# Patient Record
Sex: FEMALE | Race: WHITE
[De-identification: names, ages, dates, MRNs, and addresses within clinical notes are randomized per-mention and may not be internally consistent; named-entity substitution may affect disease eponyms.]

---

## 2018-03-23 ENCOUNTER — HOSPITAL ENCOUNTER (EMERGENCY)
Dept: HOSPITAL 75 - ER | Age: 68
End: 2018-03-23
Payer: COMMERCIAL

## 2018-03-23 VITALS — HEIGHT: 61 IN | WEIGHT: 151 LBS | BODY MASS INDEX: 28.51 KG/M2

## 2018-03-23 VITALS — DIASTOLIC BLOOD PRESSURE: 70 MMHG | SYSTOLIC BLOOD PRESSURE: 162 MMHG

## 2018-03-23 DIAGNOSIS — Z90.710: ICD-10-CM

## 2018-03-23 DIAGNOSIS — S16.1XXA: Primary | ICD-10-CM

## 2018-03-23 DIAGNOSIS — S39.012A: ICD-10-CM
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
## 2018-03-23 DIAGNOSIS — Z79.4: ICD-10-CM

## 2018-03-23 DIAGNOSIS — Z87.59: ICD-10-CM

## 2018-03-23 DIAGNOSIS — Z88.8: ICD-10-CM

## 2018-03-23 LAB
ALBUMIN SERPL-MCNC: 4.1 GM/DL (ref 3.2–4.5)
ALP SERPL-CCNC: 103 U/L (ref 40–136)
ALT SERPL-CCNC: 11 U/L (ref 0–55)
APTT BLD: 28 SEC (ref 24–35)
APTT PPP: YELLOW S
BACTERIA #/AREA URNS HPF: (no result) /HPF
BASOPHILS # BLD AUTO: 0 10^3/UL (ref 0–0.1)
BASOPHILS NFR BLD AUTO: 0 % (ref 0–10)
BILIRUB SERPL-MCNC: 0.4 MG/DL (ref 0.1–1)
BILIRUB UR QL STRIP: NEGATIVE
BUN/CREAT SERPL: 21
CALCIUM SERPL-MCNC: 9.2 MG/DL (ref 8.5–10.1)
CHLORIDE SERPL-SCNC: 107 MMOL/L (ref 98–107)
CO2 SERPL-SCNC: 25 MMOL/L (ref 21–32)
CREAT SERPL-MCNC: 0.78 MG/DL (ref 0.6–1.3)
EOSINOPHIL # BLD AUTO: 0.1 10^3/UL (ref 0–0.3)
EOSINOPHIL NFR BLD AUTO: 2 % (ref 0–10)
ERYTHROCYTE [DISTWIDTH] IN BLOOD BY AUTOMATED COUNT: 13.4 % (ref 10–14.5)
FIBRINOGEN PPP-MCNC: (no result) MG/DL
GFR SERPLBLD BASED ON 1.73 SQ M-ARVRAT: > 60 ML/MIN
GLUCOSE SERPL-MCNC: 220 MG/DL (ref 70–105)
GLUCOSE UR STRIP-MCNC: (no result) MG/DL
HCT VFR BLD CALC: 38 % (ref 35–52)
HGB BLD-MCNC: 13.1 G/DL (ref 11.5–16)
INR PPP: 1.1 (ref 0.8–1.4)
KETONES UR QL STRIP: NEGATIVE
LEUKOCYTE ESTERASE UR QL STRIP: (no result)
LYMPHOCYTES # BLD AUTO: 2.1 X 10^3 (ref 1–4)
LYMPHOCYTES NFR BLD AUTO: 29 % (ref 12–44)
MANUAL DIFFERENTIAL PERFORMED BLD QL: NO
MCH RBC QN AUTO: 30 PG (ref 25–34)
MCHC RBC AUTO-ENTMCNC: 34 G/DL (ref 32–36)
MCV RBC AUTO: 87 FL (ref 80–99)
MONOCYTES # BLD AUTO: 0.4 X 10^3 (ref 0–1)
MONOCYTES NFR BLD AUTO: 5 % (ref 0–12)
NEUTROPHILS # BLD AUTO: 4.6 X 10^3 (ref 1.8–7.8)
NEUTROPHILS NFR BLD AUTO: 64 % (ref 42–75)
NITRITE UR QL STRIP: NEGATIVE
PH UR STRIP: 8 [PH] (ref 5–9)
PLATELET # BLD: 170 10^3/UL (ref 130–400)
PMV BLD AUTO: 9.6 FL (ref 7.4–10.4)
POTASSIUM SERPL-SCNC: 3.8 MMOL/L (ref 3.6–5)
PROT SERPL-MCNC: 7.5 GM/DL (ref 6.4–8.2)
PROT UR QL STRIP: (no result)
PROTHROMBIN TIME: 14.4 SEC (ref 12.2–14.7)
RBC # BLD AUTO: 4.39 10^6/UL (ref 4.35–5.85)
RBC #/AREA URNS HPF: (no result) /HPF
RENAL EPI CELLS #/AREA URNS HPF: (no result) /HPF
SODIUM SERPL-SCNC: 141 MMOL/L (ref 135–145)
SP GR UR STRIP: 1.01 (ref 1.02–1.02)
SQUAMOUS #/AREA URNS HPF: (no result) /HPF
UROBILINOGEN UR-MCNC: NORMAL MG/DL
WBC # BLD AUTO: 7.2 10^3/UL (ref 4.3–11)
WBC #/AREA URNS HPF: (no result) /HPF

## 2018-03-23 PROCEDURE — 73523 X-RAY EXAM HIPS BI 5/> VIEWS: CPT

## 2018-03-23 PROCEDURE — 71045 X-RAY EXAM CHEST 1 VIEW: CPT

## 2018-03-23 PROCEDURE — 70450 CT HEAD/BRAIN W/O DYE: CPT

## 2018-03-23 PROCEDURE — 85025 COMPLETE CBC W/AUTO DIFF WBC: CPT

## 2018-03-23 PROCEDURE — 72128 CT CHEST SPINE W/O DYE: CPT

## 2018-03-23 PROCEDURE — 73030 X-RAY EXAM OF SHOULDER: CPT

## 2018-03-23 PROCEDURE — 93005 ELECTROCARDIOGRAM TRACING: CPT

## 2018-03-23 PROCEDURE — 71260 CT THORAX DX C+: CPT

## 2018-03-23 PROCEDURE — 74177 CT ABD & PELVIS W/CONTRAST: CPT

## 2018-03-23 PROCEDURE — 72131 CT LUMBAR SPINE W/O DYE: CPT

## 2018-03-23 PROCEDURE — 72125 CT NECK SPINE W/O DYE: CPT

## 2018-03-23 PROCEDURE — 93041 RHYTHM ECG TRACING: CPT

## 2018-03-23 PROCEDURE — 36415 COLL VENOUS BLD VENIPUNCTURE: CPT

## 2018-03-23 PROCEDURE — 85610 PROTHROMBIN TIME: CPT

## 2018-03-23 PROCEDURE — 85730 THROMBOPLASTIN TIME PARTIAL: CPT

## 2018-03-23 PROCEDURE — 80053 COMPREHEN METABOLIC PANEL: CPT

## 2018-03-23 PROCEDURE — 81000 URINALYSIS NONAUTO W/SCOPE: CPT

## 2018-03-23 PROCEDURE — 96360 HYDRATION IV INFUSION INIT: CPT

## 2018-03-23 NOTE — XMS REPORT
MU2 Ambulatory Summary

 Created on: 2016



Ambar Grider

External Reference #: [id number]

: 1950

Sex: Female



Demographics







 Address  [address]

[place]  [address]

 

 Home Phone  [phone]

 

 Preferred Language  English

 

 Marital Status  

 

 Voodoo Affiliation  Unknown

 

 Race  White

 

 Ethnic Group  Not  or 





Author







 Mateo Pathak

 

 [facility]

 

 Address  [address]

[place], [place]  [id number]



 

 Phone  [phone]







Care Team Providers







 Care Team Member Name  Role  Phone

 

 Mateo Cooper  PCP  Unavailable







Allergies and Adverse Reactions







 Name  Reaction  Notes

 

 Phenergan      







Plan of Treatment







 Planned Activity  Comments  Planned Date  Planned Time  Plan/Goal

 

 Uncontrolled Diabetes and Thyroid Nodules            

 

 AIRWAY INHALATION TREATMENT     10/8/2013  12:00 AM   

 

 BREATHING CAPACITY TEST     2014  12:00 AM   







Medications







 Active 

 

 Name  Start Date  Estimated Completion Date  SIG  Comments

 

 Pen Needle 31 gauge x 3/16" miscellaneous needle  2014     USES 3 
INJECTIONS A DAY   

 

 metformin 1,000 mg oral tablet  2014     take 1 tablet (1,000 mg) by oral 
route 2 times per day with morning and evening meals   

 

 Novolog Flexpen 100 unit/mL subcutaneous insulin pen  2015     INJECT 12 
UNITS SUBCUTANEOUSLY WITH BREAKFAST. LUNCH, AND SUPPER   

 

 metformin 1,000 mg oral tablet  2015     TAKE 1 TABLET BY MOUTH TWICE 
DAILY WITH MORNING AND EVENING MEALS   

 

 metformin 1,000 mg oral tablet  2015     TAKE 1 TABLET BY MOUTH TWICE 
DAILY WITH MORNING AND EVENING MEALS   

 

 carvedilol 3.125 mg oral tablet        take 1 tablet (3.125 mg) by oral route 
once daily   

 

 Benicar 40 mg oral tablet        take 1 tablet (40 mg) by oral route once 
daily   

 

 Levemir FlexTouch 100 unit/mL (3 mL) subcutaneous insulin pen  12/3/2015     
inject 45 units by subcutaneous route twice a day   

 

 tramadol 50 mg oral tablet  12/3/2015     take 1 tablet (50 mg) by oral route 
every 6 hours as needed   

 

 Xarelto 20 mg oral tablet  12/3/2015  2016  take 1 tablet (20 mg) by oral 
route once daily with the evening meal for 30 days   

 

 benzonatate 200 mg oral capsule  2016     take 1 capsule (200 mg) by oral 
route 3 times per day as needed   

 

 amoxicillin-pot clavulanate 500-125 mg oral tablet  2016  2/15/2016  take 
1 tablet by oral route every 12 hours for 7 days   









  

 

 Name  Start Date  Expiration Date  SIG  Comments

 

 Bactrim -160 mg oral tablet  2010  take 1 tablet by oral 
route every 12 hours for 10 days   

 

 Keflex 500 mg oral capsule  2010  take 1 capsule (500 mg) by 
oral route every 12 hours for 7 days   

 

 Reglan 10 mg oral tablet  2011  6/3/2011  take 1 tablet (10 mg) by oral 
route twice daily   

 

 prednisone 20 mg oral tablet  2011  take 1 tablet by oral 
route daily for 10 days   

 

 nabumetone 500 mg oral tablet  2011     take 1 tablet (500 mg) by oral 
route 2 times per day with food   

 

 indomethacin 50 mg oral capsule  2012  take 1 capsule (50 mg) 
by oral route 3 times per day with food for 7 days  "stopped it 2-3 weeks ago 
due to high glucose"

 

 Bactrim -160 mg oral tablet  2012  take 1 tablet by oral 
route every 12 hours for 7 days   

 

 insulin syringe-needle U-100 1 mL 31 x 5/16" miscellaneous syringe  2012  USE AS DIRECTED FOUR TIMES DAILY   

 

 Levemir 100 unit/mL subcutaneous solution  2012  inject 45 
units by subcutaneous route twice daily  "using pen"

 

 Augmentin 500-125 mg oral tablet  2012  take 1 tablet by oral 
route every 12 hours for 7 days   

 

 Pen Needle 31 gauge x 3/16" miscellaneous needle  2013  Uses 3 
injections a day   

 

 cephalexin 500 mg oral tablet  2013  take 1 tablet (500 mg) by 
oral route every 12 hours for 10 days   

 

 Augmentin 500-125 mg oral tablet  2013  take 1 tablet by oral 
route every 12 hours for 7 days   

 

 Accu-Chek Compact Test Miscellaneous Strip  1/3/2014  2014  TEST AS 
DIRECTED FOUR TIMES DAILY   

 

 Novolog Flexpen 100 unit/mL subcutaneous insulin pen  2014  
INJECT 12 UNITS SUBCUTANEOUSLY WITH BREAKFAST. LUNCH, AND SUPPER   

 

 gabapentin 600 mg oral tablet  2014  TAKE 1 TABLET BY MOUTH 
FOUR TIMES DAILY   









 Discontinued 

 

 Name  Start Date  Discontinued Date  SIG  Comments

 

 Aldara 5 % topical cream in packet  2010  3/15/2011  apply to the affected 
area(s) before bedtime by topical route 3 times per week and leave on skin for 
6 to 10 hours   

 

 fenofibrate 160 mg oral tablet  2010  take 1 tablet (160 mg) by 
oral route once daily   

 

 meloxicam 15 mg oral tablet  3/15/2011  2011  take 1 tablet (15 mg) by 
oral route once daily as needed   

 

 Zofran (as hydrochloride) 4 mg/5 mL oral solution  2011  take 5-
10 milliliters by oral route every 6 hours as needed   

 

 Klor-Con M20 20 mEq oral tablet,ER particles/crystals     3/5/2013  take 1 
tablet (20 meq) by oral route once daily with food  "not takiing"

 

 Diflucan 150 mg oral tablet  8/10/2011  2012  take 1 tablet (150 mg) by 
oral route once   

 

 amoxicillin 500 mg oral capsule     2012  take 1 capsule (500 mg) by oral 
route every 12 hours for 7 days   

 

 Medrol (Dominic) 4 mg oral tablets,dose pack     2012  take as directed   

 

 Lasix 20 mg oral tablet  4/10/2012  3/5/2013  take 1 tablet (20 mg) by oral 
route once daily as needed   

 

 amitriptyline 10 mg oral tablet  7/3/2012  2015  take 1 tablet by oral 
route once a day (at bedtime)   

 

 cyclobenzaprine 5 mg oral tablet  7/3/2012  10/8/2013  take 1 tablet by oral 
route once a day (at bedtime) as needed   

 

 cephalexin 500 mg oral tablet  7/3/2012  3/5/2013  take 1 tablet (500 mg) by 
oral route every 12 hours   

 

 Tessalon 200 mg oral capsule  2012  take 1 capsule (200 mg) by 
oral route 3 times per day as needed   

 

 Claritin-D 12 Hour 5-120 mg oral tablet extended release 12 hr  2012  3/5/
2013  take 1 tablet by oral route every 12 hours   

 

 naproxen 500 mg oral tablet  2014  take 1 tablet (500 mg) by 
oral route 2 times per day with food  "not taking now"

 

 Dulera 100-5 mcg/actuation inhalation HFA aerosol inhaler  10/8/2013  12/3/
2015  inhale 2 puffs by inhalation route 2 times per day in the morning and 
evening   

 

 flecainide 100 mg oral tablet     2015  take 1 tablet (100 mg) by oral 
route every 12 hours   

 

 cyclobenzaprine 10 mg oral tablet  2014  take 1 tablet (10 mg
) by oral route at bedtime as needed   

 

 Betapace 80 mg oral tablet     2015  take 1 tablet (80 mg) by oral route 
2 times per day  Dr. Turner

 

 duloxetine 30 mg oral capsule,delayed release(/EC)  2014  12/3/2015  
take 1 capsule (30 mg) by oral route once a day   

 

 hydrocodone-acetaminophen 7.5-325 mg oral tablet  2014  12/3/2015  take 
1 tablet by oral route every 6 hours as needed for pain   

 

 diltiazem HCl 120 mg oral capsule, extended release     12/3/2015  take 1 
capsule (120 mg) by oral route once daily   

 

 carvedilol 12.5 mg oral tablet     12/3/2015  take 1 tablet (12.5 mg) by oral 
route every 12 hours with food  dose change

 

 sotalol 120 mg oral tablet     12/3/2015  take 1 tablet (120 mg) by oral route 
2 times per day   







Problem List







 Description  Status  Onset

 

 Diabetes Mellitus, Type II  Active   

 

 Hemangioma in Spine  Active   

 

 Hyperlipidemia  Active   







Vital Signs







 Date  Time  BP-Sys(mm[Hg]  BP-Missy(mm[Hg])  HR(bpm)  RR(rpm)  Temp  WT  HT  HC  
BMI  BSA  BMI Percentile  O2 Sat(%)

 

 12/3/2015  10:17:00 AM  138 mmHg  72 mmHg  80 bpm  20 rpm  98.2 F  152 lbs  61 
in     28.72 kg/m2  1.72 m2     98 %

 

 2015  10:31:00 AM  130 mmHg  66 mmHg  66 bpm  20 rpm  98.1 F  143 lbs  61 
in     27.0193 kg/m  1.6708 m     98 %

 

 2014  9:39:00 AM  138 mmHg  88 mmHg  153 bpm  20 rpm  98.7 F  150 lbs  
61 in     28.34 kg/m2  1.71 m2     98 %

 

 6/3/2014  3:55:00 PM  130 mmHg  62 mmHg  69 bpm  18 rpm  97.9 F  151 lbs  61 
in     28.5309 kg/m  1.7169 m     99 %

 

 2014  10:56:00 AM  150 mmHg  74 mmHg  81 bpm  16 rpm  96.2 F  152 lbs  61 
in     28.72 kg/m2  1.72 m2     97 %

 

 2014  8:52:00 AM  138 mmHg  78 mmHg  61 bpm  20 rpm  98 F  168 lbs  61 in
     31.743 kg/m  1.811 m     100 %

 

 10/8/2013  10:08:00 AM  138 mmHg  78 mmHg  148 bpm  22 rpm  98.2 F  168 lbs  
61 in     31.74 kg/m2  1.81 m2     98 %

 

 2013  8:54:00 AM  150 mmHg  80 mmHg  78 bpm  16 rpm  98.9 F  163 lbs  61 
in     30.7983 kg/m  1.7839 m     99 %

 

 2013  10:13:00 AM  136 mmHg  78 mmHg  70 bpm  18 rpm  97.8 F  156 lbs    
              

 

 3/5/2013  9:30:00 AM  148 mmHg  80 mmHg  76 bpm  16 rpm  97.8 F  152 lbs  61 
in     28.7199 kg/m  1.7226 m      

 

 2012  10:33:00 AM  136 mmHg  74 mmHg  89 bpm  18 rpm  98.6 F  154.375 lbs
  61 in     29.17 kg/m2  1.74 m2     99 %

 

 7/3/2012  8:08:00 AM  134 mmHg  72 mmHg  78 bpm  22 rpm  98.2 F  145 lbs  61 
in     27.3972 kg/m  1.6825 m      

 

 2012  8:03:00 AM  132 mmHg  76 mmHg  70 bpm  18 rpm  97.9 F  143 lbs  61 
in     27.02 kg/m2  1.67 m2      

 

 3/26/2012  11:28:00 AM  136 mmHg  74 mmHg  89 bpm  18 rpm  97.9 F  149.125 lbs
  61 in     28.1766 kg/m  1.7062 m      

 

 2011  8:12:00 AM  132 mmHg  82 mmHg  84 bpm  16 rpm  97.8 F  150 lbs  61 
in     28.34 kg/m2  1.71 m2      

 

 2011  3:15:00 PM  130 mmHg  74 mmHg  68 bpm  18 rpm  98.3 F  156 lbs  61 
in     29.4756 kg/m  1.7451 m      

 

 8/10/2011  2:58:00 PM  120 mmHg  74 mmHg  100 bpm  20 rpm  99.4 F  169.25 lbs 
                 

 

 2011  9:49:00 AM  134 mmHg  78 mmHg  74 bpm  18 rpm  98.3 F  164 lbs     
             

 

 2011  8:15:00 AM  128 mmHg  78 mmHg  62 bpm  18 rpm  98.2 F  157 lbs     
             

 

 2011  8:54:00 AM  128 mmHg  72 mmHg  104 bpm  22 rpm  98.6 F  153 lbs     
            100 %

 

 3/15/2011  3:04:00 PM  144 mmHg  82 mmHg  72 bpm  18 rpm  98 F  163 lbs       
           

 

 2010  9:17:00 AM  124 mmHg  78 mmHg  76 bpm        145 lbs               
   

 

 2010  10:13:00 AM  130 mmHg  68 mmHg  70 bpm  16 rpm  98.3 F  144 lbs     
             

 

 2010  8:21:00 AM  134 mmHg  76 mmHg  70 bpm  20 rpm  98.6 F  147 lbs      
            







Social History







 Name  Description  Comments

 

 Tobacco  Never smoker   







History of Procedures







 Date Ordered  Description  Order Status

 

 12/3/2015 12:00 AM  LIPID PANEL  Reviewed

 

 12/3/2015 12:00 AM  GLYCOSYLATED HEMOGLOBIN TEST  Reviewed

 

 12/3/2015 12:00 AM  COMPREHEN METABOLIC PANEL  Reviewed

 

 12/3/2015 12:00 AM  Endocrinology Consultation  Reviewed

 

 2011 12:00 AM  METABOLIC PANEL TOTAL CA  Reviewed

 

 2011 12:00 AM  C-REACTIVE PROTEIN  Reviewed

 

 2011 12:00 AM  RBC SED RATE AUTOMATED  Reviewed

 

 2011 12:00 AM  X-RAY EXAM OF FOOT  Reviewed

 

 3/26/2012 12:00 AM  COMPLETE CBC W/AUTO DIFF WBC  Returned

 

 3/26/2012 12:00 AM  PROTHROMBIN TIME  Returned

 

 3/26/2012 12:00 AM  ASSAY OF BLOOD/URIC ACID  Returned

 

 3/27/2012 12:00 AM  RBC SED RATE AUTOMATED  Returned

 

 3/27/2012 12:00 AM  GLYCOSYLATED HEMOGLOBIN TEST  Returned

 

 2010 12:00 AM  BREATHING CAPACITY TEST  Reviewed

 

 10/8/2013 12:00 AM  CHEST X-RAY 2VW FRONTAL&LATL  Reviewed

 

 10/17/2013 12:00 AM  CHEST X-RAY 2VW FRONTAL&LATL  Reviewed

 

 2014 12:00 AM  CT HEAD/BRAIN W/O & W/DYE  Reviewed

 

 6/3/2014 12:00 AM  ECG MONIT/REPRT UP TO 48 HRS  Reviewed

 

 6/3/2014 12:00 AM  TTE W/O DOPPLER COMPLETE  Reviewed

 

 6/3/2014 12:00 AM  Carotid Doppler  Reviewed

 

 2010 12:00 AM  COMPREHEN METABOLIC PANEL  Reviewed

 

 2010 12:00 AM  LIPID PANEL  Reviewed

 

 2010 12:00 AM  GLYCOSYLATED HEMOGLOBIN TEST  Reviewed

 

 2010 12:00 AM  MICROALBUMIN SEMIQUANT  Reviewed

 

 2010 12:00 AM  DRAINAGE OF SKIN ABSCESS  Reviewed

 

 2010 12:00 AM  Urine drug screen  Reviewed

 

 2011 12:00 AM  METABOLIC PANEL TOTAL CA  Reviewed

 

 2011 12:00 AM  METABOLIC PANEL TOTAL CA  Reviewed

 

 2011 12:00 AM  C-REACTIVE PROTEIN  Reviewed

 

 8/15/2011 12:00 AM  METABOLIC PANEL TOTAL CA  Reviewed

 

 8/15/2011 12:00 AM  GLYCOSYLATED HEMOGLOBIN TEST  Reviewed







Results Summary







 Data and Description  Results

 

 2010 8:22 AM  TRIGLYCERIDES 666.0 mg/dLCHOLESTEROL 241.0 mg/dLHDL 34.0 mg/
dLLDL (CALC) INVALID MG/DLGLUCOSE 336.0 mg/dLSODIUM 138.0 mmol/LPOTASSIUM 3.40 
mmol/LCHLORIDE 102.0 mmol/LCO2 22.0 mmol/LBUN 8.0 mg/dLCREATININE 0.70 mg/dLSGOT
/AST 15.0 IU/LSGPT/ALT 7.0 IU/LALK PHOS 93.0 IU/LTOTAL PROTEIN 7.30 g/dLALBUMIN 
3.90 g/dLTOTAL BILI 0.40 mg/dLCALCIUM 9.10 mg/dLeGFR >60 mL/min/1.73 [id number] UR 
57.20 mg/dLMICROALBUMIN UR 19.0 ug/mLALB:CREAT RATIO 33 

 

 2011 9:15 AM  GLUCOSE 49.0 mg/dLSODIUM 143.0 mmol/LPOTASSIUM 3.30 mmol/
LCHLORIDE 101.0 mmol/LCO2 30.0 mmol/LBUN 12.0 mg/dLCREATININE 0.50 mg/dLCALCIUM 
9.10 mg/dLeGFR >60 mL/min/1.73 m2

 

 2011 10:25 AM  C REACTIVE PROTEIN 6.0 mg/LGLUCOSE 88.0 mg/dLSODIUM 143.0 
mmol/LPOTASSIUM 3.60 mmol/LCHLORIDE 104.0 mmol/LCO2 26.0 mmol/LBUN 11.0 mg/
dLCREATININE 0.70 mg/dLCALCIUM 9.40 mg/dLeGFR >60 mL/min/1.73 m2

 

 2011 11:00 AM  GLUCOSE 160.0 mg/dLSODIUM 141.0 mmol/LPOTASSIUM 3.70 mmol/
LCHLORIDE 105.0 mmol/LCO2 21.0 mmol/LBUN 13.0 mg/dLCREATININE 0.70 mg/dLCALCIUM 
9.50 mg/dLeGFR >60 mL/min/1.73 m2

 

 2011 3:50 PM  C REACTIVE PROTEIN 5.0 mg/LGLUCOSE 361.0 mg/dLSODIUM 140.0 
mmol/LPOTASSIUM 3.80 mmol/LCHLORIDE 102.0 mmol/LCO2 24.0 mmol/LBUN 9.0 mg/
dLCREATININE 1.0 mg/dLCALCIUM 9.60 mg/dLeGFR 57 SEDRATE 24.0 mm/hr

 

 3/26/2012 12:00 PM  WBC 7.3 RBC 4.46 HGB 13.50 g/dLHCT 39.0 %MCV 87.0 fLMCH 
30.30 pgMCHC 34.60 g/dLRDW CV 13.20 %MPV 9.60 fLPLT 142 %NEUT 59.60 %%LYMP 
33.50 %%MONO 5.60 %%EOS 1.20 %%BASO 0.10 %#NEUT 4.32 #LYMP 2.43 #MONO 0.41 #EOS 
0.09 #BASO 0.01 PROTIME 10.60 secsINR 1.0 URIC ACID 3.9 mg/dL

 

 3/27/2012 8:45 PM  SEDRATE 31.0 mm/hr

 

 2015 10:15 AM  TSH 0.390 uIU/mL

 

 12/3/2015 11:22 AM  TRIGLYCERIDES 182.0 mg/dLCHOLESTEROL 180.0 mg/dLHDL 50.0 mg
/dLLDL (CALC) 94.0 mg/dLGLUCOSE 99.0 mg/dLSODIUM 142.0 mmol/LPOTASSIUM 4.20 mmol
/LCHLORIDE 112.0 mmol/LCO2 21.0 mmol/LBUN 17.0 mg/dLCREATININE 0.70 mg/dLSGOT/
AST 17.0 IU/LSGPT/ALT 12.0 IU/LALK PHOS 86.0 IU/LTOTAL PROTEIN 6.30 g/dLALBUMIN 
4.10 g/dLTOTAL BILI 0.50 mg/dLCALCIUM 9.30 mg/dLeGFR >60 mL/min/1.73m







History Of Immunizations

Not available.



History of Past Illness







 Name  Date of Onset  Comments

 

 Diabetes Mellitus, Type II      

 

 Hyperlipidemia      

 

 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      

 

 Bronchitis, Chronic  2010  8:23AM   

 

 Subcutaneous Nodule  2010  8:23AM   

 

 Diabetes Mellitus, Type II  2010 10:15AM   

 

 Hyperlipidemia, Mixed  2010 10:15AM   

 

 Sebaceous Cyst  2010 11:32PM   

 

 General Medical Exam, Adult  2010  9:19AM   

 

 Bursitis, right knee  Mar 15 2011  3:05PM   

 

 Gastroenteritis, Viral  May  2 2011  8:56AM   

 

 Hypokalemia  May 11 2011  8:15AM   

 

 Polymyalgia Rheumatica  May 11 2011  8:15AM   

 

 Hypokalemia  2011  9:48AM   

 

 Polymyalgia Rheumatica  2011  9:48AM   

 

 Diabetes Mellitus, Type II  2011  9:48AM   

 

 Diabetes Mellitus, Type II  Aug 10 2011  2:57PM   

 

 Edema bilateral lower extremities  Aug 10 2011  2:57PM   

 

 Polymyalgia Rheumatica  Dec 14 2011  3:17PM   

 

 Arthralgia  Dec 14 2011  3:17PM   

 

 Pain in  foot, Left Heel  Dec 27 2011  8:16AM   

 

 Edema left lower ext.  Mar 26 2012 11:30AM   

 

 Petechial Rash  Mar 26 2012 11:30AM   

 

 Diabetes Mellitus, Type II  Mar 27 2012  1:39PM   

 

 Polymyalgia Rheumatica  Mar 27 2012  1:39PM   

 

 Edema left lower ext.  2012  8:05AM   

 

 Diabetes Mellitus, Type II  2012  8:05AM   

 

 Cellulitis left foot  2012  8:05AM   

 

 Myalgia  Jul  3 2012  8:10AM   

 

 Headache  Jul  3 2012  8:10AM   

 

 Neuropathy, right foot  Jul  3 2012  8:10AM   

 

 Upper Respiratory Infections  Dec  4 2012 10:35AM   

 

 Diabetes Mellitus, Type II  Mar  5 2013  9:32AM   

 

 Hyperlipidemia  Mar  5 2013  9:32AM   

 

 Atrial Fibrillation  Mar  5 2013  9:32AM   

 

 Osteoarthritis, hand  Mar  5 2013  9:32AM   

 

 Cellulitis  2013 10:15AM   

 

 Right Acute Otitis Media  Aug 28 2013  8:55AM   

 

 Chronic Cough  Oct  8 2013 10:10AM   

 

 Shortness of breath  Oct  8 2013 10:10AM   

 

 Cough  Oct 17 2013  9:40AM   

 

 Tension Headache  2014  8:54AM   

 

 Blurred Vision  2014  8:54AM   

 

 Bronchitis, Acute  2014  8:54AM   

 

 Postoperative Follow-up  2014 10:56AM   

 

 Basal Cell  2014 10:56AM   

 

 Basal Cell Carcinoma of Skin  2014 10:59AM   

 

 Sebaceous Cyst  2014 10:59AM   

 

 Syncope  Oc  3 2014  3:57PM   

 

 Basal cell carcinoma of skin, site unspecified  2014  7:25AM   

 

 Lumbar back pain  Dec 19 2014  9:42AM   

 

 Left Radiculopathy of leg  Dec 19 2014  9:42AM   

 

 Depressive Disorder  Dec 19 2014  9:42AM   

 

 Diabetes Mellitus, Type II  May 13 2015 10:33AM   

 

 Hyperlipidemia  May 13 2015 10:33AM   

 

 Systolic CHF, acute  May 13 2015 10:33AM   

 

 Resolved Atrial fibrillation  May 13 2015 10:33AM   

 

 Mixed hyperlipidemia  Dec  3 2015 10:19AM   

 

 Diabetes mellitus type 2, uncontrolled  Dec  3 2015 10:19AM   

 

 Other osteoarthritis involving multiple joints  Dec  3 2015 10:19AM   







Payers







 Insurance Name  Company Name  Plan Name  Plan Number  Policy Number  Policy 
Group Number  Start Date

 

    [employer]  [employer] [place]     [id number]     2015

 

    [employer]  [employer] [place]     [id number]     

 

    [facility]  [employer]MR[employer] 
PHYS/DS  [id number]     N/A







History of Encounters







 Visit Date  Visit Type  Provider

 

 12/3/2015  Office visit  Mateo Cooper DO

 

 5/15/2015  Hospital  LEXX Holm MD

 

 2015  Office visit  Mateo Cooper DO

 

 2014  Office visit  Mateo Cooper DO

 

 2014  Hospital  LEXX Holm MD

 

 2014  Procedures  David Suresh MD

 

 6/3/2014  Office visit  Mateo Cooper DO

 

 2014  Office visit  David Suresh MD

 

 2014  Procedures  David Suresh MD

 

 2014  Hospital  LEXX Holm MD

 

 2014  Office visit  Mateo Cooper DO

 

 10/8/2013  Office visit  Mateo Cooper DO

 

 2013  Office visit  Mateo Cooper DO

 

 2013  Office visit  Mateo Cooper DO

 

 3/5/2013  Office visit  Mateo Cooper DO

 

 2013  [facility]  LEXX Holm MD

 

 2012  Office visit  Jenni Katz APRN

 

 2012  Hospital  LEXX Holm MD

 

 7/3/2012  Office visit  Mateo Cooper DO

 

 2012  [facility]  Kristian Kiser MD

 

 2012  [facility]  LEXX Holm MD

 

 2012  [facility]  Kristian Kiser MD

 

 2012  [facility]  LEXX Holm MD

 

 2012  Office visit  Mateo Cooper DO

 

 3/26/2012  Office visit  Jenni aKtz APRN

 

 2011  Office visit  Mateo Cooper DO

 

 2011  Office visit  Jenni Katz APRN

 

 8/10/2011  Office visit  Jenni Katz APRN

 

 2011  Office visit  Mateo Cooper DO

 

 2011  Office visit  Mateo Cooper DO

 

 2011  [facility]  Bandar Zelaya MD

 

 2011  [facility]  Bandar Zelaya MD

 

 2011  [facility]  Bandar Zelaya MD

 

 2011  [facility]  LEXX Holm MD

 

 2011  Office visit  Mateo Cooper DO

 

 3/15/2011  Office visit  Mateo Cooper DO

 

 2010  Office visit  David Burciaga PA-C

 

 2010  Office visit  Mateo Cooper DO

 

 2010  Laboratory  Ayush Finn MD

 

 2010  Laboratory  Ayush Finn MD

 

 2010  Office visit  Mateo Cooper DO

## 2018-03-23 NOTE — XMS REPORT
Patient Summary (HL7 CCD)

 Created on: 2015



RICK POWERS

External Reference #: [id number]

: 1950

Sex: Female



Demographics







 Address  [address]

[facility]  [id number]

 

 Home Phone  [phone]

 

 Preferred Language  English

 

 Marital Status  Unknown

 

 Denominational Affiliation  Unknown

 

 Race  White

 

 Ethnic Group  Not  or 





Author







 Author  JEOVANNY PONCE

 

 Organization  Unknown

 

 Address  [address]

[place]  [id number]



 

 Phone  [phone]







Care Team Providers







 Care Team Member Name  Role  Phone

 

 CATHRYN HOSPITALISTDWAIN MD  Attphys  [phone]

 

 SUSAN MIKE DO  Prisurg  [phone]



                                                      



Vital Signs

          





 Vital Sign        Value        Unit        Date/Time        Recent/Initial?    

 

 Weight Measured        151.1        lbs        2015 16:55        Initial 
VS    

 

 Height        61        in        2015 16:55        Initial VS    

 

 BMI (Body Mass Index)        28.55        kg/m^2        2015 16:55      
  Initial VS    

 

 BSA (Body Surface Area)        1.72        m^2        2015 16:55        
Initial VS    

 

 Body Temperature        100.7        degrees        2015 16:55        
Initial VS    

 

 BP Systolic        120        mmHg        2015 16:56        Initial VS  
  

 

 BP Diastolic        75        mmHg        2015 16:56        Initial VS  
  

 

 Respiratory Rate        22        bpm        2015 16:57        Initial 
VS    

 

 Heart Rate        154        bpm        2015 16:57        Initial VS    

 

 O2 % BldC Oximetry        97        %        2015 16:57        Initial 
VS    

 

 Body Temperature        100        degrees        2015 21:48        Most 
Recent VS    

 

 BP Systolic        110        mmHg        2015 22:16        Most Recent 
VS    

 

 BP Diastolic        58        mmHg        2015 22:16        Most Recent 
VS    

 

 Respiratory Rate        29        bpm        2015 22:18        Most 
Recent VS    

 

 Heart Rate        89        bpm        2015 22:18        Most Recent VS 
   

 

 O2 % BldC Oximetry        97        %        2015 22:18        Most 
Recent VS    



                                                                               
                                                                               



Allergies

          





 Allergy        Code        Allergy Type        Reaction        Status    

 

 PHENERGAN        867896        Drug allergy        BREATHING PROBLEMS        
Active    

 

 ETOMIDATE        4177        Drug allergy        LARYNGOSPASM, VENTRICULAR 
ARRHYTHMIA        Active    



                                                                               
         



Procedures

          





 Procedure        Code        Procedure Type        Date    

 

 ATRIAL CARDIOVERSION        9961        ICD-9 CM, Volume 3        2015  
  

 

 US ECHO 2D COMP WITH DOPP AND COLOR        98280987        SNOMED CT            

 

 LOCM 300-349 MG/ML, PER ML        276714834        SNOMED CT        2015
    

 

 CT CHEST W/CONTRAST        45401462        SNOMED CT        2015    

 

 CX CHEST 2 VIEWS        749669842        SNOMED CT        2015    

 

 ^SENSITIVITY        839171498        SNOMED CT        2015    

 

 BEDSIDE GLUCOSE        67793866        SNOMED CT        2015    

 

 MAGNESIUM        877606449        SNOMED CT        2015    

 

 TROPONIN-I ADV        025966313        SNOMED CT        2015    

 

 CULTURE NASAL        948847455        SNOMED CT        2015    

 

 TSH        21700145        SNOMED CT        2015    

 

 MAGNESIUM        540726140        SNOMED CT        2015    

 

 RSV        358640272        SNOMED CT        2015    

 

 ^CBC W/AUTO DIFF        7425992        SNOMED CT        2015    

 

 TROPONIN-I ADV        913007806        SNOMED CT        2015    

 

 URINALYSIS C&S IF IND        234848391        SNOMED CT        2015    

 

 C REACTIVE PROTEIN        15748524        SNOMED CT        2015    

 

 INFLUENZA A & B        750564777        SNOMED CT        2015    

 

 COMPREHENSIVE METABOLIC PANEL        282050308        SNOMED CT        2015    

 

 CBC W/ AUTO DIFF (RFLX MAN DIFF IF IND)        1930953        SNOMED CT        
2015    

 

 TECH ASSIST HOURLY        600689462        SNOMED CT        2015    

 

 BAN AERO ECLIPSE TREATMENT #2        61110709        SNOMED CT        2015    

 

 BAN AERO ECLIPSE TREATMENT        50034056        SNOMED CT        2015 
   

 

 BAN AERO ECLIPSE TREATMENT        86473623        SNOMED CT        2015 
   

 

 BAN AERO ECLIPSE TREATMENT        77561539        SNOMED CT        2015 
   

 

 OXYGEN/HOUR        318256404        SNOMED CT        2015    



                                                                               
                                                                               
                                                                               
                                                                               
            



History of Immunizations

          





 Immunization        Code        Date    

 

 pneumococcal, unspecified formulation        109        2012    

 

 Influenza, seasonal, injectable        141        10/18/2012    



                                                                               
         



Problems

          





 Problem        Code        Start Date        Resolved Date        Status    

 

 ATRIAL FIBRILLATION WITH RAPID VENTRICULAR RESPONSE        841995849                         Active    



                                                                               
         



Results

          





 BEDSIDE GLUCOSE - Collect Date/Time: 2015 21:28     

 

 Test Name        Code        Test Result        Test Units        Test Ref 
Range    

 

 GLUCOSE POCT                 360        MG/DL        L=70       H=100    











 COMPREHENSIVE METABOLIC PANEL - Collect Date/Time: 2015 13:10     

 

 Test Name        Code        Test Result        Test Units        Test Ref 
Range    

 

 GLUCOSE        2345-7        292        MG/DL        L=70       H=100    

 

 SODIUM        2951-2        139        MEQ/L        L=135      H=148    

 

 POTASSIUM        2823-3        3.7        MEQ/L        L=3.5      H=5.3    

 

 CHLORIDE        2075-0        105        MEQ/L        L=96       H=110    

 

 CO2        2028-9        19        MEQ/L        L=22       H=29    

 

 BUN        3094-0        5        MG/DL        L=8        H=22    

 

 CREATININE        2160-0        0.7        MG/DL        L=0.6      H=1.6    

 

 SGOT/AST        1920-8        13        IU/L        L=10       H=40    

 

 SGPT/ALT        1742-6        6        IU/L        L=8        H=54    

 

 ALK PHOS        6768-6        72        IU/L        L=35       H=115    

 

 TOTAL PROTEIN        2885-2        7.3        G/DL        L=5.5      H=8.5    

 

 ALBUMIN        1751-7        4.4        G/DL        L=3.1      H=5.4    

 

 TOTAL BILI        1975-2        1.6        MG/DL        L=0.0      H=1.5    

 

 CALCIUM        27146-0        9.2        MG/DL        L=8.2      H=10.6    

 

 AGE                 64        yrs             

 

 GFR NonAA                 85                      

 

 GFR AA                 103                      

 

 eGFR                 >60        N/A             

 

 eGFR AA*                 >60        N/A             











 CBC W/ AUTO DIFF (RFLX MAN DIFF IF IND) - Collect Date/Time: 2015 13:10 
    

 

 Test Name        Code        Test Result        Test Units        Test Ref 
Range    

 

 WBC        58877-2        8.0        TH/CMM        L=4.5      H=10.8    

 

 RBC        789-8        4.52        ML/CMM        L=4.20     H=5.40    

 

 HGB        718-7        13.4        G/DL        L=12.0     H=16.0    

 

 HCT        4544-3        40.0        %        L=37.0     H=47.0    

 

 MCV                 89        FL        L=81       H=99    

 

 MCH                 29.6        PG        L=27.0     H=33.0    

 

 MCHC                 33.5        G/DL        L=31.0     H=36.0    

 

 RDW SD                 44        FL        L=36       H=50    

 

 RDW CV                 13.6        %        L=0.0      H=14.8    

 

 MPV                 10.1        FL        L=9.3      H=12.5    

 

 PLT        777-3        123        TH/CMM        L=130      H=440    

 

 NRBC#                 0.00        TH/CMM        L=0.00     H=0.00    

 

 NRBC%                 0.0        /100WBC        L=0.0      H=2.0    

 

 %NEUT                 80.6        %             

 

 %LYMP                 14.6        %             

 

 %MONO                 4.4        %             

 

 %EOS                 0.3        %             

 

 %BASO                 0.1        %             

 

 #NEUT                 6.41        TH/CMM        L=2.10     H=8.20    

 

 #LYMP                 1.16        TH/CMM        L=0.90     H=5.20    

 

 #MONO                 0.35        TH/CMM        L=0.16     H=1.00    

 

 #EOS                 0.02        TH/CMM        L=0.00     H=0.80    

 

 #BASO                 0.01        TH/CMM        L=0.00     H=0.20    

 

 MANUAL DIFF                 NOT IND        N/A             











 INFLUENZA A & B - Collect Date/Time: 2015 13:10     

 

 Test Name        Code        Test Result        Test Units        Test Ref 
Range    

 

 INFLUENZA A & B        6437-8        NO INFLUENZA A OR B DETECTED        N/A  
           











 RSV - Collect Date/Time: 2015 13:10     

 

 Test Name        Code        Test Result        Test Units        Test Ref 
Range    

 

 RSV        5876-8        NEGATIVE        N/A        NL: NEGATIVE    











 C REACTIVE PROTEIN - Collect Date/Time: 2015 13:10     

 

 Test Name        Code        Test Result        Test Units        Test Ref 
Range    

 

 C REACTIVE PROTEIN        1988-5        2.6        MG/DL        L=0.0      H=
1.0    











 TROPONIN-I ADV - Collect Date/Time: 2015 20:40     

 

 Test Name        Code        Test Result        Test Units        Test Ref 
Range    

 

 TROPONIN-I AD        35598-2        <0.04        ng/mL        L=0.04     H=
0.40    











 TROPONIN-I ADV - Collect Date/Time: 2015 13:45     

 

 Test Name        Code        Test Result        Test Units        Test Ref 
Range    

 

 TROPONIN-I AD        10088-5        <0.04        ng/mL        L=0.04     H=
0.40    











 TSH - Collect Date/Time: 2015 13:10     

 

 Test Name        Code        Test Result        Test Units        Test Ref 
Range    

 

 TSH        23125-2        0.60        mIU/L        L=0.35     H=4.94    











 MAGNESIUM - Collect Date/Time: 2015 20:40     

 

 Test Name        Code        Test Result        Test Units        Test Ref 
Range    

 

 MAGNESIUM        27751-4        2.2        MG/DL        L=1.7      H=2.8    











 MAGNESIUM - Collect Date/Time: 2015 13:10     

 

 Test Name        Code        Test Result        Test Units        Test Ref 
Range    

 

 MAGNESIUM        85390-1        1.4        MG/DL        L=1.7      H=2.8    











 URINALYSIS C&S IF IND - Collect Date/Time: 2015 13:30     

 

 Test Name        Code        Test Result        Test Units        Test Ref 
Range    

 

 COLOR                 YELLOW        N/A        NL: YELLOW    

 

 APPEARANCE                 CLEAR        N/A        NL: CLEAR    

 

 SPEC GRAV                 1.025        N/A        NL: 1.002 - 1.022    

 

 pH                 5.5        N/A        NL: 5 - 9    

 

 PROTEIN                 TRACE        N/A        NL: NEGATIVE  mg/dl    

 

 GLUCOSE                 500        N/A        NL: NEGATIVE  mg/dl    

 

 KETONE                 >=80        N/A        NL: NEGATIVE  mg/dl    

 

 BILIRUBIN                 NEGATIVE        N/A        NL: NEGATIVE    

 

 BLOOD                 SMALL        N/A        NL: NEGATIVE    

 

 NITRITE                 NEGATIVE        N/A        NL: NEGATIVE    

 

 LEUK SCREEN                 NEGATIVE        N/A        NL: NEGATIVE    

 

 WBC/HPF                 RARE        N/A        NL: NEGATIVE    

 

 RBC/HPF                 0-5        N/A        NL: NEGATIVE    

 

 CASTS/LPF                 NEGATIVE        N/A        NL: NEGATIVE    

 

 CRYSTALS                 NEGATIVE        N/A        NL: NEGATIVE    

 

 MUCOUS THRDS                 1+        N/A        NL: NEGATIVE    

 

 BACTERIA                 FEW        N/A        NL: NEGATIVE    

 

 EPITH CELLS                 FEW SQUAMOUS        N/A        NL: NEGATIVE    

 

 TRICHOMONAS                 NEGATIVE        N/A        NL: NEGATIVE    

 

 YEAST                 NEGATIVE        N/A        NL: NEGATIVE    

 

 CULT SET UP?                 NO        N/A             



                                                                               
                                                                               
          



Active Medications

          Unknown or Not Available.                                            
                        



Medications Administered During Visit

          





 Medication        Dose        Units        Frequency        Route        Date/
Time of Last Dose    

 

 NS  1000 ML IV [PREDEFINED]   (7983)                          X1        IV    
    2015 17:35    

 

 METOPROLOL [LOPRESSOR] INJ: 5MG/5ML VIAL        5        MG        X1        
IVP        2015 18:06    

 

 DUONEB  [IPRATROPIUM/ALBUTEROL] 0.5/3 MG        1        UD        QID (RT ONLY
)        INHALE        2015 19:43    

 

 BUDESONIDE [PULMICORT] RESP 0.5MG/2ML        1        DOSE        Q 12 HRS (RT 
ONLY)        INHALE        2015 19:43    

 

 ONDANSETRON [ZOFRAN] INJ 4 MG/2 ML VIAL        4        MG        PRN Q 6 HRS 
       SIVP        2015 20:29    

 

 ROCEPHIN IV [PREDEFINED]: 1GM IV Q 12 HR                                   
Q12H        IVPB        2015 20:24    

 

 ZITHROMAX 500MG ADV IV [PREDEFINED]                          Q24H        IVPB 
       2015 20:54    

 

 MAGNESIUM SULFATE : 1GM/2ML        2        GM        X1        IVP         18:21    

 

 ETOMIDATE 2 MG PER 1 Ml 10 mL Vial        14        MG        X1        IVP   
     2015 18:40    

 

 METOPROLOL [LOPRESSOR] INJ: 5MG/5ML VIAL        5        MG        X1        
IVP        2015 18:34    

 

 NS + KCL 20MEQ 1000ML IV [PREDEFINED]                                   CONT 
IV        IV        2015 20:29    

 

 MORPHINE  INJ: 2MG/ML 1 ML SYRINGE        2        MG        X1        IVP    
    2015 20:59    

 

 FUROSEMIDE (LASIX):  40 MG/4 ML VIAL        40        MG        X1        IVP 
       2015 21:12    

 

 MORPHINE  INJ: 2MG/ML 1 ML SYRINGE        2        MG        X1        IVP    
    2015 21:12    

 

 METOPROLOL [LOPRESSOR] INJ: 5MG/5ML VIAL        2.5        MG        X1        
IVP        2015 21:28    

 

 ONDANSETRON [ZOFRAN] INJ 4 MG/2 ML VIAL        4        MG        X1        
SIVP        2015 21:45    

 

 INSULIN [NOVOLOG] 100UNITS/ML  (SQ) 10ML        8        EA        X1        
SUB Q        2015 21:53    

 

 ONDANSETRON [ZOFRAN] INJ 4 MG/2 ML VIAL        4        MG        X1        
SIVP        2015 22:37    



                                                                               
                                                                               
                                                                               
           



Encounters

          





 Encounter Diagnosis        Diagnosis Code        Start Date    

 

 ATRIAL FIBRILLATION        29771        2015    



                                                                    



Social History

          





 Smoking Status        Code        Start Date        End Date    

 

 Never smoker        [id number]                      



                                                                    



Patient Decision Aids

          Unknown or Not Available.                                            
              



Discharge Instructions

          





         



You were admitted to [facility] on 2015 with a principal 
diagnosis of ATRIAL FIBRILLATION.        



You had the following procedures done:                  ATRIAL CARDIOVERSION   
             



You were discharged from [facility] on 2015.        



Should you have any questions prior to discharge, please contact a member of 
your healthcare team. If you have left the hospital and have any questions, 
please contact your primary care physician.                  CHIEF COMPLAINT:  
            SORE THROAT STARTED YESTERDAY, DURING LAST NIGHT STARTED COUGHING, 
APPROX              0200 THIS AM PT WOKE SHORT OF BREATH, WITH FAST HR. WAITED 
TO SEE IF SHE              CONVERTED ON HER OWN AT HOME, CAME IN TO ER THIS 
AFTERNOON                                        



                                                          



Chief Complaint and Reason For Visit

          





 Chief Complaint        Date of Onset    

 

 atrial flutter             



                                                          



Function Status

          Unknown or Not Available.                                            
              



Plan of Care

          Unknown or Not Available.                                            
              



Referral/Transition of Care

          Unknown or Not Available.
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
## 2018-03-23 NOTE — XMS REPORT
MU2 Ambulatory Summary

 Created on: 10/22/2016



Ambar Grider

External Reference #: [id number]

: 1950

Sex: Female



Demographics







 Address  [address]

[place]  [address]

 

 Home Phone  [phone]

 

 Preferred Language  English

 

 Marital Status  

 

 Anabaptist Affiliation  Unknown

 

 Race  White

 

 Ethnic Group  Not  or 





Author







 Mateo Pathak

 

 [facility]

 

 Address  [address]

[place], [place]  [id number]



 

 Phone  [phone]







Care Team Providers







 Care Team Member Name  Role  Phone

 

 Mateo Cooper  PCP  Unavailable







Allergies and Adverse Reactions







 Name  Reaction  Notes

 

 Phenergan      







Plan of Treatment







 Planned Activity  Comments  Planned Date  Planned Time  Plan/Goal

 

 Uncontrolled Diabetes and Thyroid Nodules            

 

 AIRWAY INHALATION TREATMENT     10/8/2013  12:00 AM   

 

 BREATHING CAPACITY TEST     2014  12:00 AM   







Medications







 Active 

 

 Name  Start Date  Estimated Completion Date  SIG  Comments

 

 Pen Needle 31 gauge x 3/16" miscellaneous needle  2014     USES 3 
INJECTIONS A DAY   

 

 metformin 1,000 mg oral tablet  2014     take 1 tablet (1,000 mg) by oral 
route 2 times per day with morning and evening meals   

 

 metformin 1,000 mg oral tablet  2015     TAKE 1 TABLET BY MOUTH TWICE 
DAILY WITH MORNING AND EVENING MEALS   

 

 metformin 1,000 mg oral tablet  2015     TAKE 1 TABLET BY MOUTH TWICE 
DAILY WITH MORNING AND EVENING MEALS   

 

 carvedilol 3.125 mg oral tablet        take 1 tablet (3.125 mg) by oral route 
once daily   

 

 Benicar 40 mg oral tablet        take 1 tablet (40 mg) by oral route once 
daily   

 

 Levemir FlexTouch 100 unit/mL (3 mL) subcutaneous insulin pen  12/3/2015     
inject 45 units by subcutaneous route twice a day   

 

 tramadol 50 mg oral tablet  12/3/2015     take 1 tablet (50 mg) by oral route 
every 6 hours as needed   

 

 Novolog Flexpen 100 unit/mL subcutaneous insulin pen  2016     INJECT 14 
UNITS SUBCUTANEOUSLY WITH BREAKFAST. LUNCH, AND SUPPER   

 

 acetaminophen-codeine 300-30 mg oral tablet  2016     take 1 tablet by 
oral route every 4 hours as needed   

 

 amiodarone 400 mg oral tablet        take 1 tablet (400 mg) by oral route once 
daily   









  

 

 Name  Start Date  Expiration Date  SIG  Comments

 

 Bactrim -160 mg oral tablet  2010  take 1 tablet by oral 
route every 12 hours for 10 days   

 

 Keflex 500 mg oral capsule  2010  take 1 capsule (500 mg) by 
oral route every 12 hours for 7 days   

 

 Reglan 10 mg oral tablet  2011  6/3/2011  take 1 tablet (10 mg) by oral 
route twice daily   

 

 prednisone 20 mg oral tablet  2011  take 1 tablet by oral 
route daily for 10 days   

 

 nabumetone 500 mg oral tablet  2011     take 1 tablet (500 mg) by oral 
route 2 times per day with food   

 

 indomethacin 50 mg oral capsule  2012  take 1 capsule (50 mg) 
by oral route 3 times per day with food for 7 days  "stopped it 2-3 weeks ago 
due to high glucose"

 

 Bactrim -160 mg oral tablet  2012  take 1 tablet by oral 
route every 12 hours for 7 days   

 

 insulin syringe-needle U-100 1 mL 31 gauge x  miscellaneous syringe  2012  USE AS DIRECTED FOUR TIMES DAILY   

 

 Levemir 100 unit/mL subcutaneous solution  2012  inject 45 
units by subcutaneous route twice daily  "using pen"

 

 Augmentin 500-125 mg oral tablet  2012  take 1 tablet by oral 
route every 12 hours for 7 days   

 

 Pen Needle 31 gauge x 3/16" miscellaneous needle  2013  Uses 3 
injections a day   

 

 cephalexin 500 mg oral tablet  2013  take 1 tablet (500 mg) by 
oral route every 12 hours for 10 days   

 

 Augmentin 500-125 mg oral tablet  2013  take 1 tablet by oral 
route every 12 hours for 7 days   

 

 Novolog Flexpen 100 unit/mL subcutaneous insulin pen  2014  
INJECT 12 UNITS SUBCUTANEOUSLY WITH BREAKFAST. LUNCH, AND SUPPER   

 

 gabapentin 600 mg oral tablet  2014  TAKE 1 TABLET BY MOUTH 
FOUR TIMES DAILY   

 

 Xarelto 20 mg oral tablet  12/3/2015  2016  take 1 tablet (20 mg) by oral 
route once daily with the evening meal for 30 days   

 

 amoxicillin-pot clavulanate 500-125 mg oral tablet  2016  2/15/2016  take 
1 tablet by oral route every 12 hours for 7 days   

 

 Accu-Chek Compact Test miscellaneous strip  2/15/2016  2016  Check 
glucose 4 times a day [id number]:e11.65   

 

 Zostavax (PF) 19,400 unit/0.65 mL subcutaneous suspension for reconstitution  
10/17/2016  10/18/2016  inject 0.65 milliliter by subcutaneous route once   









 Discontinued 

 

 Name  Start Date  Discontinued Date  SIG  Comments

 

 Aldara 5 % topical cream in packet  2010  3/15/2011  apply to the affected 
area(s) before bedtime by topical route 3 times per week and leave on skin for 
6 to 10 hours   

 

 fenofibrate 160 mg oral tablet  2010  take 1 tablet (160 mg) by 
oral route once daily   

 

 meloxicam 15 mg oral tablet  3/15/2011  2011  take 1 tablet (15 mg) by 
oral route once daily as needed   

 

 Zofran (as hydrochloride) 4 mg/5 mL oral solution  2011  take 5-
10 milliliters by oral route every 6 hours as needed   

 

 Klor-Con M20 20 mEq oral tablet,ER particles/crystals     3/5/2013  take 1 
tablet (20 meq) by oral route once daily with food  "not takiing"

 

 Diflucan 150 mg oral tablet  8/10/2011  2012  take 1 tablet (150 mg) by 
oral route once   

 

 amoxicillin 500 mg oral capsule     2012  take 1 capsule (500 mg) by oral 
route every 12 hours for 7 days   

 

 Medrol (Dominic) 4 mg oral tablets,dose pack     2012  take as directed   

 

 Lasix 20 mg oral tablet  4/10/2012  3/5/2013  take 1 tablet (20 mg) by oral 
route once daily as needed   

 

 amitriptyline 10 mg oral tablet  7/3/2012  2015  take 1 tablet by oral 
route once a day (at bedtime)   

 

 cyclobenzaprine 5 mg oral tablet  7/3/2012  10/8/2013  take 1 tablet by oral 
route once a day (at bedtime) as needed   

 

 cephalexin 500 mg oral tablet  7/3/2012  3/5/2013  take 1 tablet (500 mg) by 
oral route every 12 hours   

 

 Tessalon 200 mg oral capsule  2012  take 1 capsule (200 mg) by 
oral route 3 times per day as needed   

 

 Claritin-D 12 Hour 5-120 mg oral tablet extended release 12 hr  2012  3/5/
2013  take 1 tablet by oral route every 12 hours   

 

 naproxen 500 mg oral tablet  2014  take 1 tablet (500 mg) by 
oral route 2 times per day with food  "not taking now"

 

 Dulera 100-5 mcg/actuation inhalation HFA aerosol inhaler  10/8/2013  12/3/
2015  inhale 2 puffs by inhalation route 2 times per day in the morning and 
evening   

 

 flecainide 100 mg oral tablet     2015  take 1 tablet (100 mg) by oral 
route every 12 hours   

 

 cyclobenzaprine 10 mg oral tablet  2014  take 1 tablet (10 mg
) by oral route at bedtime as needed   

 

 Betapace 80 mg oral tablet     2015  take 1 tablet (80 mg) by oral route 
2 times per day  Dr. Turner

 

 duloxetine 30 mg oral capsule,delayed release(/EC)  2014  12/3/2015  
take 1 capsule (30 mg) by oral route once a day   

 

 hydrocodone-acetaminophen 7.5-325 mg oral tablet  2014  12/3/2015  take 
1 tablet by oral route every 6 hours as needed for pain   

 

 diltiazem HCl 120 mg oral capsule, extended release     12/3/2015  take 1 
capsule (120 mg) by oral route once daily   

 

 carvedilol 12.5 mg oral tablet     12/3/2015  take 1 tablet (12.5 mg) by oral 
route every 12 hours with food  dose change

 

 sotalol 120 mg oral tablet     12/3/2015  take 1 tablet (120 mg) by oral route 
2 times per day   

 

 benzonatate 200 mg oral capsule  2016  10/17/2016  take 1 capsule (200 mg) 
by oral route 3 times per day as needed   

 

 Levemir FlexTouch 100 unit/mL (3 mL) subcutaneous insulin pen  3/1/2016  10/17/
2016  INJECT 43 UNITS BY SUBCUTANEOUS ROUTE TWICE A DAY   

 

 Levemir FlexTouch 100 unit/mL (3 mL) subcutaneous insulin pen  3/1/2016  10/17/
2016  INJECT 43 UNITS BY SUBCUTANEOUS ROUTE TWICE A DAY  has an insulin pump







Problem List







 Description  Status  Onset

 

 Diabetes Mellitus, Type II  Active   

 

 Hemangioma in Spine  Active   

 

 Hyperlipidemia  Active   







Vital Signs







 Date  Time  BP-Sys(mm[Hg]  BP-Missy(mm[Hg])  HR(bpm)  RR(rpm)  Temp  WT  HT  HC  
BMI  BSA  BMI Percentile  O2 Sat(%)

 

 10/17/2016  9:17:00 AM  138 mmHg  70 mmHg  83 bpm  20 rpm  97.7 F  160 lbs  61 
in     30.23 kg/m2  1.77 m2     99 %

 

 2016  1:34:00 PM  132 mmHg  60 mmHg  87 bpm  22 rpm  99 F  152 lbs  61 in
     28.7199 kg/m  1.7226 m     99 %

 

 12/3/2015  10:17:00 AM  138 mmHg  72 mmHg  80 bpm  20 rpm  98.2 F  152 lbs  61 
in     28.72 kg/m2  1.72 m2     98 %

 

 2015  10:31:00 AM  130 mmHg  66 mmHg  66 bpm  20 rpm  98.1 F  143 lbs  61 
in     27.0193 kg/m  1.6708 m     98 %

 

 2014  9:39:00 AM  138 mmHg  88 mmHg  153 bpm  20 rpm  98.7 F  150 lbs  
61 in     28.34 kg/m2  1.71 m2     98 %

 

 6/3/2014  3:55:00 PM  130 mmHg  62 mmHg  69 bpm  18 rpm  97.9 F  151 lbs  61 
in     28.5309 kg/m  1.7169 m     99 %

 

 2014  10:56:00 AM  150 mmHg  74 mmHg  81 bpm  16 rpm  96.2 F  152 lbs  61 
in     28.72 kg/m2  1.72 m2     97 %

 

 2014  8:52:00 AM  138 mmHg  78 mmHg  61 bpm  20 rpm  98 F  168 lbs  61 in
     31.743 kg/m  1.811 m     100 %

 

 10/8/2013  10:08:00 AM  138 mmHg  78 mmHg  148 bpm  22 rpm  98.2 F  168 lbs  
61 in     31.74 kg/m2  1.81 m2     98 %

 

 2013  8:54:00 AM  150 mmHg  80 mmHg  78 bpm  16 rpm  98.9 F  163 lbs  61 
in     30.7983 kg/m  1.7839 m     99 %

 

 2013  10:13:00 AM  136 mmHg  78 mmHg  70 bpm  18 rpm  97.8 F  156 lbs    
              

 

 3/5/2013  9:30:00 AM  148 mmHg  80 mmHg  76 bpm  16 rpm  97.8 F  152 lbs  61 
in     28.7199 kg/m  1.7226 m      

 

 2012  10:33:00 AM  136 mmHg  74 mmHg  89 bpm  18 rpm  98.6 F  154.375 lbs
  61 in     29.17 kg/m2  1.74 m2     99 %

 

 7/3/2012  8:08:00 AM  134 mmHg  72 mmHg  78 bpm  22 rpm  98.2 F  145 lbs  61 
in     27.3972 kg/m  1.6825 m      

 

 2012  8:03:00 AM  132 mmHg  76 mmHg  70 bpm  18 rpm  97.9 F  143 lbs  61 
in     27.02 kg/m2  1.67 m2      

 

 3/26/2012  11:28:00 AM  136 mmHg  74 mmHg  89 bpm  18 rpm  97.9 F  149.125 lbs
  61 in     28.1766 kg/m  1.7062 m      

 

 2011  8:12:00 AM  132 mmHg  82 mmHg  84 bpm  16 rpm  97.8 F  150 lbs  61 
in     28.34 kg/m2  1.71 m2      

 

 2011  3:15:00 PM  130 mmHg  74 mmHg  68 bpm  18 rpm  98.3 F  156 lbs  61 
in     29.4756 kg/m  1.7451 m      

 

 8/10/2011  2:58:00 PM  120 mmHg  74 mmHg  100 bpm  20 rpm  99.4 F  169.25 lbs 
                 

 

 2011  9:49:00 AM  134 mmHg  78 mmHg  74 bpm  18 rpm  98.3 F  164 lbs     
             

 

 2011  8:15:00 AM  128 mmHg  78 mmHg  62 bpm  18 rpm  98.2 F  157 lbs     
             

 

 2011  8:54:00 AM  128 mmHg  72 mmHg  104 bpm  22 rpm  98.6 F  153 lbs     
            100 %

 

 3/15/2011  3:04:00 PM  144 mmHg  82 mmHg  72 bpm  18 rpm  98 F  163 lbs       
           

 

 2010  9:17:00 AM  124 mmHg  78 mmHg  76 bpm        145 lbs               
   

 

 2010  10:13:00 AM  130 mmHg  68 mmHg  70 bpm  16 rpm  98.3 F  144 lbs     
             

 

 2010  8:21:00 AM  134 mmHg  76 mmHg  70 bpm  20 rpm  98.6 F  147 lbs      
            







Social History







 Name  Description  Comments

 

 Tobacco  Never smoker   







History of Procedures







 Date Ordered  Description  Order Status

 

 12/3/2015 12:00 AM  LIPID PANEL  Reviewed

 

 12/3/2015 12:00 AM  GLYCOSYLATED HEMOGLOBIN TEST  Reviewed

 

 12/3/2015 12:00 AM  COMPREHEN METABOLIC PANEL  Reviewed

 

 12/3/2015 12:00 AM  Endocrinology Consultation  Reviewed

 

 2011 12:00 AM  METABOLIC PANEL TOTAL CA  Reviewed

 

 2011 12:00 AM  C-REACTIVE PROTEIN  Reviewed

 

 2011 12:00 AM  RBC SED RATE AUTOMATED  Reviewed

 

 2011 12:00 AM  X-RAY EXAM OF FOOT  Reviewed

 

 2016 12:00 AM  CHEST X-RAY 2VW FRONTAL&LATL  Reviewed

 

 2016 12:00 AM  Decadron, Per 1 Mg NDC# [address]-98  Reviewed

 

 2016 12:00 AM  Depo-Medrol, Per 80 Mg NDC#[address]-57  Reviewed

 

 10/17/2016 12:00 AM  PNEUMOCOCCAL VACC 13 MARILEE IM  Reviewed

 

 3/26/2012 12:00 AM  COMPLETE CBC W/AUTO DIFF WBC  Returned

 

 3/26/2012 12:00 AM  PROTHROMBIN TIME  Returned

 

 3/26/2012 12:00 AM  ASSAY OF BLOOD/URIC ACID  Returned

 

 3/27/2012 12:00 AM  RBC SED RATE AUTOMATED  Returned

 

 3/27/2012 12:00 AM  GLYCOSYLATED HEMOGLOBIN TEST  Returned

 

 2010 12:00 AM  BREATHING CAPACITY TEST  Reviewed

 

 10/8/2013 12:00 AM  CHEST X-RAY 2VW FRONTAL&LATL  Reviewed

 

 10/17/2013 12:00 AM  CHEST X-RAY 2VW FRONTAL&LATL  Reviewed

 

 2014 12:00 AM  CT HEAD/BRAIN W/O & W/DYE  Reviewed

 

 6/3/2014 12:00 AM  ECG MONIT/REPRT UP TO 48 HRS  Reviewed

 

 6/3/2014 12:00 AM  TTE W/O DOPPLER COMPLETE  Reviewed

 

 6/3/2014 12:00 AM  Carotid Doppler  Reviewed

 

 2010 12:00 AM  COMPREHEN METABOLIC PANEL  Reviewed

 

 2010 12:00 AM  LIPID PANEL  Reviewed

 

 2010 12:00 AM  GLYCOSYLATED HEMOGLOBIN TEST  Reviewed

 

 2010 12:00 AM  MICROALBUMIN SEMIQUANT  Reviewed

 

 2010 12:00 AM  DRAINAGE OF SKIN ABSCESS  Reviewed

 

 2010 12:00 AM  Urine drug screen  Reviewed

 

 2011 12:00 AM  METABOLIC PANEL TOTAL CA  Reviewed

 

 2011 12:00 AM  METABOLIC PANEL TOTAL CA  Reviewed

 

 2011 12:00 AM  C-REACTIVE PROTEIN  Reviewed

 

 8/15/2011 12:00 AM  METABOLIC PANEL TOTAL CA  Reviewed

 

 8/15/2011 12:00 AM  GLYCOSYLATED HEMOGLOBIN TEST  Reviewed







Results Summary







 Data and Description  Results

 

 2010 8:22 AM  TRIGLYCERIDES 666.0 mg/dLCHOLESTEROL 241.0 mg/dLHDL 34.0 mg/
dLLDL (CALC) INVALID MG/DLGLUCOSE 336.0 mg/dLSODIUM 138.0 mmol/LPOTASSIUM 3.40 
mmol/LCHLORIDE 102.0 mmol/LCO2 22.0 mmol/LBUN 8.0 mg/dLCREATININE 0.70 mg/dLSGOT
/AST 15.0 IU/LSGPT/ALT 7.0 IU/LALK PHOS 93.0 IU/LTOTAL PROTEIN 7.30 g/dLALBUMIN 
3.90 g/dLTOTAL BILI 0.40 mg/dLCALCIUM 9.10 mg/dLeGFR >60 mL/min/1.73 p3MXXCA UR 
57.20 mg/dLMICROALBUMIN UR 19.0 ug/mLALB:CREAT RATIO 33 

 

 2011 9:15 AM  GLUCOSE 49.0 mg/dLSODIUM 143.0 mmol/LPOTASSIUM 3.30 mmol/
LCHLORIDE 101.0 mmol/LCO2 30.0 mmol/LBUN 12.0 mg/dLCREATININE 0.50 mg/dLCALCIUM 
9.10 mg/dLeGFR >60 mL/min/1.73 m2

 

 2011 10:25 AM  C REACTIVE PROTEIN 6.0 mg/LGLUCOSE 88.0 mg/dLSODIUM 143.0 
mmol/LPOTASSIUM 3.60 mmol/LCHLORIDE 104.0 mmol/LCO2 26.0 mmol/LBUN 11.0 mg/
dLCREATININE 0.70 mg/dLCALCIUM 9.40 mg/dLeGFR >60 mL/min/1.73 m2

 

 2011 11:00 AM  GLUCOSE 160.0 mg/dLSODIUM 141.0 mmol/LPOTASSIUM 3.70 mmol/
LCHLORIDE 105.0 mmol/LCO2 21.0 mmol/LBUN 13.0 mg/dLCREATININE 0.70 mg/dLCALCIUM 
9.50 mg/dLeGFR >60 mL/min/1.73 m2

 

 2011 3:50 PM  C REACTIVE PROTEIN 5.0 mg/LGLUCOSE 361.0 mg/dLSODIUM 140.0 
mmol/LPOTASSIUM 3.80 mmol/LCHLORIDE 102.0 mmol/LCO2 24.0 mmol/LBUN 9.0 mg/
dLCREATININE 1.0 mg/dLCALCIUM 9.60 mg/dLeGFR 57 SEDRATE 24.0 mm/hr

 

 3/26/2012 12:00 PM  WBC 7.3 RBC 4.46 HGB 13.50 g/dLHCT 39.0 %MCV 87.0 fLMCH 
30.30 pgMCHC 34.60 g/dLRDW CV 13.20 %MPV 9.60 fLPLT 142 %NEUT 59.60 %%LYMP 
33.50 %%MONO 5.60 %%EOS 1.20 %%BASO 0.10 %#NEUT 4.32 #LYMP 2.43 #MONO 0.41 #EOS 
0.09 #BASO 0.01 PROTIME 10.60 secsINR 1.0 URIC ACID 3.9 mg/dL

 

 3/27/2012 8:45 PM  SEDRATE 31.0 mm/hr

 

 2015 10:15 AM  TSH 0.390 uIU/mL

 

 12/3/2015 11:22 AM  TRIGLYCERIDES 182.0 mg/dLCHOLESTEROL 180.0 mg/dLHDL 50.0 mg
/dLLDL (CALC) 94.0 mg/dLGLUCOSE 99.0 mg/dLSODIUM 142.0 mmol/LPOTASSIUM 4.20 mmol
/LCHLORIDE 112.0 mmol/LCO2 21.0 mmol/LBUN 17.0 mg/dLCREATININE 0.70 mg/dLSGOT/
AST 17.0 IU/LSGPT/ALT 12.0 IU/LALK PHOS 86.0 IU/LTOTAL PROTEIN 6.30 g/dLALBUMIN 
4.10 g/dLTOTAL BILI 0.50 mg/dLCALCIUM 9.30 mg/dLeGFR >60 mL/min/1.73m







History Of Immunizations







 Name  Date Admin  Mfg Name  Mfg Code  Trade Name  Lot#  Route  Inj  Vis Given  
Vis Pub  CVX

 

 Pneumococcal  10/17/2016  Wyeth-Ayerst-Lederle-Praxis  [facility]  Prevnar 13  [id number]
  Intramuscular  Left Deltoid  10/17/2016  2015  133







History of Past Illness







 Name  Date of Onset  Comments

 

 Diabetes Mellitus, Type II      

 

 Hyperlipidemia      

 

 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      

 

 Bronchitis, Chronic  2010  8:23AM   

 

 Subcutaneous Nodule  2010  8:23AM   

 

 Diabetes Mellitus, Type II  2010 10:15AM   

 

 Hyperlipidemia, Mixed  2010 10:15AM   

 

 Sebaceous Cyst  2010 11:32PM   

 

 General Medical Exam, Adult  2010  9:19AM   

 

 Bursitis, right knee  Mar 15 2011  3:05PM   

 

 Gastroenteritis, Viral  May  2 2011  8:56AM   

 

 Hypokalemia  May 11 2011  8:15AM   

 

 Polymyalgia Rheumatica  May 11 2011  8:15AM   

 

 Hypokalemia  2011  9:48AM   

 

 Polymyalgia Rheumatica  2011  9:48AM   

 

 Diabetes Mellitus, Type II  2011  9:48AM   

 

 Diabetes Mellitus, Type II  Aug 10 2011  2:57PM   

 

 Edema bilateral lower extremities  Aug 10 2011  2:57PM   

 

 Polymyalgia Rheumatica  Dec 14 2011  3:17PM   

 

 Arthralgia  Dec 14 2011  3:17PM   

 

 Pain in  foot, Left Heel  Dec 27 2011  8:16AM   

 

 Edema left lower ext.  Mar 26 2012 11:30AM   

 

 Petechial Rash  Mar 26 2012 11:30AM   

 

 Diabetes Mellitus, Type II  Mar 27 2012  1:39PM   

 

 Polymyalgia Rheumatica  Mar 27 2012  1:39PM   

 

 Edema left lower ext.  2012  8:05AM   

 

 Diabetes Mellitus, Type II  2012  8:05AM   

 

 Cellulitis left foot  2012  8:05AM   

 

 Myalgia  Jul  3 2012  8:10AM   

 

 Headache  Jul  3 2012  8:10AM   

 

 Neuropathy, right foot  Jul  3 2012  8:10AM   

 

 Upper Respiratory Infections  Dec  4 2012 10:35AM   

 

 Diabetes Mellitus, Type II  Mar  5 2013  9:32AM   

 

 Hyperlipidemia  Mar  5 2013  9:32AM   

 

 Atrial Fibrillation  Mar  5 2013  9:32AM   

 

 Osteoarthritis, hand  Mar  5 2013  9:32AM   

 

 Cellulitis  2013 10:15AM   

 

 Right Acute Otitis Media  Aug 28 2013  8:55AM   

 

 Chronic Cough  Oct  8 2013 10:10AM   

 

 Shortness of breath  Oct  8 2013 10:10AM   

 

 Cough  Oct 17 2013  9:40AM   

 

 Tension Headache  2014  8:54AM   

 

 Blurred Vision  2014  8:54AM   

 

 Bronchitis, Acute  2014  8:54AM   

 

 Postoperative Follow-up  2014 10:56AM   

 

 Basal Cell  2014 10:56AM   

 

 Basal Cell Carcinoma of Skin  2014 10:59AM   

 

 Sebaceous Cyst  2014 10:59AM   

 

 Syncope  Oc  3 2014  3:57PM   

 

 Basal cell carcinoma of skin, site unspecified  2014  7:25AM   

 

 Lumbar back pain  Dec 19 2014  9:42AM   

 

 Left Radiculopathy of leg  Dec 19 2014  9:42AM   

 

 Depressive Disorder  Dec 19 2014  9:42AM   

 

 Diabetes Mellitus, Type II  May 13 2015 10:33AM   

 

 Hyperlipidemia  May 13 2015 10:33AM   

 

 Systolic CHF, acute  May 13 2015 10:33AM   

 

 Resolved Atrial fibrillation  May 13 2015 10:33AM   

 

 Mixed hyperlipidemia  Dec  3 2015 10:19AM   

 

 Diabetes mellitus type 2, uncontrolled  Dec  3 2015 10:19AM   

 

 Other osteoarthritis involving multiple joints  Dec  3 2015 10:19AM   

 

 Shortness of breath  May 17 2016  1:36PM   

 

 Chronic obstructive pulmonary disease with (acute) exacerbation  May 17 2016  1
:36PM   

 

 Need for Prevnar vaccine  Oct 17 2016  9:19AM   

 

 Paroxysmal atrial fibrillation  Oct 17 2016  9:19AM   

 

 Diabetes Mellitus, Type II  Oct 17 2016  9:19AM   







Payers







 Insurance Name  Company Name  Plan Name  Plan Number  Policy Number  Policy 
Group Number  Start Date

 

    [employer]  [employer] [place]     [id number]     2015

 

    [employer] [place]     [id number]     

 

    [facility]  [employer] 
PHYS/DS  [id number]     N/A







History of Encounters







 Visit Date  Visit Type  Provider

 

 10/17/2016  Office visit  Mateo Cooper DO

 

 2016  Office visit  Mateo Cooper DO

 

 12/3/2015  Office visit  Mateo Cooper DO

 

 5/15/2015  Hospital  LEXX Holm MD

 

 2015  Office visit  Mateo Cooper DO

 

 2014  Office visit  Mateo Cooper DO

 

 2014  Hospital  LEXX Holm MD

 

 2014  Procedures  David Suresh MD

 

 6/3/2014  Office visit  Mateo Cooper DO

 

 2014  Office visit  David Suresh MD

 

 2014  Procedures  David Suresh MD

 

 2014  Hospital  LEXX Holm MD

 

 2014  Office visit  Mateo Cooper DO

 

 10/8/2013  Office visit  Mateo Cooper DO

 

 2013  Office visit  Mateo Cooper DO

 

 2013  Office visit  Mateo Cooper DO

 

 3/5/2013  Office visit  Mateo Cooper DO

 

 2013  Hospital  LEXX Holm MD

 

 2012  Office visit  Jenni Katz APRN

 

 2012  Hospital  LEXX Holm MD

 

 7/3/2012  Office visit  Mateo Cooper DO

 

 2012  [facility]  Kristian Kiser MD

 

 2012  [facility]  LEXX Holm MD

 

 2012  [facility]  Kristian Kiser MD

 

 2012  [facility]  LEXX Holm MD

 

 2012  Office visit  Mateo Cooper DO

 

 3/26/2012  Office visit  Jenni Katz APRN

 

 2011  Office visit  Mateo Cooper DO

 

 2011  Office visit  Jenni Katz APRN

 

 8/10/2011  Office visit  Jenni Katz APRN

 

 2011  Office visit  Mateo Cooper DO

 

 2011  Office visit  Mateo Cooper DO

 

 2011  [facility]  Bandar Zelaya MD

 

 2011  [facility]  Bandar Zelaya MD

 

 2011  [facility]  Bandar Zelaya MD

 

 2011  [facility]  LEXX Holm MD

 

 2011  Office visit  Mateo Cooper DO

 

 3/15/2011  Office visit  Mateo Cooper DO

 

 2010  Office visit  David Burciaga PA-C

 

 2010  Office visit  Mateo Cooper DO

 

 2010  Laboratory  Ayush Finn MD

 

 2010  Laboratory  Ayush Finn MD

 

 2010  Office visit  Mateo Cooper DO

## 2018-03-23 NOTE — XMS REPORT
MU2 Ambulatory Summary

 Created on: 02/15/2016



Abmar Grider

External Reference #: [id number]

: 1950

Sex: Female



Demographics







 Address  [address]

[place]  [address]

 

 Home Phone  [phone]

 

 Preferred Language  English

 

 Marital Status  

 

 Bahai Affiliation  Unknown

 

 Race  White

 

 Ethnic Group  Not  or 





Author







 Mateo Ptahak

 

 [facility]

 

 Address  [address]

[place], [place]  [address]



 

 Phone  [phone]







Care Team Providers







 Care Team Member Name  Role  Phone

 

 Mateo Cooper  PCP  Unavailable







Allergies and Adverse Reactions







 Name  Reaction  Notes

 

 Phenergan      







Plan of Treatment







 Planned Activity  Comments  Planned Date  Planned Time  Plan/Goal

 

 Uncontrolled Diabetes and Thyroid Nodules            

 

 AIRWAY INHALATION TREATMENT     10/8/2013  12:00 AM   

 

 BREATHING CAPACITY TEST     2014  12:00 AM   







Medications







 Active 

 

 Name  Start Date  Estimated Completion Date  SIG  Comments

 

 Pen Needle 31 gauge x 3/16" miscellaneous needle  2014     USES 3 
INJECTIONS A DAY   

 

 metformin 1,000 mg oral tablet  2014     take 1 tablet (1,000 mg) by oral 
route 2 times per day with morning and evening meals   

 

 metformin 1,000 mg oral tablet  2015     TAKE 1 TABLET BY MOUTH TWICE 
DAILY WITH MORNING AND EVENING MEALS   

 

 metformin 1,000 mg oral tablet  2015     TAKE 1 TABLET BY MOUTH TWICE 
DAILY WITH MORNING AND EVENING MEALS   

 

 carvedilol 3.125 mg oral tablet        take 1 tablet (3.125 mg) by oral route 
once daily   

 

 Benicar 40 mg oral tablet        take 1 tablet (40 mg) by oral route once 
daily   

 

 Levemir FlexTouch 100 unit/mL (3 mL) subcutaneous insulin pen  12/3/2015     
inject 45 units by subcutaneous route twice a day   

 

 tramadol 50 mg oral tablet  12/3/2015     take 1 tablet (50 mg) by oral route 
every 6 hours as needed   

 

 Xarelto 20 mg oral tablet  12/3/2015  2016  take 1 tablet (20 mg) by oral 
route once daily with the evening meal for 30 days   

 

 benzonatate 200 mg oral capsule  2016     take 1 capsule (200 mg) by oral 
route 3 times per day as needed   

 

 amoxicillin-pot clavulanate 500-125 mg oral tablet  2016  2/15/2016  take 
1 tablet by oral route every 12 hours for 7 days   

 

 Accu-Chek Compact Test miscellaneous strip  2/15/2016  2016  Check 
glucose 4 times a day [id number]   

 

 Novolog Flexpen 100 unit/mL subcutaneous insulin pen  2/15/2016     INJECT 12 
UNITS SUBCUTANEOUSLY WITH BREAKFAST. LUNCH, AND SUPPER   









  

 

 Name  Start Date  Expiration Date  SIG  Comments

 

 Bactrim -160 mg oral tablet  2010  take 1 tablet by oral 
route every 12 hours for 10 days   

 

 Keflex 500 mg oral capsule  2010  take 1 capsule (500 mg) by 
oral route every 12 hours for 7 days   

 

 Reglan 10 mg oral tablet  2011  6/3/2011  take 1 tablet (10 mg) by oral 
route twice daily   

 

 prednisone 20 mg oral tablet  2011  take 1 tablet by oral 
route daily for 10 days   

 

 nabumetone 500 mg oral tablet  2011     take 1 tablet (500 mg) by oral 
route 2 times per day with food   

 

 indomethacin 50 mg oral capsule  2012  take 1 capsule (50 mg) 
by oral route 3 times per day with food for 7 days  "stopped it 2-3 weeks ago 
due to high glucose"

 

 Bactrim -160 mg oral tablet  2012  take 1 tablet by oral 
route every 12 hours for 7 days   

 

 insulin syringe-needle U-100 1 mL 31 x 5/16" miscellaneous syringe  2012  USE AS DIRECTED FOUR TIMES DAILY   

 

 Levemir 100 unit/mL subcutaneous solution  2012  inject 45 
units by subcutaneous route twice daily  "using pen"

 

 Augmentin 500-125 mg oral tablet  2012  take 1 tablet by oral 
route every 12 hours for 7 days   

 

 Pen Needle 31 gauge x 3/16" miscellaneous needle  2013  Uses 3 
injections a day   

 

 cephalexin 500 mg oral tablet  2013  take 1 tablet (500 mg) by 
oral route every 12 hours for 10 days   

 

 Augmentin 500-125 mg oral tablet  2013  take 1 tablet by oral 
route every 12 hours for 7 days   

 

 Novolog Flexpen 100 unit/mL subcutaneous insulin pen  2014  
INJECT 12 UNITS SUBCUTANEOUSLY WITH BREAKFAST. LUNCH, AND SUPPER   

 

 gabapentin 600 mg oral tablet  2014  TAKE 1 TABLET BY MOUTH 
FOUR TIMES DAILY   









 Discontinued 

 

 Name  Start Date  Discontinued Date  SIG  Comments

 

 Aldara 5 % topical cream in packet  2010  3/15/2011  apply to the affected 
area(s) before bedtime by topical route 3 times per week and leave on skin for 
6 to 10 hours   

 

 fenofibrate 160 mg oral tablet  2010  take 1 tablet (160 mg) by 
oral route once daily   

 

 meloxicam 15 mg oral tablet  3/15/2011  2011  take 1 tablet (15 mg) by 
oral route once daily as needed   

 

 Zofran (as hydrochloride) 4 mg/5 mL oral solution  2011  take 5-
10 milliliters by oral route every 6 hours as needed   

 

 Klor-Con M20 20 mEq oral tablet,ER particles/crystals     3/5/2013  take 1 
tablet (20 meq) by oral route once daily with food  "not takiing"

 

 Diflucan 150 mg oral tablet  8/10/2011  2012  take 1 tablet (150 mg) by 
oral route once   

 

 amoxicillin 500 mg oral capsule     2012  take 1 capsule (500 mg) by oral 
route every 12 hours for 7 days   

 

 Medrol (Dominic) 4 mg oral tablets,dose pack     2012  take as directed   

 

 Lasix 20 mg oral tablet  4/10/2012  3/5/2013  take 1 tablet (20 mg) by oral 
route once daily as needed   

 

 amitriptyline 10 mg oral tablet  7/3/2012  2015  take 1 tablet by oral 
route once a day (at bedtime)   

 

 cyclobenzaprine 5 mg oral tablet  7/3/2012  10/8/2013  take 1 tablet by oral 
route once a day (at bedtime) as needed   

 

 cephalexin 500 mg oral tablet  7/3/2012  3/5/2013  take 1 tablet (500 mg) by 
oral route every 12 hours   

 

 Tessalon 200 mg oral capsule  2012  take 1 capsule (200 mg) by 
oral route 3 times per day as needed   

 

 Claritin-D 12 Hour 5-120 mg oral tablet extended release 12 hr  2012  3/5/
2013  take 1 tablet by oral route every 12 hours   

 

 naproxen 500 mg oral tablet  2014  take 1 tablet (500 mg) by 
oral route 2 times per day with food  "not taking now"

 

 Dulera 100-5 mcg/actuation inhalation HFA aerosol inhaler  10/8/2013  12/3/
2015  inhale 2 puffs by inhalation route 2 times per day in the morning and 
evening   

 

 flecainide 100 mg oral tablet     2015  take 1 tablet (100 mg) by oral 
route every 12 hours   

 

 cyclobenzaprine 10 mg oral tablet  2014  take 1 tablet (10 mg
) by oral route at bedtime as needed   

 

 Betapace 80 mg oral tablet     2015  take 1 tablet (80 mg) by oral route 
2 times per day  Dr. Turner

 

 duloxetine 30 mg oral capsule,delayed release(/EC)  2014  12/3/2015  
take 1 capsule (30 mg) by oral route once a day   

 

 hydrocodone-acetaminophen 7.5-325 mg oral tablet  2014  12/3/2015  take 
1 tablet by oral route every 6 hours as needed for pain   

 

 diltiazem HCl 120 mg oral capsule, extended release     12/3/2015  take 1 
capsule (120 mg) by oral route once daily   

 

 carvedilol 12.5 mg oral tablet     12/3/2015  take 1 tablet (12.5 mg) by oral 
route every 12 hours with food  dose change

 

 sotalol 120 mg oral tablet     12/3/2015  take 1 tablet (120 mg) by oral route 
2 times per day   







Problem List







 Description  Status  Onset

 

 Diabetes Mellitus, Type II  Active   

 

 Hemangioma in Spine  Active   

 

 Hyperlipidemia  Active   







Vital Signs







 Date  Time  BP-Sys(mm[Hg]  BP-Missy(mm[Hg])  HR(bpm)  RR(rpm)  Temp  WT  HT  HC  
BMI  BSA  BMI Percentile  O2 Sat(%)

 

 12/3/2015  10:17:00 AM  138 mmHg  72 mmHg  80 bpm  20 rpm  98.2 F  152 lbs  61 
in     28.72 kg/m2  1.72 m2     98 %

 

 2015  10:31:00 AM  130 mmHg  66 mmHg  66 bpm  20 rpm  98.1 F  143 lbs  61 
in     27.0193 kg/m  1.6708 m     98 %

 

 2014  9:39:00 AM  138 mmHg  88 mmHg  153 bpm  20 rpm  98.7 F  150 lbs  
61 in     28.34 kg/m2  1.71 m2     98 %

 

 6/3/2014  3:55:00 PM  130 mmHg  62 mmHg  69 bpm  18 rpm  97.9 F  151 lbs  61 
in     28.5309 kg/m  1.7169 m     99 %

 

 2014  10:56:00 AM  150 mmHg  74 mmHg  81 bpm  16 rpm  96.2 F  152 lbs  61 
in     28.72 kg/m2  1.72 m2     97 %

 

 2014  8:52:00 AM  138 mmHg  78 mmHg  61 bpm  20 rpm  98 F  168 lbs  61 in
     31.743 kg/m  1.811 m     100 %

 

 10/8/2013  10:08:00 AM  138 mmHg  78 mmHg  148 bpm  22 rpm  98.2 F  168 lbs  
61 in     31.74 kg/m2  1.81 m2     98 %

 

 2013  8:54:00 AM  150 mmHg  80 mmHg  78 bpm  16 rpm  98.9 F  163 lbs  61 
in     30.7983 kg/m  1.7839 m     99 %

 

 2013  10:13:00 AM  136 mmHg  78 mmHg  70 bpm  18 rpm  97.8 F  156 lbs    
              

 

 3/5/2013  9:30:00 AM  148 mmHg  80 mmHg  76 bpm  16 rpm  97.8 F  152 lbs  61 
in     28.7199 kg/m  1.7226 m      

 

 2012  10:33:00 AM  136 mmHg  74 mmHg  89 bpm  18 rpm  98.6 F  154.375 lbs
  61 in     29.17 kg/m2  1.74 m2     99 %

 

 7/3/2012  8:08:00 AM  134 mmHg  72 mmHg  78 bpm  22 rpm  98.2 F  145 lbs  61 
in     27.3972 kg/m  1.6825 m      

 

 2012  8:03:00 AM  132 mmHg  76 mmHg  70 bpm  18 rpm  97.9 F  143 lbs  61 
in     27.02 kg/m2  1.67 m2      

 

 3/26/2012  11:28:00 AM  136 mmHg  74 mmHg  89 bpm  18 rpm  97.9 F  149.125 lbs
  61 in     28.1766 kg/m  1.7062 m      

 

 2011  8:12:00 AM  132 mmHg  82 mmHg  84 bpm  16 rpm  97.8 F  150 lbs  61 
in     28.34 kg/m2  1.71 m2      

 

 2011  3:15:00 PM  130 mmHg  74 mmHg  68 bpm  18 rpm  98.3 F  156 lbs  61 
in     29.4756 kg/m  1.7451 m      

 

 8/10/2011  2:58:00 PM  120 mmHg  74 mmHg  100 bpm  20 rpm  99.4 F  169.25 lbs 
                 

 

 2011  9:49:00 AM  134 mmHg  78 mmHg  74 bpm  18 rpm  98.3 F  164 lbs     
             

 

 2011  8:15:00 AM  128 mmHg  78 mmHg  62 bpm  18 rpm  98.2 F  157 lbs     
             

 

 2011  8:54:00 AM  128 mmHg  72 mmHg  104 bpm  22 rpm  98.6 F  153 lbs     
            100 %

 

 3/15/2011  3:04:00 PM  144 mmHg  82 mmHg  72 bpm  18 rpm  98 F  163 lbs       
           

 

 2010  9:17:00 AM  124 mmHg  78 mmHg  76 bpm        145 lbs               
   

 

 2010  10:13:00 AM  130 mmHg  68 mmHg  70 bpm  16 rpm  98.3 F  144 lbs     
             

 

 2010  8:21:00 AM  134 mmHg  76 mmHg  70 bpm  20 rpm  98.6 F  147 lbs      
            







Social History







 Name  Description  Comments

 

 Tobacco  Never smoker   







History of Procedures







 Date Ordered  Description  Order Status

 

 12/3/2015 12:00 AM  LIPID PANEL  Reviewed

 

 12/3/2015 12:00 AM  GLYCOSYLATED HEMOGLOBIN TEST  Reviewed

 

 12/3/2015 12:00 AM  COMPREHEN METABOLIC PANEL  Reviewed

 

 12/3/2015 12:00 AM  Endocrinology Consultation  Reviewed

 

 2011 12:00 AM  METABOLIC PANEL TOTAL CA  Reviewed

 

 2011 12:00 AM  C-REACTIVE PROTEIN  Reviewed

 

 2011 12:00 AM  RBC SED RATE AUTOMATED  Reviewed

 

 2011 12:00 AM  X-RAY EXAM OF FOOT  Reviewed

 

 3/26/2012 12:00 AM  COMPLETE CBC W/AUTO DIFF WBC  Returned

 

 3/26/2012 12:00 AM  PROTHROMBIN TIME  Returned

 

 3/26/2012 12:00 AM  ASSAY OF BLOOD/URIC ACID  Returned

 

 3/27/2012 12:00 AM  RBC SED RATE AUTOMATED  Returned

 

 3/27/2012 12:00 AM  GLYCOSYLATED HEMOGLOBIN TEST  Returned

 

 2010 12:00 AM  BREATHING CAPACITY TEST  Reviewed

 

 10/8/2013 12:00 AM  CHEST X-RAY 2VW FRONTAL&LATL  Reviewed

 

 10/17/2013 12:00 AM  CHEST X-RAY 2VW FRONTAL&LATL  Reviewed

 

 2014 12:00 AM  CT HEAD/BRAIN W/O & W/DYE  Reviewed

 

 6/3/2014 12:00 AM  ECG MONIT/REPRT UP TO 48 HRS  Reviewed

 

 6/3/2014 12:00 AM  TTE W/O DOPPLER COMPLETE  Reviewed

 

 6/3/2014 12:00 AM  Carotid Doppler  Reviewed

 

 2010 12:00 AM  COMPREHEN METABOLIC PANEL  Reviewed

 

 2010 12:00 AM  LIPID PANEL  Reviewed

 

 2010 12:00 AM  GLYCOSYLATED HEMOGLOBIN TEST  Reviewed

 

 2010 12:00 AM  MICROALBUMIN SEMIQUANT  Reviewed

 

 2010 12:00 AM  DRAINAGE OF SKIN ABSCESS  Reviewed

 

 2010 12:00 AM  Urine drug screen  Reviewed

 

 2011 12:00 AM  METABOLIC PANEL TOTAL CA  Reviewed

 

 2011 12:00 AM  METABOLIC PANEL TOTAL CA  Reviewed

 

 2011 12:00 AM  C-REACTIVE PROTEIN  Reviewed

 

 8/15/2011 12:00 AM  METABOLIC PANEL TOTAL CA  Reviewed

 

 8/15/2011 12:00 AM  GLYCOSYLATED HEMOGLOBIN TEST  Reviewed







Results Summary







 Data and Description  Results

 

 2010 8:22 AM  TRIGLYCERIDES 666.0 mg/dLCHOLESTEROL 241.0 mg/dLHDL 34.0 mg/
dLLDL (CALC) INVALID MG/DLGLUCOSE 336.0 mg/dLSODIUM 138.0 mmol/LPOTASSIUM 3.40 
mmol/LCHLORIDE 102.0 mmol/LCO2 22.0 mmol/LBUN 8.0 mg/dLCREATININE 0.70 mg/dLSGOT
/AST 15.0 IU/LSGPT/ALT 7.0 IU/LALK PHOS 93.0 IU/LTOTAL PROTEIN 7.30 g/dLALBUMIN 
3.90 g/dLTOTAL BILI 0.40 mg/dLCALCIUM 9.10 mg/dLeGFR >60 mL/min/1.73 g1WHYVQ UR 
57.20 mg/dLMICROALBUMIN UR 19.0 ug/mLALB:CREAT RATIO 33 

 

 2011 9:15 AM  GLUCOSE 49.0 mg/dLSODIUM 143.0 mmol/LPOTASSIUM 3.30 mmol/
LCHLORIDE 101.0 mmol/LCO2 30.0 mmol/LBUN 12.0 mg/dLCREATININE 0.50 mg/dLCALCIUM 
9.10 mg/dLeGFR >60 mL/min/1.73 m2

 

 2011 10:25 AM  C REACTIVE PROTEIN 6.0 mg/LGLUCOSE 88.0 mg/dLSODIUM 143.0 
mmol/LPOTASSIUM 3.60 mmol/LCHLORIDE 104.0 mmol/LCO2 26.0 mmol/LBUN 11.0 mg/
dLCREATININE 0.70 mg/dLCALCIUM 9.40 mg/dLeGFR >60 mL/min/1.73 m2

 

 2011 11:00 AM  GLUCOSE 160.0 mg/dLSODIUM 141.0 mmol/LPOTASSIUM 3.70 mmol/
LCHLORIDE 105.0 mmol/LCO2 21.0 mmol/LBUN 13.0 mg/dLCREATININE 0.70 mg/dLCALCIUM 
9.50 mg/dLeGFR >60 mL/min/1.73 m2

 

 2011 3:50 PM  C REACTIVE PROTEIN 5.0 mg/LGLUCOSE 361.0 mg/dLSODIUM 140.0 
mmol/LPOTASSIUM 3.80 mmol/LCHLORIDE 102.0 mmol/LCO2 24.0 mmol/LBUN 9.0 mg/
dLCREATININE 1.0 mg/dLCALCIUM 9.60 mg/dLeGFR 57 SEDRATE 24.0 mm/hr

 

 3/26/2012 12:00 PM  WBC 7.3 RBC 4.46 HGB 13.50 g/dLHCT 39.0 %MCV 87.0 fLMCH 
30.30 pgMCHC 34.60 g/dLRDW CV 13.20 %MPV 9.60 fLPLT 142 %NEUT 59.60 %%LYMP 
33.50 %%MONO 5.60 %%EOS 1.20 %%BASO 0.10 %#NEUT 4.32 #LYMP 2.43 #MONO 0.41 #EOS 
0.09 #BASO 0.01 PROTIME 10.60 secsINR 1.0 URIC ACID 3.9 mg/dL

 

 3/27/2012 8:45 PM  SEDRATE 31.0 mm/hr

 

 2015 10:15 AM  TSH 0.390 uIU/mL

 

 12/3/2015 11:22 AM  TRIGLYCERIDES 182.0 mg/dLCHOLESTEROL 180.0 mg/dLHDL 50.0 mg
/dLLDL (CALC) 94.0 mg/dLGLUCOSE 99.0 mg/dLSODIUM 142.0 mmol/LPOTASSIUM 4.20 mmol
/LCHLORIDE 112.0 mmol/LCO2 21.0 mmol/LBUN 17.0 mg/dLCREATININE 0.70 mg/dLSGOT/
AST 17.0 IU/LSGPT/ALT 12.0 IU/LALK PHOS 86.0 IU/LTOTAL PROTEIN 6.30 g/dLALBUMIN 
4.10 g/dLTOTAL BILI 0.50 mg/dLCALCIUM 9.30 mg/dLeGFR >60 mL/min/1.73m







History Of Immunizations

Not available.



History of Past Illness







 Name  Date of Onset  Comments

 

 Diabetes Mellitus, Type II      

 

 Hyperlipidemia      

 

 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      

 

 Bronchitis, Chronic  2010  8:23AM   

 

 Subcutaneous Nodule  2010  8:23AM   

 

 Diabetes Mellitus, Type II  2010 10:15AM   

 

 Hyperlipidemia, Mixed  2010 10:15AM   

 

 Sebaceous Cyst  2010 11:32PM   

 

 General Medical Exam, Adult  2010  9:19AM   

 

 Bursitis, right knee  Mar 15 2011  3:05PM   

 

 Gastroenteritis, Viral  May  2 2011  8:56AM   

 

 Hypokalemia  May 11 2011  8:15AM   

 

 Polymyalgia Rheumatica  May 11 2011  8:15AM   

 

 Hypokalemia  2011  9:48AM   

 

 Polymyalgia Rheumatica  2011  9:48AM   

 

 Diabetes Mellitus, Type II  2011  9:48AM   

 

 Diabetes Mellitus, Type II  Aug 10 2011  2:57PM   

 

 Edema bilateral lower extremities  Aug 10 2011  2:57PM   

 

 Polymyalgia Rheumatica  Dec 14 2011  3:17PM   

 

 Arthralgia  Dec 14 2011  3:17PM   

 

 Pain in  foot, Left Heel  Dec 27 2011  8:16AM   

 

 Edema left lower ext.  Mar 26 2012 11:30AM   

 

 Petechial Rash  Mar 26 2012 11:30AM   

 

 Diabetes Mellitus, Type II  Mar 27 2012  1:39PM   

 

 Polymyalgia Rheumatica  Mar 27 2012  1:39PM   

 

 Edema left lower ext.  2012  8:05AM   

 

 Diabetes Mellitus, Type II  2012  8:05AM   

 

 Cellulitis left foot  2012  8:05AM   

 

 Myalgia  Jul  3 2012  8:10AM   

 

 Headache  Jul  3 2012  8:10AM   

 

 Neuropathy, right foot  Jul  3 2012  8:10AM   

 

 Upper Respiratory Infections  Dec  4 2012 10:35AM   

 

 Diabetes Mellitus, Type II  Mar  5 2013  9:32AM   

 

 Hyperlipidemia  Mar  5 2013  9:32AM   

 

 Atrial Fibrillation  Mar  5 2013  9:32AM   

 

 Osteoarthritis, hand  Mar  5 2013  9:32AM   

 

 Cellulitis  2013 10:15AM   

 

 Right Acute Otitis Media  Aug 28 2013  8:55AM   

 

 Chronic Cough  Oct  8 2013 10:10AM   

 

 Shortness of breath  Oct  8 2013 10:10AM   

 

 Cough  Oct 17 2013  9:40AM   

 

 Tension Headache  2014  8:54AM   

 

 Blurred Vision  2014  8:54AM   

 

 Bronchitis, Acute  2014  8:54AM   

 

 Postoperative Follow-up  2014 10:56AM   

 

 Basal Cell  2014 10:56AM   

 

 Basal Cell Carcinoma of Skin  2014 10:59AM   

 

 Sebaceous Cyst  2014 10:59AM   

 

 Syncope  Oc  3 2014  3:57PM   

 

 Basal cell carcinoma of skin, site unspecified  2014  7:25AM   

 

 Lumbar back pain  Dec 19 2014  9:42AM   

 

 Left Radiculopathy of leg  Dec 19 2014  9:42AM   

 

 Depressive Disorder  Dec 19 2014  9:42AM   

 

 Diabetes Mellitus, Type II  May 13 2015 10:33AM   

 

 Hyperlipidemia  May 13 2015 10:33AM   

 

 Systolic CHF, acute  May 13 2015 10:33AM   

 

 Resolved Atrial fibrillation  May 13 2015 10:33AM   

 

 Mixed hyperlipidemia  Dec  3 2015 10:19AM   

 

 Diabetes mellitus type 2, uncontrolled  Dec  3 2015 10:19AM   

 

 Other osteoarthritis involving multiple joints  Dec  3 2015 10:19AM   







Payers







 Insurance Name  Company Name  Plan Name  Plan Number  Policy Number  Policy 
Group Number  Start Date

 

    [employer]  [employer] [place]     [id number]     2015

 

    [employer]  [employer] [place]     [id number]     

 

    [facility]  [employer] 
PHYS/DS  [id number]     N/A







History of Encounters







 Visit Date  Visit Type  Provider

 

 12/3/2015  Office visit  Mateo Cooper DO

 

 5/15/2015  Hospital  LEXX Holm MD

 

 2015  Office visit  Mateo Cooper DO

 

 2014  Office visit  Mateo Cooper DO

 

 2014  Aren Holm MD

 

 2014  Procedures  David Suresh MD

 

 6/3/2014  Office visit  Mateo Cooper DO

 

 2014  Office visit  David Suresh MD

 

 2014  Procedures  David Suresh MD

 

 2014  Hospital  LEXX Holm MD

 

 2014  Office visit  Mateo Cooper DO

 

 10/8/2013  Office visit  Mateo Cooper DO

 

 2013  Office visit  Mateo Cooper DO

 

 2013  Office visit  Mateo Cooper DO

 

 3/5/2013  Office visit  Mateo Cooper DO

 

 2013  Hospital  LEXX Holm MD

 

 2012  Office visit  Jenni Katz APRN

 

 2012  Hospital  LEXX Holm MD

 

 7/3/2012  Office visit  Mateo Cooper DO

 

 2012  [facility]  Kristian Kiser MD

 

 2012  [facility]  LEXX Holm MD

 

 2012  [facility]  Kristian Kiser MD

 

 2012  [facility]  LEXX Holm MD

 

 2012  Office visit  Mateo Cooper DO

 

 3/26/2012  Office visit  Jenni Katz APRN

 

 2011  Office visit  Mateo Cooper DO

 

 2011  Office visit  Jenni Katz APRN

 

 8/10/2011  Office visit  Jenni Katz APRN

 

 2011  Office visit  Mateo Cooper DO

 

 2011  Office visit  Mateo Cooper DO

 

 2011  [facility]  Bandar Zelaya MD

 

 2011  [facility]  Bandar Zelaya MD

 

 2011  [facility]  Bandar Zelaya MD

 

 2011  [facility]  LEXX Holm MD

 

 2011  Office visit  Mateo Cooper DO

 

 3/15/2011  Office visit  Mateo Cooper DO

 

 2010  Office visit  David Burciaga PA-C

 

 2010  Office visit  Mateo Cooper DO

 

 2010  Laboratory  Ayush Finn MD

 

 2010  Laboratory  Ayush Finn MD

 

 2010  Office visit  Mateo Cooper DO
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
## 2018-03-23 NOTE — XMS REPORT
Patient Summary (HL7 CCD)

 Created on: 2016



RICK POWERS

External Reference #: [id number]

: 1950

Sex: Female



Demographics







 Address  [address]

[place]  [id number]

 

 Home Phone  [phone]

 

 Preferred Language  English

 

 Marital Status  Unknown

 

 Hinduism Affiliation  Unknown

 

 Race  White

 

 Ethnic Group  Not  or 





Author







 Author  ALBER BRADY

 

 Organization  Unknown

 

 Address  [address]

[place], [place]  [address]



 

 Phone  [phone]







Care Team Providers







 Care Team Member Name  Role  Phone

 

 Payette ER, ALEXI DO  Attphys  [phone]

 

 Payette ER, ALEXI DO  Prisurg  [phone]



                                            



Vital Signs

          Unknown or Not Available.                                            
                        



Allergies

          





 Allergy        Code        Allergy Type        Reaction        Status    

 

 PHENERGAN        329277        Drug allergy        BREATHING PROBLEMS        
Active    

 

 ETOMIDATE        4177        Drug allergy        LARYNGOSPASM, VENTRICULAR 
ARRHYTHMIA        Active    



                                                                               
         



Procedures

          





 Procedure        Code        Procedure Type        Date    

 

 FOOT 3 VIEWS        [id number]        SNOMED CT        2015    



                                                                              



History of Immunizations

          





 Immunization        Code        Date    

 

 pneumococcal, unspecified formulation        109        2012    

 

 Influenza, seasonal, injectable        141        10/18/2012    



                                                                               
         



Problems

          





 Problem        Code        Start Date        Resolved Date        Status    

 

 ATRIAL FIBRILLATION WITH RAPID VENTRICULAR RESPONSE        [id number]                         Active    



                                                                               
         



Results

          Unknown or Not Available.                                            
                                            



Active Medications

          Unknown or Not Available.                                            
                        



Medications Administered During Visit

          Unknown or Not Available.                                            
                                  



Encounters

          





 Encounter Diagnosis        Diagnosis Code        Start Date    

 

 Foot pain         [id number]        2015    



                                                                    



Social History

          





 Smoking Status        Code        Start Date        End Date    

 

 Never smoker        [id number]                      



                                                                    



Patient Decision Aids

          Unknown or Not Available.                                            
              



Discharge Instructions

          





         



You were admitted to [facility] on 2015 with a principal 
diagnosis of Foot pain .        



You were discharged from [facility] on 2015.        



Should you have any questions prior to discharge, please contact a member of 
your healthcare team. If you have left the hospital and have any questions, 
please contact your primary care physician.          



                                                          



Chief Complaint and Reason For Visit

          





 Chief Complaint        Date of Onset    

 

 FOOT PAIN             



                                                          



Function Status

          Unknown or Not Available.                                            
              



Plan of Care

          Unknown or Not Available.                                            
              



Referral/Transition of Care

          Unknown or Not Available.

## 2018-03-23 NOTE — XMS REPORT
MU2 Ambulatory Summary

 Created on: 2015



Ambar Grider

External Reference #: [id number]

: 1950

Sex: Female



Demographics







 Address  [address]

[place]  [address]

 

 Home Phone  [phone]

 

 Preferred Language  English

 

 Marital Status  

 

 Orthodox Affiliation  Unknown

 

 Race  White

 

 Ethnic Group  Not  or 





Author







 Mateo Pathak

 

 [facility]

 

 Address  [address]

[place], [place]  [id number]



 

 Phone  [phone]







Care Team Providers







 Care Team Member Name  Role  Phone

 

 Mateo Cooper  PCP  Unavailable







Allergies and Adverse Reactions







 Name  Reaction  Notes

 

 Phenergan      







Plan of Treatment







 Planned Activity  Comments  Planned Date  Planned Time  Plan/Goal

 

 Uncontrolled Diabetes and Thyroid Nodules            

 

 AIRWAY INHALATION TREATMENT     10/8/2013  12:00 AM   

 

 BREATHING CAPACITY TEST     2014  12:00 AM   







Medications







 Active 

 

 Name  Start Date  Estimated Completion Date  SIG  Comments

 

 Pen Needle 31 gauge x 3/16" miscellaneous needle  2014     USES 3 
INJECTIONS A DAY   

 

 metformin 1,000 mg oral tablet  2014     take 1 tablet (1,000 mg) by oral 
route 2 times per day with morning and evening meals   

 

 gabapentin 600 mg oral tablet  2014  TAKE 1 TABLET BY MOUTH 
FOUR TIMES DAILY   

 

 Novolog Flexpen 100 unit/mL subcutaneous insulin pen  2015     INJECT 12 
UNITS SUBCUTANEOUSLY WITH BREAKFAST. LUNCH, AND SUPPER   

 

 metformin 1,000 mg oral tablet  2015     TAKE 1 TABLET BY MOUTH TWICE 
DAILY WITH MORNING AND EVENING MEALS   

 

 metformin 1,000 mg oral tablet  2015     TAKE 1 TABLET BY MOUTH TWICE 
DAILY WITH MORNING AND EVENING MEALS   

 

 carvedilol 3.125 mg oral tablet        take 1 tablet (3.125 mg) by oral route 
once daily   

 

 Benicar 40 mg oral tablet        take 1 tablet (40 mg) by oral route once 
daily   

 

 Levemir FlexTouch 100 unit/mL (3 mL) subcutaneous insulin pen  12/3/2015     
inject 45 units by subcutaneous route twice a day   

 

 tramadol 50 mg oral tablet  12/3/2015     take 1 tablet (50 mg) by oral route 
every 6 hours as needed   

 

 Xarelto 20 mg oral tablet  12/3/2015  2016  take 1 tablet (20 mg) by oral 
route once daily with the evening meal for 30 days   









  

 

 Name  Start Date  Expiration Date  SIG  Comments

 

 Bactrim -160 mg oral tablet  2010  take 1 tablet by oral 
route every 12 hours for 10 days   

 

 Keflex 500 mg oral capsule  2010  take 1 capsule (500 mg) by 
oral route every 12 hours for 7 days   

 

 Reglan 10 mg oral tablet  2011  6/3/2011  take 1 tablet (10 mg) by oral 
route twice daily   

 

 prednisone 20 mg oral tablet  2011  take 1 tablet by oral 
route daily for 10 days   

 

 nabumetone 500 mg oral tablet  2011     take 1 tablet (500 mg) by oral 
route 2 times per day with food   

 

 indomethacin 50 mg oral capsule  2012  take 1 capsule (50 mg) 
by oral route 3 times per day with food for 7 days  "stopped it 2-3 weeks ago 
due to high glucose"

 

 Bactrim -160 mg oral tablet  2012  take 1 tablet by oral 
route every 12 hours for 7 days   

 

 insulin syringe-needle U-100 1 mL 31 x 5/16" miscellaneous syringe  2012  USE AS DIRECTED FOUR TIMES DAILY   

 

 Levemir 100 unit/mL subcutaneous solution  2012  inject 45 
units by subcutaneous route twice daily  "using pen"

 

 Augmentin 500-125 mg oral tablet  2012  take 1 tablet by oral 
route every 12 hours for 7 days   

 

 Pen Needle 31 gauge x 3/16" miscellaneous needle  2013  Uses 3 
injections a day   

 

 cephalexin 500 mg oral tablet  2013  take 1 tablet (500 mg) by 
oral route every 12 hours for 10 days   

 

 Augmentin 500-125 mg oral tablet  2013  take 1 tablet by oral 
route every 12 hours for 7 days   

 

 Accu-Chek Compact Test Miscellaneous Strip  1/3/2014  2014  TEST AS 
DIRECTED FOUR TIMES DAILY   

 

 Novolog Flexpen 100 unit/mL subcutaneous insulin pen  2014  
INJECT 12 UNITS SUBCUTANEOUSLY WITH BREAKFAST. LUNCH, AND SUPPER   









 Discontinued 

 

 Name  Start Date  Discontinued Date  SIG  Comments

 

 Aldara 5 % topical cream in packet  2010  3/15/2011  apply to the affected 
area(s) before bedtime by topical route 3 times per week and leave on skin for 
6 to 10 hours   

 

 fenofibrate 160 mg oral tablet  2010  take 1 tablet (160 mg) by 
oral route once daily   

 

 meloxicam 15 mg oral tablet  3/15/2011  2011  take 1 tablet (15 mg) by 
oral route once daily as needed   

 

 Zofran (as hydrochloride) 4 mg/5 mL oral solution  2011  take 5-
10 milliliters by oral route every 6 hours as needed   

 

 Klor-Con M20 20 mEq oral tablet,ER particles/crystals     3/5/2013  take 1 
tablet (20 meq) by oral route once daily with food  "not takiing"

 

 Diflucan 150 mg oral tablet  8/10/2011  2012  take 1 tablet (150 mg) by 
oral route once   

 

 amoxicillin 500 mg oral capsule     2012  take 1 capsule (500 mg) by oral 
route every 12 hours for 7 days   

 

 Medrol (Dominic) 4 mg oral tablets,dose pack     2012  take as directed   

 

 Lasix 20 mg oral tablet  4/10/2012  3/5/2013  take 1 tablet (20 mg) by oral 
route once daily as needed   

 

 amitriptyline 10 mg oral tablet  7/3/2012  2015  take 1 tablet by oral 
route once a day (at bedtime)   

 

 cyclobenzaprine 5 mg oral tablet  7/3/2012  10/8/2013  take 1 tablet by oral 
route once a day (at bedtime) as needed   

 

 cephalexin 500 mg oral tablet  7/3/2012  3/5/2013  take 1 tablet (500 mg) by 
oral route every 12 hours   

 

 Tessalon 200 mg oral capsule  2012  take 1 capsule (200 mg) by 
oral route 3 times per day as needed   

 

 Claritin-D 12 Hour 5-120 mg oral tablet extended release 12 hr  2012  3/5/
2013  take 1 tablet by oral route every 12 hours   

 

 naproxen 500 mg oral tablet  2014  take 1 tablet (500 mg) by 
oral route 2 times per day with food  "not taking now"

 

 Dulera 100-5 mcg/actuation inhalation HFA aerosol inhaler  10/8/2013  12/3/
2015  inhale 2 puffs by inhalation route 2 times per day in the morning and 
evening   

 

 flecainide 100 mg oral tablet     2015  take 1 tablet (100 mg) by oral 
route every 12 hours   

 

 cyclobenzaprine 10 mg oral tablet  2014  take 1 tablet (10 mg
) by oral route at bedtime as needed   

 

 Betapace 80 mg oral tablet     2015  take 1 tablet (80 mg) by oral route 
2 times per day  Dr. Turner

 

 duloxetine 30 mg oral capsule,delayed release(/EC)  2014  12/3/2015  
take 1 capsule (30 mg) by oral route once a day   

 

 hydrocodone-acetaminophen 7.5-325 mg oral tablet  2014  12/3/2015  take 
1 tablet by oral route every 6 hours as needed for pain   

 

 diltiazem HCl 120 mg oral capsule, extended release     12/3/2015  take 1 
capsule (120 mg) by oral route once daily   

 

 carvedilol 12.5 mg oral tablet     12/3/2015  take 1 tablet (12.5 mg) by oral 
route every 12 hours with food  dose change

 

 sotalol 120 mg oral tablet     12/3/2015  take 1 tablet (120 mg) by oral route 
2 times per day   







Problem List







 Description  Status  Onset

 

 Diabetes Mellitus, Type II  Active   

 

 Hemangioma in Spine  Active   

 

 Hyperlipidemia  Active   







Vital Signs







 Date  Time  BP-Sys(mm[Hg]  BP-Missy(mm[Hg])  HR(bpm)  RR(rpm)  Temp  WT  HT  HC  
BMI  BSA  BMI Percentile  O2 Sat(%)

 

 12/3/2015  10:17:00 AM  138 mmHg  72 mmHg  80 bpm  20 rpm  98.2 F  152 lbs  61 
in     28.72 kg/m2  1.72 m2     98 %

 

 2015  10:31:00 AM  130 mmHg  66 mmHg  66 bpm  20 rpm  98.1 F  143 lbs  61 
in     27.0193 kg/m  1.6708 m     98 %

 

 2014  9:39:00 AM  138 mmHg  88 mmHg  153 bpm  20 rpm  98.7 F  150 lbs  
61 in     28.34 kg/m2  1.71 m2     98 %

 

 6/3/2014  3:55:00 PM  130 mmHg  62 mmHg  69 bpm  18 rpm  97.9 F  151 lbs  61 
in     28.5309 kg/m  1.7169 m     99 %

 

 2014  10:56:00 AM  150 mmHg  74 mmHg  81 bpm  16 rpm  96.2 F  152 lbs  61 
in     28.72 kg/m2  1.72 m2     97 %

 

 2014  8:52:00 AM  138 mmHg  78 mmHg  61 bpm  20 rpm  98 F  168 lbs  61 in
     31.743 kg/m  1.811 m     100 %

 

 10/8/2013  10:08:00 AM  138 mmHg  78 mmHg  148 bpm  22 rpm  98.2 F  168 lbs  
61 in     31.74 kg/m2  1.81 m2     98 %

 

 2013  8:54:00 AM  150 mmHg  80 mmHg  78 bpm  16 rpm  98.9 F  163 lbs  61 
in     30.7983 kg/m  1.7839 m     99 %

 

 2013  10:13:00 AM  136 mmHg  78 mmHg  70 bpm  18 rpm  97.8 F  156 lbs    
              

 

 3/5/2013  9:30:00 AM  148 mmHg  80 mmHg  76 bpm  16 rpm  97.8 F  152 lbs  61 
in     28.7199 kg/m  1.7226 m      

 

 2012  10:33:00 AM  136 mmHg  74 mmHg  89 bpm  18 rpm  98.6 F  154.375 lbs
  61 in     29.17 kg/m2  1.74 m2     99 %

 

 7/3/2012  8:08:00 AM  134 mmHg  72 mmHg  78 bpm  22 rpm  98.2 F  145 lbs  61 
in     27.3972 kg/m  1.6825 m      

 

 2012  8:03:00 AM  132 mmHg  76 mmHg  70 bpm  18 rpm  97.9 F  143 lbs  61 
in     27.02 kg/m2  1.67 m2      

 

 3/26/2012  11:28:00 AM  136 mmHg  74 mmHg  89 bpm  18 rpm  97.9 F  149.125 lbs
  61 in     28.1766 kg/m  1.7062 m      

 

 2011  8:12:00 AM  132 mmHg  82 mmHg  84 bpm  16 rpm  97.8 F  150 lbs  61 
in     28.34 kg/m2  1.71 m2      

 

 2011  3:15:00 PM  130 mmHg  74 mmHg  68 bpm  18 rpm  98.3 F  156 lbs  61 
in     29.4756 kg/m  1.7451 m      

 

 8/10/2011  2:58:00 PM  120 mmHg  74 mmHg  100 bpm  20 rpm  99.4 F  169.25 lbs 
                 

 

 2011  9:49:00 AM  134 mmHg  78 mmHg  74 bpm  18 rpm  98.3 F  164 lbs     
             

 

 2011  8:15:00 AM  128 mmHg  78 mmHg  62 bpm  18 rpm  98.2 F  157 lbs     
             

 

 2011  8:54:00 AM  128 mmHg  72 mmHg  104 bpm  22 rpm  98.6 F  153 lbs     
            100 %

 

 3/15/2011  3:04:00 PM  144 mmHg  82 mmHg  72 bpm  18 rpm  98 F  163 lbs       
           

 

 2010  9:17:00 AM  124 mmHg  78 mmHg  76 bpm        145 lbs               
   

 

 2010  10:13:00 AM  130 mmHg  68 mmHg  70 bpm  16 rpm  98.3 F  144 lbs     
             

 

 2010  8:21:00 AM  134 mmHg  76 mmHg  70 bpm  20 rpm  98.6 F  147 lbs      
            







Social History







 Name  Description  Comments

 

 Tobacco  Never smoker   







History of Procedures







 Date Ordered  Description  Order Status

 

 12/3/2015 12:00 AM  LIPID PANEL  Reviewed

 

 12/3/2015 12:00 AM  GLYCOSYLATED HEMOGLOBIN TEST  Reviewed

 

 12/3/2015 12:00 AM  COMPREHEN METABOLIC PANEL  Reviewed

 

 2011 12:00 AM  METABOLIC PANEL TOTAL CA  Reviewed

 

 2011 12:00 AM  C-REACTIVE PROTEIN  Reviewed

 

 2011 12:00 AM  RBC SED RATE AUTOMATED  Reviewed

 

 2011 12:00 AM  X-RAY EXAM OF FOOT  Reviewed

 

 3/26/2012 12:00 AM  COMPLETE CBC W/AUTO DIFF WBC  Returned

 

 3/26/2012 12:00 AM  PROTHROMBIN TIME  Returned

 

 3/26/2012 12:00 AM  ASSAY OF BLOOD/URIC ACID  Returned

 

 3/27/2012 12:00 AM  RBC SED RATE AUTOMATED  Returned

 

 3/27/2012 12:00 AM  GLYCOSYLATED HEMOGLOBIN TEST  Returned

 

 2010 12:00 AM  BREATHING CAPACITY TEST  Reviewed

 

 10/8/2013 12:00 AM  CHEST X-RAY 2VW FRONTAL&LATL  Reviewed

 

 10/17/2013 12:00 AM  CHEST X-RAY 2VW FRONTAL&LATL  Reviewed

 

 2014 12:00 AM  CT HEAD/BRAIN W/O & W/DYE  Reviewed

 

 6/3/2014 12:00 AM  ECG MONIT/REPRT UP TO 48 HRS  Reviewed

 

 6/3/2014 12:00 AM  TTE W/O DOPPLER COMPLETE  Reviewed

 

 6/3/2014 12:00 AM  Carotid Doppler  Reviewed

 

 2010 12:00 AM  COMPREHEN METABOLIC PANEL  Reviewed

 

 2010 12:00 AM  LIPID PANEL  Reviewed

 

 2010 12:00 AM  GLYCOSYLATED HEMOGLOBIN TEST  Reviewed

 

 2010 12:00 AM  MICROALBUMIN SEMIQUANT  Reviewed

 

 2010 12:00 AM  DRAINAGE OF SKIN ABSCESS  Reviewed

 

 2010 12:00 AM  Urine drug screen  Reviewed

 

 2011 12:00 AM  METABOLIC PANEL TOTAL CA  Reviewed

 

 2011 12:00 AM  METABOLIC PANEL TOTAL CA  Reviewed

 

 2011 12:00 AM  C-REACTIVE PROTEIN  Reviewed

 

 8/15/2011 12:00 AM  METABOLIC PANEL TOTAL CA  Reviewed

 

 8/15/2011 12:00 AM  GLYCOSYLATED HEMOGLOBIN TEST  Reviewed







Results Summary







 Data and Description  Results

 

 2010 8:22 AM  TRIGLYCERIDES 666.0 mg/dLCHOLESTEROL 241.0 mg/dLHDL 34.0 mg/
dLLDL (CALC) INVALID MG/DLGLUCOSE 336.0 mg/dLSODIUM 138.0 mmol/LPOTASSIUM 3.40 
mmol/LCHLORIDE 102.0 mmol/LCO2 22.0 mmol/LBUN 8.0 mg/dLCREATININE 0.70 mg/dLSGOT
/AST 15.0 IU/LSGPT/ALT 7.0 IU/LALK PHOS 93.0 IU/LTOTAL PROTEIN 7.30 g/dLALBUMIN 
3.90 g/dLTOTAL BILI 0.40 mg/dLCALCIUM 9.10 mg/dLeGFR >60 mL/min/1.73 [id number] UR 
57.20 mg/dLMICROALBUMIN UR 19.0 ug/mLALB:CREAT RATIO 33 

 

 2011 9:15 AM  GLUCOSE 49.0 mg/dLSODIUM 143.0 mmol/LPOTASSIUM 3.30 mmol/
LCHLORIDE 101.0 mmol/LCO2 30.0 mmol/LBUN 12.0 mg/dLCREATININE 0.50 mg/dLCALCIUM 
9.10 mg/dLeGFR >60 mL/min/1.73 m2

 

 2011 10:25 AM  C REACTIVE PROTEIN 6.0 mg/LGLUCOSE 88.0 mg/dLSODIUM 143.0 
mmol/LPOTASSIUM 3.60 mmol/LCHLORIDE 104.0 mmol/LCO2 26.0 mmol/LBUN 11.0 mg/
dLCREATININE 0.70 mg/dLCALCIUM 9.40 mg/dLeGFR >60 mL/min/1.73 m2

 

 2011 11:00 AM  GLUCOSE 160.0 mg/dLSODIUM 141.0 mmol/LPOTASSIUM 3.70 mmol/
LCHLORIDE 105.0 mmol/LCO2 21.0 mmol/LBUN 13.0 mg/dLCREATININE 0.70 mg/dLCALCIUM 
9.50 mg/dLeGFR >60 mL/min/1.73 m2

 

 2011 3:50 PM  C REACTIVE PROTEIN 5.0 mg/LGLUCOSE 361.0 mg/dLSODIUM 140.0 
mmol/LPOTASSIUM 3.80 mmol/LCHLORIDE 102.0 mmol/LCO2 24.0 mmol/LBUN 9.0 mg/
dLCREATININE 1.0 mg/dLCALCIUM 9.60 mg/dLeGFR 57 SEDRATE 24.0 mm/hr

 

 3/26/2012 12:00 PM  WBC 7.3 RBC 4.46 HGB 13.50 g/dLHCT 39.0 %MCV 87.0 fLMCH 
30.30 pgMCHC 34.60 g/dLRDW CV 13.20 %MPV 9.60 fLPLT 142 %NEUT 59.60 %%LYMP 
33.50 %%MONO 5.60 %%EOS 1.20 %%BASO 0.10 %#NEUT 4.32 #LYMP 2.43 #MONO 0.41 #EOS 
0.09 #BASO 0.01 PROTIME 10.60 secsINR 1.0 URIC ACID 3.9 mg/dL

 

 3/27/2012 8:45 PM  SEDRATE 31.0 mm/hr

 

 2015 10:15 AM  TSH 0.390 uIU/mL

 

 12/3/2015 11:22 AM  TRIGLYCERIDES 182.0 mg/dLCHOLESTEROL 180.0 mg/dLHDL 50.0 mg
/dLLDL (CALC) 94.0 mg/dLGLUCOSE 99.0 mg/dLSODIUM 142.0 mmol/LPOTASSIUM 4.20 mmol
/LCHLORIDE 112.0 mmol/LCO2 21.0 mmol/LBUN 17.0 mg/dLCREATININE 0.70 mg/dLSGOT/
AST 17.0 IU/LSGPT/ALT 12.0 IU/LALK PHOS 86.0 IU/LTOTAL PROTEIN 6.30 g/dLALBUMIN 
4.10 g/dLTOTAL BILI 0.50 mg/dLCALCIUM 9.30 mg/dLeGFR >60 mL/min/1.73m







History Of Immunizations

Not available.



History of Past Illness







 Name  Date of Onset  Comments

 

 Diabetes Mellitus, Type II      

 

 Hyperlipidemia      

 

 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      

 

 Bronchitis, Chronic  2010  8:23AM   

 

 Subcutaneous Nodule  2010  8:23AM   

 

 Diabetes Mellitus, Type II  2010 10:15AM   

 

 Hyperlipidemia, Mixed  2010 10:15AM   

 

 Sebaceous Cyst  2010 11:32PM   

 

 General Medical Exam, Adult  2010  9:19AM   

 

 Bursitis, right knee  Mar 15 2011  3:05PM   

 

 Gastroenteritis, Viral  May  2 2011  8:56AM   

 

 Hypokalemia  May 11 2011  8:15AM   

 

 Polymyalgia Rheumatica  May 11 2011  8:15AM   

 

 Hypokalemia  2011  9:48AM   

 

 Polymyalgia Rheumatica  2011  9:48AM   

 

 Diabetes Mellitus, Type II  2011  9:48AM   

 

 Diabetes Mellitus, Type II  Aug 10 2011  2:57PM   

 

 Edema bilateral lower extremities  Aug 10 2011  2:57PM   

 

 Polymyalgia Rheumatica  Dec 14 2011  3:17PM   

 

 Arthralgia  Dec 14 2011  3:17PM   

 

 Pain in  foot, Left Heel  Dec 27 2011  8:16AM   

 

 Edema left lower ext.  Mar 26 2012 11:30AM   

 

 Petechial Rash  Mar 26 2012 11:30AM   

 

 Diabetes Mellitus, Type II  Mar 27 2012  1:39PM   

 

 Polymyalgia Rheumatica  Mar 27 2012  1:39PM   

 

 Edema left lower ext.  2012  8:05AM   

 

 Diabetes Mellitus, Type II  2012  8:05AM   

 

 Cellulitis left foot  2012  8:05AM   

 

 Myalgia  Jul  3 2012  8:10AM   

 

 Headache  Jul  3 2012  8:10AM   

 

 Neuropathy, right foot  Jul  3 2012  8:10AM   

 

 Upper Respiratory Infections  Dec  4 2012 10:35AM   

 

 Diabetes Mellitus, Type II  Mar  5 2013  9:32AM   

 

 Hyperlipidemia  Mar  5 2013  9:32AM   

 

 Atrial Fibrillation  Mar  5 2013  9:32AM   

 

 Osteoarthritis, hand  Mar  5 2013  9:32AM   

 

 Cellulitis  2013 10:15AM   

 

 Right Acute Otitis Media  Aug 28 2013  8:55AM   

 

 Chronic Cough  Oct  8 2013 10:10AM   

 

 Shortness of breath  Oct  8 2013 10:10AM   

 

 Cough  Oct 17 2013  9:40AM   

 

 Tension Headache  2014  8:54AM   

 

 Blurred Vision  2014  8:54AM   

 

 Bronchitis, Acute  2014  8:54AM   

 

 Postoperative Follow-up  2014 10:56AM   

 

 Basal Cell  2014 10:56AM   

 

 Basal Cell Carcinoma of Skin  2014 10:59AM   

 

 Sebaceous Cyst  2014 10:59AM   

 

 Syncope  Oc  3 2014  3:57PM   

 

 Basal cell carcinoma of skin, site unspecified  2014  7:25AM   

 

 Lumbar back pain  Dec 19 2014  9:42AM   

 

 Left Radiculopathy of leg  Dec 19 2014  9:42AM   

 

 Depressive Disorder  Dec 19 2014  9:42AM   

 

 Diabetes Mellitus, Type II  May 13 2015 10:33AM   

 

 Hyperlipidemia  May 13 2015 10:33AM   

 

 Systolic CHF, acute  May 13 2015 10:33AM   

 

 Resolved Atrial fibrillation  May 13 2015 10:33AM   

 

 Mixed hyperlipidemia  Dec  3 2015 10:19AM   

 

 Diabetes mellitus type 2, uncontrolled  Dec  3 2015 10:19AM   

 

 Other osteoarthritis involving multiple joints  Dec  3 2015 10:19AM   







Payers







 Insurance Name  Company Name  Plan Name  Plan Number  Policy Number  Policy 
Group Number  Start Date

 

    [employer] [place]     [id number]     2015

 

    [employer] [place]     [id number]     

 

    [facility]  [employer] 
PHYS/DS  [id number]     N/A







History of Encounters







 Visit Date  Visit Type  Provider

 

 12/3/2015  Office visit  Mateo Cooper DO

 

 5/15/2015  Hospital  LEXX Holm MD

 

 2015  Office visit  aMteo Cooper DO

 

 2014  Office visit  Mateo Cooper DO

 

 2014  Hospital  LEXX Holm MD

 

 2014  Procedures  David Suresh MD

 

 6/3/2014  Office visit  Mateo Cooper DO

 

 2014  Office visit  David Suresh MD

 

 2014  Procedures  David Suresh MD

 

 2014  [facility]  LEXX Holm MD

 

 2014  Office visit  Mateo Cooper DO

 

 10/8/2013  Office visit  Mateo Cooper DO

 

 2013  Office visit  Mateo Cooper DO

 

 2013  Office visit  Mateo Cooper DO

 

 3/5/2013  Office visit  Mateo Cooper DO

 

 2013  Hospital  LEXX Holm MD

 

 2012  Office visit  Jenni Katz APRN

 

 2012  Hospital  LEXX Holm MD

 

 7/3/2012  Office visit  Mateo Cooper DO

 

 2012  Hospital  Kristian Kiser MD

 

 2012  [facility]  LEXX Holm MD

 

 2012  [facility]  Kristian Kiser MD

 

 2012  Hospital  LEXX Holm MD

 

 2012  Office visit  Mateo Cooper DO

 

 3/26/2012  Office visit  Jenni Katz APRN

 

 2011  Office visit  Mateo Cooper DO

 

 2011  Office visit  Jenni Katz APRN

 

 8/10/2011  Office visit  Jenni Katz APRN

 

 2011  Office visit  Mateo Cooper DO

 

 2011  Office visit  Mateo Cooper DO

 

 2011  [facility]  Bandar Zelaya MD

 

 2011  [facility]  Bandar Zelaya MD

 

 2011  [facility]  Bandar Zelaya MD

 

 2011  [facility]  LEXX Holm MD

 

 2011  Office visit  Mateo Cooper DO

 

 3/15/2011  Office visit  Mateo Cooper DO

 

 2010  Office visit  David Burciaga PA-C

 

 2010  Office visit  Mateo Cooper DO

 

 2010  Laboratory  Ayush Finn MD

 

 2010  Laboratory  Ayush Finn MD

 

 2010  Office visit  Mateo Cooper DO

## 2018-03-23 NOTE — XMS REPORT
MU2 Ambulatory Summary

 Created on: 2015



Ambar Grider

External Reference #: [id number]

: 1950

Sex: Female



Demographics







 Address  [address]

[place]  [address]

 

 Home Phone  [phone]

 

 Preferred Language  English

 

 Marital Status  

 

 [employer] Affiliation  Unknown

 

 Race  White

 

 Ethnic Group  Not  or 





Author







 Mateo Pathak

 

 [facility]

 

 Address  [address]

[place], [place]  [address]



 

 Phone  [phone]







Care Team Providers







 Care Team Member Name  Role  Phone

 

 Mateo Cooper  PCP  Unavailable







Allergies and Adverse Reactions







 Name  Reaction  Notes

 

 Phenergan      







Plan of Treatment







 Planned Activity  Comments  Planned Date  Planned Time  Plan/Goal

 

 Uncontrolled Diabetes and Thyroid Nodules            

 

 AIRWAY INHALATION TREATMENT     10/8/2013  12:00 AM   

 

 BREATHING CAPACITY TEST     2014  12:00 AM   







Medications







 Active 

 

 Name  Start Date  Estimated Completion Date  SIG  Comments

 

 Pen Needle 31 gauge x 3/16" miscellaneous needle  2014     USES 3 
INJECTIONS A DAY   

 

 metformin 1,000 mg oral tablet  2014     take 1 tablet (1,000 mg) by oral 
route 2 times per day with morning and evening meals   

 

 gabapentin 600 mg oral tablet  2014  TAKE 1 TABLET BY MOUTH 
FOUR TIMES DAILY   

 

 Novolog Flexpen 100 unit/mL subcutaneous insulin pen  2015     INJECT 12 
UNITS SUBCUTANEOUSLY WITH BREAKFAST. LUNCH, AND SUPPER   

 

 metformin 1,000 mg oral tablet  2015     TAKE 1 TABLET BY MOUTH TWICE 
DAILY WITH MORNING AND EVENING MEALS   

 

 metformin 1,000 mg oral tablet  2015     TAKE 1 TABLET BY MOUTH TWICE 
DAILY WITH MORNING AND EVENING MEALS   

 

 carvedilol 3.125 mg oral tablet        take 1 tablet (3.125 mg) by oral route 
once daily   

 

 Benicar 40 mg oral tablet        take 1 tablet (40 mg) by oral route once 
daily   

 

 Levemir FlexTouch 100 unit/mL (3 mL) subcutaneous insulin pen  12/3/2015     
inject 45 units by subcutaneous route twice a day   

 

 tramadol 50 mg oral tablet  12/3/2015     take 1 tablet (50 mg) by oral route 
every 6 hours as needed   

 

 Xarelto 20 mg oral tablet  12/3/2015  2016  take 1 tablet (20 mg) by oral 
route once daily with the evening meal for 30 days   









  

 

 Name  Start Date  Expiration Date  SIG  Comments

 

 Bactrim -160 mg oral tablet  2010  take 1 tablet by oral 
route every 12 hours for 10 days   

 

 Keflex 500 mg oral capsule  2010  take 1 capsule (500 mg) by 
oral route every 12 hours for 7 days   

 

 Reglan 10 mg oral tablet  2011  6/3/2011  take 1 tablet (10 mg) by oral 
route twice daily   

 

 prednisone 20 mg oral tablet  2011  take 1 tablet by oral 
route daily for 10 days   

 

 nabumetone 500 mg oral tablet  2011     take 1 tablet (500 mg) by oral 
route 2 times per day with food   

 

 indomethacin 50 mg oral capsule  2012  take 1 capsule (50 mg) 
by oral route 3 times per day with food for 7 days  "stopped it 2-3 weeks ago 
due to high glucose"

 

 Bactrim -160 mg oral tablet  2012  take 1 tablet by oral 
route every 12 hours for 7 days   

 

 insulin syringe-needle U-100 1 mL 31 x 5/16" miscellaneous syringe  2012  USE AS DIRECTED FOUR TIMES DAILY   

 

 Levemir 100 unit/mL subcutaneous solution  2012  inject 45 
units by subcutaneous route twice daily  "using pen"

 

 Augmentin 500-125 mg oral tablet  2012  take 1 tablet by oral 
route every 12 hours for 7 days   

 

 Pen Needle 31 gauge x 3/16" miscellaneous needle  2013  Uses 3 
injections a day   

 

 cephalexin 500 mg oral tablet  2013  take 1 tablet (500 mg) by 
oral route every 12 hours for 10 days   

 

 Augmentin 500-125 mg oral tablet  2013  take 1 tablet by oral 
route every 12 hours for 7 days   

 

 Accu-Chek Compact Test Miscellaneous Strip  1/3/2014  2014  TEST AS 
DIRECTED FOUR TIMES DAILY   

 

 Novolog Flexpen 100 unit/mL subcutaneous insulin pen  2014  
INJECT 12 UNITS SUBCUTANEOUSLY WITH BREAKFAST. LUNCH, AND SUPPER   









 Discontinued 

 

 Name  Start Date  Discontinued Date  SIG  Comments

 

 Aldara 5 % topical cream in packet  2010  3/15/2011  apply to the affected 
area(s) before bedtime by topical route 3 times per week and leave on skin for 
6 to 10 hours   

 

 fenofibrate 160 mg oral tablet  2010  take 1 tablet (160 mg) by 
oral route once daily   

 

 meloxicam 15 mg oral tablet  3/15/2011  2011  take 1 tablet (15 mg) by 
oral route once daily as needed   

 

 Zofran (as hydrochloride) 4 mg/5 mL oral solution  2011  take 5-
10 milliliters by oral route every 6 hours as needed   

 

 Klor-Con M20 20 mEq oral tablet,ER particles/crystals     3/5/2013  take 1 
tablet (20 meq) by oral route once daily with food  "not takiing"

 

 Diflucan 150 mg oral tablet  8/10/2011  2012  take 1 tablet (150 mg) by 
oral route once   

 

 amoxicillin 500 mg oral capsule     2012  take 1 capsule (500 mg) by oral 
route every 12 hours for 7 days   

 

 Medrol (Dominic) 4 mg oral tablets,dose pack     2012  take as directed   

 

 Lasix 20 mg oral tablet  4/10/2012  3/5/2013  take 1 tablet (20 mg) by oral 
route once daily as needed   

 

 amitriptyline 10 mg oral tablet  7/3/2012  2015  take 1 tablet by oral 
route once a day (at bedtime)   

 

 cyclobenzaprine 5 mg oral tablet  7/3/2012  10/8/2013  take 1 tablet by oral 
route once a day (at bedtime) as needed   

 

 cephalexin 500 mg oral tablet  7/3/2012  3/5/2013  take 1 tablet (500 mg) by 
oral route every 12 hours   

 

 Tessalon 200 mg oral capsule  2012  take 1 capsule (200 mg) by 
oral route 3 times per day as needed   

 

 Claritin-D 12 Hour 5-120 mg oral tablet extended release 12 hr  2012  3/5/
2013  take 1 tablet by oral route every 12 hours   

 

 naproxen 500 mg oral tablet  2014  take 1 tablet (500 mg) by 
oral route 2 times per day with food  "not taking now"

 

 Dulera 100-5 mcg/actuation inhalation HFA aerosol inhaler  10/8/2013  12/3/
2015  inhale 2 puffs by inhalation route 2 times per day in the morning and 
evening   

 

 flecainide 100 mg oral tablet     2015  take 1 tablet (100 mg) by oral 
route every 12 hours   

 

 cyclobenzaprine 10 mg oral tablet  2014  take 1 tablet (10 mg
) by oral route at bedtime as needed   

 

 Betapace 80 mg oral tablet     2015  take 1 tablet (80 mg) by oral route 
2 times per day  Dr. Turner

 

 duloxetine 30 mg oral capsule,delayed release(/EC)  2014  12/3/2015  
take 1 capsule (30 mg) by oral route once a day   

 

 hydrocodone-acetaminophen 7.5-325 mg oral tablet  2014  12/3/2015  take 
1 tablet by oral route every 6 hours as needed for pain   

 

 diltiazem HCl 120 mg oral capsule, extended release     12/3/2015  take 1 
capsule (120 mg) by oral route once daily   

 

 carvedilol 12.5 mg oral tablet     12/3/2015  take 1 tablet (12.5 mg) by oral 
route every 12 hours with food  dose change

 

 sotalol 120 mg oral tablet     12/3/2015  take 1 tablet (120 mg) by oral route 
2 times per day   







Problem List







 Description  Status  Onset

 

 Diabetes Mellitus, Type II  Active   

 

 Hemangioma in Spine  Active   

 

 Hyperlipidemia  Active   







Vital Signs







 Date  Time  BP-Sys(mm[Hg]  BP-Missy(mm[Hg])  HR(bpm)  RR(rpm)  Temp  WT  HT  HC  
BMI  BSA  BMI Percentile  O2 Sat(%)

 

 12/3/2015  10:17:00 AM  138 mmHg  72 mmHg  80 bpm  20 rpm  98.2 F  152 lbs  61 
in     28.72 kg/m2  1.72 m2     98 %

 

 2015  10:31:00 AM  130 mmHg  66 mmHg  66 bpm  20 rpm  98.1 F  143 lbs  61 
in     27.0193 kg/m  1.6708 m     98 %

 

 2014  9:39:00 AM  138 mmHg  88 mmHg  153 bpm  20 rpm  98.7 F  150 lbs  
61 in     28.34 kg/m2  1.71 m2     98 %

 

 6/3/2014  3:55:00 PM  130 mmHg  62 mmHg  69 bpm  18 rpm  97.9 F  151 lbs  61 
in     28.5309 kg/m  1.7169 m     99 %

 

 2014  10:56:00 AM  150 mmHg  74 mmHg  81 bpm  16 rpm  96.2 F  152 lbs  61 
in     28.72 kg/m2  1.72 m2     97 %

 

 2014  8:52:00 AM  138 mmHg  78 mmHg  61 bpm  20 rpm  98 F  168 lbs  61 in
     31.743 kg/m  1.811 m     100 %

 

 10/8/2013  10:08:00 AM  138 mmHg  78 mmHg  148 bpm  22 rpm  98.2 F  168 lbs  
61 in     31.74 kg/m2  1.81 m2     98 %

 

 2013  8:54:00 AM  150 mmHg  80 mmHg  78 bpm  16 rpm  98.9 F  163 lbs  61 
in     30.7983 kg/m  1.7839 m     99 %

 

 2013  10:13:00 AM  136 mmHg  78 mmHg  70 bpm  18 rpm  97.8 F  156 lbs    
              

 

 3/5/2013  9:30:00 AM  148 mmHg  80 mmHg  76 bpm  16 rpm  97.8 F  152 lbs  61 
in     28.7199 kg/m  1.7226 m      

 

 2012  10:33:00 AM  136 mmHg  74 mmHg  89 bpm  18 rpm  98.6 F  154.375 lbs
  61 in     29.17 kg/m2  1.74 m2     99 %

 

 7/3/2012  8:08:00 AM  134 mmHg  72 mmHg  78 bpm  22 rpm  98.2 F  145 lbs  61 
in     27.3972 kg/m  1.6825 m      

 

 2012  8:03:00 AM  132 mmHg  76 mmHg  70 bpm  18 rpm  97.9 F  143 lbs  61 
in     27.02 kg/m2  1.67 m2      

 

 3/26/2012  11:28:00 AM  136 mmHg  74 mmHg  89 bpm  18 rpm  97.9 F  149.125 lbs
  61 in     28.1766 kg/m  1.7062 m      

 

 2011  8:12:00 AM  132 mmHg  82 mmHg  84 bpm  16 rpm  97.8 F  150 lbs  61 
in     28.34 kg/m2  1.71 m2      

 

 2011  3:15:00 PM  130 mmHg  74 mmHg  68 bpm  18 rpm  98.3 F  156 lbs  61 
in     29.4756 kg/m  1.7451 m      

 

 8/10/2011  2:58:00 PM  120 mmHg  74 mmHg  100 bpm  20 rpm  99.4 F  169.25 lbs 
                 

 

 2011  9:49:00 AM  134 mmHg  78 mmHg  74 bpm  18 rpm  98.3 F  164 lbs     
             

 

 2011  8:15:00 AM  128 mmHg  78 mmHg  62 bpm  18 rpm  98.2 F  157 lbs     
             

 

 2011  8:54:00 AM  128 mmHg  72 mmHg  104 bpm  22 rpm  98.6 F  153 lbs     
            100 %

 

 3/15/2011  3:04:00 PM  144 mmHg  82 mmHg  72 bpm  18 rpm  98 F  163 lbs       
           

 

 2010  9:17:00 AM  124 mmHg  78 mmHg  76 bpm        145 lbs               
   

 

 2010  10:13:00 AM  130 mmHg  68 mmHg  70 bpm  16 rpm  98.3 F  144 lbs     
             

 

 2010  8:21:00 AM  134 mmHg  76 mmHg  70 bpm  20 rpm  98.6 F  147 lbs      
            







Social History







 Name  Description  Comments

 

 Tobacco  Never smoker   







History of Procedures







 Date Ordered  Description  Order Status

 

 12/3/2015 12:00 AM  LIPID PANEL  Reviewed

 

 12/3/2015 12:00 AM  GLYCOSYLATED HEMOGLOBIN TEST  Reviewed

 

 12/3/2015 12:00 AM  COMPREHEN METABOLIC PANEL  Reviewed

 

 2011 12:00 AM  METABOLIC PANEL TOTAL CA  Reviewed

 

 2011 12:00 AM  C-REACTIVE PROTEIN  Reviewed

 

 2011 12:00 AM  RBC SED RATE AUTOMATED  Reviewed

 

 2011 12:00 AM  X-RAY EXAM OF FOOT  Reviewed

 

 3/26/2012 12:00 AM  COMPLETE CBC W/AUTO DIFF WBC  Returned

 

 3/26/2012 12:00 AM  PROTHROMBIN TIME  Returned

 

 3/26/2012 12:00 AM  ASSAY OF BLOOD/URIC ACID  Returned

 

 3/27/2012 12:00 AM  RBC SED RATE AUTOMATED  Returned

 

 3/27/2012 12:00 AM  GLYCOSYLATED HEMOGLOBIN TEST  Returned

 

 2010 12:00 AM  BREATHING CAPACITY TEST  Reviewed

 

 10/8/2013 12:00 AM  CHEST X-RAY 2VW FRONTAL&LATL  Reviewed

 

 10/17/2013 12:00 AM  CHEST X-RAY 2VW FRONTAL&LATL  Reviewed

 

 2014 12:00 AM  CT HEAD/BRAIN W/O & W/DYE  Reviewed

 

 6/3/2014 12:00 AM  ECG MONIT/REPRT UP TO 48 HRS  Reviewed

 

 6/3/2014 12:00 AM  TTE W/O DOPPLER COMPLETE  Reviewed

 

 6/3/2014 12:00 AM  Carotid Doppler  Reviewed

 

 2010 12:00 AM  COMPREHEN METABOLIC PANEL  Reviewed

 

 2010 12:00 AM  LIPID PANEL  Reviewed

 

 2010 12:00 AM  GLYCOSYLATED HEMOGLOBIN TEST  Reviewed

 

 2010 12:00 AM  MICROALBUMIN SEMIQUANT  Reviewed

 

 2010 12:00 AM  DRAINAGE OF SKIN ABSCESS  Reviewed

 

 2010 12:00 AM  Urine drug screen  Reviewed

 

 2011 12:00 AM  METABOLIC PANEL TOTAL CA  Reviewed

 

 2011 12:00 AM  METABOLIC PANEL TOTAL CA  Reviewed

 

 2011 12:00 AM  C-REACTIVE PROTEIN  Reviewed

 

 8/15/2011 12:00 AM  METABOLIC PANEL TOTAL CA  Reviewed

 

 8/15/2011 12:00 AM  GLYCOSYLATED HEMOGLOBIN TEST  Reviewed







Results Summary







 Data and Description  Results

 

 2010 8:22 AM  TRIGLYCERIDES 666.0 mg/dLCHOLESTEROL 241.0 mg/dLHDL 34.0 mg/
dLLDL (CALC) INVALID MG/DLGLUCOSE 336.0 mg/dLSODIUM 138.0 mmol/LPOTASSIUM 3.40 
mmol/LCHLORIDE 102.0 mmol/LCO2 22.0 mmol/LBUN 8.0 mg/dLCREATININE 0.70 mg/dLSGOT
/AST 15.0 IU/LSGPT/ALT 7.0 IU/LALK PHOS 93.0 IU/LTOTAL PROTEIN 7.30 g/dLALBUMIN 
3.90 g/dLTOTAL BILI 0.40 mg/dLCALCIUM 9.10 mg/dLeGFR >60 mL/min/1.73 d0TRSKV UR 
57.20 mg/dLMICROALBUMIN UR 19.0 ug/mLALB:CREAT RATIO 33 

 

 2011 9:15 AM  GLUCOSE 49.0 mg/dLSODIUM 143.0 mmol/LPOTASSIUM 3.30 mmol/
LCHLORIDE 101.0 mmol/LCO2 30.0 mmol/LBUN 12.0 mg/dLCREATININE 0.50 mg/dLCALCIUM 
9.10 mg/dLeGFR >60 mL/min/1.73 m2

 

 2011 10:25 AM  C REACTIVE PROTEIN 6.0 mg/LGLUCOSE 88.0 mg/dLSODIUM 143.0 
mmol/LPOTASSIUM 3.60 mmol/LCHLORIDE 104.0 mmol/LCO2 26.0 mmol/LBUN 11.0 mg/
dLCREATININE 0.70 mg/dLCALCIUM 9.40 mg/dLeGFR >60 mL/min/1.73 m2

 

 2011 11:00 AM  GLUCOSE 160.0 mg/dLSODIUM 141.0 mmol/LPOTASSIUM 3.70 mmol/
LCHLORIDE 105.0 mmol/LCO2 21.0 mmol/LBUN 13.0 mg/dLCREATININE 0.70 mg/dLCALCIUM 
9.50 mg/dLeGFR >60 mL/min/1.73 m2

 

 2011 3:50 PM  C REACTIVE PROTEIN 5.0 mg/LGLUCOSE 361.0 mg/dLSODIUM 140.0 
mmol/LPOTASSIUM 3.80 mmol/LCHLORIDE 102.0 mmol/LCO2 24.0 mmol/LBUN 9.0 mg/
dLCREATININE 1.0 mg/dLCALCIUM 9.60 mg/dLeGFR 57 SEDRATE 24.0 mm/hr

 

 3/26/2012 12:00 PM  WBC 7.3 RBC 4.46 HGB 13.50 g/dLHCT 39.0 %MCV 87.0 fLMCH 
30.30 pgMCHC 34.60 g/dLRDW CV 13.20 %MPV 9.60 fLPLT 142 %NEUT 59.60 %%LYMP 
33.50 %%MONO 5.60 %%EOS 1.20 %%BASO 0.10 %#NEUT 4.32 #LYMP 2.43 #MONO 0.41 #EOS 
0.09 #BASO 0.01 PROTIME 10.60 secsINR 1.0 URIC ACID 3.9 mg/dL

 

 3/27/2012 8:45 PM  SEDRATE 31.0 mm/hr

 

 2015 10:15 AM  TSH 0.390 uIU/mL

 

 12/3/2015 11:22 AM  TRIGLYCERIDES 182.0 mg/dLCHOLESTEROL 180.0 mg/dLHDL 50.0 mg
/dLLDL (CALC) 94.0 mg/dLGLUCOSE 99.0 mg/dLSODIUM 142.0 mmol/LPOTASSIUM 4.20 mmol
/LCHLORIDE 112.0 mmol/LCO2 21.0 mmol/LBUN 17.0 mg/dLCREATININE 0.70 mg/dLSGOT/
AST 17.0 IU/LSGPT/ALT 12.0 IU/LALK PHOS 86.0 IU/LTOTAL PROTEIN 6.30 g/dLALBUMIN 
4.10 g/dLTOTAL BILI 0.50 mg/dLCALCIUM 9.30 mg/dLeGFR >60 mL/min/1.73m







History Of Immunizations

Not available.



History of Past Illness







 Name  Date of Onset  Comments

 

 Diabetes Mellitus, Type II      

 

 Hyperlipidemia      

 

 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      

 

 Bronchitis, Chronic  2010  8:23AM   

 

 Subcutaneous Nodule  2010  8:23AM   

 

 Diabetes Mellitus, Type II  2010 10:15AM   

 

 Hyperlipidemia, Mixed  2010 10:15AM   

 

 Sebaceous Cyst  2010 11:32PM   

 

 General Medical Exam, Adult  2010  9:19AM   

 

 Bursitis, right knee  Mar 15 2011  3:05PM   

 

 Gastroenteritis, Viral  May  2 2011  8:56AM   

 

 Hypokalemia  May 11 2011  8:15AM   

 

 Polymyalgia Rheumatica  May 11 2011  8:15AM   

 

 Hypokalemia  2011  9:48AM   

 

 Polymyalgia Rheumatica  2011  9:48AM   

 

 Diabetes Mellitus, Type II  2011  9:48AM   

 

 Diabetes Mellitus, Type II  Aug 10 2011  2:57PM   

 

 Edema bilateral lower extremities  Aug 10 2011  2:57PM   

 

 Polymyalgia Rheumatica  Dec 14 2011  3:17PM   

 

 Arthralgia  Dec 14 2011  3:17PM   

 

 Pain in  foot, Left Heel  Dec 27 2011  8:16AM   

 

 Edema left lower ext.  Mar 26 2012 11:30AM   

 

 Petechial Rash  Mar 26 2012 11:30AM   

 

 Diabetes Mellitus, Type II  Mar 27 2012  1:39PM   

 

 Polymyalgia Rheumatica  Mar 27 2012  1:39PM   

 

 Edema left lower ext.  2012  8:05AM   

 

 Diabetes Mellitus, Type II  2012  8:05AM   

 

 Cellulitis left foot  2012  8:05AM   

 

 Myalgia  Jul  3 2012  8:10AM   

 

 Headache  Jul  3 2012  8:10AM   

 

 Neuropathy, right foot  Jul  3 2012  8:10AM   

 

 Upper Respiratory Infections  Dec  4 2012 10:35AM   

 

 Diabetes Mellitus, Type II  Mar  5 2013  9:32AM   

 

 Hyperlipidemia  Mar  5 2013  9:32AM   

 

 Atrial Fibrillation  Mar  5 2013  9:32AM   

 

 Osteoarthritis, hand  Mar  5 2013  9:32AM   

 

 Cellulitis  2013 10:15AM   

 

 Right Acute Otitis Media  Aug 28 2013  8:55AM   

 

 Chronic Cough  Oct  8 2013 10:10AM   

 

 Shortness of breath  Oct  8 2013 10:10AM   

 

 Cough  Oct 17 2013  9:40AM   

 

 Tension Headache  2014  8:54AM   

 

 Blurred Vision  2014  8:54AM   

 

 Bronchitis, Acute  2014  8:54AM   

 

 Postoperative Follow-up  2014 10:56AM   

 

 Basal Cell  2014 10:56AM   

 

 Basal Cell Carcinoma of Skin  2014 10:59AM   

 

 Sebaceous Cyst  2014 10:59AM   

 

 Syncope  Oc  3 2014  3:57PM   

 

 Basal cell carcinoma of skin, site unspecified  2014  7:25AM   

 

 Lumbar back pain  Dec 19 2014  9:42AM   

 

 Left Radiculopathy of leg  Dec 19 2014  9:42AM   

 

 Depressive Disorder  Dec 19 2014  9:42AM   

 

 Diabetes Mellitus, Type II  May 13 2015 10:33AM   

 

 Hyperlipidemia  May 13 2015 10:33AM   

 

 Systolic CHF, acute  May 13 2015 10:33AM   

 

 Resolved Atrial fibrillation  May 13 2015 10:33AM   

 

 Mixed hyperlipidemia  Dec  3 2015 10:19AM   

 

 Diabetes mellitus type 2, uncontrolled  Dec  3 2015 10:19AM   







Payers







 Insurance Name  Company Name  Plan Name  Plan Number  Policy Number  Policy 
Group Number  Start Date

 

    [employer] [place]     [id number]     2015

 

    [employer] [place]     [id number]     

 

    [facility]  [employer] 
PHYS/DS  [id number]     N/A







History of Encounters







 Visit Date  Visit Type  Provider

 

 12/3/2015  Office visit  Mateo Cooper DO

 

 5/15/2015  [facility]  LEXX Holm MD

 

 2015  Office visit  Mateo Cooper DO

 

 2014  Office visit  Mateo Cooper DO

 

 2014  Hospital  LEXX Holm MD

 

 2014  Procedures  David Suresh MD

 

 6/3/2014  Office visit  Mateo Cooper DO

 

 2014  Office visit  David Suresh MD

 

 2014  Procedures  David Suresh MD

 

 2014  [facility]  LEXX Holm MD

 

 2014  Office visit  Mateo Cooper DO

 

 10/8/2013  Office visit  Mateo Cooper DO

 

 2013  Office visit  Mateo Cooper DO

 

 2013  Office visit  Mateo Cooper DO

 

 3/5/2013  Office visit  Mateo Cooper DO

 

 2013  [facility]  LEXX Holm MD

 

 2012  Office visit  Jenni Katz APRN

 

 2012  [facility]  LEXX Holm MD

 

 7/3/2012  Office visit  Mateo Cooper DO

 

 2012  [facility]  Kristian Kiser MD

 

 2012  [facility]  LEXX Holm MD

 

 2012  [facility]  Kristian Kiser MD

 

 2012  [facility]  LEXX Holm MD

 

 2012  Office visit  Mateo Cooper DO

 

 3/26/2012  Office visit  Jenni Katz APRN

 

 2011  Office visit  Mateo Cooper DO

 

 2011  Office visit  Jenni Katz APRN

 

 8/10/2011  Office visit  Jenni Katz APRN

 

 2011  Office visit  Mateo Cooper DO

 

 2011  Office visit  Mateo Cooper DO

 

 2011  Hospital  Bandar Zelaya MD

 

 2011  [facility]  Bandar Zelaya MD

 

 2011  [facility]  Bandar Zelaya MD

 

 2011  [facility]  LEXX Holm MD

 

 2011  Office visit  Mateo Cooper DO

 

 3/15/2011  Office visit  Mateo Cooper DO

 

 2010  Office visit  David Burciaga PA-C

 

 2010  Office visit  Mateo Cooper DO

 

 2010  Laboratory  Ayush Finn MD

 

 2010  Laboratory  Ayush Finn MD

 

 2010  Office visit  Mateo Cooper DO
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
## 2018-03-23 NOTE — XMS REPORT
Clinical Summary

 Created on: 2018



RICK POWERS

External Reference #: [id number]

: 1950

Sex: Female



Demographics







 Address  [address]

[place]  [address]

 

 Home Phone  [phone]

 

 Preferred Language  Unknown

 

 Marital Status  Unknown

 

 Bahai Affiliation  BAP

 

 Race  White

 

 Ethnic Group  Not  or 





Author







 Author  [facility]

 

 Organization  [facility]

 

 Address  Unknown

 

 Phone  Unavailable







Support







 Name  Relationship  Address  Phone

 

 CHRISTIE POWERS  [address]  [address]

[place]  [address]  [phone]







Care Team Providers







 Care Team Member Name  Role  Phone

 

 Unverified, Unverified Md  PCP  Unavailable

 

 Vicki Guzman MD  Unavailable  [phone]







Source Comments

Some departments are not documenting in the electronic medical record.  If you 
do not see the information that you expected, contact Release of Information in 
the Health Information Management department at [phone] for further 
assistance in locating additional records.[facility]



Allergies

Not on File



Current Medications

Not on file



Active Problems







 



  Problem   Noted Date

 

 



  Displacement of lumbar intervertebral disc without myelopathy   10/09/2008







Social History







    



  Tobacco Use   Types   Packs/Day   Years Used   Date

 

    



  Never Assessed    









 



  Sex Assigned at Birth   Date Recorded

 

 



  Not on file 







Last Filed Vital Signs

Not on file



Plan of Treatment







   



  Health Maintenance   Due Date   Last Done   Comments

 

   



  HEPATITIS C SCREENING   1950  

 

   



  PHYSICAL (COMPREHENSIVE)   1958  



  EXAM   

 

   



  PERTUSSIS VACCINE   1962  

 

   



  TETANUS VACCINE   1968  

 

   



  BREAST CANCER SCREENING   1991  

 

   



  COLORECTAL CANCER   2001  



  SCREENING   

 

   



  SHINGLES VACCINE   2011  

 

   



  OSTEOPOROSIS SCREENING   2016  

 

   



  PREVNAR/PNEUMOVAX (#1)   2016  

 

   



  INFLUENZA VACCINE   10/01/2018  







Results

Not on filefrom Last 3 Months

## 2018-03-23 NOTE — XMS REPORT
MU2 Ambulatory Summary

 Created on: 2017



Ambar Grider

External Reference #: [id number]

: 1950

Sex: Female



Demographics







 Address  [address] [place]  [address]

 

 Home Phone  [phone]

 

 Preferred Language  English

 

 Marital Status  

 

 Anglican Affiliation  Unknown

 

 Race  White

 

 Ethnic Group  Not  or 





Author







 Mateo Pathak

 

 [facility]

 

 Address  [address]

[place], [place]  [id number]



 

 Phone  [phone]







Care Team Providers







 Care Team Member Name  Role  Phone

 

 Mateo Cooper  PCP  Unavailable

 

 Mateo Cooper  PreferredProvider  Unavailable







Allergies and Adverse Reactions







 Name  Reaction  Notes

 

 Phenergan      







Plan of Treatment







 Planned Activity  Comments  Planned Date  Planned Time  Plan/Goal

 

 Uncontrolled Diabetes and Thyroid Nodules            

 

 [facility]     2017  12:00 AM   







Medications







 Active 

 

 Name  Start Date  Estimated Completion Date  SIG  Comments

 

 Pen Needle 31 gauge x 3/16" miscellaneous needle  2014     USES 3 
INJECTIONS A DAY   

 

 metformin 1,000 mg oral tablet  2014     take 1 tablet (1,000 mg) by oral 
route 2 times per day with morning and evening meals   

 

 metformin 1,000 mg oral tablet  2015     TAKE 1 TABLET BY MOUTH TWICE 
DAILY WITH MORNING AND EVENING MEALS   

 

 metformin 1,000 mg oral tablet  2015     TAKE 1 TABLET BY MOUTH TWICE 
DAILY WITH MORNING AND EVENING MEALS   

 

 carvedilol 3.125 mg oral tablet        take 1 tablet (3.125 mg) by oral route 
once daily   

 

 Benicar 40 mg oral tablet        take 1 tablet (40 mg) by oral route once 
daily   

 

 Levemir FlexTouch 100 unit/mL (3 mL) subcutaneous insulin pen  12/3/2015     
inject 45 units by subcutaneous route twice a day   

 

 tramadol 50 mg oral tablet  12/3/2015     take 1 tablet (50 mg) by oral route 
every 6 hours as needed   

 

 Novolog Flexpen 100 unit/mL subcutaneous insulin pen  2016     INJECT 14 
UNITS SUBCUTANEOUSLY WITH BREAKFAST. LUNCH, AND SUPPER   

 

 acetaminophen-codeine 300-30 mg oral tablet  2016     take 1 tablet by 
oral route every 4 hours as needed   

 

 amiodarone 400 mg oral tablet        take 1 tablet (400 mg) by oral route once 
daily   

 

 potassium chloride 10 mEq oral capsule, extended release  10/30/2017     take 
2 capsules (20 meq) by oral route once daily with food for 30 days   









  

 

 Name  Start Date  Expiration Date  SIG  Comments

 

 Bactrim -160 mg oral tablet  2010  take 1 tablet by oral 
route every 12 hours for 10 days   

 

 Keflex 500 mg oral capsule  2010  take 1 capsule (500 mg) by 
oral route every 12 hours for 7 days   

 

 Reglan 10 mg oral tablet  2011  6/3/2011  take 1 tablet (10 mg) by oral 
route twice daily   

 

 prednisone 20 mg oral tablet  2011  take 1 tablet by oral 
route daily for 10 days   

 

 nabumetone 500 mg oral tablet  2011     take 1 tablet (500 mg) by oral 
route 2 times per day with food   

 

 indomethacin 50 mg oral capsule  2012  take 1 capsule (50 mg) 
by oral route 3 times per day with food for 7 days  "stopped it 2-3 weeks ago 
due to high glucose"

 

 Bactrim -160 mg oral tablet  2012  take 1 tablet by oral 
route every 12 hours for 7 days   

 

 insulin syringe-needle U-100 1 mL 31 gauge x 5/16 miscellaneous syringe  2012  USE AS DIRECTED FOUR TIMES DAILY   

 

 Levemir 100 unit/mL subcutaneous solution  2012  inject 45 
units by subcutaneous route twice daily  "using pen"

 

 Augmentin 500-125 mg oral tablet  2012  take 1 tablet by oral 
route every 12 hours for 7 days   

 

 Pen Needle 31 gauge x 3/16" miscellaneous needle  2013  Uses 3 
injections a day   

 

 cephalexin 500 mg oral tablet  2013  take 1 tablet (500 mg) by 
oral route every 12 hours for 10 days   

 

 Augmentin 500-125 mg oral tablet  2013  take 1 tablet by oral 
route every 12 hours for 7 days   

 

 Novolog Flexpen 100 unit/mL subcutaneous insulin pen  2014  
INJECT 12 UNITS SUBCUTANEOUSLY WITH BREAKFAST. LUNCH, AND SUPPER   

 

 gabapentin 600 mg oral tablet  2014  TAKE 1 TABLET BY MOUTH 
FOUR TIMES DAILY   

 

 Xarelto 20 mg oral tablet  12/3/2015  2016  take 1 tablet (20 mg) by oral 
route once daily with the evening meal for 30 days   

 

 amoxicillin-pot clavulanate 500-125 mg oral tablet  2016  2/15/2016  take 
1 tablet by oral route every 12 hours for 7 days   

 

 Accu-Chek Compact Test miscellaneous strip  2/15/2016  2016  Check 
glucose 4 times a day Dx:[id number]   

 

 Zostavax (PF) 19,400 unit/0.65 mL subcutaneous suspension for reconstitution  
10/17/2016  10/18/2016  inject 0.65 milliliter by subcutaneous route once   









 Discontinued 

 

 Name  Start Date  Discontinued Date  SIG  Comments

 

 Aldara 5 % topical cream in packet  2010  3/15/2011  apply to the affected 
area(s) before bedtime by topical route 3 times per week and leave on skin for 
6 to 10 hours   

 

 fenofibrate 160 mg oral tablet  2010  take 1 tablet (160 mg) by 
oral route once daily   

 

 meloxicam 15 mg oral tablet  3/15/2011  2011  take 1 tablet (15 mg) by 
oral route once daily as needed   

 

 Zofran (as hydrochloride) 4 mg/5 mL oral solution  2011  take 5-
10 milliliters by oral route every 6 hours as needed   

 

 Klor-Con M20 20 mEq oral tablet,ER particles/crystals     3/5/2013  take 1 
tablet (20 meq) by oral route once daily with food  "not takiing"

 

 Diflucan 150 mg oral tablet  8/10/2011  2012  take 1 tablet (150 mg) by 
oral route once   

 

 amoxicillin 500 mg oral capsule     2012  take 1 capsule (500 mg) by oral 
route every 12 hours for 7 days   

 

 Medrol (Dominic) 4 mg oral tablets,dose pack     2012  take as directed   

 

 Lasix 20 mg oral tablet  4/10/2012  3/5/2013  take 1 tablet (20 mg) by oral 
route once daily as needed   

 

 amitriptyline 10 mg oral tablet  7/3/2012  2015  take 1 tablet by oral 
route once a day (at bedtime)   

 

 cyclobenzaprine 5 mg oral tablet  7/3/2012  10/8/2013  take 1 tablet by oral 
route once a day (at bedtime) as needed   

 

 cephalexin 500 mg oral tablet  7/3/2012  3/5/2013  take 1 tablet (500 mg) by 
oral route every 12 hours   

 

 Tessalon 200 mg oral capsule  2012  take 1 capsule (200 mg) by 
oral route 3 times per day as needed   

 

 Claritin-D 12 Hour 5-120 mg oral tablet extended release 12 hr  2012  3/5/
2013  take 1 tablet by oral route every 12 hours   

 

 naproxen 500 mg oral tablet  2014  take 1 tablet (500 mg) by 
oral route 2 times per day with food  "not taking now"

 

 Dulera 100-5 mcg/actuation inhalation HFA aerosol inhaler  10/8/2013  12/3/
2015  inhale 2 puffs by inhalation route 2 times per day in the morning and 
evening   

 

 flecainide 100 mg oral tablet     2015  take 1 tablet (100 mg) by oral 
route every 12 hours   

 

 cyclobenzaprine 10 mg oral tablet  2014  take 1 tablet (10 mg
) by oral route at bedtime as needed   

 

 Betapace 80 mg oral tablet     2015  take 1 tablet (80 mg) by oral route 
2 times per day  Dr. Turner

 

 duloxetine 30 mg oral capsule,delayed release(/EC)  2014  12/3/2015  
take 1 capsule (30 mg) by oral route once a day   

 

 hydrocodone-acetaminophen 7.5-325 mg oral tablet  2014  12/3/2015  take 
1 tablet by oral route every 6 hours as needed for pain   

 

 diltiazem HCl 120 mg oral capsule, extended release     12/3/2015  take 1 
capsule (120 mg) by oral route once daily   

 

 carvedilol 12.5 mg oral tablet     12/3/2015  take 1 tablet (12.5 mg) by oral 
route every 12 hours with food  dose change

 

 sotalol 120 mg oral tablet     12/3/2015  take 1 tablet (120 mg) by oral route 
2 times per day   

 

 benzonatate 200 mg oral capsule  2016  10/17/2016  take 1 capsule (200 mg) 
by oral route 3 times per day as needed   

 

 Levemir FlexTouch 100 unit/mL (3 mL) subcutaneous insulin pen  3/1/2016  10/17/
2016  INJECT 43 UNITS BY SUBCUTANEOUS ROUTE TWICE A DAY   

 

 Levemir FlexTouch 100 unit/mL (3 mL) subcutaneous insulin pen  3/1/2016  10/17/
2016  INJECT 43 UNITS BY SUBCUTANEOUS ROUTE TWICE A DAY  has an insulin pump







Problem List







 Description  Status  Onset

 

 Diabetes Mellitus, Type II  Active   

 

 Hemangioma in Spine  Active   

 

 Hyperlipidemia  Active   







Vital Signs







 Date  Time  BP-Sys(mm[Hg]  BP-Missy(mm[Hg])  HR(bpm)  RR(rpm)  Temp  WT  HT  HC  
BMI  BSA  BMI Percentile  O2 Sat(%)

 

 10/30/2017  10:41:00 AM  152 mmHg  88 mmHg  80 bpm  20 rpm  98.1 F  153 lbs  
61 in     28.91 kg/m2  1.73 m2     100 %

 

 10/17/2016  9:17:00 AM  138 mmHg  70 mmHg  83 bpm  20 rpm  97.7 F  160 lbs  61 
in     30.2314 kg/m  1.7674 m     99 %

 

 2016  1:34:00 PM  132 mmHg  60 mmHg  87 bpm  22 rpm  99 F  152 lbs  61 in
     28.72 kg/m2  1.72 m2     99 %

 

 12/3/2015  10:17:00 AM  138 mmHg  72 mmHg  80 bpm  20 rpm  98.2 F  152 lbs  61 
in     28.7199 kg/m  1.7226 m     98 %

 

 2015  10:31:00 AM  130 mmHg  66 mmHg  66 bpm  20 rpm  98.1 F  143 lbs  61 
in     27.02 kg/m2  1.67 m2     98 %

 

 2014  9:39:00 AM  138 mmHg  88 mmHg  153 bpm  20 rpm  98.7 F  150 lbs  
61 in     28.342 kg/m  1.7112 m     98 %

 

 6/3/2014  3:55:00 PM  130 mmHg  62 mmHg  69 bpm  18 rpm  97.9 F  151 lbs  61 
in     28.53 kg/m2  1.72 m2     99 %

 

 2014  10:56:00 AM  150 mmHg  74 mmHg  81 bpm  16 rpm  96.2 F  152 lbs  61 
in     28.7199 kg/m  1.7226 m     97 %

 

 2014  8:52:00 AM  138 mmHg  78 mmHg  61 bpm  20 rpm  98 F  168 lbs  61 in
     31.74 kg/m2  1.81 m2     100 %

 

 10/8/2013  10:08:00 AM  138 mmHg  78 mmHg  148 bpm  22 rpm  98.2 F  168 lbs  
61 in     31.743 kg/m  1.811 m     98 %

 

 2013  8:54:00 AM  150 mmHg  80 mmHg  78 bpm  16 rpm  98.9 F  163 lbs  61 
in     30.80 kg/m2  1.78 m2     99 %

 

 2013  10:13:00 AM  136 mmHg  78 mmHg  70 bpm  18 rpm  97.8 F  156 lbs    
              

 

 3/5/2013  9:30:00 AM  148 mmHg  80 mmHg  76 bpm  16 rpm  97.8 F  152 lbs  61 
in     28.72 kg/m2  1.72 m2      

 

 2012  10:33:00 AM  136 mmHg  74 mmHg  89 bpm  18 rpm  98.6 F  154.375 lbs
  61 in     29.1686 kg/m  1.736 m     99 %

 

 7/3/2012  8:08:00 AM  134 mmHg  72 mmHg  78 bpm  22 rpm  98.2 F  145 lbs  61 
in     27.40 kg/m2  1.68 m2      

 

 2012  8:03:00 AM  132 mmHg  76 mmHg  70 bpm  18 rpm  97.9 F  143 lbs  61 
in     27.0193 kg/m  1.6708 m      

 

 3/26/2012  11:28:00 AM  136 mmHg  74 mmHg  89 bpm  18 rpm  97.9 F  149.125 lbs
  61 in     28.18 kg/m2  1.71 m2      

 

 2011  8:12:00 AM  132 mmHg  82 mmHg  84 bpm  16 rpm  97.8 F  150 lbs  61 
in     28.342 kg/m  1.7112 m      

 

 2011  3:15:00 PM  130 mmHg  74 mmHg  68 bpm  18 rpm  98.3 F  156 lbs  61 
in     29.48 kg/m2  1.75 m2      

 

 8/10/2011  2:58:00 PM  120 mmHg  74 mmHg  100 bpm  20 rpm  99.4 F  169.25 lbs 
                 

 

 2011  9:49:00 AM  134 mmHg  78 mmHg  74 bpm  18 rpm  98.3 F  164 lbs     
             

 

 2011  8:15:00 AM  128 mmHg  78 mmHg  62 bpm  18 rpm  98.2 F  157 lbs     
             

 

 2011  8:54:00 AM  128 mmHg  72 mmHg  104 bpm  22 rpm  98.6 F  153 lbs     
            100 %

 

 3/15/2011  3:04:00 PM  144 mmHg  82 mmHg  72 bpm  18 rpm  98 F  163 lbs       
           

 

 2010  9:17:00 AM  124 mmHg  78 mmHg  76 bpm        145 lbs               
   

 

 2010  10:13:00 AM  130 mmHg  68 mmHg  70 bpm  16 rpm  98.3 F  144 lbs     
             

 

 2010  8:21:00 AM  134 mmHg  76 mmHg  70 bpm  20 rpm  98.6 F  147 lbs      
            







Social History







 Name  Description  Comments

 

 Tobacco  Never smoker   







History of Procedures







 Date Ordered  Description  Order Status

 

 12/3/2015 12:00 AM  LIPID PANEL  Reviewed

 

 12/3/2015 12:00 AM  GLYCOSYLATED HEMOGLOBIN TEST  Reviewed

 

 12/3/2015 12:00 AM  COMPREHEN METABOLIC PANEL  Reviewed

 

 12/3/2015 12:00 AM  Endocrinology Consultation  Reviewed

 

 2011 12:00 AM  METABOLIC PANEL TOTAL CA  Reviewed

 

 2011 12:00 AM  C-REACTIVE PROTEIN  Reviewed

 

 2011 12:00 AM  RBC SED RATE AUTOMATED  Reviewed

 

 2011 12:00 AM  X-RAY EXAM OF FOOT  Reviewed

 

 2016 12:00 AM  CHEST X-RAY 2VW FRONTAL&LATL  Reviewed

 

 2016 12:00 AM  Decadron, Per 1 Mg NDC# [address]-49  Reviewed

 

 2016 12:00 AM  Depo-Medrol, Per 80 Mg NDC#[address]-37  Reviewed

 

 10/17/2016 12:00 AM  PNEUMOCOCCAL VACC 13 MARILEE IM  Reviewed

 

 3/26/2012 12:00 AM  COMPLETE CBC W/AUTO DIFF WBC  Reviewed

 

 3/26/2012 12:00 AM  PROTHROMBIN TIME  Reviewed

 

 3/26/2012 12:00 AM  ASSAY OF BLOOD/URIC ACID  Reviewed

 

 3/27/2012 12:00 AM  RBC SED RATE AUTOMATED  Reviewed

 

 3/27/2012 12:00 AM  GLYCOSYLATED HEMOGLOBIN TEST  Reviewed

 

 10/30/2017 12:00 AM  Rocephin 500 Mg Injection  Reviewed

 

 2017 12:00 AM  METABOLIC PANEL TOTAL CA  Reviewed

 

 2010 12:00 AM  BREATHING CAPACITY TEST  Reviewed

 

 10/8/2013 12:00 AM  CHEST X-RAY 2VW FRONTAL&LATL  Reviewed

 

 10/8/2013 12:00 AM  AIRWAY INHALATION TREATMENT  Reviewed

 

 10/17/2013 12:00 AM  CHEST X-RAY 2VW FRONTAL&LATL  Reviewed

 

 2014 12:00 AM  CT HEAD/BRAIN W/O & W/DYE  Reviewed

 

 2014 12:00 AM  BREATHING CAPACITY TEST  Reviewed

 

 6/3/2014 12:00 AM  ECG MONIT/REPRT UP TO 48 HRS  Reviewed

 

 6/3/2014 12:00 AM  TTE W/O DOPPLER COMPLETE  Reviewed

 

 6/3/2014 12:00 AM  Carotid Doppler  Reviewed

 

 2010 12:00 AM  COMPREHEN METABOLIC PANEL  Reviewed

 

 2010 12:00 AM  LIPID PANEL  Reviewed

 

 2010 12:00 AM  GLYCOSYLATED HEMOGLOBIN TEST  Reviewed

 

 2010 12:00 AM  MICROALBUMIN SEMIQUANT  Reviewed

 

 2010 12:00 AM  DRAINAGE OF SKIN ABSCESS  Reviewed

 

 2010 12:00 AM  Urine drug screen  Reviewed

 

 2014 12:00 AM  Physiatry Consult  Reviewed

 

 2011 12:00 AM  METABOLIC PANEL TOTAL CA  Reviewed

 

 2011 12:00 AM  METABOLIC PANEL TOTAL CA  Reviewed

 

 2011 12:00 AM  C-REACTIVE PROTEIN  Reviewed

 

 8/15/2011 12:00 AM  METABOLIC PANEL TOTAL CA  Reviewed

 

 8/15/2011 12:00 AM  GLYCOSYLATED HEMOGLOBIN TEST  Reviewed







Results Summary







 Date and Description  Results

 

 2010 8:22 AM  TRIGLYCERIDES 666.0 mg/dLCHOLESTEROL 241.0 mg/dLHDL 34.0 mg/
dLTOT CHOL/HDL 7.1 LDL (CALC) INVALID MG/DLGLYCOHEMOGLOBIN A1C 10.30 %GLUCOSE 
336.0 mg/dLSODIUM 138.0 mmol/LPOTASSIUM 3.40 mmol/LCHLORIDE 102.0 mmol/LCO2 
22.0 mmol/LBUN 8.0 mg/dLCREATININE 0.70 mg/dLSGOT/AST 15.0 IU/LSGPT/ALT 7.0 IU/
LALK PHOS 93.0 IU/LTOTAL PROTEIN 7.30 g/dLALBUMIN 3.90 g/dLTOTAL BILI 0.40 mg/
dLCALCIUM 9.10 mg/dLAGE 60 GFR NonAA 86 GFR  eGFR >60 mL/min/1.73 m2eGFR 
AA* >60 CREAT UR 57.20 mg/dLMICROALBUMIN UR 19.0 ug/mLALB:CREAT RATIO 33 

 

 2011 9:15 AM  GLUCOSE 49.0 mg/dLSODIUM 143.0 mmol/LPOTASSIUM 3.30 mmol/
LCHLORIDE 101.0 mmol/LCO2 30.0 mmol/LBUN 12.0 mg/dLCREATININE 0.50 mg/dLCALCIUM 
9.10 mg/dLAGE 60 GFR NonAA 126 GFR  eGFR >60 mL/min/1.73 m2eGFR AA* >60 

 

 2011 10:25 AM  C REACTIVE PROTEIN 6.0 mg/LGLUCOSE 88.0 mg/dLSODIUM 143.0 
mmol/LPOTASSIUM 3.60 mmol/LCHLORIDE 104.0 mmol/LCO2 26.0 mmol/LBUN 11.0 mg/
dLCREATININE 0.70 mg/dLCALCIUM 9.40 mg/dLAGE 61 GFR NonAA 85 GFR  eGFR >
60 mL/min/1.73 m2eGFR AA* >60 

 

 2011 11:00 AM  GLUCOSE 160.0 mg/dLSODIUM 141.0 mmol/LPOTASSIUM 3.70 mmol/
LCHLORIDE 105.0 mmol/LCO2 21.0 mmol/LBUN 13.0 mg/dLCREATININE 0.70 mg/dLCALCIUM 
9.50 mg/dLAGE 61 GFR NonAA 85 GFR  eGFR >60 mL/min/1.73 m2eGFR AA* >60 
GLYCOHEMOGLOBIN A1C 6.80 %

 

 2011 3:50 PM  C REACTIVE PROTEIN 5.0 mg/LGLUCOSE 361.0 mg/dLSODIUM 140.0 
mmol/LPOTASSIUM 3.80 mmol/LCHLORIDE 102.0 mmol/LCO2 24.0 mmol/LBUN 9.0 mg/
dLCREATININE 1.0 mg/dLCALCIUM 9.60 mg/dLAGE 61 GFR NonAA 57 GFR AA 69 eGFR 57 
eGFR AA* >60 SEDRATE 24.0 mm/hr

 

 3/26/2012 12:00 PM  WBC 7.3 RBC 4.46 HGB 13.50 g/dLHCT 39.0 %MCV 87.0 fLMCH 
30.30 pgMCHC 34.60 g/dLRDW SD 42 RDW CV 13.20 %MPV 9.60 fLPLT 142 NRBC# 0.00 
NRBC% 0.0 %NEUT 59.60 %%LYMP 33.50 %%MONO 5.60 %%EOS 1.20 %%BASO 0.10 %#NEUT 
4.32 #LYMP 2.43 #MONO 0.41 #EOS 0.09 #BASO 0.01 MANUAL DIFF NOT IND PROTIME 
10.60 secsINR 1.0 URIC ACID 3.9 mg/dL

 

 3/27/2012 8:45 PM  GLYCOHEMOGLOBIN A1C 10.20 %SEDRATE 31.0 mm/hr

 

 12/3/2015 11:22 AM  TRIGLYCERIDES 182.0 mg/dLCHOLESTEROL 180.0 mg/dLHDL 50.0 mg
/dLTOT CHOL/HDL 3.6 LDL (CALC) 94.0 mg/dLGLUCOSE 99.0 mg/dLSODIUM 142.0 mmol/
LPOTASSIUM 4.20 mmol/LCHLORIDE 112.0 mmol/LCO2 21.0 mmol/LBUN 17.0 mg/
dLCREATININE 0.70 mg/dLSGOT/AST 17.0 IU/LSGPT/ALT 12.0 IU/LALK PHOS 86.0 IU/
LTOTAL PROTEIN 6.30 g/dLALBUMIN 4.10 g/dLTOTAL BILI 0.50 mg/dLCALCIUM 9.30 mg/
dLAGE 65 GFR NonAA 84 GFR  eGFR >60 mL/min/1.73meGFR AA* >60 Hemoglobin 
A1c 9.10 %Estim. Avg Glu (eAG) 214 

 

 11/3/2017 8:02 AM  GLUCOSE 97.0 mg/dLSODIUM 145.0 mmol/LPOTASSIUM 3.40 mmol/
LCHLORIDE 112.0 mmol/LCO2 25.0 mmol/LBUN 9.0 mg/dLCREATININE 0.70 mg/dLCALCIUM 
8.80 mg/dLAGE 67 GFR NonAA 84 GFR  eGFR >60 mL/min/1.73meGFR AA* >60 







History Of Immunizations







 Name  Date Admin  Mfg Name  Mfg Code  Trade Name  Lot#  Route  Inj  Vis Given  
Vis Pub  CVX

 

 Pneumococcal  10/17/2016  CathyAyerst-LederleMony  [facility]  Prevnar 13  [id number]
  Intramuscular  Left Deltoid  10/17/2016  2015  133







History of Past Illness







 Name  Date of Onset  Comments

 

 Diabetes Mellitus, Type II      

 

 Hyperlipidemia      

 

 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      

 

 Bronchitis, Chronic  2010  8:23AM   

 

 Subcutaneous Nodule  2010  8:23AM   

 

 Diabetes Mellitus, Type II  2010 10:15AM   

 

 Hyperlipidemia, Mixed  2010 10:15AM   

 

 Sebaceous Cyst  2010 11:32PM   

 

 General Medical Exam, Adult  2010  9:19AM   

 

 Bursitis, right knee  Mar 15 2011  3:05PM   

 

 Gastroenteritis, Viral  May  2 2011  8:56AM   

 

 Hypokalemia  May 11 2011  8:15AM   

 

 Polymyalgia Rheumatica  May 11 2011  8:15AM   

 

 Hypokalemia  2011  9:48AM   

 

 Polymyalgia Rheumatica  2011  9:48AM   

 

 Diabetes Mellitus, Type II  2011  9:48AM   

 

 Diabetes Mellitus, Type II  Aug 10 2011  2:57PM   

 

 Edema bilateral lower extremities  Aug 10 2011  2:57PM   

 

 Polymyalgia Rheumatica  Dec 14 2011  3:17PM   

 

 Arthralgia  Dec 14 2011  3:17PM   

 

 Pain in  foot, Left Heel  Dec 27 2011  8:16AM   

 

 Edema left lower ext.  Mar 26 2012 11:30AM   

 

 Petechial Rash  Mar 26 2012 11:30AM   

 

 Diabetes Mellitus, Type II  Mar 27 2012  1:39PM   

 

 Polymyalgia Rheumatica  Mar 27 2012  1:39PM   

 

 Edema left lower ext.  2012  8:05AM   

 

 Diabetes Mellitus, Type II  2012  8:05AM   

 

 Cellulitis left foot  2012  8:05AM   

 

 Myalgia  Jul  3 2012  8:10AM   

 

 Headache  Jul  3 2012  8:10AM   

 

 Neuropathy, right foot  Jul  3 2012  8:10AM   

 

 Upper Respiratory Infections  Dec  4 2012 10:35AM   

 

 Diabetes Mellitus, Type II  Mar  5 2013  9:32AM   

 

 Hyperlipidemia  Mar  5 2013  9:32AM   

 

 Atrial Fibrillation  Mar  5 2013  9:32AM   

 

 Osteoarthritis, hand  Mar  5 2013  9:32AM   

 

 Cellulitis  2013 10:15AM   

 

 Right Acute Otitis Media  Aug 28 2013  8:55AM   

 

 Chronic Cough  Oct  8 2013 10:10AM   

 

 Shortness of breath  Oct  8 2013 10:10AM   

 

 Cough  Oct 17 2013  9:40AM   

 

 Tension Headache  2014  8:54AM   

 

 Blurred Vision  2014  8:54AM   

 

 Bronchitis, Acute  2014  8:54AM   

 

 Postoperative Follow-up  2014 10:56AM   

 

 Basal Cell  2014 10:56AM   

 

 Basal Cell Carcinoma of Skin  2014 10:59AM   

 

 Sebaceous Cyst  2014 10:59AM   

 

 Syncope  Oc  3 2014  3:57PM   

 

 Basal cell carcinoma of skin, site unspecified  2014  7:25AM   

 

 Lumbar back pain  Dec 19 2014  9:42AM   

 

 Left Radiculopathy of leg  Dec 19 2014  9:42AM   

 

 Depressive Disorder  Dec 19 2014  9:42AM   

 

 Diabetes Mellitus, Type II  May 13 2015 10:33AM   

 

 Hyperlipidemia  May 13 2015 10:33AM   

 

 Systolic CHF, acute  May 13 2015 10:33AM   

 

 Resolved Atrial fibrillation  May 13 2015 10:33AM   

 

 Mixed hyperlipidemia  Dec  3 2015 10:19AM   

 

 Diabetes mellitus type 2, uncontrolled  Dec  3 2015 10:19AM   

 

 Other osteoarthritis involving multiple joints  Dec  3 2015 10:19AM   

 

 Shortness of breath  May 17 2016  1:36PM   

 

 Chronic obstructive pulmonary disease with (acute) exacerbation  May 17 2016  1
:36PM   

 

 Need for Prevnar vaccine  Oct 17 2016  9:19AM   

 

 Paroxysmal atrial fibrillation  Oct 17 2016  9:19AM   

 

 Diabetes Mellitus, Type II  Oct 17 2016  9:19AM   

 

 Hypokalemia  Oct 30 2017 10:43AM   

 

 Hypokalemia  Nov  3 2017 11:41AM   

 

 Cellulitis of left lower extremity  Oct 30 2017 10:43AM   







Payers







 Insurance Name  Company Name  Plan Name  Plan Number  Policy Number  Policy 
Group Number  Start Date

 

    [employer] [place]     [id number]     2015

 

    [employer] [place]     [id number]     

 

    [facility]  [employer] 
PHYS/DS  [id number]     N/A







History of Encounters







 Visit Date  Visit Type  Provider

 

 10/30/2017  Office visit  Mateo Cooper DO

 

 10/17/2016  Office visit  Mateo Cooper DO

 

 2016  Office visit  Mateo Cooper DO

 

 12/3/2015  Office visit  Mateo Cooper DO

 

 5/15/2015  [facility]  LEXX Holm MD

 

 2015  Office visit  Mateo Cooper DO

 

 2014  Office visit  Mateo Cooper DO

 

 2014  Hospital  LEXX Holm MD

 

 2014  Procedures  David Suresh MD

 

 6/3/2014  Office visit  Mateo Cooper DO

 

 2014  Office visit  David Suresh MD

 

 2014  Procedures  David Suresh MD

 

 2014  Hospital  LEXX Holm MD

 

 2014  Office visit  Mateo Cooper DO

 

 10/8/2013  Office visit  Mateo Cooper DO

 

 2013  Office visit  Mateo Cooper DO

 

 2013  Office visit  Mateo Cooper DO

 

 3/5/2013  Office visit  Mateo Cooper DO

 

 2013  Hospital  LEXX Holm MD

 

 2012  Office visit  Jenni MARTÍNEZ

 

 2012  Hospital  LEXX Holm MD

 

 7/3/2012  Office visit  Mateo Cooper DO

 

 2012  Hospital  Kristian Kiser MD

 

 2012  [facility]  LEXX Holm MD

 

 2012  [facility]  Kristian Kiser MD

 

 2012  [facility]  LEXX Holm MD

 

 2012  Office visit  Mateo Cooper DO

 

 3/26/2012  Office visit  Jenni Katz APRN

 

 2011  Office visit  Mateo Cooper DO

 

 2011  Office visit  Jenni Katz APRN

 

 8/10/2011  Office visit  Jenni Katz APRN

 

 2011  Office visit  Mateo Cooper DO

 

 2011  Office visit  Mateo Cooper DO

 

 2011  [facility]  Bandar Zelaya MD

 

 2011  [facility]  Bandar Zelaya MD

 

 2011  [facility]  Bandar Zelaya MD

 

 2011  [facility]  LEXX Holm MD

 

 2011  Office visit  Mateo Cooper DO

 

 3/15/2011  Office visit  Mateo Cooper DO

 

 2010  Office visit  David Burciaga PA-C

 

 2010  Office visit  Mateo Cooper DO

 

 2010  Laboratory  Ayush Finn MD

 

 2010  Laboratory  Ayush Finn MD

 

 2010  Office visit  Mateo Cooper DO

## 2018-03-23 NOTE — XMS REPORT
MU2 Ambulatory Summary

 Created on: 2015



Ambar Grider

External Reference #: [id number]

: 1950

Sex: Female



Demographics







 Address  [address]

[place]  [address]

 

 Home Phone  [phone]

 

 Preferred Language  English

 

 Marital Status  

 

 Confucianist Affiliation  Unknown

 

 Race  White

 

 Ethnic Group  Not  or 





Author







 Mateo Pathak

 

 [facility]

 

 Address  [address]

[place], [place]  [id number]



 

 Phone  [phone]







Care Team Providers







 Care Team Member Name  Role  Phone

 

 Mateo Cooper  PCP  Unavailable







Allergies and Adverse Reactions







 Name  Reaction  Notes

 

 Phenergan      







Plan of Treatment

Not available.



Medications







 Active 

 

 Name  Start Date  Estimated Completion Date  SIG  Comments

 

 Dulera Inhalation HFA Aerosol Inhaler 100-5 mcg/actuation  10/8/2013     
inhale 2 puffs by inhalation route 2 times per day in the morning and evening   

 

 Levemir FlexTouch subcutaneous insulin pen 100 unit/mL (3 mL)  2014     
inject 43 units by subcutaneous route twice a day   

 

 Pen Needle miscellaneous needle 31 x 3/16 "  2014     USES 3 INJECTIONS A 
DAY   

 

 metformin Oral Tablet 1,000 mg  2014     take 1 tablet (1,000 mg) by oral 
route 2 times per day with morning and evening meals   

 

 gabapentin Oral Tablet 600 mg  2014  TAKE 1 TABLET BY MOUTH 
FOUR TIMES DAILY   

 

 Xarelto oral tablet 20 mg        take 1 tablet (20 mg) by oral route once 
daily with the evening meal  Dr. Turner

 

 duloxetine oral capsule,delayed release(/EC) 30 mg  2014     take 1 
capsule (30 mg) by oral route once a day   

 

 hydrocodone-acetaminophen oral tablet 7.5-325 mg  2014     take 1 tablet 
by oral route every 6 hours as needed for pain   

 

 Novolog Flexpen subcutaneous insulin pen 100 unit/mL  2015     INJECT 12 
UNITS SUBCUTANEOUSLY WITH BREAKFAST. LUNCH, AND SUPPER   

 

 metformin oral tablet 1,000 mg  2015     TAKE 1 TABLET BY MOUTH TWICE 
DAILY WITH MORNING AND EVENING MEALS   

 

 metformin oral tablet 1,000 mg  2015     TAKE 1 TABLET BY MOUTH TWICE 
DAILY WITH MORNING AND EVENING MEALS   

 

 diltiazem HCl oral capsule, extended release 120 mg        take 1 capsule (120 
mg) by oral route once daily   

 

 carvedilol oral tablet 12.5 mg        take 1 tablet (12.5 mg) by oral route 
every 12 hours with food   

 

 sotalol oral tablet 120 mg        take 1 tablet (120 mg) by oral route 2 times 
per day   









  

 

 Name  Start Date  Expiration Date  SIG  Comments

 

 Bactrim DS Oral Tablet 160-800 mg  2010  take 1 tablet by oral 
route every 12 hours for 10 days   

 

 Keflex Oral Capsule 500 mg  2010  take 1 capsule (500 mg) by 
oral route every 12 hours for 7 days   

 

 Reglan Oral Tablet 10 mg  2011  6/3/2011  take 1 tablet (10 mg) by oral 
route twice daily   

 

 prednisone Oral Tablet 20 mg  2011  take 1 tablet by oral 
route daily for 10 days   

 

 nabumetone Oral Tablet 500 mg  2011     take 1 tablet (500 mg) by oral 
route 2 times per day with food   

 

 indomethacin Oral Capsule 50 mg  2012  take 1 capsule (50 mg) 
by oral route 3 times per day with food for 7 days  "stopped it 2-3 weeks ago 
due to high glucose"

 

 Bactrim DS Oral Tablet 800-160 mg  2012  take 1 tablet by oral 
route every 12 hours for 7 days   

 

 insulin syringe-needle U-100 Miscellaneous Syringe 1 mL 31 x 5/16"  2012  USE AS DIRECTED FOUR TIMES DAILY   

 

 Levemir Subcutaneous Solution 100 unit/mL  2012  inject 45 
units by subcutaneous route twice daily  "using pen"

 

 Augmentin Oral tablet 500-125 mg  2012  take 1 tablet by oral 
route every 12 hours for 7 days   

 

 Pen Needle Misc.(Non-Drug; Combo Route) 31 x 3/16 "  2013  
Uses 3 injections a day   

 

 cephalexin Oral tablet 500 mg  2013  take 1 tablet (500 mg) by 
oral route every 12 hours for 10 days   

 

 Augmentin Oral tablet 500-125 mg  2013  take 1 tablet by oral 
route every 12 hours for 7 days   

 

 Accu-Chek Compact Test Miscellaneous Strip  1/3/2014  2014  TEST AS 
DIRECTED FOUR TIMES DAILY   

 

 Novolog Flexpen subcutaneous insulin pen 100 unit/mL  2014  
INJECT 12 UNITS SUBCUTANEOUSLY WITH BREAKFAST. LUNCH, AND SUPPER   









 Discontinued 

 

 Name  Start Date  Discontinued Date  SIG  Comments

 

 Aldara Topical Packet 5 %  2010  3/15/2011  apply to the affected area(s) 
before bedtime by topical route 3 times per week and leave on skin for 6 to 10 
hours   

 

 Fenofibrate Oral Tablet 160 mg  2010  take 1 tablet (160 mg) by 
oral route once daily   

 

 Meloxicam Oral Tablet 15 mg  3/15/2011  2011  take 1 tablet (15 mg) by 
oral route once daily as needed   

 

 Zofran Oral Solution 4 mg/5 mL  2011  take 5-10 milliliters by 
oral route every 6 hours as needed   

 

 Klor-Con M20 Oral Tablet, ER Particles/Crystals mEq     3/5/2013  take 1 
tablet (20 meq) by oral route once daily with food  "not takiing"

 

 Diflucan Oral Tablet 150 mg  8/10/2011  2012  take 1 tablet (150 mg) by 
oral route once   

 

 amoxicillin Oral Capsule 500 mg     2012  take 1 capsule (500 mg) by oral 
route every 12 hours for 7 days   

 

 Medrol (Dominic) Oral Tablets, Dose Pack 4 mg     2012  take as directed   

 

 Lasix Oral Tablet 20 mg  4/10/2012  3/5/2013  take 1 tablet (20 mg) by oral 
route once daily as needed   

 

 amitriptyline Oral Tablet 10 mg  7/3/2012  2015  take 1 tablet by oral 
route once a day (at bedtime)   

 

 cyclobenzaprine Oral Tablet 5 mg  7/3/2012  10/8/2013  take 1 tablet by oral 
route once a day (at bedtime) as needed   

 

 cephalexin Oral Tablet 500 mg  7/3/2012  3/5/2013  take 1 tablet (500 mg) by 
oral route every 12 hours   

 

 Tessalon Oral capsule 200 mg  2012  take 1 capsule (200 mg) by 
oral route 3 times per day as needed   

 

 Claritin-D 12 Hour Oral tablet extended release 12 hr 5-120 mg  2012  3/5/
2013  take 1 tablet by oral route every 12 hours   

 

 naproxen Oral tablet 500 mg  2014  take 1 tablet (500 mg) by 
oral route 2 times per day with food  "not taking now"

 

 flecainide oral tablet 100 mg     2015  take 1 tablet (100 mg) by oral 
route every 12 hours   

 

 cyclobenzaprine oral tablet 10 mg  2014  take 1 tablet (10 mg
) by oral route at bedtime as needed   

 

 Betapace oral tablet 80 mg     2015  take 1 tablet (80 mg) by oral route 
2 times per day  Dr. Turner







Problem List







 Description  Status  Onset

 

 Diabetes Mellitus, Type II  Active   

 

 Hemangioma in Spine  Active   

 

 Hyperlipidemia  Active   







Vital Signs







 Date  Time  BP-Sys(mm[Hg]  BP-Missy(mm[Hg])  HR(bpm)  RR(rpm)  Temp  WT  HT  HC  
BMI  BSA  BMI Percentile  O2 Sat(%)

 

 2015  10:31:00 AM  130 mmHg  66 mmHg  66 bpm  20 rpm  98.1 F  143 lbs  61 
in     27.02 kg/m2  1.67 m2     98 %

 

 2014  9:39:00 AM  138 mmHg  88 mmHg  153 bpm  20 rpm  98.7 F  150 lbs  
61 in     28.342 kg/m  1.7112 m     98 %

 

 6/3/2014  3:55:00 PM  130 mmHg  62 mmHg  69 bpm  18 rpm  97.9 F  151 lbs  61 
in     28.53 kg/m2  1.72 m2     99 %

 

 2014  10:56:00 AM  150 mmHg  74 mmHg  81 bpm  16 rpm  96.2 F  152 lbs  61 
in     28.7199 kg/m  1.7226 m     97 %

 

 2014  8:52:00 AM  138 mmHg  78 mmHg  61 bpm  20 rpm  98 F  168 lbs  61 in
     31.74 kg/m2  1.81 m2     100 %

 

 10/8/2013  10:08:00 AM  138 mmHg  78 mmHg  148 bpm  22 rpm  98.2 F  168 lbs  
61 in     31.743 kg/m  1.811 m     98 %

 

 2013  8:54:00 AM  150 mmHg  80 mmHg  78 bpm  16 rpm  98.9 F  163 lbs  61 
in     30.80 kg/m2  1.78 m2     99 %

 

 2013  10:13:00 AM  136 mmHg  78 mmHg  70 bpm  18 rpm  97.8 F  156 lbs    
              

 

 3/5/2013  9:30:00 AM  148 mmHg  80 mmHg  76 bpm  16 rpm  97.8 F  152 lbs  61 
in     28.72 kg/m2  1.72 m2      

 

 2012  10:33:00 AM  136 mmHg  74 mmHg  89 bpm  18 rpm  98.6 F  154.375 lbs
  61 in     29.1686 kg/m  1.736 m     99 %

 

 7/3/2012  8:08:00 AM  134 mmHg  72 mmHg  78 bpm  22 rpm  98.2 F  145 lbs  61 
in     27.40 kg/m2  1.68 m2      

 

 2012  8:03:00 AM  132 mmHg  76 mmHg  70 bpm  18 rpm  97.9 F  143 lbs  61 
in     27.0193 kg/m  1.6708 m      

 

 3/26/2012  11:28:00 AM  136 mmHg  74 mmHg  89 bpm  18 rpm  97.9 F  149.125 lbs
  61 in     28.18 kg/m2  1.71 m2      

 

 2011  8:12:00 AM  132 mmHg  82 mmHg  84 bpm  16 rpm  97.8 F  150 lbs  61 
in     28.342 kg/m  1.7112 m      

 

 2011  3:15:00 PM  130 mmHg  74 mmHg  68 bpm  18 rpm  98.3 F  156 lbs  61 
in     29.48 kg/m2  1.75 m2      

 

 8/10/2011  2:58:00 PM  120 mmHg  74 mmHg  100 bpm  20 rpm  99.4 F  169.25 lbs 
                 

 

 2011  9:49:00 AM  134 mmHg  78 mmHg  74 bpm  18 rpm  98.3 F  164 lbs     
             

 

 2011  8:15:00 AM  128 mmHg  78 mmHg  62 bpm  18 rpm  98.2 F  157 lbs     
             

 

 2011  8:54:00 AM  128 mmHg  72 mmHg  104 bpm  22 rpm  98.6 F  153 lbs     
            100 %

 

 3/15/2011  3:04:00 PM  144 mmHg  82 mmHg  72 bpm  18 rpm  98 F  163 lbs       
           

 

 2010  9:17:00 AM  124 mmHg  78 mmHg  76 bpm        145 lbs               
   

 

 2010  10:13:00 AM  130 mmHg  68 mmHg  70 bpm  16 rpm  98.3 F  144 lbs     
             

 

 2010  8:21:00 AM  134 mmHg  76 mmHg  70 bpm  20 rpm  98.6 F  147 lbs      
            







Social History







 Name  Description  Comments

 

 Tobacco  Never smoker   







History of Procedures







 Date Ordered  Description  Order Status

 

 2011 12:00 AM  METABOLIC PANEL TOTAL CA  Reviewed

 

 2011 12:00 AM  C-REACTIVE PROTEIN  Reviewed

 

 2011 12:00 AM  RBC SED RATE AUTOMATED  Reviewed

 

 2011 12:00 AM  X-RAY EXAM OF FOOT  Reviewed

 

 3/26/2012 12:00 AM  COMPLETE CBC W/AUTO DIFF WBC  Returned

 

 3/26/2012 12:00 AM  PROTHROMBIN TIME  Returned

 

 3/26/2012 12:00 AM  ASSAY OF BLOOD/URIC ACID  Returned

 

 3/27/2012 12:00 AM  RBC SED RATE AUTOMATED  Returned

 

 3/27/2012 12:00 AM  GLYCOSYLATED HEMOGLOBIN TEST  Returned

 

 2010 12:00 AM  BREATHING CAPACITY TEST  Reviewed

 

 10/8/2013 12:00 AM  CHEST X-RAY 2VW FRONTAL&LATL  Reviewed

 

 10/17/2013 12:00 AM  CHEST X-RAY 2VW FRONTAL&LATL  Reviewed

 

 2014 12:00 AM  CT HEAD/BRAIN W/O & W/DYE  Reviewed

 

 6/3/2014 12:00 AM  ECG MONIT/REPRT UP TO 48 HRS  Reviewed

 

 6/3/2014 12:00 AM  TTE W/O DOPPLER COMPLETE  Reviewed

 

 6/3/2014 12:00 AM  Carotid Doppler  Reviewed

 

 2010 12:00 AM  COMPREHEN METABOLIC PANEL  Reviewed

 

 2010 12:00 AM  LIPID PANEL  Reviewed

 

 2010 12:00 AM  LIPID PANEL  Reviewed

 

 2010 12:00 AM  GLYCOSYLATED HEMOGLOBIN TEST  Reviewed

 

 2010 12:00 AM  MICROALBUMIN SEMIQUANT  Reviewed

 

 2010 12:00 AM  DRAINAGE OF SKIN ABSCESS  Reviewed

 

 2010 12:00 AM  Urine drug screen  Reviewed

 

 2011 12:00 AM  METABOLIC PANEL TOTAL CA  Reviewed

 

 2011 12:00 AM  METABOLIC PANEL TOTAL CA  Reviewed

 

 2011 12:00 AM  C-REACTIVE PROTEIN  Reviewed

 

 8/15/2011 12:00 AM  METABOLIC PANEL TOTAL CA  Reviewed

 

 8/15/2011 12:00 AM  GLYCOSYLATED HEMOGLOBIN TEST  Reviewed







Results Summary







 Data and Description  Results

 

 2010 8:22 AM  TRIGLYCERIDES 666.0 mg/dLCHOLESTEROL 241.0 mg/dLHDL 34.0 mg/
dLLDL (CALC) INVALID MG/DLGLYCOHEMOGLOBIN A1C 10.30 %GLUCOSE 336.0 mg/dLSODIUM 
138.0 mmol/LPOTASSIUM 3.40 mmol/LCHLORIDE 102.0 mmol/LCO2 22.0 mmol/LBUN 8.0 mg/
dLCREATININE 0.70 mg/dLSGOT/AST 15.0 IU/LSGPT/ALT 7.0 IU/LALK PHOS 93.0 IU/
LTOTAL PROTEIN 7.30 g/dLALBUMIN 3.90 g/dLTOTAL BILI 0.40 mg/dLCALCIUM 9.10 mg/
dLeGFR >60 mL/min/1.73 [id number] UR 57.20 mg/dLMICROALBUMIN UR 19.0 ug/mLALB:
CREAT RATIO 33 

 

 2011 9:15 AM  GLUCOSE 49.0 mg/dLSODIUM 143.0 mmol/LPOTASSIUM 3.30 mmol/
LCHLORIDE 101.0 mmol/LCO2 30.0 mmol/LBUN 12.0 mg/dLCREATININE 0.50 mg/dLCALCIUM 
9.10 mg/dLeGFR >60 mL/min/1.73 m2

 

 2011 10:25 AM  C REACTIVE PROTEIN 6.0 mg/LGLUCOSE 88.0 mg/dLSODIUM 143.0 
mmol/LPOTASSIUM 3.60 mmol/LCHLORIDE 104.0 mmol/LCO2 26.0 mmol/LBUN 11.0 mg/
dLCREATININE 0.70 mg/dLCALCIUM 9.40 mg/dLeGFR >60 mL/min/1.73 m2

 

 2011 11:00 AM  GLUCOSE 160.0 mg/dLSODIUM 141.0 mmol/LPOTASSIUM 3.70 mmol/
LCHLORIDE 105.0 mmol/LCO2 21.0 mmol/LBUN 13.0 mg/dLCREATININE 0.70 mg/dLCALCIUM 
9.50 mg/dLeGFR >60 mL/min/1.73 [id number] A1C 6.80 %

 

 2011 3:50 PM  C REACTIVE PROTEIN 5.0 mg/LGLUCOSE 361.0 mg/dLSODIUM 140.0 
mmol/LPOTASSIUM 3.80 mmol/LCHLORIDE 102.0 mmol/LCO2 24.0 mmol/LBUN 9.0 mg/
dLCREATININE 1.0 mg/dLCALCIUM 9.60 mg/dLeGFR 57 SEDRATE 24.0 mm/hr

 

 3/26/2012 12:00 PM  WBC 7.3 RBC 4.46 HGB 13.50 g/dLHCT 39.0 %MCV 87.0 fLMCH 
30.30 pgMCHC 34.60 g/dLRDW CV 13.20 %MPV 9.60 fLPLT 142 %NEUT 59.60 %%LYMP 
33.50 %%MONO 5.60 %%EOS 1.20 %%BASO 0.10 %#NEUT 4.32 #LYMP 2.43 #MONO 0.41 #EOS 
0.09 #BASO 0.01 PROTIME 10.60 secsINR 1.0 URIC ACID 3.9 mg/dL

 

 3/27/2012 8:45 PM  GLYCOHEMOGLOBIN A1C 10.20 %SEDRATE 31.0 mm/hr







History Of Immunizations

Not available.



History of Past Illness







 Name  Date of Onset  Comments

 

 Diabetes Mellitus, Type II      

 

 Hyperlipidemia      

 

 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      

 

 Bronchitis, Chronic  2010  8:23AM   

 

 Subcutaneous Nodule  2010  8:23AM   

 

 Diabetes Mellitus, Type II  2010 10:15AM   

 

 Hyperlipidemia, Mixed  2010 10:15AM   

 

 Sebaceous Cyst  2010 11:32PM   

 

 General Medical Exam, Adult  2010  9:19AM   

 

 Bursitis, right knee  Mar 15 2011  3:05PM   

 

 Gastroenteritis, Viral  May  2 2011  8:56AM   

 

 Hypokalemia  May 11 2011  8:15AM   

 

 Polymyalgia Rheumatica  May 11 2011  8:15AM   

 

 Hypokalemia  2011  9:48AM   

 

 Polymyalgia Rheumatica  2011  9:48AM   

 

 Diabetes Mellitus, Type II  2011  9:48AM   

 

 Diabetes Mellitus, Type II  Aug 10 2011  2:57PM   

 

 Edema bilateral lower extremities  Aug 10 2011  2:57PM   

 

 Polymyalgia Rheumatica  Dec 14 2011  3:17PM   

 

 Arthralgia  Dec 14 2011  3:17PM   

 

 Pain in  foot, Left Heel  Dec 27 2011  8:16AM   

 

 Edema left lower ext.  Mar 26 2012 11:30AM   

 

 Petechial Rash  Mar 26 2012 11:30AM   

 

 Diabetes Mellitus, Type II  Mar 27 2012  1:39PM   

 

 Polymyalgia Rheumatica  Mar 27 2012  1:39PM   

 

 Edema left lower ext.  2012  8:05AM   

 

 Diabetes Mellitus, Type II  2012  8:05AM   

 

 Cellulitis left foot  2012  8:05AM   

 

 Myalgia  Jul  3 2012  8:10AM   

 

 Headache  Jul  3 2012  8:10AM   

 

 Neuropathy, right foot  Jul  3 2012  8:10AM   

 

 Upper Respiratory Infections  Dec  4 2012 10:35AM   

 

 Diabetes Mellitus, Type II  Mar  5 2013  9:32AM   

 

 Hyperlipidemia  Mar  5 2013  9:32AM   

 

 Atrial Fibrillation  Mar  5 2013  9:32AM   

 

 Osteoarthritis, hand  Mar  5 2013  9:32AM   

 

 Cellulitis  2013 10:15AM   

 

 Right Acute Otitis Media  Aug 28 2013  8:55AM   

 

 Chronic Cough  Oct  8 2013 10:10AM   

 

 Shortness of breath  Oct  8 2013 10:10AM   

 

 Cough  Oct 17 2013  9:40AM   

 

 Tension Headache  2014  8:54AM   

 

 Blurred Vision  2014  8:54AM   

 

 Bronchitis, Acute  2014  8:54AM   

 

 Postoperative Follow-up  2014 10:56AM   

 

 Basal Cell  2014 10:56AM   

 

 Basal Cell Carcinoma of Skin  2014 10:59AM   

 

 Sebaceous Cyst  2014 10:59AM   

 

 Syncope  Oc  3 2014  3:57PM   

 

 Basal cell carcinoma of skin, site unspecified  2014  7:25AM   

 

 Lumbar back pain  Dec 19 2014  9:42AM   

 

 Left Radiculopathy of leg  Dec 19 2014  9:42AM   

 

 Depressive Disorder  Dec 19 2014  9:42AM   

 

 Diabetes Mellitus, Type II  May 13 2015 10:33AM   

 

 Hyperlipidemia  May 13 2015 10:33AM   

 

 Systolic CHF, acute  May 13 2015 10:33AM   

 

 Resolved Atrial fibrillation  May 13 2015 10:33AM   







Payers







 Insurance Name  Company Name  Plan Name  Plan Number  Policy Number  Policy 
Group Number  Start Date

 

    [facility]     [id number]     

 

    [facility]  [employer] 
PHYS/DS  [id number]     N/A







History of Encounters







 Visit Date  Visit Type  Provider

 

 2015  Office visit  Mateo Cooper DO

 

 2014  Office visit  Mateo Cooper DO

 

 2014  Hospital  LEXX Holm MD

 

 2014  Procedures  David Suresh MD

 

 6/3/2014  Office visit  Mateo Cooper DO

 

 2014  Office visit  David Suresh MD

 

 2014  Procedures  David Suresh MD

 

 2014  Hospital  LEXX Holm MD

 

 2014  Office visit  Mateo Cooper DO

 

 10/8/2013  Office visit  Mateo Cooper DO

 

 2013  Office visit  Mateo Cooper DO

 

 2013  Office visit  Mateo Cooper DO

 

 3/5/2013  Office visit  Mateo Cooper DO

 

 2013  Hospital  LEXX Holm MD

 

 2012  Office visit  Jenni Katz APRN

 

 2012  Hospital  LEXX Holm MD

 

 7/3/2012  Office visit  Mateo Cooper DO

 

 2012  Hospital  Kristian Kiser MD

 

 2012  [facility]  Kristian Kiser MD

 

 2012  Hospital  LEXX Holm MD

 

 2012  Office visit  Mateo Cooper DO

 

 2012  [facility]  LEXX Holm MD

 

 3/26/2012  Office visit  Jenni Katz APRN

 

 2011  Office visit  Mateo Cooper DO

 

 2011  Office visit  Jenni Katz APRN

 

 8/10/2011  Office visit  Jenni Katz APRN

 

 2011  Office visit  Mateo Cooper DO

 

 2011  Office visit  Mateo Cooper DO

 

 2011  [facility]  Bandar Zelaya MD

 

 2011  [facility]  Bandar Zelaya MD

 

 2011  [facility]  LEXX Holm MD

 

 2011  [facility]  Bandar Zelaya MD

 

 2011  Office visit  Mateo Cooper DO

 

 3/15/2011  Office visit  Mateo Cooper DO

 

 2010  Office visit  David Burciaga PA-C

 

 2010  Office visit  Mateo Cooper DO

 

 2010  Laboratory  Ayush Finn MD

 

 2010  Laboratory  Ayush Finn MD

 

 2010  Office visit  Mateo Cooper DO
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
## 2018-03-23 NOTE — XMS REPORT
MU2 Ambulatory Summary

 Created on: 2016



Ambar Grider

External Reference #: [id number]

: 1950

Sex: Female



Demographics







 Address  [address]

[place]  [address]

 

 Home Phone  [phone]

 

 Preferred Language  English

 

 Marital Status  

 

 [employer] Affiliation  Unknown

 

 Race  White

 

 Ethnic Group  Not  or 





Author







 Mateo Pathak

 

 [facility]

 

 Address  [address]

[place], [place]  [id number]



 

 Phone  [phone]







Care Team Providers







 Care Team Member Name  Role  Phone

 

 Mateo Cooper  PCP  Unavailable







Allergies and Adverse Reactions







 Name  Reaction  Notes

 

 Phenergan      







Plan of Treatment







 Planned Activity  Comments  Planned Date  Planned Time  Plan/Goal

 

 Uncontrolled Diabetes and Thyroid Nodules            

 

 AIRWAY INHALATION TREATMENT     10/8/2013  12:00 AM   

 

 BREATHING CAPACITY TEST     2014  12:00 AM   







Medications







 Active 

 

 Name  Start Date  Estimated Completion Date  SIG  Comments

 

 Pen Needle 31 gauge x 3/16" miscellaneous needle  2014     USES 3 
INJECTIONS A DAY   

 

 metformin 1,000 mg oral tablet  2014     take 1 tablet (1,000 mg) by oral 
route 2 times per day with morning and evening meals   

 

 metformin 1,000 mg oral tablet  2015     TAKE 1 TABLET BY MOUTH TWICE 
DAILY WITH MORNING AND EVENING MEALS   

 

 metformin 1,000 mg oral tablet  2015     TAKE 1 TABLET BY MOUTH TWICE 
DAILY WITH MORNING AND EVENING MEALS   

 

 carvedilol 3.125 mg oral tablet        take 1 tablet (3.125 mg) by oral route 
once daily   

 

 Benicar 40 mg oral tablet        take 1 tablet (40 mg) by oral route once 
daily   

 

 Levemir FlexTouch 100 unit/mL (3 mL) subcutaneous insulin pen  12/3/2015     
inject 45 units by subcutaneous route twice a day   

 

 tramadol 50 mg oral tablet  12/3/2015     take 1 tablet (50 mg) by oral route 
every 6 hours as needed   

 

 Xarelto 20 mg oral tablet  12/3/2015  2016  take 1 tablet (20 mg) by oral 
route once daily with the evening meal for 30 days   

 

 benzonatate 200 mg oral capsule  2016     take 1 capsule (200 mg) by oral 
route 3 times per day as needed   

 

 Accu-Chek Compact Test miscellaneous strip  2/15/2016  2016  Check 
glucose 4 times a day [id number]   

 

 Novolog Flexpen 100 unit/mL subcutaneous insulin pen  2016     INJECT 14 
UNITS SUBCUTANEOUSLY WITH BREAKFAST. LUNCH, AND SUPPER   

 

 Levemir FlexTouch 100 unit/mL (3 mL) subcutaneous insulin pen  3/1/2016     
INJECT 43 UNITS BY SUBCUTANEOUS ROUTE TWICE A DAY   

 

 acetaminophen-codeine 300-30 mg oral tablet  2016     take 1 tablet by 
oral route every 4 hours as needed   









  

 

 Name  Start Date  Expiration Date  SIG  Comments

 

 Bactrim -160 mg oral tablet  2010  take 1 tablet by oral 
route every 12 hours for 10 days   

 

 Keflex 500 mg oral capsule  2010  take 1 capsule (500 mg) by 
oral route every 12 hours for 7 days   

 

 Reglan 10 mg oral tablet  2011  6/3/2011  take 1 tablet (10 mg) by oral 
route twice daily   

 

 prednisone 20 mg oral tablet  2011  take 1 tablet by oral 
route daily for 10 days   

 

 nabumetone 500 mg oral tablet  2011     take 1 tablet (500 mg) by oral 
route 2 times per day with food   

 

 indomethacin 50 mg oral capsule  2012  take 1 capsule (50 mg) 
by oral route 3 times per day with food for 7 days  "stopped it 2-3 weeks ago 
due to high glucose"

 

 Bactrim -160 mg oral tablet  2012  take 1 tablet by oral 
route every 12 hours for 7 days   

 

 insulin syringe-needle U-100 1 mL 31 gauge x 5/16 miscellaneous syringe  2012  USE AS DIRECTED FOUR TIMES DAILY   

 

 Levemir 100 unit/mL subcutaneous solution  2012  inject 45 
units by subcutaneous route twice daily  "using pen"

 

 Augmentin 500-125 mg oral tablet  2012  take 1 tablet by oral 
route every 12 hours for 7 days   

 

 Pen Needle 31 gauge x 3/16" miscellaneous needle  2013  Uses 3 
injections a day   

 

 cephalexin 500 mg oral tablet  2013  take 1 tablet (500 mg) by 
oral route every 12 hours for 10 days   

 

 Augmentin 500-125 mg oral tablet  2013  take 1 tablet by oral 
route every 12 hours for 7 days   

 

 Novolog Flexpen 100 unit/mL subcutaneous insulin pen  2014  
INJECT 12 UNITS SUBCUTANEOUSLY WITH BREAKFAST. LUNCH, AND SUPPER   

 

 gabapentin 600 mg oral tablet  2014  TAKE 1 TABLET BY MOUTH 
FOUR TIMES DAILY   

 

 amoxicillin-pot clavulanate 500-125 mg oral tablet  2016  2/15/2016  take 
1 tablet by oral route every 12 hours for 7 days   









 Discontinued 

 

 Name  Start Date  Discontinued Date  SIG  Comments

 

 Aldara 5 % topical cream in packet  2010  3/15/2011  apply to the affected 
area(s) before bedtime by topical route 3 times per week and leave on skin for 
6 to 10 hours   

 

 fenofibrate 160 mg oral tablet  2010  take 1 tablet (160 mg) by 
oral route once daily   

 

 meloxicam 15 mg oral tablet  3/15/2011  2011  take 1 tablet (15 mg) by 
oral route once daily as needed   

 

 Zofran (as hydrochloride) 4 mg/5 mL oral solution  2011  take 5-
10 milliliters by oral route every 6 hours as needed   

 

 Klor-Con M20 20 mEq oral tablet,ER particles/crystals     3/5/2013  take 1 
tablet (20 meq) by oral route once daily with food  "not takiing"

 

 Diflucan 150 mg oral tablet  8/10/2011  2012  take 1 tablet (150 mg) by 
oral route once   

 

 amoxicillin 500 mg oral capsule     2012  take 1 capsule (500 mg) by oral 
route every 12 hours for 7 days   

 

 Medrol (Dominic) 4 mg oral tablets,dose pack     2012  take as directed   

 

 Lasix 20 mg oral tablet  4/10/2012  3/5/2013  take 1 tablet (20 mg) by oral 
route once daily as needed   

 

 amitriptyline 10 mg oral tablet  7/3/2012  2015  take 1 tablet by oral 
route once a day (at bedtime)   

 

 cyclobenzaprine 5 mg oral tablet  7/3/2012  10/8/2013  take 1 tablet by oral 
route once a day (at bedtime) as needed   

 

 cephalexin 500 mg oral tablet  7/3/2012  3/5/2013  take 1 tablet (500 mg) by 
oral route every 12 hours   

 

 Tessalon 200 mg oral capsule  2012  take 1 capsule (200 mg) by 
oral route 3 times per day as needed   

 

 Claritin-D 12 Hour 5-120 mg oral tablet extended release 12 hr  2012  3/5/
2013  take 1 tablet by oral route every 12 hours   

 

 naproxen 500 mg oral tablet  2014  take 1 tablet (500 mg) by 
oral route 2 times per day with food  "not taking now"

 

 Dulera 100-5 mcg/actuation inhalation HFA aerosol inhaler  10/8/2013  12/3/
2015  inhale 2 puffs by inhalation route 2 times per day in the morning and 
evening   

 

 flecainide 100 mg oral tablet     2015  take 1 tablet (100 mg) by oral 
route every 12 hours   

 

 cyclobenzaprine 10 mg oral tablet  2014  take 1 tablet (10 mg
) by oral route at bedtime as needed   

 

 Betapace 80 mg oral tablet     2015  take 1 tablet (80 mg) by oral route 
2 times per day  Dr. Turner

 

 duloxetine 30 mg oral capsule,delayed release(/EC)  2014  12/3/2015  
take 1 capsule (30 mg) by oral route once a day   

 

 hydrocodone-acetaminophen 7.5-325 mg oral tablet  2014  12/3/2015  take 
1 tablet by oral route every 6 hours as needed for pain   

 

 diltiazem HCl 120 mg oral capsule, extended release     12/3/2015  take 1 
capsule (120 mg) by oral route once daily   

 

 carvedilol 12.5 mg oral tablet     12/3/2015  take 1 tablet (12.5 mg) by oral 
route every 12 hours with food  dose change

 

 sotalol 120 mg oral tablet     12/3/2015  take 1 tablet (120 mg) by oral route 
2 times per day   







Problem List







 Description  Status  Onset

 

 Diabetes Mellitus, Type II  Active   

 

 Hemangioma in Spine  Active   

 

 Hyperlipidemia  Active   







Vital Signs







 Date  Time  BP-Sys(mm[Hg]  BP-Missy(mm[Hg])  HR(bpm)  RR(rpm)  Temp  WT  HT  HC  
BMI  BSA  BMI Percentile  O2 Sat(%)

 

 2016  1:34:00 PM  132 mmHg  60 mmHg  87 bpm  22 rpm  99 F  152 lbs  61 in
     28.72 kg/m2  1.72 m2     99 %

 

 12/3/2015  10:17:00 AM  138 mmHg  72 mmHg  80 bpm  20 rpm  98.2 F  152 lbs  61 
in     28.7199 kg/m  1.7226 m     98 %

 

 2015  10:31:00 AM  130 mmHg  66 mmHg  66 bpm  20 rpm  98.1 F  143 lbs  61 
in     27.02 kg/m2  1.67 m2     98 %

 

 2014  9:39:00 AM  138 mmHg  88 mmHg  153 bpm  20 rpm  98.7 F  150 lbs  
61 in     28.342 kg/m  1.7112 m     98 %

 

 6/3/2014  3:55:00 PM  130 mmHg  62 mmHg  69 bpm  18 rpm  97.9 F  151 lbs  61 
in     28.53 kg/m2  1.72 m2     99 %

 

 2014  10:56:00 AM  150 mmHg  74 mmHg  81 bpm  16 rpm  96.2 F  152 lbs  61 
in     28.7199 kg/m  1.7226 m     97 %

 

 2014  8:52:00 AM  138 mmHg  78 mmHg  61 bpm  20 rpm  98 F  168 lbs  61 in
     31.74 kg/m2  1.81 m2     100 %

 

 10/8/2013  10:08:00 AM  138 mmHg  78 mmHg  148 bpm  22 rpm  98.2 F  168 lbs  
61 in     31.743 kg/m  1.811 m     98 %

 

 2013  8:54:00 AM  150 mmHg  80 mmHg  78 bpm  16 rpm  98.9 F  163 lbs  61 
in     30.80 kg/m2  1.78 m2     99 %

 

 2013  10:13:00 AM  136 mmHg  78 mmHg  70 bpm  18 rpm  97.8 F  156 lbs    
              

 

 3/5/2013  9:30:00 AM  148 mmHg  80 mmHg  76 bpm  16 rpm  97.8 F  152 lbs  61 
in     28.72 kg/m2  1.72 m2      

 

 2012  10:33:00 AM  136 mmHg  74 mmHg  89 bpm  18 rpm  98.6 F  154.375 lbs
  61 in     29.1686 kg/m  1.736 m     99 %

 

 7/3/2012  8:08:00 AM  134 mmHg  72 mmHg  78 bpm  22 rpm  98.2 F  145 lbs  61 
in     27.40 kg/m2  1.68 m2      

 

 2012  8:03:00 AM  132 mmHg  76 mmHg  70 bpm  18 rpm  97.9 F  143 lbs  61 
in     27.0193 kg/m  1.6708 m      

 

 3/26/2012  11:28:00 AM  136 mmHg  74 mmHg  89 bpm  18 rpm  97.9 F  149.125 lbs
  61 in     28.18 kg/m2  1.71 m2      

 

 2011  8:12:00 AM  132 mmHg  82 mmHg  84 bpm  16 rpm  97.8 F  150 lbs  61 
in     28.342 kg/m  1.7112 m      

 

 2011  3:15:00 PM  130 mmHg  74 mmHg  68 bpm  18 rpm  98.3 F  156 lbs  61 
in     29.48 kg/m2  1.75 m2      

 

 8/10/2011  2:58:00 PM  120 mmHg  74 mmHg  100 bpm  20 rpm  99.4 F  169.25 lbs 
                 

 

 2011  9:49:00 AM  134 mmHg  78 mmHg  74 bpm  18 rpm  98.3 F  164 lbs     
             

 

 2011  8:15:00 AM  128 mmHg  78 mmHg  62 bpm  18 rpm  98.2 F  157 lbs     
             

 

 2011  8:54:00 AM  128 mmHg  72 mmHg  104 bpm  22 rpm  98.6 F  153 lbs     
            100 %

 

 3/15/2011  3:04:00 PM  144 mmHg  82 mmHg  72 bpm  18 rpm  98 F  163 lbs       
           

 

 2010  9:17:00 AM  124 mmHg  78 mmHg  76 bpm        145 lbs               
   

 

 2010  10:13:00 AM  130 mmHg  68 mmHg  70 bpm  16 rpm  98.3 F  144 lbs     
             

 

 2010  8:21:00 AM  134 mmHg  76 mmHg  70 bpm  20 rpm  98.6 F  147 lbs      
            







Social History







 Name  Description  Comments

 

 Tobacco  Never smoker   







History of Procedures







 Date Ordered  Description  Order Status

 

 12/3/2015 12:00 AM  LIPID PANEL  Reviewed

 

 12/3/2015 12:00 AM  GLYCOSYLATED HEMOGLOBIN TEST  Reviewed

 

 12/3/2015 12:00 AM  COMPREHEN METABOLIC PANEL  Reviewed

 

 12/3/2015 12:00 AM  Endocrinology Consultation  Reviewed

 

 2011 12:00 AM  METABOLIC PANEL TOTAL CA  Reviewed

 

 2011 12:00 AM  C-REACTIVE PROTEIN  Reviewed

 

 2011 12:00 AM  RBC SED RATE AUTOMATED  Reviewed

 

 2011 12:00 AM  X-RAY EXAM OF FOOT  Reviewed

 

 2016 12:00 AM  CHEST X-RAY 2VW FRONTAL&LATL  Returned

 

 3/26/2012 12:00 AM  COMPLETE CBC W/AUTO DIFF WBC  Returned

 

 3/26/2012 12:00 AM  PROTHROMBIN TIME  Returned

 

 3/26/2012 12:00 AM  ASSAY OF BLOOD/URIC ACID  Returned

 

 3/27/2012 12:00 AM  RBC SED RATE AUTOMATED  Returned

 

 3/27/2012 12:00 AM  GLYCOSYLATED HEMOGLOBIN TEST  Returned

 

 2010 12:00 AM  BREATHING CAPACITY TEST  Reviewed

 

 10/8/2013 12:00 AM  CHEST X-RAY 2VW FRONTAL&LATL  Reviewed

 

 10/17/2013 12:00 AM  CHEST X-RAY 2VW FRONTAL&LATL  Reviewed

 

 2014 12:00 AM  CT HEAD/BRAIN W/O & W/DYE  Reviewed

 

 6/3/2014 12:00 AM  ECG MONIT/REPRT UP TO 48 HRS  Reviewed

 

 6/3/2014 12:00 AM  TTE W/O DOPPLER COMPLETE  Reviewed

 

 6/3/2014 12:00 AM  Carotid Doppler  Reviewed

 

 2010 12:00 AM  COMPREHEN METABOLIC PANEL  Reviewed

 

 2010 12:00 AM  LIPID PANEL  Reviewed

 

 2010 12:00 AM  GLYCOSYLATED HEMOGLOBIN TEST  Reviewed

 

 2010 12:00 AM  MICROALBUMIN SEMIQUANT  Reviewed

 

 2010 12:00 AM  DRAINAGE OF SKIN ABSCESS  Reviewed

 

 2010 12:00 AM  Urine drug screen  Reviewed

 

 2011 12:00 AM  METABOLIC PANEL TOTAL CA  Reviewed

 

 2011 12:00 AM  METABOLIC PANEL TOTAL CA  Reviewed

 

 2011 12:00 AM  C-REACTIVE PROTEIN  Reviewed

 

 8/15/2011 12:00 AM  METABOLIC PANEL TOTAL CA  Reviewed

 

 8/15/2011 12:00 AM  GLYCOSYLATED HEMOGLOBIN TEST  Reviewed







Results Summary







 Data and Description  Results

 

 2010 8:22 AM  TRIGLYCERIDES 666.0 mg/dLCHOLESTEROL 241.0 mg/dLHDL 34.0 mg/
dLLDL (CALC) INVALID MG/DLGLUCOSE 336.0 mg/dLSODIUM 138.0 mmol/LPOTASSIUM 3.40 
mmol/LCHLORIDE 102.0 mmol/LCO2 22.0 mmol/LBUN 8.0 mg/dLCREATININE 0.70 mg/dLSGOT
/AST 15.0 IU/LSGPT/ALT 7.0 IU/LALK PHOS 93.0 IU/LTOTAL PROTEIN 7.30 g/dLALBUMIN 
3.90 g/dLTOTAL BILI 0.40 mg/dLCALCIUM 9.10 mg/dLeGFR >60 mL/min/1.73 [id number] UR 
57.20 mg/dLMICROALBUMIN UR 19.0 ug/mLALB:CREAT RATIO 33 

 

 2011 9:15 AM  GLUCOSE 49.0 mg/dLSODIUM 143.0 mmol/LPOTASSIUM 3.30 mmol/
LCHLORIDE 101.0 mmol/LCO2 30.0 mmol/LBUN 12.0 mg/dLCREATININE 0.50 mg/dLCALCIUM 
9.10 mg/dLeGFR >60 mL/min/1.73 m2

 

 2011 10:25 AM  C REACTIVE PROTEIN 6.0 mg/LGLUCOSE 88.0 mg/dLSODIUM 143.0 
mmol/LPOTASSIUM 3.60 mmol/LCHLORIDE 104.0 mmol/LCO2 26.0 mmol/LBUN 11.0 mg/
dLCREATININE 0.70 mg/dLCALCIUM 9.40 mg/dLeGFR >60 mL/min/1.73 m2

 

 2011 11:00 AM  GLUCOSE 160.0 mg/dLSODIUM 141.0 mmol/LPOTASSIUM 3.70 mmol/
LCHLORIDE 105.0 mmol/LCO2 21.0 mmol/LBUN 13.0 mg/dLCREATININE 0.70 mg/dLCALCIUM 
9.50 mg/dLeGFR >60 mL/min/1.73 m2

 

 2011 3:50 PM  C REACTIVE PROTEIN 5.0 mg/LGLUCOSE 361.0 mg/dLSODIUM 140.0 
mmol/LPOTASSIUM 3.80 mmol/LCHLORIDE 102.0 mmol/LCO2 24.0 mmol/LBUN 9.0 mg/
dLCREATININE 1.0 mg/dLCALCIUM 9.60 mg/dLeGFR 57 SEDRATE 24.0 mm/hr

 

 3/26/2012 12:00 PM  WBC 7.3 RBC 4.46 HGB 13.50 g/dLHCT 39.0 %MCV 87.0 fLMCH 
30.30 pgMCHC 34.60 g/dLRDW CV 13.20 %MPV 9.60 fLPLT 142 %NEUT 59.60 %%LYMP 
33.50 %%MONO 5.60 %%EOS 1.20 %%BASO 0.10 %#NEUT 4.32 #LYMP 2.43 #MONO 0.41 #EOS 
0.09 #BASO 0.01 PROTIME 10.60 secsINR 1.0 URIC ACID 3.9 mg/dL

 

 3/27/2012 8:45 PM  SEDRATE 31.0 mm/hr

 

 2015 10:15 AM  TSH 0.390 uIU/mL

 

 12/3/2015 11:22 AM  TRIGLYCERIDES 182.0 mg/dLCHOLESTEROL 180.0 mg/dLHDL 50.0 mg
/dLLDL (CALC) 94.0 mg/dLGLUCOSE 99.0 mg/dLSODIUM 142.0 mmol/LPOTASSIUM 4.20 mmol
/LCHLORIDE 112.0 mmol/LCO2 21.0 mmol/LBUN 17.0 mg/dLCREATININE 0.70 mg/dLSGOT/
AST 17.0 IU/LSGPT/ALT 12.0 IU/LALK PHOS 86.0 IU/LTOTAL PROTEIN 6.30 g/dLALBUMIN 
4.10 g/dLTOTAL BILI 0.50 mg/dLCALCIUM 9.30 mg/dLeGFR >60 mL/min/1.73m
Hemoglobin A1c 9.10 %







History Of Immunizations

Not available.



History of Past Illness







 Name  Date of Onset  Comments

 

 Diabetes Mellitus, Type II      

 

 Hyperlipidemia      

 

 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      

 

 Bronchitis, Chronic  2010  8:23AM   

 

 Subcutaneous Nodule  2010  8:23AM   

 

 Diabetes Mellitus, Type II  2010 10:15AM   

 

 Hyperlipidemia, Mixed  2010 10:15AM   

 

 Sebaceous Cyst  2010 11:32PM   

 

 General Medical Exam, Adult  2010  9:19AM   

 

 Bursitis, right knee  Mar 15 2011  3:05PM   

 

 Gastroenteritis, Viral  May  2 2011  8:56AM   

 

 Hypokalemia  May 11 2011  8:15AM   

 

 Polymyalgia Rheumatica  May 11 2011  8:15AM   

 

 Hypokalemia  2011  9:48AM   

 

 Polymyalgia Rheumatica  2011  9:48AM   

 

 Diabetes Mellitus, Type II  2011  9:48AM   

 

 Diabetes Mellitus, Type II  Aug 10 2011  2:57PM   

 

 Edema bilateral lower extremities  Aug 10 2011  2:57PM   

 

 Polymyalgia Rheumatica  Dec 14 2011  3:17PM   

 

 Arthralgia  Dec 14 2011  3:17PM   

 

 Pain in  foot, Left Heel  Dec 27 2011  8:16AM   

 

 Edema left lower ext.  Mar 26 2012 11:30AM   

 

 Petechial Rash  Mar 26 2012 11:30AM   

 

 Diabetes Mellitus, Type II  Mar 27 2012  1:39PM   

 

 Polymyalgia Rheumatica  Mar 27 2012  1:39PM   

 

 Edema left lower ext.  2012  8:05AM   

 

 Diabetes Mellitus, Type II  2012  8:05AM   

 

 Cellulitis left foot  2012  8:05AM   

 

 Myalgia  Jul  3 2012  8:10AM   

 

 Headache  Jul  3 2012  8:10AM   

 

 Neuropathy, right foot  Jul  3 2012  8:10AM   

 

 Upper Respiratory Infections  Dec  4 2012 10:35AM   

 

 Diabetes Mellitus, Type II  Mar  5 2013  9:32AM   

 

 Hyperlipidemia  Mar  5 2013  9:32AM   

 

 Atrial Fibrillation  Mar  5 2013  9:32AM   

 

 Osteoarthritis, hand  Mar  5 2013  9:32AM   

 

 Cellulitis  2013 10:15AM   

 

 Right Acute Otitis Media  Aug 28 2013  8:55AM   

 

 Chronic Cough  Oct  8 2013 10:10AM   

 

 Shortness of breath  Oct  8 2013 10:10AM   

 

 Cough  Oct 17 2013  9:40AM   

 

 Tension Headache  2014  8:54AM   

 

 Blurred Vision  2014  8:54AM   

 

 Bronchitis, Acute  2014  8:54AM   

 

 Postoperative Follow-up  2014 10:56AM   

 

 Basal Cell  2014 10:56AM   

 

 Basal Cell Carcinoma of Skin  2014 10:59AM   

 

 Sebaceous Cyst  2014 10:59AM   

 

 Syncope  Oc  3 2014  3:57PM   

 

 Basal cell carcinoma of skin, site unspecified  2014  7:25AM   

 

 Lumbar back pain  Dec 19 2014  9:42AM   

 

 Left Radiculopathy of leg  Dec 19 2014  9:42AM   

 

 Depressive Disorder  Dec 19 2014  9:42AM   

 

 Diabetes Mellitus, Type II  May 13 2015 10:33AM   

 

 Hyperlipidemia  May 13 2015 10:33AM   

 

 Systolic CHF, acute  May 13 2015 10:33AM   

 

 Resolved Atrial fibrillation  May 13 2015 10:33AM   

 

 Mixed hyperlipidemia  Dec  3 2015 10:19AM   

 

 Diabetes mellitus type 2, uncontrolled  Dec  3 2015 10:19AM   

 

 Other osteoarthritis involving multiple joints  Dec  3 2015 10:19AM   

 

 Shortness of breath  May 17 2016  1:36PM   

 

 Chronic obstructive pulmonary disease with (acute) exacerbation  May 17 2016  1
:36PM   







Payers







 Insurance Name  Company Name  Plan Name  Plan Number  Policy Number  Policy 
Group Number  Start Date

 

    [employer]  [employer] [place]     [id number]     2015

 

    [employer]  [employer] [place]     [id number]     

 

    [facility]  [employer] 
PHYS/DS  [id number]     N/A







History of Encounters







 Visit Date  Visit Type  Provider

 

 2016  Office visit  Mateo Cooper DO

 

 12/3/2015  Office visit  Mateo Cooper DO

 

 5/15/2015  Hospital  LEXX Holm MD

 

 2015  Office visit  Mateo Kenneth DO

 

 2014  Office visit  Mateo Kenneth DO

 

 2014  Hospital  LEXX Holm MD

 

 2014  Procedures  David Suresh MD

 

 6/3/2014  Office visit  Mateo Kenneth DO

 

 2014  Office visit  David Suresh MD

 

 2014  Procedures  David Suresh MD

 

 2014  Hospital  LEXX Holm MD

 

 2014  Office visit  Mateo Kenneth DO

 

 10/8/2013  Office visit  Mateo Kenneth DO

 

 2013  Office visit  Mateo Kenneth DO

 

 2013  Office visit  Mateo Kenneth DO

 

 3/5/2013  Office visit  Mateo Kenneth DO

 

 2013  [facility]  LEXX Holm MD

 

 2012  Office visit  Jenni Katz APRN

 

 2012  [facility]  LEXX Holm MD

 

 7/3/2012  Office visit  Mateo Kenneth DO

 

 2012  [facility]  Kristian Kiser MD

 

 2012  [facility]  LEXX Holm MD

 

 2012  [facility]  Kristian Kiser MD

 

 2012  [facility]  LEXX Holm MD

 

 2012  Office visit  Mateo Kenneth DO

 

 3/26/2012  Office visit  Jenni Katz APRN

 

 2011  Office visit  Mateo Kenneth DO

 

 2011  Office visit  Jenni Katz APRN

 

 8/10/2011  Office visit  Jenni Katz APRN

 

 2011  Office visit  Mateo Kenneth DO

 

 2011  Office visit  Mateo Kenneth DO

 

 2011  [facility]  Bandar Zelaya MD

 

 2011  [facility]  Bandar Zelaya MD

 

 2011  [facility]  Bandar Zelaya MD

 

 2011  Hospital  LEXX Holm MD

 

 2011  Office visit  Mateo Kenneth DO

 

 3/15/2011  Office visit  Mateo Kenneth DO

 

 2010  Office visit  David Burciaga PA-C

 

 2010  Office visit  Mateo Kenneth DO

 

 2010  Laboratory  Ayush Finn MD

 

 2010  Laboratory  Ayush Finn MD

 

 2010  Office visit  Mateo Kenneth DO

## 2018-03-23 NOTE — XMS REPORT
MU2 Ambulatory Summary

 Created on: 2017



Ambar Grider

External Reference #: [id number]

: 1950

Sex: Female



Demographics







 Address  [address] [place]  [address]

 

 Home Phone  [phone]

 

 Preferred Language  English

 

 Marital Status  

 

 Roman Catholic Affiliation  Unknown

 

 Race  White

 

 Ethnic Group  Not  or 





Author







 Mateo Pathak

 

 [facility]

 

 Address  [address]

[place], [place]  [id number]



 

 Phone  [phone]







Care Team Providers







 Care Team Member Name  Role  Phone

 

 Maeto Cooper  PCP  Unavailable

 

 Mateo Cooper  PreferredProvider  Unavailable







Allergies and Adverse Reactions







 Name  Reaction  Notes

 

 Phenergan      







Plan of Treatment







 Planned Activity  Comments  Planned Date  Planned Time  Plan/Goal

 

 Uncontrolled Diabetes and Thyroid Nodules            







Medications







 Active 

 

 Name  Start Date  Estimated Completion Date  SIG  Comments

 

 Pen Needle 31 gauge x 3/" miscellaneous needle  2014     USES 3 
INJECTIONS A DAY   

 

 metformin 1,000 mg oral tablet  2014     take 1 tablet (1,000 mg) by oral 
route 2 times per day with morning and evening meals   

 

 metformin 1,000 mg oral tablet  2015     TAKE 1 TABLET BY MOUTH TWICE 
DAILY WITH MORNING AND EVENING MEALS   

 

 metformin 1,000 mg oral tablet  2015     TAKE 1 TABLET BY MOUTH TWICE 
DAILY WITH MORNING AND EVENING MEALS   

 

 carvedilol 3.125 mg oral tablet        take 1 tablet (3.125 mg) by oral route 
once daily   

 

 Benicar 40 mg oral tablet        take 1 tablet (40 mg) by oral route once 
daily   

 

 Levemir FlexTouch 100 unit/mL (3 mL) subcutaneous insulin pen  12/3/2015     
inject 45 units by subcutaneous route twice a day   

 

 tramadol 50 mg oral tablet  12/3/2015     take 1 tablet (50 mg) by oral route 
every 6 hours as needed   

 

 Novolog Flexpen 100 unit/mL subcutaneous insulin pen  2016     INJECT 14 
UNITS SUBCUTANEOUSLY WITH BREAKFAST. LUNCH, AND SUPPER   

 

 acetaminophen-codeine 300-30 mg oral tablet  2016     take 1 tablet by 
oral route every 4 hours as needed   

 

 amiodarone 400 mg oral tablet        take 1 tablet (400 mg) by oral route once 
daily   

 

 potassium chloride 10 mEq oral capsule, extended release  10/30/2017     take 
2 capsules (20 meq) by oral route once daily with food for 30 days   









  

 

 Name  Start Date  Expiration Date  SIG  Comments

 

 Bactrim -160 mg oral tablet  2010  take 1 tablet by oral 
route every 12 hours for 10 days   

 

 Keflex 500 mg oral capsule  2010  take 1 capsule (500 mg) by 
oral route every 12 hours for 7 days   

 

 Reglan 10 mg oral tablet  2011  6/3/2011  take 1 tablet (10 mg) by oral 
route twice daily   

 

 prednisone 20 mg oral tablet  2011  take 1 tablet by oral 
route daily for 10 days   

 

 nabumetone 500 mg oral tablet  2011     take 1 tablet (500 mg) by oral 
route 2 times per day with food   

 

 indomethacin 50 mg oral capsule  2012  take 1 capsule (50 mg) 
by oral route 3 times per day with food for 7 days  "stopped it 2-3 weeks ago 
due to high glucose"

 

 Bactrim -160 mg oral tablet  2012  take 1 tablet by oral 
route every 12 hours for 7 days   

 

 insulin syringe-needle U-100 1 mL 31 gauge x 16 miscellaneous syringe  2012  USE AS DIRECTED FOUR TIMES DAILY   

 

 Levemir 100 unit/mL subcutaneous solution  2012  inject 45 
units by subcutaneous route twice daily  "using pen"

 

 Augmentin 500-125 mg oral tablet  2012  take 1 tablet by oral 
route every 12 hours for 7 days   

 

 Pen Needle 31 gauge x 3/16" miscellaneous needle  2013  Uses 3 
injections a day   

 

 cephalexin 500 mg oral tablet  2013  take 1 tablet (500 mg) by 
oral route every 12 hours for 10 days   

 

 Augmentin 500-125 mg oral tablet  2013  take 1 tablet by oral 
route every 12 hours for 7 days   

 

 Novolog Flexpen 100 unit/mL subcutaneous insulin pen  2014  
INJECT 12 UNITS SUBCUTANEOUSLY WITH BREAKFAST. LUNCH, AND SUPPER   

 

 gabapentin 600 mg oral tablet  2014  TAKE 1 TABLET BY MOUTH 
FOUR TIMES DAILY   

 

 Xarelto 20 mg oral tablet  12/3/2015  2016  take 1 tablet (20 mg) by oral 
route once daily with the evening meal for 30 days   

 

 amoxicillin-pot clavulanate 500-125 mg oral tablet  2016  2/15/2016  take 
1 tablet by oral route every 12 hours for 7 days   

 

 Accu-Chek Compact Test miscellaneous strip  2/15/2016  2016  Check 
glucose 4 times a day Dx:e11.65   

 

 Zostavax (PF) 19,400 unit/0.65 mL subcutaneous suspension for reconstitution  
10/17/2016  10/18/2016  inject 0.65 milliliter by subcutaneous route once   









 Discontinued 

 

 Name  Start Date  Discontinued Date  SIG  Comments

 

 Aldara 5 % topical cream in packet  2010  3/15/2011  apply to the affected 
area(s) before bedtime by topical route 3 times per week and leave on skin for 
6 to 10 hours   

 

 fenofibrate 160 mg oral tablet  2010  take 1 tablet (160 mg) by 
oral route once daily   

 

 meloxicam 15 mg oral tablet  3/15/2011  2011  take 1 tablet (15 mg) by 
oral route once daily as needed   

 

 Zofran (as hydrochloride) 4 mg/5 mL oral solution  2011  take 5-
10 milliliters by oral route every 6 hours as needed   

 

 Klor-Con M20 20 mEq oral tablet,ER particles/crystals     3/5/2013  take 1 
tablet (20 meq) by oral route once daily with food  "not takiing"

 

 Diflucan 150 mg oral tablet  8/10/2011  2012  take 1 tablet (150 mg) by 
oral route once   

 

 amoxicillin 500 mg oral capsule     2012  take 1 capsule (500 mg) by oral 
route every 12 hours for 7 days   

 

 Medrol (Dominic) 4 mg oral tablets,dose pack     2012  take as directed   

 

 Lasix 20 mg oral tablet  4/10/2012  3/5/2013  take 1 tablet (20 mg) by oral 
route once daily as needed   

 

 amitriptyline 10 mg oral tablet  7/3/2012  2015  take 1 tablet by oral 
route once a day (at bedtime)   

 

 cyclobenzaprine 5 mg oral tablet  7/3/2012  10/8/2013  take 1 tablet by oral 
route once a day (at bedtime) as needed   

 

 cephalexin 500 mg oral tablet  7/3/2012  3/5/2013  take 1 tablet (500 mg) by 
oral route every 12 hours   

 

 Tessalon 200 mg oral capsule  2012  take 1 capsule (200 mg) by 
oral route 3 times per day as needed   

 

 Claritin-D 12 Hour 5-120 mg oral tablet extended release 12 hr  2012  3/5/
2013  take 1 tablet by oral route every 12 hours   

 

 naproxen 500 mg oral tablet  2014  take 1 tablet (500 mg) by 
oral route 2 times per day with food  "not taking now"

 

 Dulera 100-5 mcg/actuation inhalation HFA aerosol inhaler  10/8/2013  12/3/
2015  inhale 2 puffs by inhalation route 2 times per day in the morning and 
evening   

 

 flecainide 100 mg oral tablet     2015  take 1 tablet (100 mg) by oral 
route every 12 hours   

 

 cyclobenzaprine 10 mg oral tablet  2014  take 1 tablet (10 mg
) by oral route at bedtime as needed   

 

 Betapace 80 mg oral tablet     2015  take 1 tablet (80 mg) by oral route 
2 times per day  Dr. Turner

 

 duloxetine 30 mg oral capsule,delayed release(/EC)  2014  12/3/2015  
take 1 capsule (30 mg) by oral route once a day   

 

 hydrocodone-acetaminophen 7.5-325 mg oral tablet  2014  12/3/2015  take 
1 tablet by oral route every 6 hours as needed for pain   

 

 diltiazem HCl 120 mg oral capsule, extended release     12/3/2015  take 1 
capsule (120 mg) by oral route once daily   

 

 carvedilol 12.5 mg oral tablet     12/3/2015  take 1 tablet (12.5 mg) by oral 
route every 12 hours with food  dose change

 

 sotalol 120 mg oral tablet     12/3/2015  take 1 tablet (120 mg) by oral route 
2 times per day   

 

 benzonatate 200 mg oral capsule  2016  10/17/2016  take 1 capsule (200 mg) 
by oral route 3 times per day as needed   

 

 Levemir FlexTouch 100 unit/mL (3 mL) subcutaneous insulin pen  3/1/2016  10/17/
2016  INJECT 43 UNITS BY SUBCUTANEOUS ROUTE TWICE A DAY   

 

 Levemir FlexTouch 100 unit/mL (3 mL) subcutaneous insulin pen  3/1/2016  10/17/
2016  INJECT 43 UNITS BY SUBCUTANEOUS ROUTE TWICE A DAY  has an insulin pump







Problem List







 Description  Status  Onset

 

 Diabetes Mellitus, Type II  Active   

 

 Hemangioma in Spine  Active   

 

 Hyperlipidemia  Active   







Vital Signs







 Date  Time  BP-Sys(mm[Hg]  BP-Missy(mm[Hg])  HR(bpm)  RR(rpm)  Temp  WT  HT  HC  
BMI  BSA  BMI Percentile  O2 Sat(%)

 

 10/30/2017  10:41:00 AM  152 mmHg  88 mmHg  80 bpm  20 rpm  98.1 F  153 lbs  
61 in     28.91 kg/m2  1.73 m2     100 %

 

 10/17/2016  9:17:00 AM  138 mmHg  70 mmHg  83 bpm  20 rpm  97.7 F  160 lbs  61 
in     30.2314 kg/m  1.7674 m     99 %

 

 2016  1:34:00 PM  132 mmHg  60 mmHg  87 bpm  22 rpm  99 F  152 lbs  61 in
     28.72 kg/m2  1.72 m2     99 %

 

 12/3/2015  10:17:00 AM  138 mmHg  72 mmHg  80 bpm  20 rpm  98.2 F  152 lbs  61 
in     28.7199 kg/m  1.7226 m     98 %

 

 2015  10:31:00 AM  130 mmHg  66 mmHg  66 bpm  20 rpm  98.1 F  143 lbs  61 
in     27.02 kg/m2  1.67 m2     98 %

 

 2014  9:39:00 AM  138 mmHg  88 mmHg  153 bpm  20 rpm  98.7 F  150 lbs  
61 in     28.342 kg/m  1.7112 m     98 %

 

 6/3/2014  3:55:00 PM  130 mmHg  62 mmHg  69 bpm  18 rpm  97.9 F  151 lbs  61 
in     28.53 kg/m2  1.72 m2     99 %

 

 2014  10:56:00 AM  150 mmHg  74 mmHg  81 bpm  16 rpm  96.2 F  152 lbs  61 
in     28.7199 kg/m  1.7226 m     97 %

 

 2014  8:52:00 AM  138 mmHg  78 mmHg  61 bpm  20 rpm  98 F  168 lbs  61 in
     31.74 kg/m2  1.81 m2     100 %

 

 10/8/2013  10:08:00 AM  138 mmHg  78 mmHg  148 bpm  22 rpm  98.2 F  168 lbs  
61 in     31.743 kg/m  1.811 m     98 %

 

 2013  8:54:00 AM  150 mmHg  80 mmHg  78 bpm  16 rpm  98.9 F  163 lbs  61 
in     30.80 kg/m2  1.78 m2     99 %

 

 2013  10:13:00 AM  136 mmHg  78 mmHg  70 bpm  18 rpm  97.8 F  156 lbs    
              

 

 3/5/2013  9:30:00 AM  148 mmHg  80 mmHg  76 bpm  16 rpm  97.8 F  152 lbs  61 
in     28.72 kg/m2  1.72 m2      

 

 2012  10:33:00 AM  136 mmHg  74 mmHg  89 bpm  18 rpm  98.6 F  154.375 lbs
  61 in     29.1686 kg/m  1.736 m     99 %

 

 7/3/2012  8:08:00 AM  134 mmHg  72 mmHg  78 bpm  22 rpm  98.2 F  145 lbs  61 
in     27.40 kg/m2  1.68 m2      

 

 2012  8:03:00 AM  132 mmHg  76 mmHg  70 bpm  18 rpm  97.9 F  143 lbs  61 
in     27.0193 kg/m  1.6708 m      

 

 3/26/2012  11:28:00 AM  136 mmHg  74 mmHg  89 bpm  18 rpm  97.9 F  149.125 lbs
  61 in     28.18 kg/m2  1.71 m2      

 

 2011  8:12:00 AM  132 mmHg  82 mmHg  84 bpm  16 rpm  97.8 F  150 lbs  61 
in     28.342 kg/m  1.7112 m      

 

 2011  3:15:00 PM  130 mmHg  74 mmHg  68 bpm  18 rpm  98.3 F  156 lbs  61 
in     29.48 kg/m2  1.75 m2      

 

 8/10/2011  2:58:00 PM  120 mmHg  74 mmHg  100 bpm  20 rpm  99.4 F  169.25 lbs 
                 

 

 2011  9:49:00 AM  134 mmHg  78 mmHg  74 bpm  18 rpm  98.3 F  164 lbs     
             

 

 2011  8:15:00 AM  128 mmHg  78 mmHg  62 bpm  18 rpm  98.2 F  157 lbs     
             

 

 2011  8:54:00 AM  128 mmHg  72 mmHg  104 bpm  22 rpm  98.6 F  153 lbs     
            100 %

 

 3/15/2011  3:04:00 PM  144 mmHg  82 mmHg  72 bpm  18 rpm  98 F  163 lbs       
           

 

 2010  9:17:00 AM  124 mmHg  78 mmHg  76 bpm        145 lbs               
   

 

 2010  10:13:00 AM  130 mmHg  68 mmHg  70 bpm  16 rpm  98.3 F  144 lbs     
             

 

 2010  8:21:00 AM  134 mmHg  76 mmHg  70 bpm  20 rpm  98.6 F  147 lbs      
            







Social History







 Name  Description  Comments

 

 Tobacco  Never smoker   







History of Procedures







 Date Ordered  Description  Order Status

 

 12/3/2015 12:00 AM  LIPID PANEL  Reviewed

 

 12/3/2015 12:00 AM  GLYCOSYLATED HEMOGLOBIN TEST  Reviewed

 

 12/3/2015 12:00 AM  COMPREHEN METABOLIC PANEL  Reviewed

 

 12/3/2015 12:00 AM  Endocrinology Consultation  Reviewed

 

 2011 12:00 AM  METABOLIC PANEL TOTAL CA  Reviewed

 

 2011 12:00 AM  C-REACTIVE PROTEIN  Reviewed

 

 2011 12:00 AM  RBC SED RATE AUTOMATED  Reviewed

 

 2011 12:00 AM  X-RAY EXAM OF FOOT  Reviewed

 

 2016 12:00 AM  CHEST X-RAY 2VW FRONTAL&LATL  Reviewed

 

 2016 12:00 AM  Decadron, Per 1 Mg NDC# [address]-92  Reviewed

 

 2016 12:00 AM  Depo-Medrol, Per 80 Mg NDC#[address]-48  Reviewed

 

 10/17/2016 12:00 AM  PNEUMOCOCCAL VACC 13 MARILEE IM  Reviewed

 

 3/26/2012 12:00 AM  COMPLETE CBC W/AUTO DIFF WBC  Reviewed

 

 3/26/2012 12:00 AM  PROTHROMBIN TIME  Reviewed

 

 3/26/2012 12:00 AM  ASSAY OF BLOOD/URIC ACID  Reviewed

 

 3/27/2012 12:00 AM  RBC SED RATE AUTOMATED  Reviewed

 

 3/27/2012 12:00 AM  GLYCOSYLATED HEMOGLOBIN TEST  Reviewed

 

 2017 12:00 AM  METABOLIC PANEL TOTAL CA  Reviewed

 

 10/30/2017 12:00 AM  Rocephin 500 Mg Injection  Reviewed

 

 2017 12:00 AM  METABOLIC PANEL TOTAL CA  Reviewed

 

 2010 12:00 AM  BREATHING CAPACITY TEST  Reviewed

 

 10/8/2013 12:00 AM  CHEST X-RAY 2VW FRONTAL&LATL  Reviewed

 

 10/8/2013 12:00 AM  AIRWAY INHALATION TREATMENT  Reviewed

 

 10/17/2013 12:00 AM  CHEST X-RAY 2VW FRONTAL&LATL  Reviewed

 

 2014 12:00 AM  CT HEAD/BRAIN W/O & W/DYE  Reviewed

 

 2014 12:00 AM  BREATHING CAPACITY TEST  Reviewed

 

 6/3/2014 12:00 AM  ECG MONIT/REPRT UP TO 48 HRS  Reviewed

 

 6/3/2014 12:00 AM  TTE W/O DOPPLER COMPLETE  Reviewed

 

 6/3/2014 12:00 AM  Carotid Doppler  Reviewed

 

 2010 12:00 AM  COMPREHEN METABOLIC PANEL  Reviewed

 

 2010 12:00 AM  LIPID PANEL  Reviewed

 

 2010 12:00 AM  GLYCOSYLATED HEMOGLOBIN TEST  Reviewed

 

 2010 12:00 AM  MICROALBUMIN SEMIQUANT  Reviewed

 

 2010 12:00 AM  DRAINAGE OF SKIN ABSCESS  Reviewed

 

 2010 12:00 AM  Urine drug screen  Reviewed

 

 2014 12:00 AM  Physiatry Consult  Reviewed

 

 2011 12:00 AM  METABOLIC PANEL TOTAL CA  Reviewed

 

 2011 12:00 AM  METABOLIC PANEL TOTAL CA  Reviewed

 

 2011 12:00 AM  C-REACTIVE PROTEIN  Reviewed

 

 8/15/2011 12:00 AM  METABOLIC PANEL TOTAL CA  Reviewed

 

 8/15/2011 12:00 AM  GLYCOSYLATED HEMOGLOBIN TEST  Reviewed







Results Summary







 Date and Description  Results

 

 2010 8:22 AM  TRIGLYCERIDES 666.0 mg/dLCHOLESTEROL 241.0 mg/dLHDL 34.0 mg/
dLTOT CHOL/HDL 7.1 LDL (CALC) INVALID MG/DLGLYCOHEMOGLOBIN A1C 10.30 %GLUCOSE 
336.0 mg/dLSODIUM 138.0 mmol/LPOTASSIUM 3.40 mmol/LCHLORIDE 102.0 mmol/LCO2 
22.0 mmol/LBUN 8.0 mg/dLCREATININE 0.70 mg/dLSGOT/AST 15.0 IU/LSGPT/ALT 7.0 IU/
LALK PHOS 93.0 IU/LTOTAL PROTEIN 7.30 g/dLALBUMIN 3.90 g/dLTOTAL BILI 0.40 mg/
dLCALCIUM 9.10 mg/dLAGE 60 GFR NonAA 86 GFR  eGFR >60 mL/min/1.73 m2eGFR 
AA* >60 CREAT UR 57.20 mg/dLMICROALBUMIN UR 19.0 ug/mLALB:CREAT RATIO 33 

 

 2011 9:15 AM  GLUCOSE 49.0 mg/dLSODIUM 143.0 mmol/LPOTASSIUM 3.30 mmol/
LCHLORIDE 101.0 mmol/LCO2 30.0 mmol/LBUN 12.0 mg/dLCREATININE 0.50 mg/dLCALCIUM 
9.10 mg/dLAGE 60 GFR NonAA 126 GFR  eGFR >60 mL/min/1.73 m2eGFR AA* >60 

 

 2011 10:25 AM  C REACTIVE PROTEIN 6.0 mg/LGLUCOSE 88.0 mg/dLSODIUM 143.0 
mmol/LPOTASSIUM 3.60 mmol/LCHLORIDE 104.0 mmol/LCO2 26.0 mmol/LBUN 11.0 mg/
dLCREATININE 0.70 mg/dLCALCIUM 9.40 mg/dLAGE 61 GFR NonAA 85 GFR  eGFR >
60 mL/min/1.73 m2eGFR AA* >60 

 

 2011 11:00 AM  GLUCOSE 160.0 mg/dLSODIUM 141.0 mmol/LPOTASSIUM 3.70 mmol/
LCHLORIDE 105.0 mmol/LCO2 21.0 mmol/LBUN 13.0 mg/dLCREATININE 0.70 mg/dLCALCIUM 
9.50 mg/dLAGE 61 GFR NonAA 85 GFR  eGFR >60 mL/min/1.73 m2eGFR AA* >60 
GLYCOHEMOGLOBIN A1C 6.80 %

 

 2011 3:50 PM  C REACTIVE PROTEIN 5.0 mg/LGLUCOSE 361.0 mg/dLSODIUM 140.0 
mmol/LPOTASSIUM 3.80 mmol/LCHLORIDE 102.0 mmol/LCO2 24.0 mmol/LBUN 9.0 mg/
dLCREATININE 1.0 mg/dLCALCIUM 9.60 mg/dLAGE 61 GFR NonAA 57 GFR AA 69 eGFR 57 
eGFR AA* >60 SEDRATE 24.0 mm/hr

 

 3/26/2012 12:00 PM  WBC 7.3 RBC 4.46 HGB 13.50 g/dLHCT 39.0 %MCV 87.0 fLMCH 
30.30 pgMCHC 34.60 g/dLRDW SD 42 RDW CV 13.20 %MPV 9.60 fLPLT 142 NRBC# 0.00 
NRBC% 0.0 %NEUT 59.60 %%LYMP 33.50 %%MONO 5.60 %%EOS 1.20 %%BASO 0.10 %#NEUT 
4.32 #LYMP 2.43 #MONO 0.41 #EOS 0.09 #BASO 0.01 MANUAL DIFF NOT IND PROTIME 
10.60 secsINR 1.0 URIC ACID 3.9 mg/dL

 

 3/27/2012 8:45 PM  GLYCOHEMOGLOBIN A1C 10.20 %SEDRATE 31.0 mm/hr

 

 12/3/2015 11:22 AM  TRIGLYCERIDES 182.0 mg/dLCHOLESTEROL 180.0 mg/dLHDL 50.0 mg
/dLTOT CHOL/HDL 3.6 LDL (CALC) 94.0 mg/dLGLUCOSE 99.0 mg/dLSODIUM 142.0 mmol/
LPOTASSIUM 4.20 mmol/LCHLORIDE 112.0 mmol/LCO2 21.0 mmol/LBUN 17.0 mg/
dLCREATININE 0.70 mg/dLSGOT/AST 17.0 IU/LSGPT/ALT 12.0 IU/LALK PHOS 86.0 IU/
LTOTAL PROTEIN 6.30 g/dLALBUMIN 4.10 g/dLTOTAL BILI 0.50 mg/dLCALCIUM 9.30 mg/
dLAGE 65 GFR NonAA 84 GFR  eGFR >60 mL/min/1.73meGFR AA* >60 Hemoglobin 
A1c 9.10 %Estim. Avg Glu (eAG) 214 

 

 11/3/2017 8:02 AM  GLUCOSE 97.0 mg/dLSODIUM 145.0 mmol/LPOTASSIUM 3.40 mmol/
LCHLORIDE 112.0 mmol/LCO2 25.0 mmol/LBUN 9.0 mg/dLCREATININE 0.70 mg/dLCALCIUM 
8.80 mg/dLAGE 67 GFR NonAA 84 GFR  eGFR >60 mL/min/1.73meGFR AA* >60 

 

 2017 7:37 AM  GLUCOSE 172.0 mg/dLSODIUM 142.0 mmol/LPOTASSIUM 3.70 mmol/
LCHLORIDE 108.0 mmol/LCO2 26.0 mmol/LBUN 16.0 mg/dLCREATININE 0.90 mg/dLCALCIUM 
9.20 mg/dLAGE 67 GFR NonAA 63 GFR AA 76 eGFR >60 mL/min/1.73meGFR AA* >60 







History Of Immunizations







 Name  Date Admin  Mfg Name  Mfg Code  Trade Name  Lot#  Route  Inj  Vis Given  
Vis Pub  CVX

 

 Pneumococcal  10/17/2016  [employer]-A[employer]-Lederle-Praxis  [facility]  Prevnar 13  [id number]
  Intramuscular  Left Deltoid  10/17/2016  2015  133







History of Past Illness







 Name  Date of Onset  Comments

 

 Diabetes Mellitus, Type II      

 

 Hyperlipidemia      

 

 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      

 

 Bronchitis, Chronic  2010  8:23AM   

 

 Subcutaneous Nodule  2010  8:23AM   

 

 Diabetes Mellitus, Type II  2010 10:15AM   

 

 Hyperlipidemia, Mixed  2010 10:15AM   

 

 Sebaceous Cyst  2010 11:32PM   

 

 General Medical Exam, Adult  2010  9:19AM   

 

 Bursitis, right knee  Mar 15 2011  3:05PM   

 

 Gastroenteritis, Viral  May  2 2011  8:56AM   

 

 Hypokalemia  May 11 2011  8:15AM   

 

 Polymyalgia Rheumatica  May 11 2011  8:15AM   

 

 Hypokalemia  2011  9:48AM   

 

 Polymyalgia Rheumatica  2011  9:48AM   

 

 Diabetes Mellitus, Type II  2011  9:48AM   

 

 Diabetes Mellitus, Type II  Aug 10 2011  2:57PM   

 

 Edema bilateral lower extremities  Aug 10 2011  2:57PM   

 

 Polymyalgia Rheumatica  Dec 14 2011  3:17PM   

 

 Arthralgia  Dec 14 2011  3:17PM   

 

 Pain in  foot, Left Heel  Dec 27 2011  8:16AM   

 

 Edema left lower ext.  Mar 26 2012 11:30AM   

 

 Petechial Rash  Mar 26 2012 11:30AM   

 

 Diabetes Mellitus, Type II  Mar 27 2012  1:39PM   

 

 Polymyalgia Rheumatica  Mar 27 2012  1:39PM   

 

 Edema left lower ext.  2012  8:05AM   

 

 Diabetes Mellitus, Type II  2012  8:05AM   

 

 Cellulitis left foot  2012  8:05AM   

 

 Myalgia  Jul  3 2012  8:10AM   

 

 Headache  Jul  3 2012  8:10AM   

 

 Neuropathy, right foot  Jul  3 2012  8:10AM   

 

 Upper Respiratory Infections  Dec  4 2012 10:35AM   

 

 Diabetes Mellitus, Type II  Mar  5 2013  9:32AM   

 

 Hyperlipidemia  Mar  5 2013  9:32AM   

 

 Atrial Fibrillation  Mar  5 2013  9:32AM   

 

 Osteoarthritis, hand  Mar  5 2013  9:32AM   

 

 Cellulitis  2013 10:15AM   

 

 Right Acute Otitis Media  Aug 28 2013  8:55AM   

 

 Chronic Cough  Oct  8 2013 10:10AM   

 

 Shortness of breath  Oct  8 2013 10:10AM   

 

 Cough  Oct 17 2013  9:40AM   

 

 Tension Headache  2014  8:54AM   

 

 Blurred Vision  2014  8:54AM   

 

 Bronchitis, Acute  2014  8:54AM   

 

 Postoperative Follow-up  2014 10:56AM   

 

 Basal Cell  2014 10:56AM   

 

 Basal Cell Carcinoma of Skin  2014 10:59AM   

 

 Sebaceous Cyst  2014 10:59AM   

 

 Syncope  Oc  3 2014  3:57PM   

 

 Basal cell carcinoma of skin, site unspecified  2014  7:25AM   

 

 Lumbar back pain  Dec 19 2014  9:42AM   

 

 Left Radiculopathy of leg  Dec 19 2014  9:42AM   

 

 Depressive Disorder  Dec 19 2014  9:42AM   

 

 Diabetes Mellitus, Type II  May 13 2015 10:33AM   

 

 Hyperlipidemia  May 13 2015 10:33AM   

 

 Systolic CHF, acute  May 13 2015 10:33AM   

 

 Resolved Atrial fibrillation  May 13 2015 10:33AM   

 

 Mixed hyperlipidemia  Dec  3 2015 10:19AM   

 

 Diabetes mellitus type 2, uncontrolled  Dec  3 2015 10:19AM   

 

 Other osteoarthritis involving multiple joints  Dec  3 2015 10:19AM   

 

 Shortness of breath  May 17 2016  1:36PM   

 

 Chronic obstructive pulmonary disease with (acute) exacerbation  May 17 2016  1
:36PM   

 

 Need for Prevnar vaccine  Oct 17 2016  9:19AM   

 

 Paroxysmal atrial fibrillation  Oct 17 2016  9:19AM   

 

 Diabetes Mellitus, Type II  Oct 17 2016  9:19AM   

 

 Hypokalemia  Oct 30 2017 10:43AM   

 

 Hypokalemia  Nov  3 2017 11:41AM   

 

 Cellulitis of left lower extremity  Oct 30 2017 10:43AM   







Payers







 Insurance Name  Company Name  Plan Name  Plan Number  Policy Number  Policy 
Group Number  Start Date

 

    [employer] [place]     [id number]     2015

 

    [employer] [place]     [id number]     

 

    [facility]  [employer] 
PHYS/DS  [id number]     N/A







History of Encounters







 Visit Date  Visit Type  Provider

 

 10/30/2017  Office visit  Mateo Cooper DO

 

 10/17/2016  Office visit  Mateo Cooper DO

 

 2016  Office visit  Mateo Cooper DO

 

 12/3/2015  Office visit  Mateo Cooper DO

 

 5/15/2015  Hospital  LEXX Holm MD

 

 2015  Office visit  Mateo Cooper DO

 

 2014  Office visit  Mateo Cooper DO

 

 2014  Aren Holm MD

 

 2014  Procedures  David Suresh MD

 

 6/3/2014  Office visit  Mateo Cooper DO

 

 2014  Office visit  David Suresh MD

 

 2014  Procedures  David Suresh MD

 

 2014  Hospital  LEXX Holm MD

 

 2014  Office visit  Mateo Cooper DO

 

 10/8/2013  Office visit  Mateo Cooper DO

 

 2013  Office visit  Mateo Copoer DO

 

 2013  Office visit  Mateo Cooper DO

 

 3/5/2013  Office visit  Mateo Cooper DO

 

 2013  Hospital  LEXX Holm MD

 

 2012  Office visit  Jenni Katz APRN

 

 2012  Hospital  LEXX Holm MD

 

 7/3/2012  Office visit  Mateo Cooper DO

 

 2012  [facility]  Kristian Kiser MD

 

 2012  [facility]  LEXX Holm MD

 

 2012  [facility]  Kristian Kiser MD

 

 2012  Hospital  LEXX Holm MD

 

 2012  Office visit  Mateo Cooper DO

 

 3/26/2012  Office visit  Jenni Kazt APRN

 

 2011  Office visit  Mateo Cooper DO

 

 2011  Office visit  Jenni Katz APRN

 

 8/10/2011  Office visit  Jenni Katz APRN

 

 2011  Office visit  Mateo Cooper DO

 

 2011  Office visit  Mateo Cooper DO

 

 2011  [facility]  Bandar Zelaya MD

 

 2011  [facility]  Bandar Zelaya MD

 

 2011  [facility]  Bandar Zelaya MD

 

 2011  [facility]  LEXX Holm MD

 

 2011  Office visit  Mateo Cooper DO

 

 3/15/2011  Office visit  Mateo Cooper DO

 

 2010  Office visit  David Burciaga PA-C

 

 2010  Office visit  Mateo Cooper DO

 

 2010  Laboratory  Ayush Finn MD

 

 2010  Laboratory  Ayush Finn MD

 

 2010  Office visit  Mateo Cooper DO

## 2018-03-23 NOTE — XMS REPORT
MU2 Ambulatory Summary

 Created on: 2017



Ambar Grider

External Reference #: [id number]

: 1950

Sex: Female



Demographics







 Address  [address] [place]  [address]

 

 Home Phone  [phone]

 

 Preferred Language  English

 

 Marital Status  

 

 Temple Affiliation  Unknown

 

 Race  White

 

 Ethnic Group  Not  or 





Author







 Mateo Pathak

 

 [facility]

 

 Address  [address]

[place], [place]  [id number]



 

 Phone  [phone]







Care Team Providers







 Care Team Member Name  Role  Phone

 

 Mateo Cooper  PCP  Unavailable

 

 Mateo Cooper  PreferredProvider  Unavailable







Allergies and Adverse Reactions







 Name  Reaction  Notes

 

 Phenergan      







Plan of Treatment







 Planned Activity  Comments  Planned Date  Planned Time  Plan/Goal

 

 Uncontrolled Diabetes and Thyroid Nodules            

 

 [facility]     2017  12:00 AM   







Medications







 Active 

 

 Name  Start Date  Estimated Completion Date  SIG  Comments

 

 Pen Needle 31 gauge x 3/16" miscellaneous needle  2014     USES 3 
INJECTIONS A DAY   

 

 metformin 1,000 mg oral tablet  2014     take 1 tablet (1,000 mg) by oral 
route 2 times per day with morning and evening meals   

 

 metformin 1,000 mg oral tablet  2015     TAKE 1 TABLET BY MOUTH TWICE 
DAILY WITH MORNING AND EVENING MEALS   

 

 metformin 1,000 mg oral tablet  2015     TAKE 1 TABLET BY MOUTH TWICE 
DAILY WITH MORNING AND EVENING MEALS   

 

 carvedilol 3.125 mg oral tablet        take 1 tablet (3.125 mg) by oral route 
once daily   

 

 Benicar 40 mg oral tablet        take 1 tablet (40 mg) by oral route once 
daily   

 

 Levemir FlexTouch 100 unit/mL (3 mL) subcutaneous insulin pen  12/3/2015     
inject 45 units by subcutaneous route twice a day   

 

 tramadol 50 mg oral tablet  12/3/2015     take 1 tablet (50 mg) by oral route 
every 6 hours as needed   

 

 Novolog Flexpen 100 unit/mL subcutaneous insulin pen  2016     INJECT 14 
UNITS SUBCUTANEOUSLY WITH BREAKFAST. LUNCH, AND SUPPER   

 

 acetaminophen-codeine 300-30 mg oral tablet  2016     take 1 tablet by 
oral route every 4 hours as needed   

 

 amiodarone 400 mg oral tablet        take 1 tablet (400 mg) by oral route once 
daily   

 

 potassium chloride 10 mEq oral capsule, extended release  10/30/2017     take 
2 capsules (20 meq) by oral route once daily with food for 30 days   









  

 

 Name  Start Date  Expiration Date  SIG  Comments

 

 Bactrim -160 mg oral tablet  2010  take 1 tablet by oral 
route every 12 hours for 10 days   

 

 Keflex 500 mg oral capsule  2010  take 1 capsule (500 mg) by 
oral route every 12 hours for 7 days   

 

 Reglan 10 mg oral tablet  2011  6/3/2011  take 1 tablet (10 mg) by oral 
route twice daily   

 

 prednisone 20 mg oral tablet  2011  take 1 tablet by oral 
route daily for 10 days   

 

 nabumetone 500 mg oral tablet  2011     take 1 tablet (500 mg) by oral 
route 2 times per day with food   

 

 indomethacin 50 mg oral capsule  2012  take 1 capsule (50 mg) 
by oral route 3 times per day with food for 7 days  "stopped it 2-3 weeks ago 
due to high glucose"

 

 Bactrim -160 mg oral tablet  2012  take 1 tablet by oral 
route every 12 hours for 7 days   

 

 insulin syringe-needle U-100 1 mL 31 gauge x 5/16 miscellaneous syringe  2012  USE AS DIRECTED FOUR TIMES DAILY   

 

 Levemir 100 unit/mL subcutaneous solution  2012  inject 45 
units by subcutaneous route twice daily  "using pen"

 

 Augmentin 500-125 mg oral tablet  2012  take 1 tablet by oral 
route every 12 hours for 7 days   

 

 Pen Needle 31 gauge x 3/16" miscellaneous needle  2013  Uses 3 
injections a day   

 

 cephalexin 500 mg oral tablet  2013  take 1 tablet (500 mg) by 
oral route every 12 hours for 10 days   

 

 Augmentin 500-125 mg oral tablet  2013  take 1 tablet by oral 
route every 12 hours for 7 days   

 

 Novolog Flexpen 100 unit/mL subcutaneous insulin pen  2014  
INJECT 12 UNITS SUBCUTANEOUSLY WITH BREAKFAST. LUNCH, AND SUPPER   

 

 gabapentin 600 mg oral tablet  2014  TAKE 1 TABLET BY MOUTH 
FOUR TIMES DAILY   

 

 Xarelto 20 mg oral tablet  12/3/2015  2016  take 1 tablet (20 mg) by oral 
route once daily with the evening meal for 30 days   

 

 amoxicillin-pot clavulanate 500-125 mg oral tablet  2016  2/15/2016  take 
1 tablet by oral route every 12 hours for 7 days   

 

 Accu-Chek Compact Test miscellaneous strip  2/15/2016  2016  Check 
glucose 4 times a day Dx:e11.65   

 

 Zostavax (PF) 19,400 unit/0.65 mL subcutaneous suspension for reconstitution  
10/17/2016  10/18/2016  inject 0.65 milliliter by subcutaneous route once   









 Discontinued 

 

 Name  Start Date  Discontinued Date  SIG  Comments

 

 Aldara 5 % topical cream in packet  2010  3/15/2011  apply to the affected 
area(s) before bedtime by topical route 3 times per week and leave on skin for 
6 to 10 hours   

 

 fenofibrate 160 mg oral tablet  2010  take 1 tablet (160 mg) by 
oral route once daily   

 

 meloxicam 15 mg oral tablet  3/15/2011  2011  take 1 tablet (15 mg) by 
oral route once daily as needed   

 

 Zofran (as hydrochloride) 4 mg/5 mL oral solution  2011  take 5-
10 milliliters by oral route every 6 hours as needed   

 

 Klor-Con M20 20 mEq oral tablet,ER particles/crystals     3/5/2013  take 1 
tablet (20 meq) by oral route once daily with food  "not takiing"

 

 Diflucan 150 mg oral tablet  8/10/2011  2012  take 1 tablet (150 mg) by 
oral route once   

 

 amoxicillin 500 mg oral capsule     2012  take 1 capsule (500 mg) by oral 
route every 12 hours for 7 days   

 

 Medrol (Dominic) 4 mg oral tablets,dose pack     2012  take as directed   

 

 Lasix 20 mg oral tablet  4/10/2012  3/5/2013  take 1 tablet (20 mg) by oral 
route once daily as needed   

 

 amitriptyline 10 mg oral tablet  7/3/2012  2015  take 1 tablet by oral 
route once a day (at bedtime)   

 

 cyclobenzaprine 5 mg oral tablet  7/3/2012  10/8/2013  take 1 tablet by oral 
route once a day (at bedtime) as needed   

 

 cephalexin 500 mg oral tablet  7/3/2012  3/5/2013  take 1 tablet (500 mg) by 
oral route every 12 hours   

 

 Tessalon 200 mg oral capsule  2012  take 1 capsule (200 mg) by 
oral route 3 times per day as needed   

 

 Claritin-D 12 Hour 5-120 mg oral tablet extended release 12 hr  2012  3/5/
2013  take 1 tablet by oral route every 12 hours   

 

 naproxen 500 mg oral tablet  2014  take 1 tablet (500 mg) by 
oral route 2 times per day with food  "not taking now"

 

 Dulera 100-5 mcg/actuation inhalation HFA aerosol inhaler  10/8/2013  12/3/
2015  inhale 2 puffs by inhalation route 2 times per day in the morning and 
evening   

 

 flecainide 100 mg oral tablet     2015  take 1 tablet (100 mg) by oral 
route every 12 hours   

 

 cyclobenzaprine 10 mg oral tablet  2014  take 1 tablet (10 mg
) by oral route at bedtime as needed   

 

 Betapace 80 mg oral tablet     2015  take 1 tablet (80 mg) by oral route 
2 times per day  Dr. Turner

 

 duloxetine 30 mg oral capsule,delayed release(/EC)  2014  12/3/2015  
take 1 capsule (30 mg) by oral route once a day   

 

 hydrocodone-acetaminophen 7.5-325 mg oral tablet  2014  12/3/2015  take 
1 tablet by oral route every 6 hours as needed for pain   

 

 diltiazem HCl 120 mg oral capsule, extended release     12/3/2015  take 1 
capsule (120 mg) by oral route once daily   

 

 carvedilol 12.5 mg oral tablet     12/3/2015  take 1 tablet (12.5 mg) by oral 
route every 12 hours with food  dose change

 

 sotalol 120 mg oral tablet     12/3/2015  take 1 tablet (120 mg) by oral route 
2 times per day   

 

 benzonatate 200 mg oral capsule  2016  10/17/2016  take 1 capsule (200 mg) 
by oral route 3 times per day as needed   

 

 Levemir FlexTouch 100 unit/mL (3 mL) subcutaneous insulin pen  3/1/2016  10/17/
2016  INJECT 43 UNITS BY SUBCUTANEOUS ROUTE TWICE A DAY   

 

 Levemir FlexTouch 100 unit/mL (3 mL) subcutaneous insulin pen  3/1/2016  10/17/
2016  INJECT 43 UNITS BY SUBCUTANEOUS ROUTE TWICE A DAY  has an insulin pump







Problem List







 Description  Status  Onset

 

 Diabetes Mellitus, Type II  Active   

 

 Hemangioma in Spine  Active   

 

 Hyperlipidemia  Active   







Vital Signs







 Date  Time  BP-Sys(mm[Hg]  BP-Missy(mm[Hg])  HR(bpm)  RR(rpm)  Temp  WT  HT  HC  
BMI  BSA  BMI Percentile  O2 Sat(%)

 

 10/30/2017  10:41:00 AM  152 mmHg  88 mmHg  80 bpm  20 rpm  98.1 F  153 lbs  
61 in     28.91 kg/m2  1.73 m2     100 %

 

 10/17/2016  9:17:00 AM  138 mmHg  70 mmHg  83 bpm  20 rpm  97.7 F  160 lbs  61 
in     30.2314 kg/m  1.7674 m     99 %

 

 2016  1:34:00 PM  132 mmHg  60 mmHg  87 bpm  22 rpm  99 F  152 lbs  61 in
     28.72 kg/m2  1.72 m2     99 %

 

 12/3/2015  10:17:00 AM  138 mmHg  72 mmHg  80 bpm  20 rpm  98.2 F  152 lbs  61 
in     28.7199 kg/m  1.7226 m     98 %

 

 2015  10:31:00 AM  130 mmHg  66 mmHg  66 bpm  20 rpm  98.1 F  143 lbs  61 
in     27.02 kg/m2  1.67 m2     98 %

 

 2014  9:39:00 AM  138 mmHg  88 mmHg  153 bpm  20 rpm  98.7 F  150 lbs  
61 in     28.342 kg/m  1.7112 m     98 %

 

 6/3/2014  3:55:00 PM  130 mmHg  62 mmHg  69 bpm  18 rpm  97.9 F  151 lbs  61 
in     28.53 kg/m2  1.72 m2     99 %

 

 2014  10:56:00 AM  150 mmHg  74 mmHg  81 bpm  16 rpm  96.2 F  152 lbs  61 
in     28.7199 kg/m  1.7226 m     97 %

 

 2014  8:52:00 AM  138 mmHg  78 mmHg  61 bpm  20 rpm  98 F  168 lbs  61 in
     31.74 kg/m2  1.81 m2     100 %

 

 10/8/2013  10:08:00 AM  138 mmHg  78 mmHg  148 bpm  22 rpm  98.2 F  168 lbs  
61 in     31.743 kg/m  1.811 m     98 %

 

 2013  8:54:00 AM  150 mmHg  80 mmHg  78 bpm  16 rpm  98.9 F  163 lbs  61 
in     30.80 kg/m2  1.78 m2     99 %

 

 2013  10:13:00 AM  136 mmHg  78 mmHg  70 bpm  18 rpm  97.8 F  156 lbs    
              

 

 3/5/2013  9:30:00 AM  148 mmHg  80 mmHg  76 bpm  16 rpm  97.8 F  152 lbs  61 
in     28.72 kg/m2  1.72 m2      

 

 2012  10:33:00 AM  136 mmHg  74 mmHg  89 bpm  18 rpm  98.6 F  154.375 lbs
  61 in     29.1686 kg/m  1.736 m     99 %

 

 7/3/2012  8:08:00 AM  134 mmHg  72 mmHg  78 bpm  22 rpm  98.2 F  145 lbs  61 
in     27.40 kg/m2  1.68 m2      

 

 2012  8:03:00 AM  132 mmHg  76 mmHg  70 bpm  18 rpm  97.9 F  143 lbs  61 
in     27.0193 kg/m  1.6708 m      

 

 3/26/2012  11:28:00 AM  136 mmHg  74 mmHg  89 bpm  18 rpm  97.9 F  149.125 lbs
  61 in     28.18 kg/m2  1.71 m2      

 

 2011  8:12:00 AM  132 mmHg  82 mmHg  84 bpm  16 rpm  97.8 F  150 lbs  61 
in     28.342 kg/m  1.7112 m      

 

 2011  3:15:00 PM  130 mmHg  74 mmHg  68 bpm  18 rpm  98.3 F  156 lbs  61 
in     29.48 kg/m2  1.75 m2      

 

 8/10/2011  2:58:00 PM  120 mmHg  74 mmHg  100 bpm  20 rpm  99.4 F  169.25 lbs 
                 

 

 2011  9:49:00 AM  134 mmHg  78 mmHg  74 bpm  18 rpm  98.3 F  164 lbs     
             

 

 2011  8:15:00 AM  128 mmHg  78 mmHg  62 bpm  18 rpm  98.2 F  157 lbs     
             

 

 2011  8:54:00 AM  128 mmHg  72 mmHg  104 bpm  22 rpm  98.6 F  153 lbs     
            100 %

 

 3/15/2011  3:04:00 PM  144 mmHg  82 mmHg  72 bpm  18 rpm  98 F  163 lbs       
           

 

 2010  9:17:00 AM  124 mmHg  78 mmHg  76 bpm        145 lbs               
   

 

 2010  10:13:00 AM  130 mmHg  68 mmHg  70 bpm  16 rpm  98.3 F  144 lbs     
             

 

 2010  8:21:00 AM  134 mmHg  76 mmHg  70 bpm  20 rpm  98.6 F  147 lbs      
            







Social History







 Name  Description  Comments

 

 Tobacco  Never smoker   







History of Procedures







 Date Ordered  Description  Order Status

 

 12/3/2015 12:00 AM  LIPID PANEL  Reviewed

 

 12/3/2015 12:00 AM  GLYCOSYLATED HEMOGLOBIN TEST  Reviewed

 

 12/3/2015 12:00 AM  COMPREHEN METABOLIC PANEL  Reviewed

 

 12/3/2015 12:00 AM  Endocrinology Consultation  Reviewed

 

 2011 12:00 AM  METABOLIC PANEL TOTAL CA  Reviewed

 

 2011 12:00 AM  C-REACTIVE PROTEIN  Reviewed

 

 2011 12:00 AM  RBC SED RATE AUTOMATED  Reviewed

 

 2011 12:00 AM  X-RAY EXAM OF FOOT  Reviewed

 

 2016 12:00 AM  CHEST X-RAY 2VW FRONTAL&LATL  Reviewed

 

 2016 12:00 AM  Decadron, Per 1 Mg NDC# [address]-19  Reviewed

 

 2016 12:00 AM  Depo-Medrol, Per 80 Mg NDC#[address]-65  Reviewed

 

 10/17/2016 12:00 AM  PNEUMOCOCCAL VACC 13 MARILEE IM  Reviewed

 

 3/26/2012 12:00 AM  COMPLETE CBC W/AUTO DIFF WBC  Reviewed

 

 3/26/2012 12:00 AM  PROTHROMBIN TIME  Reviewed

 

 3/26/2012 12:00 AM  ASSAY OF BLOOD/URIC ACID  Reviewed

 

 3/27/2012 12:00 AM  RBC SED RATE AUTOMATED  Reviewed

 

 3/27/2012 12:00 AM  GLYCOSYLATED HEMOGLOBIN TEST  Reviewed

 

 10/30/2017 12:00 AM  Rocephin 500 Mg Injection  Reviewed

 

 2017 12:00 AM  METABOLIC PANEL TOTAL CA  Reviewed

 

 2010 12:00 AM  BREATHING CAPACITY TEST  Reviewed

 

 10/8/2013 12:00 AM  CHEST X-RAY 2VW FRONTAL&LATL  Reviewed

 

 10/8/2013 12:00 AM  AIRWAY INHALATION TREATMENT  Reviewed

 

 10/17/2013 12:00 AM  CHEST X-RAY 2VW FRONTAL&LATL  Reviewed

 

 2014 12:00 AM  CT HEAD/BRAIN W/O & W/DYE  Reviewed

 

 2014 12:00 AM  BREATHING CAPACITY TEST  Reviewed

 

 6/3/2014 12:00 AM  ECG MONIT/REPRT UP TO 48 HRS  Reviewed

 

 6/3/2014 12:00 AM  TTE W/O DOPPLER COMPLETE  Reviewed

 

 6/3/2014 12:00 AM  Carotid Doppler  Reviewed

 

 2010 12:00 AM  COMPREHEN METABOLIC PANEL  Reviewed

 

 2010 12:00 AM  LIPID PANEL  Reviewed

 

 2010 12:00 AM  GLYCOSYLATED HEMOGLOBIN TEST  Reviewed

 

 2010 12:00 AM  MICROALBUMIN SEMIQUANT  Reviewed

 

 2010 12:00 AM  DRAINAGE OF SKIN ABSCESS  Reviewed

 

 2010 12:00 AM  Urine drug screen  Reviewed

 

 2014 12:00 AM  Physiatry Consult  Reviewed

 

 2011 12:00 AM  METABOLIC PANEL TOTAL CA  Reviewed

 

 2011 12:00 AM  METABOLIC PANEL TOTAL CA  Reviewed

 

 2011 12:00 AM  C-REACTIVE PROTEIN  Reviewed

 

 8/15/2011 12:00 AM  METABOLIC PANEL TOTAL CA  Reviewed

 

 8/15/2011 12:00 AM  GLYCOSYLATED HEMOGLOBIN TEST  Reviewed







Results Summary







 Date and Description  Results

 

 2010 8:22 AM  TRIGLYCERIDES 666.0 mg/dLCHOLESTEROL 241.0 mg/dLHDL 34.0 mg/
dLTOT CHOL/HDL 7.1 LDL (CALC) INVALID MG/DLGLYCOHEMOGLOBIN A1C 10.30 %GLUCOSE 
336.0 mg/dLSODIUM 138.0 mmol/LPOTASSIUM 3.40 mmol/LCHLORIDE 102.0 mmol/LCO2 
22.0 mmol/LBUN 8.0 mg/dLCREATININE 0.70 mg/dLSGOT/AST 15.0 IU/LSGPT/ALT 7.0 IU/
LALK PHOS 93.0 IU/LTOTAL PROTEIN 7.30 g/dLALBUMIN 3.90 g/dLTOTAL BILI 0.40 mg/
dLCALCIUM 9.10 mg/dLAGE 60 GFR NonAA 86 GFR  eGFR >60 mL/min/1.73 m2eGFR 
AA* >60 CREAT UR 57.20 mg/dLMICROALBUMIN UR 19.0 ug/mLALB:CREAT RATIO 33 

 

 2011 9:15 AM  GLUCOSE 49.0 mg/dLSODIUM 143.0 mmol/LPOTASSIUM 3.30 mmol/
LCHLORIDE 101.0 mmol/LCO2 30.0 mmol/LBUN 12.0 mg/dLCREATININE 0.50 mg/dLCALCIUM 
9.10 mg/dLAGE 60 GFR NonAA 126 GFR  eGFR >60 mL/min/1.73 m2eGFR AA* >60 

 

 2011 10:25 AM  C REACTIVE PROTEIN 6.0 mg/LGLUCOSE 88.0 mg/dLSODIUM 143.0 
mmol/LPOTASSIUM 3.60 mmol/LCHLORIDE 104.0 mmol/LCO2 26.0 mmol/LBUN 11.0 mg/
dLCREATININE 0.70 mg/dLCALCIUM 9.40 mg/dLAGE 61 GFR NonAA 85 GFR  eGFR >
60 mL/min/1.73 m2eGFR AA* >60 

 

 2011 11:00 AM  GLUCOSE 160.0 mg/dLSODIUM 141.0 mmol/LPOTASSIUM 3.70 mmol/
LCHLORIDE 105.0 mmol/LCO2 21.0 mmol/LBUN 13.0 mg/dLCREATININE 0.70 mg/dLCALCIUM 
9.50 mg/dLAGE 61 GFR NonAA 85 GFR  eGFR >60 mL/min/1.73 m2eGFR AA* >60 
GLYCOHEMOGLOBIN A1C 6.80 %

 

 2011 3:50 PM  C REACTIVE PROTEIN 5.0 mg/LGLUCOSE 361.0 mg/dLSODIUM 140.0 
mmol/LPOTASSIUM 3.80 mmol/LCHLORIDE 102.0 mmol/LCO2 24.0 mmol/LBUN 9.0 mg/
dLCREATININE 1.0 mg/dLCALCIUM 9.60 mg/dLAGE 61 GFR NonAA 57 GFR AA 69 eGFR 57 
eGFR AA* >60 SEDRATE 24.0 mm/hr

 

 3/26/2012 12:00 PM  WBC 7.3 RBC 4.46 HGB 13.50 g/dLHCT 39.0 %MCV 87.0 fLMCH 
30.30 pgMCHC 34.60 g/dLRDW SD 42 RDW CV 13.20 %MPV 9.60 fLPLT 142 NRBC# 0.00 
NRBC% 0.0 %NEUT 59.60 %%LYMP 33.50 %%MONO 5.60 %%EOS 1.20 %%BASO 0.10 %#NEUT 
4.32 #LYMP 2.43 #MONO 0.41 #EOS 0.09 #BASO 0.01 MANUAL DIFF NOT IND PROTIME 
10.60 secsINR 1.0 URIC ACID 3.9 mg/dL

 

 3/27/2012 8:45 PM  GLYCOHEMOGLOBIN A1C 10.20 %SEDRATE 31.0 mm/hr

 

 12/3/2015 11:22 AM  TRIGLYCERIDES 182.0 mg/dLCHOLESTEROL 180.0 mg/dLHDL 50.0 mg
/dLTOT CHOL/HDL 3.6 LDL (CALC) 94.0 mg/dLGLUCOSE 99.0 mg/dLSODIUM 142.0 mmol/
LPOTASSIUM 4.20 mmol/LCHLORIDE 112.0 mmol/LCO2 21.0 mmol/LBUN 17.0 mg/
dLCREATININE 0.70 mg/dLSGOT/AST 17.0 IU/LSGPT/ALT 12.0 IU/LALK PHOS 86.0 IU/
LTOTAL PROTEIN 6.30 g/dLALBUMIN 4.10 g/dLTOTAL BILI 0.50 mg/dLCALCIUM 9.30 mg/
dLAGE 65 GFR NonAA 84 GFR  eGFR >60 mL/min/1.73meGFR AA* >60 Hemoglobin 
A1c 9.10 %Estim. Avg Glu (eAG) 214 

 

 11/3/2017 8:02 AM  GLUCOSE 97.0 mg/dLSODIUM 145.0 mmol/LPOTASSIUM 3.40 mmol/
LCHLORIDE 112.0 mmol/LCO2 25.0 mmol/LBUN 9.0 mg/dLCREATININE 0.70 mg/dLCALCIUM 
8.80 mg/dLAGE 67 GFR NonAA 84 GFR  eGFR >60 mL/min/1.73meGFR AA* >60 







History Of Immunizations







 Name  Date Admin  Mfg Name  Mfg Code  Trade Name  Lot#  Route  Inj  Vis Given  
Vis Pub  CVX

 

 Pneumococcal  10/17/2016  CathyAyerst-LederleMony  [facility]  Prevnar 13  [id number]
  Intramuscular  Left Deltoid  10/17/2016  2015  133







History of Past Illness







 Name  Date of Onset  Comments

 

 Diabetes Mellitus, Type II      

 

 Hyperlipidemia      

 

 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      

 

 Bronchitis, Chronic  2010  8:23AM   

 

 Subcutaneous Nodule  2010  8:23AM   

 

 Diabetes Mellitus, Type II  2010 10:15AM   

 

 Hyperlipidemia, Mixed  2010 10:15AM   

 

 Sebaceous Cyst  2010 11:32PM   

 

 General Medical Exam, Adult  2010  9:19AM   

 

 Bursitis, right knee  Mar 15 2011  3:05PM   

 

 Gastroenteritis, Viral  May  2 2011  8:56AM   

 

 Hypokalemia  May 11 2011  8:15AM   

 

 Polymyalgia Rheumatica  May 11 2011  8:15AM   

 

 Hypokalemia  2011  9:48AM   

 

 Polymyalgia Rheumatica  2011  9:48AM   

 

 Diabetes Mellitus, Type II  2011  9:48AM   

 

 Diabetes Mellitus, Type II  Aug 10 2011  2:57PM   

 

 Edema bilateral lower extremities  Aug 10 2011  2:57PM   

 

 Polymyalgia Rheumatica  Dec 14 2011  3:17PM   

 

 Arthralgia  Dec 14 2011  3:17PM   

 

 Pain in  foot, Left Heel  Dec 27 2011  8:16AM   

 

 Edema left lower ext.  Mar 26 2012 11:30AM   

 

 Petechial Rash  Mar 26 2012 11:30AM   

 

 Diabetes Mellitus, Type II  Mar 27 2012  1:39PM   

 

 Polymyalgia Rheumatica  Mar 27 2012  1:39PM   

 

 Edema left lower ext.  2012  8:05AM   

 

 Diabetes Mellitus, Type II  2012  8:05AM   

 

 Cellulitis left foot  2012  8:05AM   

 

 Myalgia  Jul  3 2012  8:10AM   

 

 Headache  Jul  3 2012  8:10AM   

 

 Neuropathy, right foot  Jul  3 2012  8:10AM   

 

 Upper Respiratory Infections  Dec  4 2012 10:35AM   

 

 Diabetes Mellitus, Type II  Mar  5 2013  9:32AM   

 

 Hyperlipidemia  Mar  5 2013  9:32AM   

 

 Atrial Fibrillation  Mar  5 2013  9:32AM   

 

 Osteoarthritis, hand  Mar  5 2013  9:32AM   

 

 Cellulitis  2013 10:15AM   

 

 Right Acute Otitis Media  Aug 28 2013  8:55AM   

 

 Chronic Cough  Oct  8 2013 10:10AM   

 

 Shortness of breath  Oct  8 2013 10:10AM   

 

 Cough  Oct 17 2013  9:40AM   

 

 Tension Headache  2014  8:54AM   

 

 Blurred Vision  2014  8:54AM   

 

 Bronchitis, Acute  2014  8:54AM   

 

 Postoperative Follow-up  2014 10:56AM   

 

 Basal Cell  2014 10:56AM   

 

 Basal Cell Carcinoma of Skin  2014 10:59AM   

 

 Sebaceous Cyst  2014 10:59AM   

 

 Syncope  Oc  3 2014  3:57PM   

 

 Basal cell carcinoma of skin, site unspecified  2014  7:25AM   

 

 Lumbar back pain  Dec 19 2014  9:42AM   

 

 Left Radiculopathy of leg  Dec 19 2014  9:42AM   

 

 Depressive Disorder  Dec 19 2014  9:42AM   

 

 Diabetes Mellitus, Type II  May 13 2015 10:33AM   

 

 Hyperlipidemia  May 13 2015 10:33AM   

 

 Systolic CHF, acute  May 13 2015 10:33AM   

 

 Resolved Atrial fibrillation  May 13 2015 10:33AM   

 

 Mixed hyperlipidemia  Dec  3 2015 10:19AM   

 

 Diabetes mellitus type 2, uncontrolled  Dec  3 2015 10:19AM   

 

 Other osteoarthritis involving multiple joints  Dec  3 2015 10:19AM   

 

 Shortness of breath  May 17 2016  1:36PM   

 

 Chronic obstructive pulmonary disease with (acute) exacerbation  May 17 2016  1
:36PM   

 

 Need for Prevnar vaccine  Oct 17 2016  9:19AM   

 

 Paroxysmal atrial fibrillation  Oct 17 2016  9:19AM   

 

 Diabetes Mellitus, Type II  Oct 17 2016  9:19AM   

 

 Hypokalemia  Oct 30 2017 10:43AM   

 

 Hypokalemia  Nov  3 2017 11:41AM   

 

 Cellulitis of left lower extremity  Oct 30 2017 10:43AM   







Payers







 Insurance Name  Company Name  Plan Name  Plan Number  Policy Number  Policy 
Group Number  Start Date

 

    [employer] [place]     [id number]     2015

 

    [employer] [place]     [id number]     

 

    [facility]  [employer] 
PHYS/DS  [id number]     N/A







History of Encounters







 Visit Date  Visit Type  Provider

 

 10/30/2017  Office visit  Mateo Cooper DO

 

 10/17/2016  Office visit  Mateo Cooper DO

 

 2016  Office visit  Mateo Cooper DO

 

 12/3/2015  Office visit  Mateo Cooper DO

 

 5/15/2015  [facility]  LEXX Holm MD

 

 2015  Office visit  Mateo Cooper DO

 

 2014  Office visit  Mateo Cooper DO

 

 2014  Hospital  LEXX Holm MD

 

 2014  Procedures  David Suresh MD

 

 6/3/2014  Office visit  Mateo Cooper DO

 

 2014  Office visit  David Suresh MD

 

 2014  Procedures  David Suresh MD

 

 2014  Hospital  LEXX Holm MD

 

 2014  Office visit  Mateo Cooper DO

 

 10/8/2013  Office visit  Mateo Cooper DO

 

 2013  Office visit  Mateo Cooper DO

 

 2013  Office visit  Mateo Cooper DO

 

 3/5/2013  Office visit  Mateo Cooper DO

 

 2013  Hospital  LEXX Holm MD

 

 2012  Office visit  Jenni MARTÍNEZ

 

 2012  Hospital  LEXX Holm MD

 

 7/3/2012  Office visit  Mateo Cooper DO

 

 2012  Hospital  Kristian Kiser MD

 

 2012  [facility]  LEXX Holm MD

 

 2012  [facility]  Kristian Kiser MD

 

 2012  [facility]  LEXX Holm MD

 

 2012  Office visit  Mateo Cooper DO

 

 3/26/2012  Office visit  Jenni Katz APRN

 

 2011  Office visit  Mateo Cooper DO

 

 2011  Office visit  Jenni Katz APRN

 

 8/10/2011  Office visit  Jenni Katz APRN

 

 2011  Office visit  Mateo Cooper DO

 

 2011  Office visit  Mateo Cooper DO

 

 2011  [facility]  Bandar Zelaya MD

 

 2011  [facility]  Bandar Zelaya MD

 

 2011  [facility]  Bandar Zelaya MD

 

 2011  [facility]  LEXX Holm MD

 

 2011  Office visit  Mateo Cooper DO

 

 3/15/2011  Office visit  Mateo Cooper DO

 

 2010  Office visit  David Burciaga PA-C

 

 2010  Office visit  Mateo Cooper DO

 

 2010  Laboratory  Ayush Finn MD

 

 2010  Laboratory  Ayush Finn MD

 

 2010  Office visit  Mateo Cooper DO

## 2018-03-23 NOTE — XMS REPORT
MU2 Ambulatory Summary

 Created on: 2017



Ambar Grider

External Reference #: [id number]

: 1950

Sex: Female



Demographics







 Address  [address] [place]  [address]

 

 Home Phone  [phone]

 

 Preferred Language  English

 

 Marital Status  

 

 [employer] Affiliation  Unknown

 

 Race  White

 

 Ethnic Group  Not  or 





Author







 Mateo Pathak

 

 [facility]

 

 Address  [address]

[place], [place]  [id number]



 

 Phone  [phone]







Care Team Providers







 Care Team Member Name  Role  Phone

 

 Mateo Cooper  PCP  Unavailable

 

 Mateo Cooper  PreferredProvider  Unavailable







Allergies and Adverse Reactions







 Name  Reaction  Notes

 

 Phenergan      







Plan of Treatment







 Planned Activity  Comments  Planned Date  Planned Time  Plan/Goal

 

 Uncontrolled Diabetes and Thyroid Nodules            

 

 [facility]     2017  12:00 AM   







Medications







 Active 

 

 Name  Start Date  Estimated Completion Date  SIG  Comments

 

 Pen Needle 31 gauge x 3/16" miscellaneous needle  2014     USES 3 
INJECTIONS A DAY   

 

 metformin 1,000 mg oral tablet  2014     take 1 tablet (1,000 mg) by oral 
route 2 times per day with morning and evening meals   

 

 metformin 1,000 mg oral tablet  2015     TAKE 1 TABLET BY MOUTH TWICE 
DAILY WITH MORNING AND EVENING MEALS   

 

 metformin 1,000 mg oral tablet  2015     TAKE 1 TABLET BY MOUTH TWICE 
DAILY WITH MORNING AND EVENING MEALS   

 

 carvedilol 3.125 mg oral tablet        take 1 tablet (3.125 mg) by oral route 
once daily   

 

 Benicar 40 mg oral tablet        take 1 tablet (40 mg) by oral route once 
daily   

 

 Levemir FlexTouch 100 unit/mL (3 mL) subcutaneous insulin pen  12/3/2015     
inject 45 units by subcutaneous route twice a day   

 

 tramadol 50 mg oral tablet  12/3/2015     take 1 tablet (50 mg) by oral route 
every 6 hours as needed   

 

 Novolog Flexpen 100 unit/mL subcutaneous insulin pen  2016     INJECT 14 
UNITS SUBCUTANEOUSLY WITH BREAKFAST. LUNCH, AND SUPPER   

 

 acetaminophen-codeine 300-30 mg oral tablet  2016     take 1 tablet by 
oral route every 4 hours as needed   

 

 amiodarone 400 mg oral tablet        take 1 tablet (400 mg) by oral route once 
daily   

 

 potassium chloride 10 mEq oral capsule, extended release  10/30/2017     take 
2 capsules (20 meq) by oral route once daily with food for 30 days   









  

 

 Name  Start Date  Expiration Date  SIG  Comments

 

 Bactrim -160 mg oral tablet  2010  take 1 tablet by oral 
route every 12 hours for 10 days   

 

 Keflex 500 mg oral capsule  2010  take 1 capsule (500 mg) by 
oral route every 12 hours for 7 days   

 

 Reglan 10 mg oral tablet  2011  6/3/2011  take 1 tablet (10 mg) by oral 
route twice daily   

 

 prednisone 20 mg oral tablet  2011  take 1 tablet by oral 
route daily for 10 days   

 

 nabumetone 500 mg oral tablet  2011     take 1 tablet (500 mg) by oral 
route 2 times per day with food   

 

 indomethacin 50 mg oral capsule  2012  take 1 capsule (50 mg) 
by oral route 3 times per day with food for 7 days  "stopped it 2-3 weeks ago 
due to high glucose"

 

 Bactrim -160 mg oral tablet  2012  take 1 tablet by oral 
route every 12 hours for 7 days   

 

 insulin syringe-needle U-100 1 mL 31 gauge x 5/16 miscellaneous syringe  2012  USE AS DIRECTED FOUR TIMES DAILY   

 

 Levemir 100 unit/mL subcutaneous solution  2012  inject 45 
units by subcutaneous route twice daily  "using pen"

 

 Augmentin 500-125 mg oral tablet  2012  take 1 tablet by oral 
route every 12 hours for 7 days   

 

 Pen Needle 31 gauge x 3/16" miscellaneous needle  2013  Uses 3 
injections a day   

 

 cephalexin 500 mg oral tablet  2013  take 1 tablet (500 mg) by 
oral route every 12 hours for 10 days   

 

 Augmentin 500-125 mg oral tablet  2013  take 1 tablet by oral 
route every 12 hours for 7 days   

 

 Novolog Flexpen 100 unit/mL subcutaneous insulin pen  2014  
INJECT 12 UNITS SUBCUTANEOUSLY WITH BREAKFAST. LUNCH, AND SUPPER   

 

 gabapentin 600 mg oral tablet  2014  TAKE 1 TABLET BY MOUTH 
FOUR TIMES DAILY   

 

 Xarelto 20 mg oral tablet  12/3/2015  2016  take 1 tablet (20 mg) by oral 
route once daily with the evening meal for 30 days   

 

 amoxicillin-pot clavulanate 500-125 mg oral tablet  2016  2/15/2016  take 
1 tablet by oral route every 12 hours for 7 days   

 

 Accu-Chek Compact Test miscellaneous strip  2/15/2016  2016  Check 
glucose 4 times a day Dx:[id number]   

 

 Zostavax (PF) 19,400 unit/0.65 mL subcutaneous suspension for reconstitution  
10/17/2016  10/18/2016  inject 0.65 milliliter by subcutaneous route once   









 Discontinued 

 

 Name  Start Date  Discontinued Date  SIG  Comments

 

 Aldara 5 % topical cream in packet  2010  3/15/2011  apply to the affected 
area(s) before bedtime by topical route 3 times per week and leave on skin for 
6 to 10 hours   

 

 fenofibrate 160 mg oral tablet  2010  take 1 tablet (160 mg) by 
oral route once daily   

 

 meloxicam 15 mg oral tablet  3/15/2011  2011  take 1 tablet (15 mg) by 
oral route once daily as needed   

 

 Zofran (as hydrochloride) 4 mg/5 mL oral solution  2011  take 5-
10 milliliters by oral route every 6 hours as needed   

 

 Klor-Con M20 20 mEq oral tablet,ER particles/crystals     3/5/2013  take 1 
tablet (20 meq) by oral route once daily with food  "not takiing"

 

 Diflucan 150 mg oral tablet  8/10/2011  2012  take 1 tablet (150 mg) by 
oral route once   

 

 amoxicillin 500 mg oral capsule     2012  take 1 capsule (500 mg) by oral 
route every 12 hours for 7 days   

 

 Medrol (Dominic) 4 mg oral tablets,dose pack     2012  take as directed   

 

 Lasix 20 mg oral tablet  4/10/2012  3/5/2013  take 1 tablet (20 mg) by oral 
route once daily as needed   

 

 amitriptyline 10 mg oral tablet  7/3/2012  2015  take 1 tablet by oral 
route once a day (at bedtime)   

 

 cyclobenzaprine 5 mg oral tablet  7/3/2012  10/8/2013  take 1 tablet by oral 
route once a day (at bedtime) as needed   

 

 cephalexin 500 mg oral tablet  7/3/2012  3/5/2013  take 1 tablet (500 mg) by 
oral route every 12 hours   

 

 Tessalon 200 mg oral capsule  2012  take 1 capsule (200 mg) by 
oral route 3 times per day as needed   

 

 Claritin-D 12 Hour 5-120 mg oral tablet extended release 12 hr  2012  3/5/
2013  take 1 tablet by oral route every 12 hours   

 

 naproxen 500 mg oral tablet  2014  take 1 tablet (500 mg) by 
oral route 2 times per day with food  "not taking now"

 

 Dulera 100-5 mcg/actuation inhalation HFA aerosol inhaler  10/8/2013  12/3/
2015  inhale 2 puffs by inhalation route 2 times per day in the morning and 
evening   

 

 flecainide 100 mg oral tablet     2015  take 1 tablet (100 mg) by oral 
route every 12 hours   

 

 cyclobenzaprine 10 mg oral tablet  2014  take 1 tablet (10 mg
) by oral route at bedtime as needed   

 

 Betapace 80 mg oral tablet     2015  take 1 tablet (80 mg) by oral route 
2 times per day  Dr. Turner

 

 duloxetine 30 mg oral capsule,delayed release(/EC)  2014  12/3/2015  
take 1 capsule (30 mg) by oral route once a day   

 

 hydrocodone-acetaminophen 7.5-325 mg oral tablet  2014  12/3/2015  take 
1 tablet by oral route every 6 hours as needed for pain   

 

 diltiazem HCl 120 mg oral capsule, extended release     12/3/2015  take 1 
capsule (120 mg) by oral route once daily   

 

 carvedilol 12.5 mg oral tablet     12/3/2015  take 1 tablet (12.5 mg) by oral 
route every 12 hours with food  dose change

 

 sotalol 120 mg oral tablet     12/3/2015  take 1 tablet (120 mg) by oral route 
2 times per day   

 

 benzonatate 200 mg oral capsule  2016  10/17/2016  take 1 capsule (200 mg) 
by oral route 3 times per day as needed   

 

 Levemir FlexTouch 100 unit/mL (3 mL) subcutaneous insulin pen  3/1/2016  10/17/
2016  INJECT 43 UNITS BY SUBCUTANEOUS ROUTE TWICE A DAY   

 

 Levemir FlexTouch 100 unit/mL (3 mL) subcutaneous insulin pen  3/1/2016  10/17/
2016  INJECT 43 UNITS BY SUBCUTANEOUS ROUTE TWICE A DAY  has an insulin pump







Problem List







 Description  Status  Onset

 

 Diabetes Mellitus, Type II  Active   

 

 Hemangioma in Spine  Active   

 

 Hyperlipidemia  Active   







Vital Signs







 Date  Time  BP-Sys(mm[Hg]  BP-Missy(mm[Hg])  HR(bpm)  RR(rpm)  Temp  WT  HT  HC  
BMI  BSA  BMI Percentile  O2 Sat(%)

 

 10/30/2017  10:41:00 AM  152 mmHg  88 mmHg  80 bpm  20 rpm  98.1 F  153 lbs  
61 in     28.91 kg/m2  1.73 m2     100 %

 

 10/17/2016  9:17:00 AM  138 mmHg  70 mmHg  83 bpm  20 rpm  97.7 F  160 lbs  61 
in     30.2314 kg/m  1.7674 m     99 %

 

 2016  1:34:00 PM  132 mmHg  60 mmHg  87 bpm  22 rpm  99 F  152 lbs  61 in
     28.72 kg/m2  1.72 m2     99 %

 

 12/3/2015  10:17:00 AM  138 mmHg  72 mmHg  80 bpm  20 rpm  98.2 F  152 lbs  61 
in     28.7199 kg/m  1.7226 m     98 %

 

 2015  10:31:00 AM  130 mmHg  66 mmHg  66 bpm  20 rpm  98.1 F  143 lbs  61 
in     27.02 kg/m2  1.67 m2     98 %

 

 2014  9:39:00 AM  138 mmHg  88 mmHg  153 bpm  20 rpm  98.7 F  150 lbs  
61 in     28.342 kg/m  1.7112 m     98 %

 

 6/3/2014  3:55:00 PM  130 mmHg  62 mmHg  69 bpm  18 rpm  97.9 F  151 lbs  61 
in     28.53 kg/m2  1.72 m2     99 %

 

 2014  10:56:00 AM  150 mmHg  74 mmHg  81 bpm  16 rpm  96.2 F  152 lbs  61 
in     28.7199 kg/m  1.7226 m     97 %

 

 2014  8:52:00 AM  138 mmHg  78 mmHg  61 bpm  20 rpm  98 F  168 lbs  61 in
     31.74 kg/m2  1.81 m2     100 %

 

 10/8/2013  10:08:00 AM  138 mmHg  78 mmHg  148 bpm  22 rpm  98.2 F  168 lbs  
61 in     31.743 kg/m  1.811 m     98 %

 

 2013  8:54:00 AM  150 mmHg  80 mmHg  78 bpm  16 rpm  98.9 F  163 lbs  61 
in     30.80 kg/m2  1.78 m2     99 %

 

 2013  10:13:00 AM  136 mmHg  78 mmHg  70 bpm  18 rpm  97.8 F  156 lbs    
              

 

 3/5/2013  9:30:00 AM  148 mmHg  80 mmHg  76 bpm  16 rpm  97.8 F  152 lbs  61 
in     28.72 kg/m2  1.72 m2      

 

 2012  10:33:00 AM  136 mmHg  74 mmHg  89 bpm  18 rpm  98.6 F  154.375 lbs
  61 in     29.1686 kg/m  1.736 m     99 %

 

 7/3/2012  8:08:00 AM  134 mmHg  72 mmHg  78 bpm  22 rpm  98.2 F  145 lbs  61 
in     27.40 kg/m2  1.68 m2      

 

 2012  8:03:00 AM  132 mmHg  76 mmHg  70 bpm  18 rpm  97.9 F  143 lbs  61 
in     27.0193 kg/m  1.6708 m      

 

 3/26/2012  11:28:00 AM  136 mmHg  74 mmHg  89 bpm  18 rpm  97.9 F  149.125 lbs
  61 in     28.18 kg/m2  1.71 m2      

 

 2011  8:12:00 AM  132 mmHg  82 mmHg  84 bpm  16 rpm  97.8 F  150 lbs  61 
in     28.342 kg/m  1.7112 m      

 

 2011  3:15:00 PM  130 mmHg  74 mmHg  68 bpm  18 rpm  98.3 F  156 lbs  61 
in     29.48 kg/m2  1.75 m2      

 

 8/10/2011  2:58:00 PM  120 mmHg  74 mmHg  100 bpm  20 rpm  99.4 F  169.25 lbs 
                 

 

 2011  9:49:00 AM  134 mmHg  78 mmHg  74 bpm  18 rpm  98.3 F  164 lbs     
             

 

 2011  8:15:00 AM  128 mmHg  78 mmHg  62 bpm  18 rpm  98.2 F  157 lbs     
             

 

 2011  8:54:00 AM  128 mmHg  72 mmHg  104 bpm  22 rpm  98.6 F  153 lbs     
            100 %

 

 3/15/2011  3:04:00 PM  144 mmHg  82 mmHg  72 bpm  18 rpm  98 F  163 lbs       
           

 

 2010  9:17:00 AM  124 mmHg  78 mmHg  76 bpm        145 lbs               
   

 

 2010  10:13:00 AM  130 mmHg  68 mmHg  70 bpm  16 rpm  98.3 F  144 lbs     
             

 

 2010  8:21:00 AM  134 mmHg  76 mmHg  70 bpm  20 rpm  98.6 F  147 lbs      
            







Social History







 Name  Description  Comments

 

 Tobacco  Never smoker   







History of Procedures







 Date Ordered  Description  Order Status

 

 12/3/2015 12:00 AM  LIPID PANEL  Reviewed

 

 12/3/2015 12:00 AM  GLYCOSYLATED HEMOGLOBIN TEST  Reviewed

 

 12/3/2015 12:00 AM  COMPREHEN METABOLIC PANEL  Reviewed

 

 12/3/2015 12:00 AM  Endocrinology Consultation  Reviewed

 

 2011 12:00 AM  METABOLIC PANEL TOTAL CA  Reviewed

 

 2011 12:00 AM  C-REACTIVE PROTEIN  Reviewed

 

 2011 12:00 AM  RBC SED RATE AUTOMATED  Reviewed

 

 2011 12:00 AM  X-RAY EXAM OF FOOT  Reviewed

 

 2016 12:00 AM  CHEST X-RAY 2VW FRONTAL&LATL  Reviewed

 

 2016 12:00 AM  Decadron, Per 1 Mg NDC# [address]-25  Reviewed

 

 2016 12:00 AM  Depo-Medrol, Per 80 Mg NDC#[address]-94  Reviewed

 

 10/17/2016 12:00 AM  PNEUMOCOCCAL VACC 13 MARILEE IM  Reviewed

 

 3/26/2012 12:00 AM  COMPLETE CBC W/AUTO DIFF WBC  Reviewed

 

 3/26/2012 12:00 AM  PROTHROMBIN TIME  Reviewed

 

 3/26/2012 12:00 AM  ASSAY OF BLOOD/URIC ACID  Reviewed

 

 3/27/2012 12:00 AM  RBC SED RATE AUTOMATED  Reviewed

 

 3/27/2012 12:00 AM  GLYCOSYLATED HEMOGLOBIN TEST  Reviewed

 

 10/30/2017 12:00 AM  Rocephin 500 Mg Injection  Reviewed

 

 2017 12:00 AM  METABOLIC PANEL TOTAL CA  Reviewed

 

 2010 12:00 AM  BREATHING CAPACITY TEST  Reviewed

 

 10/8/2013 12:00 AM  CHEST X-RAY 2VW FRONTAL&LATL  Reviewed

 

 10/8/2013 12:00 AM  AIRWAY INHALATION TREATMENT  Reviewed

 

 10/17/2013 12:00 AM  CHEST X-RAY 2VW FRONTAL&LATL  Reviewed

 

 2014 12:00 AM  CT HEAD/BRAIN W/O & W/DYE  Reviewed

 

 2014 12:00 AM  BREATHING CAPACITY TEST  Reviewed

 

 6/3/2014 12:00 AM  ECG MONIT/REPRT UP TO 48 HRS  Reviewed

 

 6/3/2014 12:00 AM  TTE W/O DOPPLER COMPLETE  Reviewed

 

 6/3/2014 12:00 AM  Carotid Doppler  Reviewed

 

 2010 12:00 AM  COMPREHEN METABOLIC PANEL  Reviewed

 

 2010 12:00 AM  LIPID PANEL  Reviewed

 

 2010 12:00 AM  GLYCOSYLATED HEMOGLOBIN TEST  Reviewed

 

 2010 12:00 AM  MICROALBUMIN SEMIQUANT  Reviewed

 

 2010 12:00 AM  DRAINAGE OF SKIN ABSCESS  Reviewed

 

 2010 12:00 AM  Urine drug screen  Reviewed

 

 2014 12:00 AM  Physiatry Consult  Reviewed

 

 2011 12:00 AM  METABOLIC PANEL TOTAL CA  Reviewed

 

 2011 12:00 AM  METABOLIC PANEL TOTAL CA  Reviewed

 

 2011 12:00 AM  C-REACTIVE PROTEIN  Reviewed

 

 8/15/2011 12:00 AM  METABOLIC PANEL TOTAL CA  Reviewed

 

 8/15/2011 12:00 AM  GLYCOSYLATED HEMOGLOBIN TEST  Reviewed







Results Summary







 Date and Description  Results

 

 2010 8:22 AM  TRIGLYCERIDES 666.0 mg/dLCHOLESTEROL 241.0 mg/dLHDL 34.0 mg/
dLTOT CHOL/HDL 7.1 LDL (CALC) INVALID MG/DLGLYCOHEMOGLOBIN A1C 10.30 %GLUCOSE 
336.0 mg/dLSODIUM 138.0 mmol/LPOTASSIUM 3.40 mmol/LCHLORIDE 102.0 mmol/LCO2 
22.0 mmol/LBUN 8.0 mg/dLCREATININE 0.70 mg/dLSGOT/AST 15.0 IU/LSGPT/ALT 7.0 IU/
LALK PHOS 93.0 IU/LTOTAL PROTEIN 7.30 g/dLALBUMIN 3.90 g/dLTOTAL BILI 0.40 mg/
dLCALCIUM 9.10 mg/dLAGE 60 GFR NonAA 86 GFR  eGFR >60 mL/min/1.73 m2eGFR 
AA* >60 CREAT UR 57.20 mg/dLMICROALBUMIN UR 19.0 ug/mLALB:CREAT RATIO 33 

 

 2011 9:15 AM  GLUCOSE 49.0 mg/dLSODIUM 143.0 mmol/LPOTASSIUM 3.30 mmol/
LCHLORIDE 101.0 mmol/LCO2 30.0 mmol/LBUN 12.0 mg/dLCREATININE 0.50 mg/dLCALCIUM 
9.10 mg/dLAGE 60 GFR NonAA 126 GFR  eGFR >60 mL/min/1.73 m2eGFR AA* >60 

 

 2011 10:25 AM  C REACTIVE PROTEIN 6.0 mg/LGLUCOSE 88.0 mg/dLSODIUM 143.0 
mmol/LPOTASSIUM 3.60 mmol/LCHLORIDE 104.0 mmol/LCO2 26.0 mmol/LBUN 11.0 mg/
dLCREATININE 0.70 mg/dLCALCIUM 9.40 mg/dLAGE 61 GFR NonAA 85 GFR  eGFR >
60 mL/min/1.73 m2eGFR AA* >60 

 

 2011 11:00 AM  GLUCOSE 160.0 mg/dLSODIUM 141.0 mmol/LPOTASSIUM 3.70 mmol/
LCHLORIDE 105.0 mmol/LCO2 21.0 mmol/LBUN 13.0 mg/dLCREATININE 0.70 mg/dLCALCIUM 
9.50 mg/dLAGE 61 GFR NonAA 85 GFR  eGFR >60 mL/min/1.73 m2eGFR AA* >60 
GLYCOHEMOGLOBIN A1C 6.80 %

 

 2011 3:50 PM  C REACTIVE PROTEIN 5.0 mg/LGLUCOSE 361.0 mg/dLSODIUM 140.0 
mmol/LPOTASSIUM 3.80 mmol/LCHLORIDE 102.0 mmol/LCO2 24.0 mmol/LBUN 9.0 mg/
dLCREATININE 1.0 mg/dLCALCIUM 9.60 mg/dLAGE 61 GFR NonAA 57 GFR AA 69 eGFR 57 
eGFR AA* >60 SEDRATE 24.0 mm/hr

 

 3/26/2012 12:00 PM  WBC 7.3 RBC 4.46 HGB 13.50 g/dLHCT 39.0 %MCV 87.0 fLMCH 
30.30 pgMCHC 34.60 g/dLRDW SD 42 RDW CV 13.20 %MPV 9.60 fLPLT 142 NRBC# 0.00 
NRBC% 0.0 %NEUT 59.60 %%LYMP 33.50 %%MONO 5.60 %%EOS 1.20 %%BASO 0.10 %#NEUT 
4.32 #LYMP 2.43 #MONO 0.41 #EOS 0.09 #BASO 0.01 MANUAL DIFF NOT IND PROTIME 
10.60 secsINR 1.0 URIC ACID 3.9 mg/dL

 

 3/27/2012 8:45 PM  GLYCOHEMOGLOBIN A1C 10.20 %SEDRATE 31.0 mm/hr

 

 12/3/2015 11:22 AM  TRIGLYCERIDES 182.0 mg/dLCHOLESTEROL 180.0 mg/dLHDL 50.0 mg
/dLTOT CHOL/HDL 3.6 LDL (CALC) 94.0 mg/dLGLUCOSE 99.0 mg/dLSODIUM 142.0 mmol/
LPOTASSIUM 4.20 mmol/LCHLORIDE 112.0 mmol/LCO2 21.0 mmol/LBUN 17.0 mg/
dLCREATININE 0.70 mg/dLSGOT/AST 17.0 IU/LSGPT/ALT 12.0 IU/LALK PHOS 86.0 IU/
LTOTAL PROTEIN 6.30 g/dLALBUMIN 4.10 g/dLTOTAL BILI 0.50 mg/dLCALCIUM 9.30 mg/
dLAGE 65 GFR NonAA 84 GFR  eGFR >60 mL/min/1.73meGFR AA* >60 Hemoglobin 
A1c 9.10 %Estim. Avg Glu (eAG) 214 

 

 11/3/2017 8:02 AM  GLUCOSE 97.0 mg/dLSODIUM 145.0 mmol/LPOTASSIUM 3.40 mmol/
LCHLORIDE 112.0 mmol/LCO2 25.0 mmol/LBUN 9.0 mg/dLCREATININE 0.70 mg/dLCALCIUM 
8.80 mg/dLAGE 67 GFR NonAA 84 GFR  eGFR >60 mL/min/1.73meGFR AA* >60 







History Of Immunizations







 Name  Date Admin  Mfg Name  Mfg Code  Trade Name  Lot#  Route  Inj  Vis Given  
Vis Pub  CVX

 

 Pneumococcal  10/17/2016  CathyAyerst-LederleMony  [facility]  Prevnar 13  [id number]
  Intramuscular  Left Deltoid  10/17/2016  2015  133







History of Past Illness







 Name  Date of Onset  Comments

 

 Diabetes Mellitus, Type II      

 

 Hyperlipidemia      

 

 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      

 

 Bronchitis, Chronic  2010  8:23AM   

 

 Subcutaneous Nodule  2010  8:23AM   

 

 Diabetes Mellitus, Type II  2010 10:15AM   

 

 Hyperlipidemia, Mixed  2010 10:15AM   

 

 Sebaceous Cyst  2010 11:32PM   

 

 General Medical Exam, Adult  2010  9:19AM   

 

 Bursitis, right knee  Mar 15 2011  3:05PM   

 

 Gastroenteritis, Viral  May  2 2011  8:56AM   

 

 Hypokalemia  May 11 2011  8:15AM   

 

 Polymyalgia Rheumatica  May 11 2011  8:15AM   

 

 Hypokalemia  2011  9:48AM   

 

 Polymyalgia Rheumatica  2011  9:48AM   

 

 Diabetes Mellitus, Type II  2011  9:48AM   

 

 Diabetes Mellitus, Type II  Aug 10 2011  2:57PM   

 

 Edema bilateral lower extremities  Aug 10 2011  2:57PM   

 

 Polymyalgia Rheumatica  Dec 14 2011  3:17PM   

 

 Arthralgia  Dec 14 2011  3:17PM   

 

 Pain in  foot, Left Heel  Dec 27 2011  8:16AM   

 

 Edema left lower ext.  Mar 26 2012 11:30AM   

 

 Petechial Rash  Mar 26 2012 11:30AM   

 

 Diabetes Mellitus, Type II  Mar 27 2012  1:39PM   

 

 Polymyalgia Rheumatica  Mar 27 2012  1:39PM   

 

 Edema left lower ext.  2012  8:05AM   

 

 Diabetes Mellitus, Type II  2012  8:05AM   

 

 Cellulitis left foot  2012  8:05AM   

 

 Myalgia  Jul  3 2012  8:10AM   

 

 Headache  Jul  3 2012  8:10AM   

 

 Neuropathy, right foot  Jul  3 2012  8:10AM   

 

 Upper Respiratory Infections  Dec  4 2012 10:35AM   

 

 Diabetes Mellitus, Type II  Mar  5 2013  9:32AM   

 

 Hyperlipidemia  Mar  5 2013  9:32AM   

 

 Atrial Fibrillation  Mar  5 2013  9:32AM   

 

 Osteoarthritis, hand  Mar  5 2013  9:32AM   

 

 Cellulitis  2013 10:15AM   

 

 Right Acute Otitis Media  Aug 28 2013  8:55AM   

 

 Chronic Cough  Oct  8 2013 10:10AM   

 

 Shortness of breath  Oct  8 2013 10:10AM   

 

 Cough  Oct 17 2013  9:40AM   

 

 Tension Headache  2014  8:54AM   

 

 Blurred Vision  2014  8:54AM   

 

 Bronchitis, Acute  2014  8:54AM   

 

 Postoperative Follow-up  2014 10:56AM   

 

 Basal Cell  2014 10:56AM   

 

 Basal Cell Carcinoma of Skin  2014 10:59AM   

 

 Sebaceous Cyst  2014 10:59AM   

 

 Syncope  Oc  3 2014  3:57PM   

 

 Basal cell carcinoma of skin, site unspecified  2014  7:25AM   

 

 Lumbar back pain  Dec 19 2014  9:42AM   

 

 Left Radiculopathy of leg  Dec 19 2014  9:42AM   

 

 Depressive Disorder  Dec 19 2014  9:42AM   

 

 Diabetes Mellitus, Type II  May 13 2015 10:33AM   

 

 Hyperlipidemia  May 13 2015 10:33AM   

 

 Systolic CHF, acute  May 13 2015 10:33AM   

 

 Resolved Atrial fibrillation  May 13 2015 10:33AM   

 

 Mixed hyperlipidemia  Dec  3 2015 10:19AM   

 

 Diabetes mellitus type 2, uncontrolled  Dec  3 2015 10:19AM   

 

 Other osteoarthritis involving multiple joints  Dec  3 2015 10:19AM   

 

 Shortness of breath  May 17 2016  1:36PM   

 

 Chronic obstructive pulmonary disease with (acute) exacerbation  May 17 2016  1
:36PM   

 

 Need for Prevnar vaccine  Oct 17 2016  9:19AM   

 

 Paroxysmal atrial fibrillation  Oct 17 2016  9:19AM   

 

 Diabetes Mellitus, Type II  Oct 17 2016  9:19AM   

 

 Hypokalemia  Oct 30 2017 10:43AM   

 

 Hypokalemia  Nov  3 2017 11:41AM   







Payers







 Insurance Name  Company Name  Plan Name  Plan Number  Policy Number  Policy 
Group Number  Start Date

 

    [employer] [place]     [id number]     2015

 

    [employer] [place]     [id number]     

 

    [facility]  [employer] 
PHYS/DS  [id number]     N/A







History of Encounters







 Visit Date  Visit Type  Provider

 

 10/30/2017  Office visit  Mateo Cooper DO

 

 10/17/2016  Office visit  Mateo Cooper DO

 

 2016  Office visit  Mateo Cooper DO

 

 12/3/2015  Office visit  Mateo Cooper DO

 

 5/15/2015  Hospital  LEXX Holm MD

 

 2015  Office visit  Mateo Cooper DO

 

 2014  Office visit  Mateo Cooper DO

 

 2014  Hospital  LEXX Holm MD

 

 2014  Procedures  David Suresh MD

 

 6/3/2014  Office visit  Mateo Cooper DO

 

 2014  Office visit  David Suresh MD

 

 2014  Procedures  David Suresh MD

 

 2014  Hospital  LEXX Holm MD

 

 2014  Office visit  Mateo Cooper DO

 

 10/8/2013  Office visit  Mateo Cooper DO

 

 2013  Office visit  Mateo Cooper DO

 

 2013  Office visit  Mateo Cooper DO

 

 3/5/2013  Office visit  Mateo Cooper DO

 

 2013  Hospital  LEXX Holm MD

 

 2012  Office visit  Jenni MARTÍNEZ

 

 2012  Hospital  LEXX Holm MD

 

 7/3/2012  Office visit  Mateo Cooper DO

 

 2012  Hospital  Kristian Kiser MD

 

 2012  Hospital  LEXX Holm MD

 

 2012  Hospital  Kristian Kiser MD

 

 2012  [facility]  LEXX Holm MD

 

 2012  Office visit  Mateo Cooper DO

 

 3/26/2012  Office visit  Jenni Sterling APRN

 

 2011  Office visit  Mateo Cooper DO

 

 2011  Office visit  Jenni Sterling APRN

 

 8/10/2011  Office visit  Jenni Sterling APRN

 

 2011  Office visit  Mateo Cooper DO

 

 2011  Office visit  Mateo Cooper DO

 

 2011  Hospital  Bandar Zelaya MD

 

 2011  [facility]  Bandar Zelaya MD

 

 2011  [facility]  Bandar Zelaya MD

 

 2011  [facility]  LEXX Holm MD

 

 2011  Office visit  Mateo Cooper DO

 

 3/15/2011  Office visit  Mateo Cooper DO

 

 2010  Office visit  David Burciaga PA-C

 

 2010  Office visit  Mateo Cooper DO

 

 2010  Laboratory  Ayush Finn MD

 

 2010  Laboratory  Ayush Finn MD

 

 2010  Office visit  Mateo Cooper DO

## 2018-03-23 NOTE — XMS REPORT
MU2 Ambulatory Summary

 Created on: 2017



Ambar Grider

External Reference #: [id number]

: 1950

Sex: Female



Demographics







 Address  [address] [place]  [address]

 

 Home Phone  [phone]

 

 Preferred Language  English

 

 Marital Status  

 

 Jehovah's witness Affiliation  Unknown

 

 Race  White

 

 Ethnic Group  Not  or 





Author







 Mateo Pathak

 

 [facility]

 

 Address  [address]

[place], [place]  [id number]



 

 Phone  [phone]







Care Team Providers







 Care Team Member Name  Role  Phone

 

 Mateo Cooper  PCP  Unavailable

 

 Mateo Cooper  PreferredProvider  Unavailable







Allergies and Adverse Reactions







 Name  Reaction  Notes

 

 Phenergan      







Plan of Treatment







 Planned Activity  Comments  Planned Date  Planned Time  Plan/Goal

 

 Uncontrolled Diabetes and Thyroid Nodules            

 

 [facility]     2017  12:00 AM   







Medications







 Active 

 

 Name  Start Date  Estimated Completion Date  SIG  Comments

 

 Pen Needle 31 gauge x 3/16" miscellaneous needle  2014     USES 3 
INJECTIONS A DAY   

 

 metformin 1,000 mg oral tablet  2014     take 1 tablet (1,000 mg) by oral 
route 2 times per day with morning and evening meals   

 

 metformin 1,000 mg oral tablet  2015     TAKE 1 TABLET BY MOUTH TWICE 
DAILY WITH MORNING AND EVENING MEALS   

 

 metformin 1,000 mg oral tablet  2015     TAKE 1 TABLET BY MOUTH TWICE 
DAILY WITH MORNING AND EVENING MEALS   

 

 carvedilol 3.125 mg oral tablet        take 1 tablet (3.125 mg) by oral route 
once daily   

 

 Benicar 40 mg oral tablet        take 1 tablet (40 mg) by oral route once 
daily   

 

 Levemir FlexTouch 100 unit/mL (3 mL) subcutaneous insulin pen  12/3/2015     
inject 45 units by subcutaneous route twice a day   

 

 tramadol 50 mg oral tablet  12/3/2015     take 1 tablet (50 mg) by oral route 
every 6 hours as needed   

 

 Novolog Flexpen 100 unit/mL subcutaneous insulin pen  2016     INJECT 14 
UNITS SUBCUTANEOUSLY WITH BREAKFAST. LUNCH, AND SUPPER   

 

 acetaminophen-codeine 300-30 mg oral tablet  2016     take 1 tablet by 
oral route every 4 hours as needed   

 

 amiodarone 400 mg oral tablet        take 1 tablet (400 mg) by oral route once 
daily   

 

 potassium chloride 10 mEq oral capsule, extended release  10/30/2017     take 
2 capsules (20 meq) by oral route once daily with food for 30 days   









  

 

 Name  Start Date  Expiration Date  SIG  Comments

 

 Bactrim -160 mg oral tablet  2010  take 1 tablet by oral 
route every 12 hours for 10 days   

 

 Keflex 500 mg oral capsule  2010  take 1 capsule (500 mg) by 
oral route every 12 hours for 7 days   

 

 Reglan 10 mg oral tablet  2011  6/3/2011  take 1 tablet (10 mg) by oral 
route twice daily   

 

 prednisone 20 mg oral tablet  2011  take 1 tablet by oral 
route daily for 10 days   

 

 nabumetone 500 mg oral tablet  2011     take 1 tablet (500 mg) by oral 
route 2 times per day with food   

 

 indomethacin 50 mg oral capsule  2012  take 1 capsule (50 mg) 
by oral route 3 times per day with food for 7 days  "stopped it 2-3 weeks ago 
due to high glucose"

 

 Bactrim -160 mg oral tablet  2012  take 1 tablet by oral 
route every 12 hours for 7 days   

 

 insulin syringe-needle U-100 1 mL 31 gauge x 5/16 miscellaneous syringe  2012  USE AS DIRECTED FOUR TIMES DAILY   

 

 Levemir 100 unit/mL subcutaneous solution  2012  inject 45 
units by subcutaneous route twice daily  "using pen"

 

 Augmentin 500-125 mg oral tablet  2012  take 1 tablet by oral 
route every 12 hours for 7 days   

 

 Pen Needle 31 gauge x 3/16" miscellaneous needle  2013  Uses 3 
injections a day   

 

 cephalexin 500 mg oral tablet  2013  take 1 tablet (500 mg) by 
oral route every 12 hours for 10 days   

 

 Augmentin 500-125 mg oral tablet  2013  take 1 tablet by oral 
route every 12 hours for 7 days   

 

 Novolog Flexpen 100 unit/mL subcutaneous insulin pen  2014  
INJECT 12 UNITS SUBCUTANEOUSLY WITH BREAKFAST. LUNCH, AND SUPPER   

 

 gabapentin 600 mg oral tablet  2014  TAKE 1 TABLET BY MOUTH 
FOUR TIMES DAILY   

 

 Xarelto 20 mg oral tablet  12/3/2015  2016  take 1 tablet (20 mg) by oral 
route once daily with the evening meal for 30 days   

 

 amoxicillin-pot clavulanate 500-125 mg oral tablet  2016  2/15/2016  take 
1 tablet by oral route every 12 hours for 7 days   

 

 Accu-Chek Compact Test miscellaneous strip  2/15/2016  2016  Check 
glucose 4 times a day Dx:[id number]   

 

 Zostavax (PF) 19,400 unit/0.65 mL subcutaneous suspension for reconstitution  
10/17/2016  10/18/2016  inject 0.65 milliliter by subcutaneous route once   









 Discontinued 

 

 Name  Start Date  Discontinued Date  SIG  Comments

 

 Aldara 5 % topical cream in packet  2010  3/15/2011  apply to the affected 
area(s) before bedtime by topical route 3 times per week and leave on skin for 
6 to 10 hours   

 

 fenofibrate 160 mg oral tablet  2010  take 1 tablet (160 mg) by 
oral route once daily   

 

 meloxicam 15 mg oral tablet  3/15/2011  2011  take 1 tablet (15 mg) by 
oral route once daily as needed   

 

 Zofran (as hydrochloride) 4 mg/5 mL oral solution  2011  take 5-
10 milliliters by oral route every 6 hours as needed   

 

 Klor-Con M20 20 mEq oral tablet,ER particles/crystals     3/5/2013  take 1 
tablet (20 meq) by oral route once daily with food  "not takiing"

 

 Diflucan 150 mg oral tablet  8/10/2011  2012  take 1 tablet (150 mg) by 
oral route once   

 

 amoxicillin 500 mg oral capsule     2012  take 1 capsule (500 mg) by oral 
route every 12 hours for 7 days   

 

 Medrol (Dominic) 4 mg oral tablets,dose pack     2012  take as directed   

 

 Lasix 20 mg oral tablet  4/10/2012  3/5/2013  take 1 tablet (20 mg) by oral 
route once daily as needed   

 

 amitriptyline 10 mg oral tablet  7/3/2012  2015  take 1 tablet by oral 
route once a day (at bedtime)   

 

 cyclobenzaprine 5 mg oral tablet  7/3/2012  10/8/2013  take 1 tablet by oral 
route once a day (at bedtime) as needed   

 

 cephalexin 500 mg oral tablet  7/3/2012  3/5/2013  take 1 tablet (500 mg) by 
oral route every 12 hours   

 

 Tessalon 200 mg oral capsule  2012  take 1 capsule (200 mg) by 
oral route 3 times per day as needed   

 

 Claritin-D 12 Hour 5-120 mg oral tablet extended release 12 hr  2012  3/5/
2013  take 1 tablet by oral route every 12 hours   

 

 naproxen 500 mg oral tablet  2014  take 1 tablet (500 mg) by 
oral route 2 times per day with food  "not taking now"

 

 Dulera 100-5 mcg/actuation inhalation HFA aerosol inhaler  10/8/2013  12/3/
2015  inhale 2 puffs by inhalation route 2 times per day in the morning and 
evening   

 

 flecainide 100 mg oral tablet     2015  take 1 tablet (100 mg) by oral 
route every 12 hours   

 

 cyclobenzaprine 10 mg oral tablet  2014  take 1 tablet (10 mg
) by oral route at bedtime as needed   

 

 Betapace 80 mg oral tablet     2015  take 1 tablet (80 mg) by oral route 
2 times per day  Dr. Turner

 

 duloxetine 30 mg oral capsule,delayed release(/EC)  2014  12/3/2015  
take 1 capsule (30 mg) by oral route once a day   

 

 hydrocodone-acetaminophen 7.5-325 mg oral tablet  2014  12/3/2015  take 
1 tablet by oral route every 6 hours as needed for pain   

 

 diltiazem HCl 120 mg oral capsule, extended release     12/3/2015  take 1 
capsule (120 mg) by oral route once daily   

 

 carvedilol 12.5 mg oral tablet     12/3/2015  take 1 tablet (12.5 mg) by oral 
route every 12 hours with food  dose change

 

 sotalol 120 mg oral tablet     12/3/2015  take 1 tablet (120 mg) by oral route 
2 times per day   

 

 benzonatate 200 mg oral capsule  2016  10/17/2016  take 1 capsule (200 mg) 
by oral route 3 times per day as needed   

 

 Levemir FlexTouch 100 unit/mL (3 mL) subcutaneous insulin pen  3/1/2016  10/17/
2016  INJECT 43 UNITS BY SUBCUTANEOUS ROUTE TWICE A DAY   

 

 Levemir FlexTouch 100 unit/mL (3 mL) subcutaneous insulin pen  3/1/2016  10/17/
2016  INJECT 43 UNITS BY SUBCUTANEOUS ROUTE TWICE A DAY  has an insulin pump







Problem List







 Description  Status  Onset

 

 Diabetes Mellitus, Type II  Active   

 

 Hemangioma in Spine  Active   

 

 Hyperlipidemia  Active   







Vital Signs







 Date  Time  BP-Sys(mm[Hg]  BP-Missy(mm[Hg])  HR(bpm)  RR(rpm)  Temp  WT  HT  HC  
BMI  BSA  BMI Percentile  O2 Sat(%)

 

 10/30/2017  10:41:00 AM  152 mmHg  88 mmHg  80 bpm  20 rpm  98.1 F  153 lbs  
61 in     28.91 kg/m2  1.73 m2     100 %

 

 10/17/2016  9:17:00 AM  138 mmHg  70 mmHg  83 bpm  20 rpm  97.7 F  160 lbs  61 
in     30.2314 kg/m  1.7674 m     99 %

 

 2016  1:34:00 PM  132 mmHg  60 mmHg  87 bpm  22 rpm  99 F  152 lbs  61 in
     28.72 kg/m2  1.72 m2     99 %

 

 12/3/2015  10:17:00 AM  138 mmHg  72 mmHg  80 bpm  20 rpm  98.2 F  152 lbs  61 
in     28.7199 kg/m  1.7226 m     98 %

 

 2015  10:31:00 AM  130 mmHg  66 mmHg  66 bpm  20 rpm  98.1 F  143 lbs  61 
in     27.02 kg/m2  1.67 m2     98 %

 

 2014  9:39:00 AM  138 mmHg  88 mmHg  153 bpm  20 rpm  98.7 F  150 lbs  
61 in     28.342 kg/m  1.7112 m     98 %

 

 6/3/2014  3:55:00 PM  130 mmHg  62 mmHg  69 bpm  18 rpm  97.9 F  151 lbs  61 
in     28.53 kg/m2  1.72 m2     99 %

 

 2014  10:56:00 AM  150 mmHg  74 mmHg  81 bpm  16 rpm  96.2 F  152 lbs  61 
in     28.7199 kg/m  1.7226 m     97 %

 

 2014  8:52:00 AM  138 mmHg  78 mmHg  61 bpm  20 rpm  98 F  168 lbs  61 in
     31.74 kg/m2  1.81 m2     100 %

 

 10/8/2013  10:08:00 AM  138 mmHg  78 mmHg  148 bpm  22 rpm  98.2 F  168 lbs  
61 in     31.743 kg/m  1.811 m     98 %

 

 2013  8:54:00 AM  150 mmHg  80 mmHg  78 bpm  16 rpm  98.9 F  163 lbs  61 
in     30.80 kg/m2  1.78 m2     99 %

 

 2013  10:13:00 AM  136 mmHg  78 mmHg  70 bpm  18 rpm  97.8 F  156 lbs    
              

 

 3/5/2013  9:30:00 AM  148 mmHg  80 mmHg  76 bpm  16 rpm  97.8 F  152 lbs  61 
in     28.72 kg/m2  1.72 m2      

 

 2012  10:33:00 AM  136 mmHg  74 mmHg  89 bpm  18 rpm  98.6 F  154.375 lbs
  61 in     29.1686 kg/m  1.736 m     99 %

 

 7/3/2012  8:08:00 AM  134 mmHg  72 mmHg  78 bpm  22 rpm  98.2 F  145 lbs  61 
in     27.40 kg/m2  1.68 m2      

 

 2012  8:03:00 AM  132 mmHg  76 mmHg  70 bpm  18 rpm  97.9 F  143 lbs  61 
in     27.0193 kg/m  1.6708 m      

 

 3/26/2012  11:28:00 AM  136 mmHg  74 mmHg  89 bpm  18 rpm  97.9 F  149.125 lbs
  61 in     28.18 kg/m2  1.71 m2      

 

 2011  8:12:00 AM  132 mmHg  82 mmHg  84 bpm  16 rpm  97.8 F  150 lbs  61 
in     28.342 kg/m  1.7112 m      

 

 2011  3:15:00 PM  130 mmHg  74 mmHg  68 bpm  18 rpm  98.3 F  156 lbs  61 
in     29.48 kg/m2  1.75 m2      

 

 8/10/2011  2:58:00 PM  120 mmHg  74 mmHg  100 bpm  20 rpm  99.4 F  169.25 lbs 
                 

 

 2011  9:49:00 AM  134 mmHg  78 mmHg  74 bpm  18 rpm  98.3 F  164 lbs     
             

 

 2011  8:15:00 AM  128 mmHg  78 mmHg  62 bpm  18 rpm  98.2 F  157 lbs     
             

 

 2011  8:54:00 AM  128 mmHg  72 mmHg  104 bpm  22 rpm  98.6 F  153 lbs     
            100 %

 

 3/15/2011  3:04:00 PM  144 mmHg  82 mmHg  72 bpm  18 rpm  98 F  163 lbs       
           

 

 2010  9:17:00 AM  124 mmHg  78 mmHg  76 bpm        145 lbs               
   

 

 2010  10:13:00 AM  130 mmHg  68 mmHg  70 bpm  16 rpm  98.3 F  144 lbs     
             

 

 2010  8:21:00 AM  134 mmHg  76 mmHg  70 bpm  20 rpm  98.6 F  147 lbs      
            







Social History







 Name  Description  Comments

 

 Tobacco  Never smoker   







History of Procedures







 Date Ordered  Description  Order Status

 

 12/3/2015 12:00 AM  LIPID PANEL  Reviewed

 

 12/3/2015 12:00 AM  GLYCOSYLATED HEMOGLOBIN TEST  Reviewed

 

 12/3/2015 12:00 AM  COMPREHEN METABOLIC PANEL  Reviewed

 

 12/3/2015 12:00 AM  Endocrinology Consultation  Reviewed

 

 2011 12:00 AM  METABOLIC PANEL TOTAL CA  Reviewed

 

 2011 12:00 AM  C-REACTIVE PROTEIN  Reviewed

 

 2011 12:00 AM  RBC SED RATE AUTOMATED  Reviewed

 

 2011 12:00 AM  X-RAY EXAM OF FOOT  Reviewed

 

 2016 12:00 AM  CHEST X-RAY 2VW FRONTAL&LATL  Reviewed

 

 2016 12:00 AM  Decadron, Per 1 Mg NDC# [address]-88  Reviewed

 

 2016 12:00 AM  Depo-Medrol, Per 80 Mg NDC#[address]-49  Reviewed

 

 10/17/2016 12:00 AM  PNEUMOCOCCAL VACC 13 MARILEE IM  Reviewed

 

 3/26/2012 12:00 AM  COMPLETE CBC W/AUTO DIFF WBC  Reviewed

 

 3/26/2012 12:00 AM  PROTHROMBIN TIME  Reviewed

 

 3/26/2012 12:00 AM  ASSAY OF BLOOD/URIC ACID  Reviewed

 

 3/27/2012 12:00 AM  RBC SED RATE AUTOMATED  Reviewed

 

 3/27/2012 12:00 AM  GLYCOSYLATED HEMOGLOBIN TEST  Reviewed

 

 10/30/2017 12:00 AM  Rocephin 500 Mg Injection  Reviewed

 

 2017 12:00 AM  METABOLIC PANEL TOTAL CA  Reviewed

 

 2010 12:00 AM  BREATHING CAPACITY TEST  Reviewed

 

 10/8/2013 12:00 AM  CHEST X-RAY 2VW FRONTAL&LATL  Reviewed

 

 10/8/2013 12:00 AM  AIRWAY INHALATION TREATMENT  Reviewed

 

 10/17/2013 12:00 AM  CHEST X-RAY 2VW FRONTAL&LATL  Reviewed

 

 2014 12:00 AM  CT HEAD/BRAIN W/O & W/DYE  Reviewed

 

 2014 12:00 AM  BREATHING CAPACITY TEST  Reviewed

 

 6/3/2014 12:00 AM  ECG MONIT/REPRT UP TO 48 HRS  Reviewed

 

 6/3/2014 12:00 AM  TTE W/O DOPPLER COMPLETE  Reviewed

 

 6/3/2014 12:00 AM  Carotid Doppler  Reviewed

 

 2010 12:00 AM  COMPREHEN METABOLIC PANEL  Reviewed

 

 2010 12:00 AM  LIPID PANEL  Reviewed

 

 2010 12:00 AM  GLYCOSYLATED HEMOGLOBIN TEST  Reviewed

 

 2010 12:00 AM  MICROALBUMIN SEMIQUANT  Reviewed

 

 2010 12:00 AM  DRAINAGE OF SKIN ABSCESS  Reviewed

 

 2010 12:00 AM  Urine drug screen  Reviewed

 

 2014 12:00 AM  Physiatry Consult  Reviewed

 

 2011 12:00 AM  METABOLIC PANEL TOTAL CA  Reviewed

 

 2011 12:00 AM  METABOLIC PANEL TOTAL CA  Reviewed

 

 2011 12:00 AM  C-REACTIVE PROTEIN  Reviewed

 

 8/15/2011 12:00 AM  METABOLIC PANEL TOTAL CA  Reviewed

 

 8/15/2011 12:00 AM  GLYCOSYLATED HEMOGLOBIN TEST  Reviewed







Results Summary







 Date and Description  Results

 

 2010 8:22 AM  TRIGLYCERIDES 666.0 mg/dLCHOLESTEROL 241.0 mg/dLHDL 34.0 mg/
dLTOT CHOL/HDL 7.1 LDL (CALC) INVALID MG/DLGLYCOHEMOGLOBIN A1C 10.30 %GLUCOSE 
336.0 mg/dLSODIUM 138.0 mmol/LPOTASSIUM 3.40 mmol/LCHLORIDE 102.0 mmol/LCO2 
22.0 mmol/LBUN 8.0 mg/dLCREATININE 0.70 mg/dLSGOT/AST 15.0 IU/LSGPT/ALT 7.0 IU/
LALK PHOS 93.0 IU/LTOTAL PROTEIN 7.30 g/dLALBUMIN 3.90 g/dLTOTAL BILI 0.40 mg/
dLCALCIUM 9.10 mg/dLAGE 60 GFR NonAA 86 GFR  eGFR >60 mL/min/1.73 m2eGFR 
AA* >60 CREAT UR 57.20 mg/dLMICROALBUMIN UR 19.0 ug/mLALB:CREAT RATIO 33 

 

 2011 9:15 AM  GLUCOSE 49.0 mg/dLSODIUM 143.0 mmol/LPOTASSIUM 3.30 mmol/
LCHLORIDE 101.0 mmol/LCO2 30.0 mmol/LBUN 12.0 mg/dLCREATININE 0.50 mg/dLCALCIUM 
9.10 mg/dLAGE 60 GFR NonAA 126 GFR  eGFR >60 mL/min/1.73 m2eGFR AA* >60 

 

 2011 10:25 AM  C REACTIVE PROTEIN 6.0 mg/LGLUCOSE 88.0 mg/dLSODIUM 143.0 
mmol/LPOTASSIUM 3.60 mmol/LCHLORIDE 104.0 mmol/LCO2 26.0 mmol/LBUN 11.0 mg/
dLCREATININE 0.70 mg/dLCALCIUM 9.40 mg/dLAGE 61 GFR NonAA 85 GFR  eGFR >
60 mL/min/1.73 m2eGFR AA* >60 

 

 2011 11:00 AM  GLUCOSE 160.0 mg/dLSODIUM 141.0 mmol/LPOTASSIUM 3.70 mmol/
LCHLORIDE 105.0 mmol/LCO2 21.0 mmol/LBUN 13.0 mg/dLCREATININE 0.70 mg/dLCALCIUM 
9.50 mg/dLAGE 61 GFR NonAA 85 GFR  eGFR >60 mL/min/1.73 m2eGFR AA* >60 
GLYCOHEMOGLOBIN A1C 6.80 %

 

 2011 3:50 PM  C REACTIVE PROTEIN 5.0 mg/LGLUCOSE 361.0 mg/dLSODIUM 140.0 
mmol/LPOTASSIUM 3.80 mmol/LCHLORIDE 102.0 mmol/LCO2 24.0 mmol/LBUN 9.0 mg/
dLCREATININE 1.0 mg/dLCALCIUM 9.60 mg/dLAGE 61 GFR NonAA 57 GFR AA 69 eGFR 57 
eGFR AA* >60 SEDRATE 24.0 mm/hr

 

 3/26/2012 12:00 PM  WBC 7.3 RBC 4.46 HGB 13.50 g/dLHCT 39.0 %MCV 87.0 fLMCH 
30.30 pgMCHC 34.60 g/dLRDW SD 42 RDW CV 13.20 %MPV 9.60 fLPLT 142 NRBC# 0.00 
NRBC% 0.0 %NEUT 59.60 %%LYMP 33.50 %%MONO 5.60 %%EOS 1.20 %%BASO 0.10 %#NEUT 
4.32 #LYMP 2.43 #MONO 0.41 #EOS 0.09 #BASO 0.01 MANUAL DIFF NOT IND PROTIME 
10.60 secsINR 1.0 URIC ACID 3.9 mg/dL

 

 3/27/2012 8:45 PM  GLYCOHEMOGLOBIN A1C 10.20 %SEDRATE 31.0 mm/hr

 

 12/3/2015 11:22 AM  TRIGLYCERIDES 182.0 mg/dLCHOLESTEROL 180.0 mg/dLHDL 50.0 mg
/dLTOT CHOL/HDL 3.6 LDL (CALC) 94.0 mg/dLGLUCOSE 99.0 mg/dLSODIUM 142.0 mmol/
LPOTASSIUM 4.20 mmol/LCHLORIDE 112.0 mmol/LCO2 21.0 mmol/LBUN 17.0 mg/
dLCREATININE 0.70 mg/dLSGOT/AST 17.0 IU/LSGPT/ALT 12.0 IU/LALK PHOS 86.0 IU/
LTOTAL PROTEIN 6.30 g/dLALBUMIN 4.10 g/dLTOTAL BILI 0.50 mg/dLCALCIUM 9.30 mg/
dLAGE 65 GFR NonAA 84 GFR  eGFR >60 mL/min/1.73meGFR AA* >60 Hemoglobin 
A1c 9.10 %Estim. Avg Glu (eAG) 214 

 

 11/3/2017 8:02 AM  GLUCOSE 97.0 mg/dLSODIUM 145.0 mmol/LPOTASSIUM 3.40 mmol/
LCHLORIDE 112.0 mmol/LCO2 25.0 mmol/LBUN 9.0 mg/dLCREATININE 0.70 mg/dLCALCIUM 
8.80 mg/dLAGE 67 GFR NonAA 84 GFR  eGFR >60 mL/min/1.73meGFR AA* >60 







History Of Immunizations







 Name  Date Admin  Mfg Name  Mfg Code  Trade Name  Lot#  Route  Inj  Vis Given  
Vis Pub  CVX

 

 Pneumococcal  10/17/2016  CathyAyerst-LederleMony  [facility]  Prevnar 13  [id number]
  Intramuscular  Left Deltoid  10/17/2016  2015  133







History of Past Illness







 Name  Date of Onset  Comments

 

 Diabetes Mellitus, Type II      

 

 Hyperlipidemia      

 

 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      

 

 Bronchitis, Chronic  2010  8:23AM   

 

 Subcutaneous Nodule  2010  8:23AM   

 

 Diabetes Mellitus, Type II  2010 10:15AM   

 

 Hyperlipidemia, Mixed  2010 10:15AM   

 

 Sebaceous Cyst  2010 11:32PM   

 

 General Medical Exam, Adult  2010  9:19AM   

 

 Bursitis, right knee  Mar 15 2011  3:05PM   

 

 Gastroenteritis, Viral  May  2 2011  8:56AM   

 

 Hypokalemia  May 11 2011  8:15AM   

 

 Polymyalgia Rheumatica  May 11 2011  8:15AM   

 

 Hypokalemia  2011  9:48AM   

 

 Polymyalgia Rheumatica  2011  9:48AM   

 

 Diabetes Mellitus, Type II  2011  9:48AM   

 

 Diabetes Mellitus, Type II  Aug 10 2011  2:57PM   

 

 Edema bilateral lower extremities  Aug 10 2011  2:57PM   

 

 Polymyalgia Rheumatica  Dec 14 2011  3:17PM   

 

 Arthralgia  Dec 14 2011  3:17PM   

 

 Pain in  foot, Left Heel  Dec 27 2011  8:16AM   

 

 Edema left lower ext.  Mar 26 2012 11:30AM   

 

 Petechial Rash  Mar 26 2012 11:30AM   

 

 Diabetes Mellitus, Type II  Mar 27 2012  1:39PM   

 

 Polymyalgia Rheumatica  Mar 27 2012  1:39PM   

 

 Edema left lower ext.  2012  8:05AM   

 

 Diabetes Mellitus, Type II  2012  8:05AM   

 

 Cellulitis left foot  2012  8:05AM   

 

 Myalgia  Jul  3 2012  8:10AM   

 

 Headache  Jul  3 2012  8:10AM   

 

 Neuropathy, right foot  Jul  3 2012  8:10AM   

 

 Upper Respiratory Infections  Dec  4 2012 10:35AM   

 

 Diabetes Mellitus, Type II  Mar  5 2013  9:32AM   

 

 Hyperlipidemia  Mar  5 2013  9:32AM   

 

 Atrial Fibrillation  Mar  5 2013  9:32AM   

 

 Osteoarthritis, hand  Mar  5 2013  9:32AM   

 

 Cellulitis  2013 10:15AM   

 

 Right Acute Otitis Media  Aug 28 2013  8:55AM   

 

 Chronic Cough  Oct  8 2013 10:10AM   

 

 Shortness of breath  Oct  8 2013 10:10AM   

 

 Cough  Oct 17 2013  9:40AM   

 

 Tension Headache  2014  8:54AM   

 

 Blurred Vision  2014  8:54AM   

 

 Bronchitis, Acute  2014  8:54AM   

 

 Postoperative Follow-up  2014 10:56AM   

 

 Basal Cell  2014 10:56AM   

 

 Basal Cell Carcinoma of Skin  2014 10:59AM   

 

 Sebaceous Cyst  2014 10:59AM   

 

 Syncope  Oc  3 2014  3:57PM   

 

 Basal cell carcinoma of skin, site unspecified  2014  7:25AM   

 

 Lumbar back pain  Dec 19 2014  9:42AM   

 

 Left Radiculopathy of leg  Dec 19 2014  9:42AM   

 

 Depressive Disorder  Dec 19 2014  9:42AM   

 

 Diabetes Mellitus, Type II  May 13 2015 10:33AM   

 

 Hyperlipidemia  May 13 2015 10:33AM   

 

 Systolic CHF, acute  May 13 2015 10:33AM   

 

 Resolved Atrial fibrillation  May 13 2015 10:33AM   

 

 Mixed hyperlipidemia  Dec  3 2015 10:19AM   

 

 Diabetes mellitus type 2, uncontrolled  Dec  3 2015 10:19AM   

 

 Other osteoarthritis involving multiple joints  Dec  3 2015 10:19AM   

 

 Shortness of breath  May 17 2016  1:36PM   

 

 Chronic obstructive pulmonary disease with (acute) exacerbation  May 17 2016  1
:36PM   

 

 Need for Prevnar vaccine  Oct 17 2016  9:19AM   

 

 Paroxysmal atrial fibrillation  Oct 17 2016  9:19AM   

 

 Diabetes Mellitus, Type II  Oct 17 2016  9:19AM   

 

 Hypokalemia  Oct 30 2017 10:43AM   

 

 Hypokalemia  Nov  3 2017 11:41AM   







Payers







 Insurance Name  Company Name  Plan Name  Plan Number  Policy Number  Policy 
Group Number  Start Date

 

    [employer] [place]     [id number]     2015

 

    [employer] [place]     [id number]     

 

    [employer]  [employer] 
PHYS/DS  [id number]     N/A







History of Encounters







 Visit Date  Visit Type  Provider

 

 10/30/2017  Office visit  Mateo Cooper DO

 

 10/17/2016  Office visit  Mateo Cooper DO

 

 2016  Office visit  Mateo Cooper DO

 

 12/3/2015  Office visit  Mateo Cooper DO

 

 5/15/2015  Hospital  LEXX Holm MD

 

 2015  Office visit  Mateo Cooper DO

 

 2014  Office visit  Mateo Cooper DO

 

 2014  Hospital  LEXX Holm MD

 

 2014  Procedures  David Suresh MD

 

 6/3/2014  Office visit  Mateo Cooper DO

 

 2014  Office visit  David Suresh MD

 

 2014  Procedures  David Suresh MD

 

 2014  Hospital  LEXX Holm MD

 

 2014  Office visit  Mateo Cooper DO

 

 10/8/2013  Office visit  Mateo Cooper DO

 

 2013  Office visit  Mateo Cooper DO

 

 2013  Office visit  Mateo Cooper DO

 

 3/5/2013  Office visit  Mateo Cooper DO

 

 2013  Hospital  LEXX Holm MD

 

 2012  Office visit  Jenni MARTÍNEZ

 

 2012  Hospital  LEXX Holm MD

 

 7/3/2012  Office visit  Mateo Cooper DO

 

 2012  Hospital  Kristian Kiser MD

 

 2012  Hospital  LEXX Holm MD

 

 2012  Hospital  Kristian Kiser MD

 

 2012  [facility]  LEXX Holm MD

 

 2012  Office visit  Mateo Cooper DO

 

 3/26/2012  Office visit  Jenni Sterling APRN

 

 2011  Office visit  Mateo Cooper DO

 

 2011  Office visit  Jenni Sterling APRN

 

 8/10/2011  Office visit  Jenni Sterling APRN

 

 2011  Office visit  Mateo Cooper DO

 

 2011  Office visit  Mateo Cooper DO

 

 2011  Hospital  Bandar Zelaya MD

 

 2011  [facility]  Bandar Zelaya MD

 

 2011  [facility]  Bandar Zelaya MD

 

 2011  [facility]  LEXX Holm MD

 

 2011  Office visit  Mateo Cooper DO

 

 3/15/2011  Office visit  Mateo Cooper DO

 

 2010  Office visit  David Burciaga PA-C

 

 2010  Office visit  Mateo Cooper DO

 

 2010  Laboratory  Ayush Finn MD

 

 2010  Laboratory  Ayush Finn MD

 

 2010  Office visit  Mateo Cooper DO
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
## 2019-06-24 ENCOUNTER — HOSPITAL ENCOUNTER (EMERGENCY)
Dept: HOSPITAL 75 - ER | Age: 69
Discharge: ANOTHER HOSPITAL | End: 2019-06-24
Payer: COMMERCIAL

## 2019-06-24 VITALS — SYSTOLIC BLOOD PRESSURE: 110 MMHG | DIASTOLIC BLOOD PRESSURE: 68 MMHG

## 2019-06-24 VITALS — HEIGHT: 61 IN | WEIGHT: 151 LBS | BODY MASS INDEX: 28.51 KG/M2

## 2019-06-24 DIAGNOSIS — E11.9: ICD-10-CM

## 2019-06-24 DIAGNOSIS — Z79.4: ICD-10-CM

## 2019-06-24 DIAGNOSIS — Z90.710: ICD-10-CM

## 2019-06-24 DIAGNOSIS — I10: ICD-10-CM

## 2019-06-24 DIAGNOSIS — J44.9: ICD-10-CM

## 2019-06-24 DIAGNOSIS — L03.115: Primary | ICD-10-CM

## 2019-06-24 DIAGNOSIS — I48.91: ICD-10-CM

## 2019-06-24 DIAGNOSIS — K21.9: ICD-10-CM

## 2019-06-24 DIAGNOSIS — E78.00: ICD-10-CM

## 2019-06-24 DIAGNOSIS — Z95.0: ICD-10-CM

## 2019-06-24 DIAGNOSIS — Z88.8: ICD-10-CM

## 2019-06-24 LAB
ALBUMIN SERPL-MCNC: 4.1 GM/DL (ref 3.2–4.5)
ALP SERPL-CCNC: 82 U/L (ref 40–136)
ALT SERPL-CCNC: 7 U/L (ref 0–55)
APTT BLD: 29 SEC (ref 24–35)
APTT PPP: YELLOW S
BACTERIA #/AREA URNS HPF: (no result) /HPF
BASOPHILS # BLD AUTO: 0 10^3/UL (ref 0–0.1)
BASOPHILS NFR BLD AUTO: 0 % (ref 0–10)
BILIRUB SERPL-MCNC: 0.7 MG/DL (ref 0.1–1)
BILIRUB UR QL STRIP: NEGATIVE
BUN/CREAT SERPL: 8
CALCIUM SERPL-MCNC: 9.1 MG/DL (ref 8.5–10.1)
CHLORIDE SERPL-SCNC: 107 MMOL/L (ref 98–107)
CO2 SERPL-SCNC: 22 MMOL/L (ref 21–32)
CREAT SERPL-MCNC: 0.97 MG/DL (ref 0.6–1.3)
EOSINOPHIL # BLD AUTO: 0.2 10^3/UL (ref 0–0.3)
EOSINOPHIL NFR BLD AUTO: 4 % (ref 0–10)
ERYTHROCYTE [DISTWIDTH] IN BLOOD BY AUTOMATED COUNT: 15.6 % (ref 10–14.5)
FIBRINOGEN PPP-MCNC: CLEAR MG/DL
GFR SERPLBLD BASED ON 1.73 SQ M-ARVRAT: 57 ML/MIN
GLUCOSE SERPL-MCNC: 281 MG/DL (ref 70–105)
GLUCOSE UR STRIP-MCNC: (no result) MG/DL
HCT VFR BLD CALC: 39 % (ref 35–52)
HGB BLD-MCNC: 12.1 G/DL (ref 11.5–16)
INR PPP: 1.3 (ref 0.8–1.4)
KETONES UR QL STRIP: NEGATIVE
LEUKOCYTE ESTERASE UR QL STRIP: (no result)
LYMPHOCYTES # BLD AUTO: 1.4 X 10^3 (ref 1–4)
LYMPHOCYTES NFR BLD AUTO: 28 % (ref 12–44)
MANUAL DIFFERENTIAL PERFORMED BLD QL: NO
MCH RBC QN AUTO: 28 PG (ref 25–34)
MCHC RBC AUTO-ENTMCNC: 31 G/DL (ref 32–36)
MCV RBC AUTO: 92 FL (ref 80–99)
MONOCYTES # BLD AUTO: 0.3 X 10^3 (ref 0–1)
MONOCYTES NFR BLD AUTO: 6 % (ref 0–12)
NEUTROPHILS # BLD AUTO: 3.1 X 10^3 (ref 1.8–7.8)
NEUTROPHILS NFR BLD AUTO: 62 % (ref 42–75)
NITRITE UR QL STRIP: NEGATIVE
PH UR STRIP: 5 [PH] (ref 5–9)
PLATELET # BLD: 201 10^3/UL (ref 130–400)
PMV BLD AUTO: 9.8 FL (ref 7.4–10.4)
POTASSIUM SERPL-SCNC: 3.5 MMOL/L (ref 3.6–5)
PROT SERPL-MCNC: 7.2 GM/DL (ref 6.4–8.2)
PROT UR QL STRIP: (no result)
PROTHROMBIN TIME: 16.8 SEC (ref 12.2–14.7)
RBC #/AREA URNS HPF: (no result) /HPF
SODIUM SERPL-SCNC: 141 MMOL/L (ref 135–145)
SP GR UR STRIP: 1.02 (ref 1.02–1.02)
UROBILINOGEN UR-MCNC: NORMAL MG/DL
WBC # BLD AUTO: 5.1 10^3/UL (ref 4.3–11)
WBC #/AREA URNS HPF: (no result) /HPF

## 2019-06-24 PROCEDURE — 71045 X-RAY EXAM CHEST 1 VIEW: CPT

## 2019-06-24 PROCEDURE — 87088 URINE BACTERIA CULTURE: CPT

## 2019-06-24 PROCEDURE — 85610 PROTHROMBIN TIME: CPT

## 2019-06-24 PROCEDURE — 84484 ASSAY OF TROPONIN QUANT: CPT

## 2019-06-24 PROCEDURE — 36415 COLL VENOUS BLD VENIPUNCTURE: CPT

## 2019-06-24 PROCEDURE — 87186 SC STD MICRODIL/AGAR DIL: CPT

## 2019-06-24 PROCEDURE — 83605 ASSAY OF LACTIC ACID: CPT

## 2019-06-24 PROCEDURE — 87077 CULTURE AEROBIC IDENTIFY: CPT

## 2019-06-24 PROCEDURE — 80162 ASSAY OF DIGOXIN TOTAL: CPT

## 2019-06-24 PROCEDURE — 87040 BLOOD CULTURE FOR BACTERIA: CPT

## 2019-06-24 PROCEDURE — 93005 ELECTROCARDIOGRAM TRACING: CPT

## 2019-06-24 PROCEDURE — 87070 CULTURE OTHR SPECIMN AEROBIC: CPT

## 2019-06-24 PROCEDURE — 80053 COMPREHEN METABOLIC PANEL: CPT

## 2019-06-24 PROCEDURE — 85025 COMPLETE CBC W/AUTO DIFF WBC: CPT

## 2019-06-24 PROCEDURE — 81000 URINALYSIS NONAUTO W/SCOPE: CPT

## 2019-06-24 PROCEDURE — 85730 THROMBOPLASTIN TIME PARTIAL: CPT

## 2019-06-24 PROCEDURE — 87205 SMEAR GRAM STAIN: CPT

## 2019-06-24 NOTE — XMS REPORT
MU2 Ambulatory Summary

                             Created on: 2018



Ambar Grider

External Reference #: [id number]

: 1950

Sex: Female



Demographics







                          Address                   [address] [place]  [address]

 

                          Home Phone                [phone]

 

                          Preferred Language        English

 

                          Marital Status            

 

                          Amish Affiliation     Unknown

 

                          Race                      White

 

                          Ethnic Group              Not  or 





Author







                          Author                    Mateo Cooper

 

                          [facility]

 

                          Address                   [address]

[place], [place]  [id number]



 

                          Phone                     [phone]







Care Team Providers







                    Care Team Member Name    Role                Phone

 

                    Mateo Cooper    PCP                 [phone]

 

                    Mateo Cooper    PreferredProvider    [phone]







Allergies and Adverse Reactions







                    Name                Reaction            Notes

 

                    Phenergan                                

 

                    etomidate           "stopped heart"      







Plan of Treatment







             Planned Activity    Comments     Planned Date    Planned Time    Plan/Goal

 

             Uncontrolled Diabetes and Thyroid Nodules                                            







Medications







                                        Active 

 

             Name         Start Date    Estimated Completion Date    SIG          Comments

 

                carvedilol 3.125 mg oral tablet                                    take 1 tablet (3.125 mg) by oral route once

 daily                                   

 

                Levemir FlexTouch 100 unit/mL (3 mL) subcutaneous insulin pen    12/3/2015                       inject

 45 units by subcutaneous route twice a day     

 

                Novolog Flexpen 100 unit/mL subcutaneous insulin pen    2016                       INJECT 14 UNITS

 SUBCUTANEOUSLY WITH BREAKFAST. LUNCH, AND SUPPER     

 

                acetaminophen-codeine 300-30 mg oral tablet    2016                       take 1 tablet by oral

 route every 4 hours as needed           

 

             amiodarone 400 mg oral tablet                              take 2 tablets by oral route once daily     

 

                gabapentin 600 mg oral tablet    2018        TAKE 1 TABLET BY MOUTH FOUR

 TIMES DAILY                             

 

                metformin 1,000 mg oral tablet    2018       TAKE 1 TABLET BY MOUTH TWICE

 DAILY WITH MORNING AND EVENING MEALS for 90 days     









                                         

 

             Name         Start Date    Expiration Date    SIG          Comments

 

                Bactrim -160 mg oral tablet    2010       take 1 tablet by oral route

 every 12 hours for 10 days              

 

                Keflex 500 mg oral capsule    2010        take 1 capsule (500 mg) by oral 

route every 12 hours for 7 days          

 

                Reglan 10 mg oral tablet    2011        6/3/2011        take 1 tablet (10 mg) by oral route

 twice daily                             

 

                prednisone 20 mg oral tablet    2011      take 1 tablet by oral route

 daily for 10 days                       

 

                nabumetone 500 mg oral tablet    2011                      take 1 tablet (500 mg) by oral route

 2 times per day with food               

 

                indomethacin 50 mg oral capsule    2012       take 1 capsule (50 mg) by 

oral route 3 times per day with food for 7 days    "stopped it 2-3 weeks ago due to

 high glucose"

 

                Bactrim -160 mg oral tablet    2012       take 1 tablet by oral route

 every 12 hours for 7 days               

 

                Levemir 100 unit/mL subcutaneous solution    2012       inject 45 units

 by subcutaneous route twice daily      "using pen"

 

                Augmentin 500-125 mg oral tablet    2012      take 1 tablet by oral route

 every 12 hours for 7 days               

 

                cephalexin 500 mg oral tablet    2013       take 1 tablet (500 mg) by oral

 route every 12 hours for 10 days        

 

                Augmentin 500-125 mg oral tablet    2013        take 1 tablet by oral route

 every 12 hours for 7 days               

 

                Novolog Flexpen 100 unit/mL subcutaneous insulin pen    2014        INJECT

 12 UNITS SUBCUTANEOUSLY WITH BREAKFAST. LUNCH, AND SUPPER     

 

                Xarelto 20 mg oral tablet    12/3/2015       2016       take 1 tablet (20 mg) by oral route

 once daily with the evening meal for 30 days     

 

                amoxicillin-pot clavulanate 500-125 mg oral tablet    2016        2/15/2016       take 1 

tablet by oral route every 12 hours for 7 days     

 

                          Zostavax (PF) 19,400 unit/0.65 mL subcutaneous suspension for reconstitution    10/17/2016

                    10/18/2016          inject 0.65 milliliter by subcutaneous route once     









                                        Discontinued 

 

             Name         Start Date    Discontinued Date    SIG          Comments

 

                Aldara 5 % topical cream in packet    2010        3/15/2011       apply to the affected area(s)

 before bedtime by topical route 3 times per week and leave on skin for 6 to 10 
hours                                    

 

                fenofibrate 160 mg oral tablet    2010       take 1 tablet (160 mg) by oral

 route once daily                        

 

                meloxicam 15 mg oral tablet    3/15/2011       2011      take 1 tablet (15 mg) by oral

 route once daily as needed              

 

                Zofran (as hydrochloride) 4 mg/5 mL oral solution    2011       take 5-10

 milliliters by oral route every 6 hours as needed     

 

                Klor-Con M20 20 mEq oral tablet,ER particles/crystals                    3/5/2013        take 1 tablet

 (20 meq) by oral route once daily with food    "not takiing"

 

                Diflucan 150 mg oral tablet    8/10/2011       2012        take 1 tablet (150 mg) by oral

 route once                              

 

                amoxicillin 500 mg oral capsule                    2012        take 1 capsule (500 mg) by oral route

 every 12 hours for 7 days               

 

             Medrol (Dominic) 4 mg oral tablets,dose pack                 2012     take as directed     

 

                Lasix 20 mg oral tablet    4/10/2012       3/5/2013        take 1 tablet (20 mg) by oral route

 once daily as needed                    

 

                amitriptyline 10 mg oral tablet    7/3/2012        2015       take 1 tablet by oral route

 once a day (at bedtime)                 

 

                cyclobenzaprine 5 mg oral tablet    7/3/2012        10/8/2013       take 1 tablet by oral route

 once a day (at bedtime) as needed       

 

                cephalexin 500 mg oral tablet    7/3/2012        3/5/2013        take 1 tablet (500 mg) by oral

 route every 12 hours                    

 

                Tessalon 200 mg oral capsule    2012       take 1 capsule (200 mg) by oral

 route 3 times per day as needed         

 

                    Claritin-D 12 Hour 5-120 mg oral tablet extended release 12 hr    2012           3/5/2013

                          take 1 tablet by oral route every 12 hours     

 

                naproxen 500 mg oral tablet    2014      take 1 tablet (500 mg) by oral

 route 2 times per day with food        "not taking now"

 

                    Dulera 100-5 mcg/actuation inhalation HFA aerosol inhaler    10/8/2013           12/3/2015 

                          inhale 2 puffs by inhalation route 2 times per day in the morning and evening     



 

                flecainide 100 mg oral tablet                    2015       take 1 tablet (100 mg) by oral route

 every 12 hours                          

 

                cyclobenzaprine 10 mg oral tablet    2014      take 1 tablet (10 mg) 

by oral route at bedtime as needed       

 

                Betapace 80 mg oral tablet                    2015       take 1 tablet (80 mg) by oral route 2 

times per day                            

 

                duloxetine 30 mg oral capsule,delayed release(DR/EC)    2014      12/3/2015       take

 1 capsule (30 mg) by oral route once a day     

 

                hydrocodone-acetaminophen 7.5-325 mg oral tablet    2014      12/3/2015       take 1 

tablet by oral route every 6 hours as needed for pain     

 

                diltiazem HCl 120 mg oral capsule, extended release                    12/3/2015       take 1 capsule

 (120 mg) by oral route once daily       

 

                carvedilol 12.5 mg oral tablet                    12/3/2015       take 1 tablet (12.5 mg) by oral route

 every 12 hours with food               dose change

 

                sotalol 120 mg oral tablet                    12/3/2015       take 1 tablet (120 mg) by oral route 2

 times per day                           

 

                Benicar 40 mg oral tablet                    2018      take 1 tablet (40 mg) by oral route once

 daily                                   

 

                tramadol 50 mg oral tablet    12/3/2015       2018      take 1 tablet (50 mg) by oral

 route every 6 hours as needed           

 

                benzonatate 200 mg oral capsule    2016        10/17/2016      take 1 capsule (200 mg) by

 oral route 3 times per day as needed     

 

                    Levemir FlexTouch 100 unit/mL (3 mL) subcutaneous insulin pen    3/1/2016            10/17/2016

                          INJECT 43 UNITS BY SUBCUTANEOUS ROUTE TWICE A DAY    has an insulin pump

 

                amiodarone 400 mg oral tablet                    2018      take 1 tablet (400 mg) by oral route

 once daily                             dose change

 

                    potassium chloride 10 mEq oral capsule, extended release    10/30/2017          2018

                          take 2 capsules (20 meq) by oral route once daily with food for 30 days     







Problem List







                    Description         Status              Onset

 

                    Diabetes Mellitus, Type II    Active               

 

                    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               

 

                    Hyperlipidemia      Active               







Vital Signs







      Date    Time    BP-Sys(mm[Hg]    BP-Missy(mm[Hg])    HR(bpm)    RR(rpm)    Temp    WT    HT    HC    BMI

                    BSA                 BMI Percentile      O2 Sat(%)

 

        2018    7:59:00 AM    160 mmHg    82 mmHg    87 bpm    18 rpm    97.5 F    160 lbs    61 in

                          30.2314 kg/m    1.7674 m                 100 %

 

        10/30/2017    10:41:00 AM    152 mmHg    88 mmHg    80 bpm    20 rpm    98.1 F    153 lbs    61 

in                        28.91 kg/m2    1.73 m2                   100 %

 

        10/17/2016    9:17:00 AM    138 mmHg    70 mmHg    83 bpm    20 rpm    97.7 F    160 lbs    61 in

                          30.2314 kg/m    1.7674 m                 99 %

 

       2016    1:34:00 PM    132 mmHg    60 mmHg    87 bpm    22 rpm    99 F    152 lbs    61 in    

                28.72 kg/m2     1.72 m2                         99 %

 

        12/3/2015    10:17:00 AM    138 mmHg    72 mmHg    80 bpm    20 rpm    98.2 F    152 lbs    61 in

                          28.7199 kg/m    1.7226 m                 98 %

 

        2015    10:31:00 AM    130 mmHg    66 mmHg    66 bpm    20 rpm    98.1 F    143 lbs    61 in

                          27.02 kg/m2    1.67 m2                   98 %

 

        2014    9:39:00 AM    138 mmHg    88 mmHg    153 bpm    20 rpm    98.7 F    150 lbs    61 

in                        28.342 kg/m    1.7112 m                 98 %

 

        6/3/2014    3:55:00 PM    130 mmHg    62 mmHg    69 bpm    18 rpm    97.9 F    151 lbs    61 in 

                          28.53 kg/m2    1.72 m2                   99 %

 

        2014    10:56:00 AM    150 mmHg    74 mmHg    81 bpm    16 rpm    96.2 F    152 lbs    61 in

                          28.7199 kg/m    1.7226 m                 97 %

 

       2014    8:52:00 AM    138 mmHg    78 mmHg    61 bpm    20 rpm    98 F    168 lbs    61 in    

                31.74 kg/m2     1.81 m2                         100 %

 

        10/8/2013    10:08:00 AM    138 mmHg    78 mmHg    148 bpm    22 rpm    98.2 F    168 lbs    61 

in                        31.743 kg/m    1.811 m                 98 %

 

        2013    8:54:00 AM    150 mmHg    80 mmHg    78 bpm    16 rpm    98.9 F    163 lbs    61 in

                          30.80 kg/m2    1.78 m2                   99 %

 

       2013    10:13:00 AM    136 mmHg    78 mmHg    70 bpm    18 rpm    97.8 F    156 lbs            

                                                                 

 

        3/5/2013    9:30:00 AM    148 mmHg    80 mmHg    76 bpm    16 rpm    97.8 F    152 lbs    61 in 

                          28.72 kg/m2    1.72 m2                    

 

        2012    10:33:00 AM    136 mmHg    74 mmHg    89 bpm    18 rpm    98.6 F    154.375 lbs    

61 in                     29.1686 kg/m    1.736 m                 99 %

 

        7/3/2012    8:08:00 AM    134 mmHg    72 mmHg    78 bpm    22 rpm    98.2 F    145 lbs    61 in 

                          27.40 kg/m2    1.68 m2                    

 

        2012    8:03:00 AM    132 mmHg    76 mmHg    70 bpm    18 rpm    97.9 F    143 lbs    61 in 

                          27.0193 kg/m    1.6708 m                  

 

        3/26/2012    11:28:00 AM    136 mmHg    74 mmHg    89 bpm    18 rpm    97.9 F    149.125 lbs    

61 in                     28.18 kg/m2    1.71 m2                    

 

        2011    8:12:00 AM    132 mmHg    82 mmHg    84 bpm    16 rpm    97.8 F    150 lbs    61 in

                          28.342 kg/m    1.7112 m                  

 

        2011    3:15:00 PM    130 mmHg    74 mmHg    68 bpm    18 rpm    98.3 F    156 lbs    61 in

                          29.48 kg/m2    1.75 m2                    

 

        8/10/2011    2:58:00 PM    120 mmHg    74 mmHg    100 bpm    20 rpm    99.4 F    169.25 lbs     

                                                                  

 

       2011    9:49:00 AM    134 mmHg    78 mmHg    74 bpm    18 rpm    98.3 F    164 lbs             

                                                                 

 

       2011    8:15:00 AM    128 mmHg    78 mmHg    62 bpm    18 rpm    98.2 F    157 lbs             

                                                                 

 

       2011    8:54:00 AM    128 mmHg    72 mmHg    104 bpm    22 rpm    98.6 F    153 lbs             

                                                                100 %

 

      3/15/2011    3:04:00 PM    144 mmHg    82 mmHg    72 bpm    18 rpm    98 F    163 lbs                 

                                                             

 

     2010    9:17:00 AM    124 mmHg    78 mmHg    76 bpm              145 lbs                             

 

 

       2010    10:13:00 AM    130 mmHg    68 mmHg    70 bpm    16 rpm    98.3 F    144 lbs             

                                                                 

 

      2010    8:21:00 AM    134 mmHg    76 mmHg    70 bpm    20 rpm    98.6 F    147 lbs                

                                                             







Social History







                    Name                Description         Comments

 

                    Tobacco             Never smoker         







History of Procedures







                    Date Ordered        Description         Order Status

 

                    12/3/2015 12:00 AM    LIPID PANEL         Reviewed

 

                    12/3/2015 12:00 AM    GLYCOSYLATED HEMOGLOBIN TEST    Reviewed

 

                    12/3/2015 12:00 AM    COMPREHEN METABOLIC PANEL    Reviewed

 

                    12/3/2015 12:00 AM    Endocrinology Consultation    Reviewed

 

                    2011 12:00 AM    METABOLIC PANEL TOTAL CA    Reviewed

 

                    2011 12:00 AM    C-REACTIVE PROTEIN    Reviewed

 

                    2011 12:00 AM    RBC SED RATE AUTOMATED    Reviewed

 

                    2011 12:00 AM    X-RAY EXAM OF FOOT    Reviewed

 

                    2016 12:00 AM    CHEST X-RAY 2VW FRONTAL&LATL    Reviewed

 

                    2016 12:00 AM    Decadron, Per 1 Mg NDC# [address]-70    Reviewed

 

                    2016 12:00 AM    Depo-Medrol, Per 80 Mg NDC#[address]-13    Reviewed

 

                    10/17/2016 12:00 AM    PNEUMOCOCCAL VACC 13 MARILEE IM    Reviewed

 

                    3/26/2012 12:00 AM    COMPLETE CBC W/AUTO DIFF WBC    Reviewed

 

                    3/26/2012 12:00 AM    PROTHROMBIN TIME    Reviewed

 

                    3/26/2012 12:00 AM    ASSAY OF BLOOD/URIC ACID    Reviewed

 

                    3/27/2012 12:00 AM    RBC SED RATE AUTOMATED    Reviewed

 

                    3/27/2012 12:00 AM    GLYCOSYLATED HEMOGLOBIN TEST    Reviewed

 

                    2017 12:00 AM    METABOLIC PANEL TOTAL CA    Reviewed

 

                    10/30/2017 12:00 AM    Rocephin 500 Mg Injection    Reviewed

 

                    2017 12:00 AM    METABOLIC PANEL TOTAL CA    Reviewed

 

                    2010 12:00 AM    BREATHING CAPACITY TEST    Reviewed

 

                    10/8/2013 12:00 AM    CHEST X-RAY 2VW FRONTAL&LATL    Reviewed

 

                    10/8/2013 12:00 AM    AIRWAY INHALATION TREATMENT    Reviewed

 

                    10/17/2013 12:00 AM    CHEST X-RAY 2VW FRONTAL&LATL    Reviewed

 

                    2014 12:00 AM    CT HEAD/BRAIN W/O & W/DYE    Reviewed

 

                    2014 12:00 AM    BREATHING CAPACITY TEST    Reviewed

 

                    6/3/2014 12:00 AM    ECG MONIT/REPRT UP TO 48 HRS    Reviewed

 

                    6/3/2014 12:00 AM    TTE W/O DOPPLER COMPLETE    Reviewed

 

                    6/3/2014 12:00 AM    Carotid Doppler     Reviewed

 

                    2010 12:00 AM    COMPREHEN METABOLIC PANEL    Reviewed

 

                    2010 12:00 AM    LIPID PANEL         Reviewed

 

                    2010 12:00 AM    GLYCOSYLATED HEMOGLOBIN TEST    Reviewed

 

                    2010 12:00 AM    MICROALBUMIN SEMIQUANT    Reviewed

 

                    2010 12:00 AM    DRAINAGE OF SKIN ABSCESS    Reviewed

 

                    2010 12:00 AM    Urine drug screen    Reviewed

 

                    2014 12:00 AM    Physiatry Consult    Reviewed

 

                    2011 12:00 AM    METABOLIC PANEL TOTAL CA    Reviewed

 

                    2011 12:00 AM    METABOLIC PANEL TOTAL CA    Reviewed

 

                    2011 12:00 AM    C-REACTIVE PROTEIN    Reviewed

 

                    8/15/2011 12:00 AM    METABOLIC PANEL TOTAL CA    Reviewed

 

                    8/15/2011 12:00 AM    GLYCOSYLATED HEMOGLOBIN TEST    Reviewed







Results Summary







                          Date and Description      Results

 

                          2010 8:22 AM          TRIGLYCERIDES 666.0 mg/dLCHOLESTEROL 241.0 mg/dLHDL 34.0 mg/dLTOT

 CHOL/HDL 7.1 LDL (CALC) INVALID MG/DLGLYCOHEMOGLOBIN A1C 10.30 %GLUCOSE 336.0 
mg/dLSODIUM 138.0 mmol/LPOTASSIUM 3.40 mmol/LCHLORIDE 102.0 mmol/LCO2 22.0 
mmol/LBUN 8.0 mg/dLCREATININE 0.70 mg/dLSGOT/AST 15.0 IU/LSGPT/ALT 7.0 IU/LALK 
PHOS 93.0 IU/LTOTAL PROTEIN 7.30 g/dLALBUMIN 3.90 g/dLTOTAL BILI 0.40 
mg/dLCALCIUM 9.10 mg/dLAGE 60 GFR NonAA 86 GFR  eGFR >60 mL/min/1.73 
m2eGFR AA* >60 CREAT UR 57.20 mg/dLMICROALBUMIN UR 19.0 ug/mLALB:CREAT RATIO 33 

 

                          2011 9:15 AM         GLUCOSE 49.0 mg/dLSODIUM 143.0 mmol/LPOTASSIUM 3.30 mmol/LCHLORIDE

 101.0 mmol/LCO2 30.0 mmol/LBUN 12.0 mg/dLCREATININE 0.50 mg/dLCALCIUM 9.10 
mg/dLAGE 60 GFR NonAA 126 GFR  eGFR >60 mL/min/1.73 m2eGFR AA* >60 

 

                          2011 10:25 AM        C REACTIVE PROTEIN 6.0 mg/LGLUCOSE 88.0 mg/dLSODIUM 143.0 mmol/LPOTASSIUM

 3.60 mmol/LCHLORIDE 104.0 mmol/LCO2 26.0 mmol/LBUN 11.0 mg/dLCREATININE 0.70 
mg/dLCALCIUM 9.40 mg/dLAGE 61 GFR NonAA 85 GFR  eGFR >60 mL/min/1.73 m2
eGFR AA* >60 

 

                          2011 11:00 AM        GLUCOSE 160.0 mg/dLSODIUM 141.0 mmol/LPOTASSIUM 3.70 mmol/LCHLORIDE

 105.0 mmol/LCO2 21.0 mmol/LBUN 13.0 mg/dLCREATININE 0.70 mg/dLCALCIUM 9.50 
mg/dLAGE 61 GFR NonAA 85 GFR  eGFR >60 mL/min/1.73 m2eGFR AA* >60 
GLYCOHEMOGLOBIN A1C 6.80 %

 

                          2011 3:50 PM        C REACTIVE PROTEIN 5.0 mg/LGLUCOSE 361.0 mg/dLSODIUM 140.0 mmol/LPOTASSIUM

 3.80 mmol/LCHLORIDE 102.0 mmol/LCO2 24.0 mmol/LBUN 9.0 mg/dLCREATININE 1.0 
mg/dLCALCIUM 9.60 mg/dLAGE 61 GFR NonAA 57 GFR AA 69 eGFR 57 eGFR AA* >60 
SEDRATE 24.0 mm/hr

 

                          3/26/2012 12:00 PM        WBC 7.3 RBC 4.46 HGB 13.50 g/dLHCT 39.0 %MCV 87.0 fLMCH 30.30

 pgMCHC 34.60 g/dLRDW SD 42 RDW CV 13.20 %MPV 9.60 fLPLT 142 NRBC# 0.00 NRBC% 
0.0 %NEUT 59.60 %%LYMP 33.50 %%MONO 5.60 %%EOS 1.20 %%BASO 0.10 %#NEUT 4.32 
#LYMP 2.43 #MONO 0.41 #EOS 0.09 #BASO 0.01 MANUAL DIFF NOT IND PROTIME 10.60 
secsINR 1.0 URIC ACID 3.9 mg/dL

 

                          3/27/2012 8:45 PM         GLYCOHEMOGLOBIN A1C 10.20 %SEDRATE 31.0 mm/hr

 

                          12/3/2015 11:22 AM        TRIGLYCERIDES 182.0 mg/dLCHOLESTEROL 180.0 mg/dLHDL 50.0 mg/dLTOT

 CHOL/HDL 3.6 LDL (CALC) 94.0 mg/dLGLUCOSE 99.0 mg/dLSODIUM 142.0 
mmol/LPOTASSIUM 4.20 mmol/LCHLORIDE 112.0 mmol/LCO2 21.0 mmol/LBUN 17.0 
mg/dLCREATININE 0.70 mg/dLSGOT/AST 17.0 IU/LSGPT/ALT 12.0 IU/LALK PHOS 86.0 
IU/LTOTAL PROTEIN 6.30 g/dLALBUMIN 4.10 g/dLTOTAL BILI 0.50 mg/dLCALCIUM 9.30 
mg/dLAGE 65 GFR NonAA 84 GFR  eGFR >60 mL/min/1.73meGFR AA* >60 
Hemoglobin A1c 9.10 %Estim. Avg Glu (eAG) 214 

 

                          11/3/2017 8:02 AM         GLUCOSE 97.0 mg/dLSODIUM 145.0 mmol/LPOTASSIUM 3.40 mmol/LCHLORIDE

 112.0 mmol/LCO2 25.0 mmol/LBUN 9.0 mg/dLCREATININE 0.70 mg/dLCALCIUM 8.80 
mg/dLAGE 67 GFR NonAA 84 GFR  eGFR >60 mL/min/1.73meGFR AA* >60 

 

                          2017 7:37 AM        GLUCOSE 172.0 mg/dLSODIUM 142.0 mmol/LPOTASSIUM 3.70 mmol/LCHLORIDE

 108.0 mmol/LCO2 26.0 mmol/LBUN 16.0 mg/dLCREATININE 0.90 mg/dLCALCIUM 9.20 
mg/dLAGE 67 GFR NonAA 63 GFR AA 76 eGFR >60 mL/min/1.73meGFR AA* >60 

 

                          2018 8:23 AM        Falls in last 6 months? No Unsteady or worry about falling? 

No Fall Risk Assessment Not At Risk 







History Of Immunizations







       Name    Date Admin    Mfg Name    Mfg Code    Trade Name    Lot#    Route    Inj    Vis Given    Vis

 Pub                                    CVX

 

          Pneumococcal    10/17/2016    [employer]-Praxis    [facility]       PREVNAR 13    [id number]    

Intramuscular    Left Deltoid    10/17/2016      2015       133







History of Past Illness







                    Name                Date of Onset       Comments

 

                    Diabetes Mellitus, Type II                         

 

                    Hyperlipidemia                           

 

                    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                         

 

                    Bronchitis, Chronic    2010  8:23AM     

 

                    Subcutaneous Nodule    2010  8:23AM     

 

                    Diabetes Mellitus, Type II    2010 10:15AM     

 

                    Hyperlipidemia, Mixed    2010 10:15AM     

 

                    Sebaceous Cyst      2010 11:32PM     

 

                    General Medical Exam, Adult    2010  9:19AM     

 

                    Bursitis, right knee    Mar 15 2011  3:05PM     

 

                    Gastroenteritis, Viral    May  2 2011  8:56AM     

 

                    Hypokalemia         May 11 2011  8:15AM     

 

                    Polymyalgia Rheumatica    May 11 2011  8:15AM     

 

                    Hypokalemia         2011  9:48AM     

 

                    Polymyalgia Rheumatica    2011  9:48AM     

 

                    Diabetes Mellitus, Type II    2011  9:48AM     

 

                    Diabetes Mellitus, Type II    Aug 10 2011  2:57PM     

 

                    Edema bilateral lower extremities    Aug 10 2011  2:57PM     

 

                    Polymyalgia Rheumatica    Dec 14 2011  3:17PM     

 

                    Arthralgia          Dec 14 2011  3:17PM     

 

                    Pain in  foot, Left Heel    Dec 27 2011  8:16AM     

 

                    Edema left lower ext.    Mar 26 2012 11:30AM     

 

                    Petechial Rash      Mar 26 2012 11:30AM     

 

                    Diabetes Mellitus, Type II    Mar 27 2012  1:39PM     

 

                    Polymyalgia Rheumatica    Mar 27 2012  1:39PM     

 

                    Edema left lower ext.    2012  8:05AM     

 

                    Diabetes Mellitus, Type II    2012  8:05AM     

 

                    Cellulitis left foot    2012  8:05AM     

 

                    Myalgia             Jul  3 2012  8:10AM     

 

                    Headache            Jul  3 2012  8:10AM     

 

                    Neuropathy, right foot    Jul  3 2012  8:10AM     

 

                    Upper Respiratory Infections    Dec  4 2012 10:35AM     

 

                    Diabetes Mellitus, Type II    Mar  5 2013  9:32AM     

 

                    Hyperlipidemia      Mar  5 2013  9:32AM     

 

                    Atrial Fibrillation    Mar  5 2013  9:32AM     

 

                    Osteoarthritis, hand    Mar  5 2013  9:32AM     

 

                    Cellulitis          2013 10:15AM     

 

                    Right Acute Otitis Media    Aug 28 2013  8:55AM     

 

                    Chronic Cough       Oct  8 2013 10:10AM     

 

                    Shortness of breath    Oct  8 2013 10:10AM     

 

                    Cough               Oct 17 2013  9:40AM     

 

                    Tension Headache    2014  8:54AM     

 

                    Blurred Vision      2014  8:54AM     

 

                    Bronchitis, Acute    2014  8:54AM     

 

                    Postoperative Follow-up    2014 10:56AM     

 

                    Basal Cell          2014 10:56AM     

 

                    Basal Cell Carcinoma of Skin    2014 10:59AM     

 

                    Sebaceous Cyst      2014 10:59AM     

 

                    Syncope             Oc  3 2014  3:57PM     

 

                    Basal cell carcinoma of skin, site unspecified    2014  7:25AM     

 

                    Lumbar back pain    Dec 19 2014  9:42AM     

 

                    Left Radiculopathy of leg    Dec 19 2014  9:42AM     

 

                    Depressive Disorder    Dec 19 2014  9:42AM     

 

                    Diabetes Mellitus, Type II    May 13 2015 10:33AM     

 

                    Hyperlipidemia      May 13 2015 10:33AM     

 

                    Systolic CHF, acute    May 13 2015 10:33AM     

 

                    Resolved Atrial fibrillation    May 13 2015 10:33AM     

 

                    Mixed hyperlipidemia    Dec  3 2015 10:19AM     

 

                    Diabetes mellitus type 2, uncontrolled    Dec  3 2015 10:19AM     

 

                    Other osteoarthritis involving multiple joints    Dec  3 2015 10:19AM     

 

                    Shortness of breath    May 17 2016  1:36PM     

 

                          Chronic obstructive pulmonary disease with (acute) exacerbation    May 17 2016  1:36PM

                                         

 

                    Need for Prevnar vaccine    Oct 17 2016  9:19AM     

 

                    Paroxysmal atrial fibrillation    Oct 17 2016  9:19AM     

 

                    Diabetes Mellitus, Type II    Oct 17 2016  9:19AM     

 

                    Hypokalemia         Oct 30 2017 10:43AM     

 

                    Hypokalemia         Nov  3 2017 11:41AM     

 

                    Cellulitis of left lower extremity    Oct 30 2017 10:43AM     

 

                    Diabetes Mellitus, Type II    Dec 14 2018  8:01AM     

 

                    Hyperlipidemia      Dec 14 2018  8:01AM     

 

                    Atrial fibrillation with controlled ventricular rate    Dec 14 2018  8:01AM     







Payers







           Insurance Name    Company Name    Plan Name    Plan Number    Policy Number    Policy Group

 Number                                 Start Date

 

                    [employer]      [employer] [place]              [id number]              2015

 

                    [employer]      [employer]              [id number]              

 

                                [facility]    PMRV PHYS/DS

                    [id number]                               N/A







History of Encounters







                    Visit Date          Visit Type          Provider

 

                    2018          Office visit         

 

                    2018          Office visit        Mateo Cooper DO

 

                    10/30/2017          Office visit        Mateo Cooper DO

 

                    10/29/2017          Hospital            LEXX Holm MD

 

                    10/17/2016          Office visit        Mateo Cooper DO

 

                    2016           Office visit        Mateo Cooper DO

 

                    12/3/2015           Office visit        Mateo Cooper DO

 

                    5/15/2015           [facility]            LEXX Holm MD

 

                    2015           Office visit        Mateo Cooper DO

 

                    2014          Office visit        Mateo Cooper DO

 

                    2014           Aren Holm MD

 

                    2014           Procedures          David Suresh MD

 

                    6/3/2014            Office visit        Mateo Cooper DO

 

                    2014           Office visit        David Suresh MD

 

                    2014            Procedures          David Suresh MD

 

                    2014           [facility]            LEXX Holm MD

 

                    2014           Office visit        Mateo Cooper DO

 

                    10/8/2013           Office visit        Mateo Cooper DO

 

                    2013           Office visit        Mateo Cooper DO

 

                    2013           Office visit        Mateo Cooper DO

 

                    3/5/2013            Office visit        Mateo Cooper DO

 

                    2013           Hospital            LEXX Holm MD

 

                    2012           Office visit        Jenni Katz APRN

 

                    2012          Hospital            LEXX oHlm MD

 

                    7/3/2012            Office visit        Mateo Cooper DO

 

                    2012            [facility]            Kristian Kiser MD

 

                    2012            [facility]            LEXX Holm MD

 

                    2012            [facility]            Kristian Kiser MD

 

                    2012            [facility]            LEXX Holm MD

 

                    2012            Office visit        Mateo Cooper DO

 

                    3/26/2012           Office visit        Jenni Katz APRN

 

                    2011          Office visit        Mateo Cooper DO

 

                    2011          Office visit        Jenni Katz APRN

 

                    8/10/2011           Office visit        Jenni Katz APRN

 

                    2011           Office visit        Mateo Cooper DO

 

                    2011           Office visit        Mateo Cooper DO

 

                    2011            [facility]            Bandar Zelaya MD

 

                    2011            [facility]            Bandar Zelaya MD

 

                    2011            [facility]            Bandar Zelaya MD

 

                    2011            [facility]            LEXX Holm MD

 

                    2011            Office visit        Mateo Cooper DO

 

                    3/15/2011           Office visit        Mateo Cooper DO

 

                    2010           Office visit        David Burciaga PA-C

 

                    2010            Office visit        Mateo Cooper DO

 

                    2010           Laboratory          Ayush Finn MD

 

                    2010           Laboratory          Ayush Finn MD

 

                    2010            Office visit        Mateo Cooper DO

## 2019-06-24 NOTE — XMS REPORT
Continuity of Care Document

                             Created on: 2019



RICK POWERS

External Reference #: [id number]

: 1950

Sex: Female



Demographics







                          Home Phone                [phone]

 

                          Preferred Language        Unknown

 

                          Marital Status            Unknown

 

                          Shinto Affiliation     Unknown

 

                          Race                      Unknown

 

                          Ethnic Group              Unknown





Author







                          Organization              Unknown

 

                          Address                   Unknown

 

                          Phone                     [phone]



              



Allergies

      





             Active              Description              Code              Type              Severity

                Reaction              Onset              Reported/Identified              Relationship

 to Patient                             Clinical Status        

 

             Yes              ETOMIDATE              [id number]              DRUG              N/A     

                LARYNGOSPASM, VENTRICULAR ARRHYTHMIA                                              

                                                 

 

             Yes              ETOMIDATE              [id number]              DRUG              N/A     

             N/A                                                                    

 

                Yes              No Known Environmental Allergies              [id number]               

              N/A              N/A                                                           

         

 

                Yes              No Known Food Allergies              [id number]                        

           N/A              N/A                                                                    



 

             Yes              PHENERGAN              [id number]              BRANDNAME              N/A

              BREATHING PROBLEMS                                                           

         

 

             Yes              PHENERGAN              [id number]              BRANDNAME              N/A

              SOB                                                                    

 

                Yes              promethazine              [id number]              Drug Allergy       

             Moderate              N/A                             2008                    

                                                 

 

                Yes              etomidate              etomidate              Drug Allergy           

             Unknown              CARDIAC ARREST                             2017              

                                                 

 

                Yes              promethazine              promethazine              Drug Allergy     

                Severe              RESPIRATORY DISTRESS                              2017 

                                                             



                                  



Medications

      



There is no data.                  



Problems

      





             Date Dx Coded              Attending              Type              Code              Diagnosis

                                        Diagnosed By        

 

                2014              ARIANNA ALVAREZ DO, Ot              228.09     

                                                             

 

                2014              ARIANNA ALVAREZ DO, Ot              724.4      

                                                             

 

                2014              THOMAS JOSEPH DO              Ot              724.02       

                          SPINAL STENOSIS, LUMBAR REG, W/OUT NEURO                       

 

                2018              ARIANNA ALVAREZ DO, Ot              228.09     

                          HEMANGIOMA NEC                       

 

                2018              ARIANNA ALVAREZ DO, Ot              724.4      

                          LUMBOSACRAL NEURITIS NOS                       

 

             2018              DARA PAL DO, Ot              M54.5              

LOW BACK PAIN                                    

 

                2018              DARA PAL DO, Ot              S16.1XXA         

                          STRAIN OF MUSCLE, FASCIA AND TENDON AT N                       

 

                2018              DARA PAL DO, Ot              S39.012A         

                          STRAIN OF MUSCLE, FASCIA AND TENDON OF L                       

 

                2018              DARA PAL DO, Ot              V47.5XXA         

                           INJURED IN [facility] WITH STATNRY                        

 

             2018              DARA PAL DO, Ot              Z79.4              

LONG TERM (CURRENT) USE OF INSULIN                       

 

                2018              DARA PAL DO, Ot              Z87.59           

                          PERSONAL HISTORY OF COMP OF PREG, CHLDBR                       

 

             2018              DARA PAL DO, Ot              Z88.8              

ALLERGY STATUS TO OTH DRUG/MEDS/BIOL SUB                       

 

                2018              DEMARCO WEBBER DARA BROWN              Ot              Z90.710          

                          ACQUIRED ABSENCE OF BOTH CERVIX AND UTER                       

 

                2018              RAIANNA ALVAREZ DO              Ot              228.09     

                          HEMANGIOMA NEC                       

 

                2018              ARIANNA ALVAREZ DO              Ot              724.4      

                          LUMBOSACRAL NEURITIS NOS                       

 

             2018              DEMAR[employer]BBER DARA BROWN              Ot              M54.5              

LOW BACK PAIN                                    

 

                2018              DEMARCO WEBBER DARA K              Ot              S16.1XXA         

                          STRAIN OF MUSCLE, FASCIA AND TENDON AT N                       

 

                2018              DARA PAL DO KEVIN              Ot              S39.012A         

                          STRAIN OF MUSCLE, FASCIA AND TENDON OF L                       

 

                2018              DEMARCO WEBBER DARA KEVIN              Ot              V47.5XXA         

                           INJURED IN [facility] WITH STATNRY                        

 

             2018              DEMAR[employer] WEBBER DARA KEVIN              Ot              Z79.4              

LONG TERM (CURRENT) USE OF INSULIN                       

 

                2018              DEMAR[employer] WEBBER DARA KEVIN              Ot              Z87.59           

                          PERSONAL HISTORY OF COMP OF PREG, CHLDBR                       

 

             2018              DEMARCO WEBBER DARA BROWN              Ot              Z88.8              

ALLERGY STATUS TO OTH DRUG/MEDS/BIOL SUB                       

 

                2018              DEMARCO WEBBER DARA BROWN              Ot              Z90.710          

                          ACQUIRED ABSENCE OF BOTH CERVIX AND UTER                       

 

             2019                             S              I429              Cardiomyopathy, 

unspecified                                      

 

             2019                             P                            Unspecified atrial

 fibrillation                                    

 

             2019                             P                            Unspecified atrial

 fibrillation                                    

 

             2019                             S              R000              Tachycardia, unspecified

                                                 

 

             2019                             S              R42              Dizziness and giddiness

                                                 

 

             2019                             S                            Other malaise  

                                                 

 

             2019                             S                            Abnormal electrocardiogram

 [ECG] [EKG]                                     

 

             2019                             S              I06471              Other long term

 (current) drug therapy                          

 

             2019                             P                            Unspecified atrial

 fibrillation                                    

 

             2019                             S                            Long QT syndrome

                                                 

 

             2019                             P                            Unspecified atrial

 fibrillation                                    

 

             2019                             S                            Shortness of breath

                                                 

 

             2019                             S              Z950              Presence of cardiac

 pacemaker                                       

 

             05/15/2019                             P              A419              Sepsis, unspecified

 organism                                        

 

             05/15/2019                             S              E118              Type 2 diabetes 

mellitus with unspecified complications                       

 

             05/15/2019                             S              I480              Paroxysmal atrial

 fibrillation                                    

 

             05/15/2019                             S              [id number]              Cellulitis of 

right lower limb                                 

 

             05/15/2019                             S              N390              Urinary tract infection,

 site not specified                              

 

             05/15/2019                             S                            Long term (current)

 use of anticoagulants                           

 

             2019                             P              A419              Sepsis, unspecified

 organism                                        

 

             2019                             S                            Type 2 diabetes

 mellitus with hyperglycemia                       

 

             2019                             S              E669              Obesity, unspecified

                                                 

 

             2019                             S              E785              Hyperlipidemia, 

unspecified                                      

 

             2019                             S              E872              Acidosis        

                                                 

 

             2019                             S                            Unspecified atrial

 fibrillation                                    

 

             2019                             S                            Acute on chronic

 systolic (congestive) heart failure                       

 

             2019                             S                            Acute respiratory

 failure with hypoxia                            

 

             2019                             S              [id number]              Cellulitis of 

right lower limb                                 

 

             2019                             S              N390              Urinary tract infection,

 site not specified                              

 

             2019                             S              R21              Rash and other nonspecific

 skin eruption                                   

 

             2019                             S                            Severe sepsis without

 septic shock                                    

 

             2019                             S                            Body mass index

 (BMI) 32.0-32.9, adult                          

 

             2019                             S                            Long term (current)

 use of antithrombotics/antiplatelets                       

 

             2019                             S              Z794              Long term (current)

 use of insulin                                  

 

             2019                             S                            Long term (current)

 use of oral hypoglycemic drugs                       

 

             2019                             S              G87004              Other long term

 (current) drug therapy                          

 

             2019                             S              Z950              Presence of cardiac

 pacemaker                                       



                                                                                
                                                        



Procedures

      



There is no data.                  



Results

      





                    Test                Result              Range        









                                        HEMOGLOBIN - 17 12:26         









                    MEAN CELL VOLUME              86.0 fl              80.0-100.0        

 

                    HEMOGLOBIN              12.8 gm/dL              12.0-16.0        









                                        METABOLIC PANEL, BASIC - 17 12:26         









                    POTASSIUM              3.3 mmol/L              3.5-5.3        

 

                    EST GFR (MDRD)              > 60 mL/min              > 59        

 

                    ANION GAP              10 mmol/L              5-15        

 

                    EST CrCl (CG)              > 60 mL/min              > 59        

 

                    GLUCOSE              91 mg/dL              70-99        

 

                    CALCIUM              8.9 mg/dL              8.5-10.1        

 

                    BLOOD UREA NITROGEN              12 mg/dL              7-20        

 

                    CREATININE              0.7 mg/dL              0.6-1.0        

 

                    SODIUM              144 mmol/L              135-148        

 

                    CHLORIDE              108 mmol/L                      

 

                    CARBON DIOXIDE              26 mmol/L              21-32        









                                        GLUCOSE (POC) - 17 12:28         









                    GLUCOSE (POC)              76 mg/dL              70-99        









                                        GLUCOSE (POC) - 17 14:08         









                    GLUCOSE (POC)              89 mg/dL              70-99        









                                        Complete blood count (CBC) with automated white blood cell (WBC) differential - 

18 09:20         









                          Blood leukocytes automated count (number/volume)              7.2 10*3/uL         

                                        4.3-11.0        

 

                          Blood erythrocytes automated count (number/volume)              4.39 10*6/uL      

                                        4.35-5.85        

 

                          Venous blood hemoglobin measurement (mass/volume)              13.1 g/dL          

                                        11.5-16.0        

 

                    Blood hematocrit (volume fraction)              38 %                35-52        

 

                    Automated erythrocyte mean corpuscular volume              87 [foz_us]              

80-99        

 

                                        Automated erythrocyte mean corpuscular hemoglobin (mass per erythrocyte)        

                          30 pg                     25-34        

 

                                        Automated erythrocyte mean corpuscular hemoglobin concentration measurement (mass/volume)

                          34 g/dL                   32-36        

 

                    Automated erythrocyte distribution width ratio              13.4 %              10.0-

14.5        

 

                    Automated blood platelet count (count/volume)              170 10*3/uL              

130-400        

 

                          Automated blood platelet mean volume measurement              9.6 [foz_us]        

                                        7.4-10.4        

 

                    Automated blood neutrophils/100 leukocytes              64 %                42-75     

   

 

                    Automated blood lymphocytes/100 leukocytes              29 %                12-44     

   

 

                    Blood monocytes/100 leukocytes              5 %                 0-12        

 

                    Automated blood eosinophils/100 leukocytes              2 %                 0-10       

 

 

                    Automated blood basophils/100 leukocytes              0 %                 0-10        

 

                          Blood neutrophils automated count (number/volume)              4.6 10*3           

                                        1.8-7.8        

 

                          Blood lymphocytes automated count (number/volume)              2.1 10*3           

                                        1.0-4.0        

 

                    Blood monocytes automated count (number/volume)              0.4 10*3              0.0-

1.0        

 

                    Automated eosinophil count              0.1 10*3/uL              0.0-0.3        

 

                    Automated blood basophil count (count/volume)              0.0 10*3/uL              

0.0-0.1        









                                        PT panel in platelet poor plasma by coagulation assay - 18 09:20         









                          Prothrombin time (PT) in platelet poor plasma by coagulation assay              14.4

 s                                      12.2-14.7        

 

                          INR in platelet poor plasma or blood by coagulation assay              1.1        

                                        0.8-1.4        









                                        Activated partial thromboplastin time (aPTT) in platelet poor plasma bycoagulation

 assay - 18 09:20         









                                        Activated partial thromboplastin time (aPTT) in platelet poor plasma bycoagulation

 assay                    28 s                      24-35        









                                        Comprehensive metabolic panel - 18 09:20         









                          Serum or plasma sodium measurement (moles/volume)              141 mmol/L         

                                        135-145        

 

                          Serum or plasma potassium measurement (moles/volume)              3.8 mmol/L      

                                        3.6-5.0        

 

                          Serum or plasma chloride measurement (moles/volume)              107 mmol/L       

                                                

 

                    Carbon dioxide              25 mmol/L              21-32        

 

                          Serum or plasma anion gap determination (moles/volume)              9 mmol/L      

                                        5-14        

 

                          Serum or plasma urea nitrogen measurement (mass/volume)              16 mg/dL     

                                        7-18        

 

                          Serum or plasma creatinine measurement (mass/volume)              0.78 mg/dL      

                                        0.60-1.30        

 

                    Serum or plasma urea nitrogen/creatinine mass ratio              21                  NRG

        

 

                                        Serum or plasma creatinine measurement with calculation of estimated glomerular 

filtration rate              >                         NRG        

 

                          Serum or plasma glucose measurement (mass/volume)              220 mg/dL          

                                                

 

                          Serum or plasma calcium measurement (mass/volume)              9.2 mg/dL          

                                        8.5-10.1        

 

                          Serum or plasma total bilirubin measurement (mass/volume)              0.4 mg/dL  

                                        0.1-1.0        

 

                                        Serum or plasma alkaline phosphatase measurement (enzymatic activity/volume)    

                          103 U/L                           

 

                                        Serum or plasma aspartate aminotransferase measurement (enzymatic activity/volume)

                          20 U/L                    5-34        

 

                                        Serum or plasma alanine aminotransferase measurement (enzymatic activity/volume)

                          11 U/L                    0-55        

 

                          Serum or plasma protein measurement (mass/volume)              7.5 g/dL           

                                        6.4-8.2        

 

                          Serum or plasma albumin measurement (mass/volume)              4.1 g/dL           

                                        3.2-4.5        









                                        Complete urinalysis with reflex to culture - 18 09:40         









                    Urine color determination              YELLOW               NRG        

 

                    Urine clarity determination              SLIGHTLY CLOUDY               NRG        

 

                    Urine pH measurement by test strip              8                   5-9        

 

                    Specific gravity of urine by test strip              1.010               1.016-1.022

        

 

                    Urine protein assay by test strip, semi-quantitative              1+                  NEGATIVE

        

 

                    Urine glucose detection by automated test strip              2+                  NEGATIVE

        

 

                          Erythrocytes detection in urine sediment by light microscopy              NEGATIVE

                                        NEGATIVE        

 

                    Urine ketones detection by automated test strip              NEGATIVE               

NEGATIVE        

 

                    Urine nitrite detection by test strip              NEGATIVE               NEGATIVE  

      

 

                    Urine total bilirubin detection by test strip              NEGATIVE               NEGATIVE

        

 

                          Urine urobilinogen measurement by automated test strip (mass/volume)              

NORMAL                                  NORMAL        

 

                    Urine leukocyte esterase detection by dipstick              1+                  NEGATIVE

        

 

                                        Automated urine sediment erythrocyte count by microscopy (number/high power field)

                          RARE                      NRG        

 

                                        Automated urine sediment leukocyte count by microscopy (number/high power field)

                           [HPF]                    NRG        

 

                          Bacteria detection in urine sediment by light microscopy              FEW         

                                        NRG        

 

                                        Squamous epithelial cells detection in urine sediment by light microscopy       

                          0-2                       NRG        

 

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

## 2019-06-24 NOTE — DIAGNOSTIC IMAGING REPORT
Indication: Fever and leg swelling.



Time of exam: 1:19 PM



Correlation is made with prior study from 03/23/2018.



The heart is enlarged but stable. Dual-lead left subclavian

cardiac pacemaker has tips in the region of the right atrium and

right ventricle. Lungs are clear. No infiltrate or failure is

seen. There is no effusion or pneumothorax.



Impression: Stable chest. No acute feature is detected.



Dictated by: 



  Dictated on workstation # [id number]

## 2019-06-24 NOTE — XMS REPORT
MU2 Ambulatory Summary

                             Created on: 2019



Ambar Grider

External Reference #: [id number]

: 1950

Sex: Female



Demographics







                          Address                   [address] [place]  [address]

 

                          Home Phone                [phone]

 

                          Preferred Language        English

 

                          Marital Status            

 

                          Confucianism Affiliation     Unknown

 

                          Race                      White

 

                          Ethnic Group              Not  or 





Author







                          Mateo Pathak

 

                          [facility]

 

                          Address                   [address]

[place], [place]  [id number]



 

                          Phone                     [phone]







Care Team Providers







                    Care Team Member Name    Role                Phone

 

                    Mateo Cooper    PCP                 [phone]

 

                    Mateo Cooper    PreferredProvider    [phone]







Allergies and Adverse Reactions







                    Name                Reaction            Notes

 

                    Phenergan                                

 

                    etomidate           "stopped heart"      







Plan of Treatment







             Planned Activity    Comments     Planned Date    Planned Time    Plan/Goal

 

             Uncontrolled Diabetes and Thyroid Nodules                                            

 

             CBC W/ AUTO DIFF (RFLX MAN DIFF IF IND).                 2019    12:00 AM      

 

             CMP                       2019    12:00 AM      







Medications







                                        Active 

 

             Name         Start Date    Estimated Completion Date    SIG          Comments

 

                Novolog Flexpen 100 unit/mL subcutaneous insulin pen    2016                       INJECT 14 UNITS

 SUBCUTANEOUSLY WITH BREAKFAST. LUNCH, AND SUPPER     

 

                acetaminophen-codeine 300-30 mg oral tablet    2016                       take 1 tablet by oral

 route every 4 hours as needed           

 

                gabapentin 600 mg oral tablet    2018        TAKE 1 TABLET BY MOUTH FOUR

 TIMES DAILY                             

 

                metformin 1,000 mg oral tablet    2018       TAKE 1 TABLET BY MOUTH TWICE

 DAILY WITH MORNING AND EVENING MEALS for 90 days     

 

                diltiazem HCl 120 mg oral capsule,extended release 24 hr                                    take 1 capsule (120

 mg) by oral route once daily            

 

                pravastatin 20 mg oral tablet                                    take 1 tablet (20 mg) by oral route once daily

                                         

 

                duloxetine 30 mg oral capsule,delayed release(DR/EC)                                    take 2 capsules (60 

mg) by oral route once daily             

 

                amiodarone 400 mg oral tablet                                    take 1 tablet (400 mg) by oral route once daily

                                         

 

                Toprol XL 50 mg oral tablet extended release 24 hr                                    take 1 tablet (50 mg) 

by oral route once daily                 

 

             Lasix 40 mg oral tablet                              take 1 tablet (40 mg) by oral route once daily     



 

                potassium chloride 10 mEq oral tablet extended release                                    take 1 tablet (10 

meq) by oral route once daily with food     









                                         

 

             Name         Start Date    Expiration Date    SIG          Comments

 

                Bactrim -160 mg oral tablet    2010       take 1 tablet by oral route

 every 12 hours for 10 days              

 

                Keflex 500 mg oral capsule    2010        take 1 capsule (500 mg) by oral 

route every 12 hours for 7 days          

 

                Reglan 10 mg oral tablet    2011        6/3/2011        take 1 tablet (10 mg) by oral route

 twice daily                             

 

                prednisone 20 mg oral tablet    2011      take 1 tablet by oral route

 daily for 10 days                       

 

                nabumetone 500 mg oral tablet    2011                      take 1 tablet (500 mg) by oral route

 2 times per day with food               

 

                indomethacin 50 mg oral capsule    2012       take 1 capsule (50 mg) by 

oral route 3 times per day with food for 7 days    "stopped it 2-3 weeks ago due to

 high glucose"

 

                Bactrim -160 mg oral tablet    2012       take 1 tablet by oral route

 every 12 hours for 7 days               

 

                Levemir 100 unit/mL subcutaneous solution    2012       inject 45 units

 by subcutaneous route twice daily      "using pen"

 

                Augmentin 500-125 mg oral tablet    2012      take 1 tablet by oral route

 every 12 hours for 7 days               

 

                cephalexin 500 mg oral tablet    2013       take 1 tablet (500 mg) by oral

 route every 12 hours for 10 days        

 

                Augmentin 500-125 mg oral tablet    2013        take 1 tablet by oral route

 every 12 hours for 7 days               

 

                Xarelto 20 mg oral tablet    12/3/2015       2016       take 1 tablet (20 mg) by oral route

 once daily with the evening meal for 30 days     

 

                amoxicillin-pot clavulanate 500-125 mg oral tablet    2016        2/15/2016       take 1 

tablet by oral route every 12 hours for 7 days     

 

                          Zostavax (PF) 19,400 unit/0.65 mL subcutaneous suspension for reconstitution    10/17/2016

                    10/18/2016          inject 0.65 milliliter by subcutaneous route once     









                                        Discontinued 

 

             Name         Start Date    Discontinued Date    SIG          Comments

 

                Aldara 5 % topical cream in packet    2010        3/15/2011       apply to the affected area(s)

 before bedtime by topical route 3 times per week and leave on skin for 6 to 10 
hours                                    

 

                fenofibrate 160 mg oral tablet    2010       take 1 tablet (160 mg) by oral

 route once daily                        

 

                meloxicam 15 mg oral tablet    3/15/2011       2011      take 1 tablet (15 mg) by oral

 route once daily as needed              

 

                Zofran (as hydrochloride) 4 mg/5 mL oral solution    2011       take 5-10

 milliliters by oral route every 6 hours as needed     

 

                Klor-Con M20 20 mEq oral tablet,ER particles/crystals                    3/5/2013        take 1 tablet

 (20 meq) by oral route once daily with food    "not takiing"

 

                Diflucan 150 mg oral tablet    8/10/2011       2012        take 1 tablet (150 mg) by oral

 route once                              

 

                amoxicillin 500 mg oral capsule                    2012        take 1 capsule (500 mg) by oral route

 every 12 hours for 7 days               

 

             Medrol (Dominic) 4 mg oral tablets,dose pack                 2012     take as directed     

 

                Lasix 20 mg oral tablet    4/10/2012       3/5/2013        take 1 tablet (20 mg) by oral route

 once daily as needed                    

 

                amitriptyline 10 mg oral tablet    7/3/2012        2015       take 1 tablet by oral route

 once a day (at bedtime)                 

 

                cyclobenzaprine 5 mg oral tablet    7/3/2012        10/8/2013       take 1 tablet by oral route

 once a day (at bedtime) as needed       

 

                cephalexin 500 mg oral tablet    7/3/2012        3/5/2013        take 1 tablet (500 mg) by oral

 route every 12 hours                    

 

                Tessalon 200 mg oral capsule    2012       take 1 capsule (200 mg) by oral

 route 3 times per day as needed         

 

                    Claritin-D 12 Hour 5-120 mg oral tablet extended release 12 hr    2012           3/5/2013

                          take 1 tablet by oral route every 12 hours     

 

                naproxen 500 mg oral tablet    2014      take 1 tablet (500 mg) by oral

 route 2 times per day with food        "not taking now"

 

                    Dulera 100-5 mcg/actuation inhalation HFA aerosol inhaler    10/8/2013           12/3/2015 

                          inhale 2 puffs by inhalation route 2 times per day in the morning and evening     



 

                flecainide 100 mg oral tablet                    2015       take 1 tablet (100 mg) by oral route

 every 12 hours                          

 

                cyclobenzaprine 10 mg oral tablet    2014      take 1 tablet (10 mg) 

by oral route at bedtime as needed       

 

                Betapace 80 mg oral tablet                    2015       take 1 tablet (80 mg) by oral route 2 

times per day                            

 

                duloxetine 30 mg oral capsule,delayed release(DR/EC)    2014      12/3/2015       take

 1 capsule (30 mg) by oral route once a day     

 

                hydrocodone-acetaminophen 7.5-325 mg oral tablet    2014      12/3/2015       take 1 

tablet by oral route every 6 hours as needed for pain     

 

                diltiazem HCl 120 mg oral capsule, extended release                    12/3/2015       take 1 capsule

 (120 mg) by oral route once daily       

 

                carvedilol 12.5 mg oral tablet                    12/3/2015       take 1 tablet (12.5 mg) by oral route

 every 12 hours with food               dose change

 

                sotalol 120 mg oral tablet                    12/3/2015       take 1 tablet (120 mg) by oral route 2

 times per day                           

 

                carvedilol 3.125 mg oral tablet                    2019       take 1 tablet (3.125 mg) by oral 

route once daily                         

 

                Benicar 40 mg oral tablet                    2018      take 1 tablet (40 mg) by oral route once

 daily                                   

 

                tramadol 50 mg oral tablet    12/3/2015       2018      take 1 tablet (50 mg) by oral

 route every 6 hours as needed           

 

                benzonatate 200 mg oral capsule    2016        10/17/2016      take 1 capsule (200 mg) by

 oral route 3 times per day as needed     

 

                    Levemir FlexTouch 100 unit/mL (3 mL) subcutaneous insulin pen    3/1/2016            10/17/2016

                          INJECT 43 UNITS BY SUBCUTANEOUS ROUTE TWICE A DAY    has an insulin pump

 

                amiodarone 400 mg oral tablet                    2018      take 1 tablet (400 mg) by oral route

 once daily                             dose change

 

                    potassium chloride 10 mEq oral capsule, extended release    10/30/2017          2018

                          take 2 capsules (20 meq) by oral route once daily with food for 30 days     

 

                amiodarone 400 mg oral tablet                    2019       take 2 tablets by oral route once daily

                                        dose updated







Problem List







                    Description         Status              Onset

 

                    Diabetes Mellitus, Type II    Active               

 

                    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               

 

                    Hyperlipidemia      Active               







Vital Signs







      Date    Time    BP-Sys(mm[Hg]    BP-Missy(mm[Hg])    HR(bpm)    RR(rpm)    Temp    WT    HT    HC    BMI

                    BSA                 BMI Percentile      O2 Sat(%)

 

        2019    1:15:00 PM    116 mmHg    70 mmHg    89 bpm    22 rpm    97.5 F    155 lbs    61 in

                          29.2867 kg/m    1.7395 m                 98 %

 

        2018    7:59:00 AM    160 mmHg    82 mmHg    87 bpm    18 rpm    97.5 F    160 lbs    61 in

                          30.23 kg/m2    1.77 m2                   100 %

 

        10/30/2017    10:41:00 AM    152 mmHg    88 mmHg    80 bpm    20 rpm    98.1 F    153 lbs    61 

in                        28.9088 kg/m    1.7283 m                 100 %

 

        10/17/2016    9:17:00 AM    138 mmHg    70 mmHg    83 bpm    20 rpm    97.7 F    160 lbs    61 in

                          30.23 kg/m2    1.77 m2                   99 %

 

       2016    1:34:00 PM    132 mmHg    60 mmHg    87 bpm    22 rpm    99 F    152 lbs    61 in    

                28.7199 kg/m    1.7226 m                      99 %

 

        12/3/2015    10:17:00 AM    138 mmHg    72 mmHg    80 bpm    20 rpm    98.2 F    152 lbs    61 in

                          28.72 kg/m2    1.72 m2                   98 %

 

        2015    10:31:00 AM    130 mmHg    66 mmHg    66 bpm    20 rpm    98.1 F    143 lbs    61 in

                          27.0193 kg/m    1.6708 m                 98 %

 

        2014    9:39:00 AM    138 mmHg    88 mmHg    153 bpm    20 rpm    98.7 F    150 lbs    61 

in                        28.34 kg/m2    1.71 m2                   98 %

 

        6/3/2014    3:55:00 PM    130 mmHg    62 mmHg    69 bpm    18 rpm    97.9 F    151 lbs    61 in 

                          28.53 kg/m2    1.72 m2                   99 %

 

        2014    10:56:00 AM    150 mmHg    74 mmHg    81 bpm    16 rpm    96.2 F    152 lbs    61 in

                          28.7199 kg/m    1.7226 m                 97 %

 

       2014    8:52:00 AM    138 mmHg    78 mmHg    61 bpm    20 rpm    98 F    168 lbs    61 in    

                31.74 kg/m2     1.81 m2                         100 %

 

        10/8/2013    10:08:00 AM    138 mmHg    78 mmHg    148 bpm    22 rpm    98.2 F    168 lbs    61 

in                        31.743 kg/m    1.811 m                 98 %

 

        2013    8:54:00 AM    150 mmHg    80 mmHg    78 bpm    16 rpm    98.9 F    163 lbs    61 in

                          30.80 kg/m2    1.78 m2                   99 %

 

       2013    10:13:00 AM    136 mmHg    78 mmHg    70 bpm    18 rpm    97.8 F    156 lbs            

                                                                 

 

        3/5/2013    9:30:00 AM    148 mmHg    80 mmHg    76 bpm    16 rpm    97.8 F    152 lbs    61 in 

                          28.72 kg/m2    1.72 m2                    

 

        2012    10:33:00 AM    136 mmHg    74 mmHg    89 bpm    18 rpm    98.6 F    154.375 lbs    

61 in                     29.1686 kg/m    1.736 m                 99 %

 

        7/3/2012    8:08:00 AM    134 mmHg    72 mmHg    78 bpm    22 rpm    98.2 F    145 lbs    61 in 

                          27.40 kg/m2    1.68 m2                    

 

        2012    8:03:00 AM    132 mmHg    76 mmHg    70 bpm    18 rpm    97.9 F    143 lbs    61 in 

                          27.0193 kg/m    1.6708 m                  

 

        3/26/2012    11:28:00 AM    136 mmHg    74 mmHg    89 bpm    18 rpm    97.9 F    149.125 lbs    

61 in                     28.18 kg/m2    1.71 m2                    

 

        2011    8:12:00 AM    132 mmHg    82 mmHg    84 bpm    16 rpm    97.8 F    150 lbs    61 in

                          28.342 kg/m    1.7112 m                  

 

        2011    3:15:00 PM    130 mmHg    74 mmHg    68 bpm    18 rpm    98.3 F    156 lbs    61 in

                          29.48 kg/m2    1.75 m2                    

 

        8/10/2011    2:58:00 PM    120 mmHg    74 mmHg    100 bpm    20 rpm    99.4 F    169.25 lbs     

                                                                  

 

       2011    9:49:00 AM    134 mmHg    78 mmHg    74 bpm    18 rpm    98.3 F    164 lbs             

                                                                 

 

       2011    8:15:00 AM    128 mmHg    78 mmHg    62 bpm    18 rpm    98.2 F    157 lbs             

                                                                 

 

       2011    8:54:00 AM    128 mmHg    72 mmHg    104 bpm    22 rpm    98.6 F    153 lbs             

                                                                100 %

 

      3/15/2011    3:04:00 PM    144 mmHg    82 mmHg    72 bpm    18 rpm    98 F    163 lbs                 

                                                             

 

     2010    9:17:00 AM    124 mmHg    78 mmHg    76 bpm              145 lbs                             

 

 

       2010    10:13:00 AM    130 mmHg    68 mmHg    70 bpm    16 rpm    98.3 F    144 lbs             

                                                                 

 

      2010    8:21:00 AM    134 mmHg    76 mmHg    70 bpm    20 rpm    98.6 F    147 lbs                

                                                             







Social History







                    Name                Description         Comments

 

                    Tobacco             Never smoker         







History of Procedures







                    Date Ordered        Description         Order Status

 

                    12/3/2015 12:00 AM    LIPID PANEL         Reviewed

 

                    12/3/2015 12:00 AM    GLYCOSYLATED HEMOGLOBIN TEST    Reviewed

 

                    12/3/2015 12:00 AM    COMPREHEN METABOLIC PANEL    Reviewed

 

                    12/3/2015 12:00 AM    Endocrinology Consultation    Reviewed

 

                    2011 12:00 AM    METABOLIC PANEL TOTAL CA    Reviewed

 

                    2011 12:00 AM    C-REACTIVE PROTEIN    Reviewed

 

                    2011 12:00 AM    RBC SED RATE AUTOMATED    Reviewed

 

                    2011 12:00 AM    X-RAY EXAM OF FOOT    Reviewed

 

                    2016 12:00 AM    CHEST X-RAY 2VW FRONTAL&LATL    Reviewed

 

                    2016 12:00 AM    Decadron, Per 1 Mg NDC# [address]-57    Reviewed

 

                    2016 12:00 AM    Depo-Medrol, Per 80 Mg NDC#[address]-74    Reviewed

 

                    10/17/2016 12:00 AM    PNEUMOCOCCAL VACC 13 MARILEE IM    Reviewed

 

                    3/26/2012 12:00 AM    COMPLETE CBC W/AUTO DIFF WBC    Reviewed

 

                    3/26/2012 12:00 AM    PROTHROMBIN TIME    Reviewed

 

                    3/26/2012 12:00 AM    ASSAY OF BLOOD/URIC ACID    Reviewed

 

                    3/27/2012 12:00 AM    RBC SED RATE AUTOMATED    Reviewed

 

                    3/27/2012 12:00 AM    GLYCOSYLATED HEMOGLOBIN TEST    Reviewed

 

                    2017 12:00 AM    METABOLIC PANEL TOTAL CA    Reviewed

 

                    10/30/2017 12:00 AM    Rocephin 500 Mg Injection    Reviewed

 

                    2017 12:00 AM    METABOLIC PANEL TOTAL CA    Reviewed

 

                    2010 12:00 AM    BREATHING CAPACITY TEST    Reviewed

 

                    10/8/2013 12:00 AM    CHEST X-RAY 2VW FRONTAL&LATL    Reviewed

 

                    10/8/2013 12:00 AM    AIRWAY INHALATION TREATMENT    Reviewed

 

                    10/17/2013 12:00 AM    CHEST X-RAY 2VW FRONTAL&LATL    Reviewed

 

                    2014 12:00 AM    CT HEAD/BRAIN W/O & W/DYE    Reviewed

 

                    2014 12:00 AM    BREATHING CAPACITY TEST    Reviewed

 

                    6/3/2014 12:00 AM    ECG MONIT/REPRT UP TO 48 HRS    Reviewed

 

                    6/3/2014 12:00 AM    TTE W/O DOPPLER COMPLETE    Reviewed

 

                    6/3/2014 12:00 AM    Carotid Doppler     Reviewed

 

                    2010 12:00 AM    COMPREHEN METABOLIC PANEL    Reviewed

 

                    2010 12:00 AM    LIPID PANEL         Reviewed

 

                    2010 12:00 AM    GLYCOSYLATED HEMOGLOBIN TEST    Reviewed

 

                    2010 12:00 AM    MICROALBUMIN SEMIQUANT    Reviewed

 

                    2010 12:00 AM    DRAINAGE OF SKIN ABSCESS    Reviewed

 

                    2010 12:00 AM    Urine drug screen    Reviewed

 

                    2014 12:00 AM    Physiatry Consult    Reviewed

 

                    2011 12:00 AM    METABOLIC PANEL TOTAL CA    Reviewed

 

                    2011 12:00 AM    METABOLIC PANEL TOTAL CA    Reviewed

 

                    2011 12:00 AM    C-REACTIVE PROTEIN    Reviewed

 

                    8/15/2011 12:00 AM    METABOLIC PANEL TOTAL CA    Reviewed

 

                    8/15/2011 12:00 AM    GLYCOSYLATED HEMOGLOBIN TEST    Reviewed







Results Summary







                          Date and Description      Results

 

                          2010 8:22 AM          TRIGLYCERIDES 666.0 mg/dLCHOLESTEROL 241.0 mg/dLHDL 34.0 mg/dLTOT

 CHOL/HDL 7.1 LDL (CALC) INVALID MG/DLGLYCOHEMOGLOBIN A1C 10.30 %GLUCOSE 336.0 
mg/dLSODIUM 138.0 mmol/LPOTASSIUM 3.40 mmol/LCHLORIDE 102.0 mmol/LCO2 22.0 
mmol/LBUN 8.0 mg/dLCREATININE 0.70 mg/dLSGOT/AST 15.0 IU/LSGPT/ALT 7.0 IU/LALK 
PHOS 93.0 IU/LTOTAL PROTEIN 7.30 g/dLALBUMIN 3.90 g/dLTOTAL BILI 0.40 
mg/dLCALCIUM 9.10 mg/dLAGE 60 GFR NonAA 86 GFR  eGFR >60 mL/min/1.73 
m2eGFR AA* >60 CREAT UR 57.20 mg/dLMICROALBUMIN UR 19.0 ug/mLALB:CREAT RATIO 33 

 

                          2011 9:15 AM         GLUCOSE 49.0 mg/dLSODIUM 143.0 mmol/LPOTASSIUM 3.30 mmol/LCHLORIDE

 101.0 mmol/LCO2 30.0 mmol/LBUN 12.0 mg/dLCREATININE 0.50 mg/dLCALCIUM 9.10 
mg/dLAGE 60 GFR NonAA 126 GFR  eGFR >60 mL/min/1.73 m2eGFR AA* >60 

 

                          2011 10:25 AM        C REACTIVE PROTEIN 6.0 mg/LGLUCOSE 88.0 mg/dLSODIUM 143.0 mmol/LPOTASSIUM

 3.60 mmol/LCHLORIDE 104.0 mmol/LCO2 26.0 mmol/LBUN 11.0 mg/dLCREATININE 0.70 
mg/dLCALCIUM 9.40 mg/dLAGE 61 GFR NonAA 85 GFR  eGFR >60 mL/min/1.73 m2
eGFR AA* >60 

 

                          2011 11:00 AM        GLUCOSE 160.0 mg/dLSODIUM 141.0 mmol/LPOTASSIUM 3.70 mmol/LCHLORIDE

 105.0 mmol/LCO2 21.0 mmol/LBUN 13.0 mg/dLCREATININE 0.70 mg/dLCALCIUM 9.50 
mg/dLAGE 61 GFR NonAA 85 GFR  eGFR >60 mL/min/1.73 m2eGFR AA* >60 
GLYCOHEMOGLOBIN A1C 6.80 %

 

                          2011 3:50 PM        C REACTIVE PROTEIN 5.0 mg/LGLUCOSE 361.0 mg/dLSODIUM 140.0 mmol/LPOTASSIUM

 3.80 mmol/LCHLORIDE 102.0 mmol/LCO2 24.0 mmol/LBUN 9.0 mg/dLCREATININE 1.0 
mg/dLCALCIUM 9.60 mg/dLAGE 61 GFR NonAA 57 GFR AA 69 eGFR 57 eGFR AA* >60 
SEDRATE 24.0 mm/hr

 

                          3/26/2012 12:00 PM        WBC 7.3 RBC 4.46 HGB 13.50 g/dLHCT 39.0 %MCV 87.0 fLMCH 30.30

 pgMCHC 34.60 g/dLRDW SD 42 RDW CV 13.20 %MPV 9.60 fLPLT 142 NRBC# 0.00 NRBC% 
0.0 %NEUT 59.60 %%LYMP 33.50 %%MONO 5.60 %%EOS 1.20 %%BASO 0.10 %#NEUT 4.32 
#LYMP 2.43 #MONO 0.41 #EOS 0.09 #BASO 0.01 MANUAL DIFF NOT IND PROTIME 10.60 
secsINR 1.0 URIC ACID 3.9 mg/dL

 

                          3/27/2012 8:45 PM         GLYCOHEMOGLOBIN A1C 10.20 %SEDRATE 31.0 mm/hr

 

                          12/3/2015 11:22 AM        TRIGLYCERIDES 182.0 mg/dLCHOLESTEROL 180.0 mg/dLHDL 50.0 mg/dLTOT

 CHOL/HDL 3.6 LDL (CALC) 94.0 mg/dLGLUCOSE 99.0 mg/dLSODIUM 142.0 
mmol/LPOTASSIUM 4.20 mmol/LCHLORIDE 112.0 mmol/LCO2 21.0 mmol/LBUN 17.0 
mg/dLCREATININE 0.70 mg/dLSGOT/AST 17.0 IU/LSGPT/ALT 12.0 IU/LALK PHOS 86.0 
IU/LTOTAL PROTEIN 6.30 g/dLALBUMIN 4.10 g/dLTOTAL BILI 0.50 mg/dLCALCIUM 9.30 
mg/dLAGE 65 GFR NonAA 84 GFR  eGFR >60 mL/min/1.73meGFR AA* >60 
Hemoglobin A1c 9.10 %Estim. Avg Glu (eAG) 214 

 

                          11/3/2017 8:02 AM         GLUCOSE 97.0 mg/dLSODIUM 145.0 mmol/LPOTASSIUM 3.40 mmol/LCHLORIDE

 112.0 mmol/LCO2 25.0 mmol/LBUN 9.0 mg/dLCREATININE 0.70 mg/dLCALCIUM 8.80 
mg/dLAGE 67 GFR NonAA 84 GFR  eGFR >60 mL/min/1.73meGFR AA* >60 

 

                          2017 7:37 AM        GLUCOSE 172.0 mg/dLSODIUM 142.0 mmol/LPOTASSIUM 3.70 mmol/LCHLORIDE

 108.0 mmol/LCO2 26.0 mmol/LBUN 16.0 mg/dLCREATININE 0.90 mg/dLCALCIUM 9.20 
mg/dLAGE 67 GFR NonAA 63 GFR AA 76 eGFR >60 mL/min/1.73meGFR AA* >60 

 

                          2018 8:23 AM        Falls in last 6 months? No Unsteady or worry about falling? 

No Fall Risk Assessment Not At Risk 







History Of Immunizations







       Name    Date Admin    Mfg Name    Mfg Code    Trade Name    Lot#    Route    Inj    Vis Given    Vis

 Pub                                    CVX

 

          Pneumococcal    10/17/2016    [employer]    [facility]       PREVNAR 13    [id number]    

Intramuscular    Left Deltoid    10/17/2016      2015       133







History of Past Illness







                    Name                Date of Onset       Comments

 

                    Diabetes Mellitus, Type II                         

 

                    Hyperlipidemia                           

 

                    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                         

 

                    Bronchitis, Chronic    2010  8:23AM     

 

                    Subcutaneous Nodule    2010  8:23AM     

 

                    Diabetes Mellitus, Type II    2010 10:15AM     

 

                    Hyperlipidemia, Mixed    2010 10:15AM     

 

                    Sebaceous Cyst      2010 11:32PM     

 

                    General Medical Exam, Adult    2010  9:19AM     

 

                    Bursitis, right knee    Mar 15 2011  3:05PM     

 

                    Gastroenteritis, Viral    May  2 2011  8:56AM     

 

                    Hypokalemia         May 11 2011  8:15AM     

 

                    Polymyalgia Rheumatica    May 11 2011  8:15AM     

 

                    Hypokalemia         2011  9:48AM     

 

                    Polymyalgia Rheumatica    2011  9:48AM     

 

                    Diabetes Mellitus, Type II    2011  9:48AM     

 

                    Diabetes Mellitus, Type II    Aug 10 2011  2:57PM     

 

                    Edema bilateral lower extremities    Aug 10 2011  2:57PM     

 

                    Polymyalgia Rheumatica    Dec 14 2011  3:17PM     

 

                    Arthralgia          Dec 14 2011  3:17PM     

 

                    Pain in  foot, Left Heel    Dec 27 2011  8:16AM     

 

                    Edema left lower ext.    Mar 26 2012 11:30AM     

 

                    Petechial Rash      Mar 26 2012 11:30AM     

 

                    Diabetes Mellitus, Type II    Mar 27 2012  1:39PM     

 

                    Polymyalgia Rheumatica    Mar 27 2012  1:39PM     

 

                    Edema left lower ext.    2012  8:05AM     

 

                    Diabetes Mellitus, Type II    2012  8:05AM     

 

                    Cellulitis left foot    2012  8:05AM     

 

                    Myalgia             Jul  3 2012  8:10AM     

 

                    Headache            Jul  3 2012  8:10AM     

 

                    Neuropathy, right foot    Jul  3 2012  8:10AM     

 

                    Upper Respiratory Infections    Dec  4 2012 10:35AM     

 

                    Diabetes Mellitus, Type II    Mar  5 2013  9:32AM     

 

                    Hyperlipidemia      Mar  5 2013  9:32AM     

 

                    Atrial Fibrillation    Mar  5 2013  9:32AM     

 

                    Osteoarthritis, hand    Mar  5 2013  9:32AM     

 

                    Cellulitis          2013 10:15AM     

 

                    Right Acute Otitis Media    Aug 28 2013  8:55AM     

 

                    Chronic Cough       Oct  8 2013 10:10AM     

 

                    Shortness of breath    Oct  8 2013 10:10AM     

 

                    Cough               Oct 17 2013  9:40AM     

 

                    Tension Headache    2014  8:54AM     

 

                    Blurred Vision      2014  8:54AM     

 

                    Bronchitis, Acute    2014  8:54AM     

 

                    Postoperative Follow-up    2014 10:56AM     

 

                    Basal Cell          2014 10:56AM     

 

                    Basal Cell Carcinoma of Skin    2014 10:59AM     

 

                    Sebaceous Cyst      2014 10:59AM     

 

                    Syncope             Oc  3 2014  3:57PM     

 

                    Basal cell carcinoma of skin, site unspecified    2014  7:25AM     

 

                    Lumbar back pain    Dec 19 2014  9:42AM     

 

                    Left Radiculopathy of leg    Dec 19 2014  9:42AM     

 

                    Depressive Disorder    Dec 19 2014  9:42AM     

 

                    Diabetes Mellitus, Type II    May 13 2015 10:33AM     

 

                    Hyperlipidemia      May 13 2015 10:33AM     

 

                    Systolic CHF, acute    May 13 2015 10:33AM     

 

                    Resolved Atrial fibrillation    May 13 2015 10:33AM     

 

                    Mixed hyperlipidemia    Dec  3 2015 10:19AM     

 

                    Diabetes mellitus type 2, uncontrolled    Dec  3 2015 10:19AM     

 

                    Other osteoarthritis involving multiple joints    Dec  3 2015 10:19AM     

 

                    Shortness of breath    May 17 2016  1:36PM     

 

                          Chronic obstructive pulmonary disease with (acute) exacerbation    May 17 2016  1:36PM

                                         

 

                    Need for Prevnar vaccine    Oct 17 2016  9:19AM     

 

                    Paroxysmal atrial fibrillation    Oct 17 2016  9:19AM     

 

                    Diabetes Mellitus, Type II    Oct 17 2016  9:19AM     

 

                    Hypokalemia         Oct 30 2017 10:43AM     

 

                    Hypokalemia         Nov  3 2017 11:41AM     

 

                    Cellulitis of left lower extremity    Oct 30 2017 10:43AM     

 

                    Diabetes Mellitus, Type II    Dec 14 2018  8:01AM     

 

                    Hyperlipidemia      Dec 14 2018  8:01AM     

 

                    Atrial fibrillation with controlled ventricular rate    Dec 14 2018  8:01AM     

 

                    Diabetes Mellitus, Type II    May 21 2019  1:17PM     

 

                    Hyperlipidemia      May 21 2019  1:17PM     







Payers







           Insurance Name    Company Name    Plan Name    Plan Number    Policy Number    Policy Group

 Number                                 Start Date

 

                    [employer]      [employer] [place]              [id number]              2015

 

                    [employer]      [employer] [place]              [id number]              N/A

 

                    [employer]      [employer] [place]              [id number]              

 

                                [facility]    PMRV PHYS/DS

                    [id number]                               N/A







History of Encounters







                    Visit Date          Visit Type          Provider

 

                    2019           Office visit        Mateo Cooper DO

 

                    2018          Office visit         

 

                    2018          Office visit        Mateo Cooper DO

 

                    2018          [facility]            LEXX Holm MD

 

                    10/30/2017          Office visit        Mateo Cooper DO

 

                    10/29/2017          [facility]            LEXX Holm MD

 

                    10/17/2016          Office visit        Mateo Kenneth DO

 

                    2016           Office visit        Mateo Kenneth DO

 

                    12/3/2015           Office visit        Mateo Kenneth DO

 

                    5/15/2015           [facility]            LEXX Holm MD

 

                    2015           Office visit        Mateo Kenneth DO

 

                    2014          Office visit        Mateo Kenneth DO

 

                    2014           [facility]            LEXX Holm MD

 

                    2014           Procedures          David Suresh MD

 

                    6/3/2014            Office visit        Mateo Cooper DO

 

                    2014           Office visit        David Suresh MD

 

                    2014            Procedures          David Suresh MD

 

                    2014           [facility]            LEXX Holm MD

 

                    2014           Office visit        Mateo Kenneth DO

 

                    10/8/2013           Office visit        Mateo Kenneth DO

 

                    2013           Office visit        Mateo Kenneth DO

 

                    2013           Office visit        Mateo Kenneth DO

 

                    3/5/2013            Office visit        Mateo Cooper DO

 

                    2013           [facility]            LEXX Holm MD

 

                    2012           Office visit        Jenni Katz APRN

 

                    2012          [facility]            LEXX Holm MD

 

                    7/3/2012            Office visit        Mateo Cooper DO

 

                    2012            [facility]            Kristian Kiser MD

 

                    2012            [facility]            LEXX Holm MD

 

                    2012            [facility]            Kristian Kiser MD

 

                    2012            [facility]            LEXX Holm MD

 

                    2012            Office visit        Mateo Cooper DO

 

                    3/26/2012           Office visit        Jenni Katz APRN

 

                    2011          Office visit        Mateo Cooper DO

 

                    2011          Office visit        Jenni Katz APRN

 

                    8/10/2011           Office visit        Jenni Katz APRN

 

                    2011           Office visit        Mateo Kenneth DO

 

                    2011           Office visit        Mateo Cooper DO

 

                    2011            [facility]            Bandar Zelaya MD

 

                    2011            [facility]            Bandar Zelaya MD

 

                    2011            [facility]            Bandar Zelaya MD

 

                    2011            [facility]            LEXX Holm MD

 

                    2011            Office visit        Mateo Cooper DO

 

                    3/15/2011           Office visit        Mateo Cooper DO

 

                    2010           Office visit        David Burciaga PA-C

 

                    2010            Office visit        Mateo Cooper DO

 

                    2010           Laboratory          Ayush Finn MD

 

                    2010           Laboratory          Ayush Finn MD

 

                    2010            Office visit        Mateo Cooper DO

## 2019-06-24 NOTE — XMS REPORT
Clinical Summary

                             Created on: 2019



Ambar Grider

External Reference #: [id number]

: 1950

Sex: Female



Demographics







                          Address                   [address] [place]  [address]

 

                          Home Phone                [phone]

 

                          Preferred Language        Unknown

 

                          Marital Status            Unknown

 

                          Mormon Affiliation     Unknown

 

                          Race                      Unknown

 

                          Ethnic Group              Unknown





Author







                          Author                    [facility]

 

                          Organization              [facility]

 

                          Address                   Unknown

 

                          Phone                     Unavailable







Care Team Providers







                    Care Team Member Name    Role                Phone

 

                          PCP                       Unavailable







Allergies

Not on File



Current Medications

Not on file



Active Problems





Not on file



Social History







    



              Tobacco Use     Types        Packs/Day     Years Used     Date

 

    



                                         Never Assessed    









 



                           Sex Assigned at Birth     Date Recorded

 

 



                                         Not on file 







Last Filed Vital Signs

Not on file



Plan of Treatment





Not on file



Results

Not on filefrom Last 3 Months

## 2019-06-24 NOTE — XMS REPORT
MU2 Ambulatory Summary

                             Created on: 2019



Ambar Grider

External Reference #: [id number]

: 1950

Sex: Female



Demographics







                          Address                   [address] [place]  [address]

 

                          Home Phone                [phone]

 

                          Preferred Language        English

 

                          Marital Status            

 

                          Anabaptism Affiliation     Unknown

 

                          Race                      White

 

                          Ethnic Group              Not  or 





Author







                          Author                    Mateo Cooper

 

                          [facility]

 

                          Address                   [address]

[place], [place]  [id number]



 

                          Phone                     [phone]







Care Team Providers







                    Care Team Member Name    Role                Phone

 

                    Mateo Cooper    PCP                 [phone]

 

                    Mateo Cooper    PreferredProvider    [phone]







Allergies and Adverse Reactions







                    Name                Reaction            Notes

 

                    Phenergan                                

 

                    etomidate           "stopped heart"      







Plan of Treatment







             Planned Activity    Comments     Planned Date    Planned Time    Plan/Goal

 

             Uncontrolled Diabetes and Thyroid Nodules                                            







Medications







                                        Active 

 

             Name         Start Date    Estimated Completion Date    SIG          Comments

 

                Novolog Flexpen 100 unit/mL subcutaneous insulin pen    2016                       INJECT 14 UNITS

 SUBCUTANEOUSLY WITH BREAKFAST. LUNCH, AND SUPPER     

 

                acetaminophen-codeine 300-30 mg oral tablet    2016                       take 1 tablet by oral

 route every 4 hours as needed           

 

                gabapentin 600 mg oral tablet    2018        TAKE 1 TABLET BY MOUTH FOUR

 TIMES DAILY                             

 

                metformin 1,000 mg oral tablet    2018       TAKE 1 TABLET BY MOUTH TWICE

 DAILY WITH MORNING AND EVENING MEALS for 90 days     

 

                diltiazem HCl 120 mg oral capsule,extended release 24 hr                                    take 1 capsule (120

 mg) by oral route once daily            

 

                pravastatin 20 mg oral tablet                                    take 1 tablet (20 mg) by oral route once daily

                                         

 

                duloxetine 30 mg oral capsule,delayed release(DR/EC)                                    take 2 capsules (60 

mg) by oral route once daily             

 

                amiodarone 400 mg oral tablet                                    take 1 tablet (400 mg) by oral route once daily

                                         

 

                Toprol XL 50 mg oral tablet extended release 24 hr                                    take 1 tablet (50 mg) 

by oral route once daily                 

 

             Lasix 40 mg oral tablet                              take 1 tablet (40 mg) by oral route once daily     



 

                potassium chloride 10 mEq oral tablet extended release                                    take 1 tablet (10 

meq) by oral route once daily with food     









                                         

 

             Name         Start Date    Expiration Date    SIG          Comments

 

                Bactrim -160 mg oral tablet    2010       take 1 tablet by oral route

 every 12 hours for 10 days              

 

                Keflex 500 mg oral capsule    2010        take 1 capsule (500 mg) by oral 

route every 12 hours for 7 days          

 

                Reglan 10 mg oral tablet    2011        6/3/2011        take 1 tablet (10 mg) by oral route

 twice daily                             

 

                prednisone 20 mg oral tablet    2011      take 1 tablet by oral route

 daily for 10 days                       

 

                nabumetone 500 mg oral tablet    2011                      take 1 tablet (500 mg) by oral route

 2 times per day with food               

 

                indomethacin 50 mg oral capsule    2012       take 1 capsule (50 mg) by 

oral route 3 times per day with food for 7 days    "stopped it 2-3 weeks ago due to

 high glucose"

 

                Bactrim -160 mg oral tablet    2012       take 1 tablet by oral route

 every 12 hours for 7 days               

 

                Levemir 100 unit/mL subcutaneous solution    2012       inject 45 units

 by subcutaneous route twice daily      "using pen"

 

                Augmentin 500-125 mg oral tablet    2012      take 1 tablet by oral route

 every 12 hours for 7 days               

 

                cephalexin 500 mg oral tablet    2013       take 1 tablet (500 mg) by oral

 route every 12 hours for 10 days        

 

                Augmentin 500-125 mg oral tablet    2013        take 1 tablet by oral route

 every 12 hours for 7 days               

 

                Xarelto 20 mg oral tablet    12/3/2015       2016       take 1 tablet (20 mg) by oral route

 once daily with the evening meal for 30 days     

 

                amoxicillin-pot clavulanate 500-125 mg oral tablet    2016        2/15/2016       take 1 

tablet by oral route every 12 hours for 7 days     

 

                          Zostavax (PF) 19,400 unit/0.65 mL subcutaneous suspension for reconstitution    10/17/2016

                    10/18/2016          inject 0.65 milliliter by subcutaneous route once     









                                        Discontinued 

 

             Name         Start Date    Discontinued Date    SIG          Comments

 

                Aldara 5 % topical cream in packet    2010        3/15/2011       apply to the affected area(s)

 before bedtime by topical route 3 times per week and leave on skin for 6 to 10 
hours                                    

 

                fenofibrate 160 mg oral tablet    2010       take 1 tablet (160 mg) by oral

 route once daily                        

 

                meloxicam 15 mg oral tablet    3/15/2011       2011      take 1 tablet (15 mg) by oral

 route once daily as needed              

 

                Zofran (as hydrochloride) 4 mg/5 mL oral solution    2011       take 5-10

 milliliters by oral route every 6 hours as needed     

 

                Klor-Con M20 20 mEq oral tablet,ER particles/crystals                    3/5/2013        take 1 tablet

 (20 meq) by oral route once daily with food    "not takiing"

 

                Diflucan 150 mg oral tablet    8/10/2011       2012        take 1 tablet (150 mg) by oral

 route once                              

 

                amoxicillin 500 mg oral capsule                    2012        take 1 capsule (500 mg) by oral route

 every 12 hours for 7 days               

 

             Medrol (Dominic) 4 mg oral tablets,dose pack                 2012     take as directed     

 

                Lasix 20 mg oral tablet    4/10/2012       3/5/2013        take 1 tablet (20 mg) by oral route

 once daily as needed                    

 

                amitriptyline 10 mg oral tablet    7/3/2012        2015       take 1 tablet by oral route

 once a day (at bedtime)                 

 

                cyclobenzaprine 5 mg oral tablet    7/3/2012        10/8/2013       take 1 tablet by oral route

 once a day (at bedtime) as needed       

 

                cephalexin 500 mg oral tablet    7/3/2012        3/5/2013        take 1 tablet (500 mg) by oral

 route every 12 hours                    

 

                Tessalon 200 mg oral capsule    2012       take 1 capsule (200 mg) by oral

 route 3 times per day as needed         

 

                    Claritin-D 12 Hour 5-120 mg oral tablet extended release 12 hr    2012           3/5/2013

                          take 1 tablet by oral route every 12 hours     

 

                naproxen 500 mg oral tablet    2014      take 1 tablet (500 mg) by oral

 route 2 times per day with food        "not taking now"

 

                    Dulera 100-5 mcg/actuation inhalation HFA aerosol inhaler    10/8/2013           12/3/2015 

                          inhale 2 puffs by inhalation route 2 times per day in the morning and evening     



 

                flecainide 100 mg oral tablet                    2015       take 1 tablet (100 mg) by oral route

 every 12 hours                          

 

                cyclobenzaprine 10 mg oral tablet    2014      take 1 tablet (10 mg) 

by oral route at bedtime as needed       

 

                Betapace 80 mg oral tablet                    2015       take 1 tablet (80 mg) by oral route 2 

times per day                            

 

                duloxetine 30 mg oral capsule,delayed release(DR/EC)    2014      12/3/2015       take

 1 capsule (30 mg) by oral route once a day     

 

                hydrocodone-acetaminophen 7.5-325 mg oral tablet    2014      12/3/2015       take 1 

tablet by oral route every 6 hours as needed for pain     

 

                diltiazem HCl 120 mg oral capsule, extended release                    12/3/2015       take 1 capsule

 (120 mg) by oral route once daily       

 

                carvedilol 12.5 mg oral tablet                    12/3/2015       take 1 tablet (12.5 mg) by oral route

 every 12 hours with food               dose change

 

                sotalol 120 mg oral tablet                    12/3/2015       take 1 tablet (120 mg) by oral route 2

 times per day                           

 

                carvedilol 3.125 mg oral tablet                    2019       take 1 tablet (3.125 mg) by oral 

route once daily                         

 

                Benicar 40 mg oral tablet                    2018      take 1 tablet (40 mg) by oral route once

 daily                                   

 

                tramadol 50 mg oral tablet    12/3/2015       2018      take 1 tablet (50 mg) by oral

 route every 6 hours as needed           

 

                benzonatate 200 mg oral capsule    2016        10/17/2016      take 1 capsule (200 mg) by

 oral route 3 times per day as needed     

 

                    Levemir FlexTouch 100 unit/mL (3 mL) subcutaneous insulin pen    3/1/2016            10/17/2016

                          INJECT 43 UNITS BY SUBCUTANEOUS ROUTE TWICE A DAY    has an insulin pump

 

                amiodarone 400 mg oral tablet                    2018      take 1 tablet (400 mg) by oral route

 once daily                             dose change

 

                    potassium chloride 10 mEq oral capsule, extended release    10/30/2017          2018

                          take 2 capsules (20 meq) by oral route once daily with food for 30 days     

 

                amiodarone 400 mg oral tablet                    2019       take 2 tablets by oral route once daily

                                        dose updated







Problem List







                    Description         Status              Onset

 

                    Diabetes Mellitus, Type II    Active               

 

                    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               

 

                    Hyperlipidemia      Active               







Vital Signs







      Date    Time    BP-Sys(mm[Hg]    BP-Missy(mm[Hg])    HR(bpm)    RR(rpm)    Temp    WT    HT    HC    BMI

                    BSA                 BMI Percentile      O2 Sat(%)

 

        2019    1:15:00 PM    116 mmHg    70 mmHg    89 bpm    22 rpm    97.5 F    155 lbs    61 in

                          29.2867 kg/m    1.7395 m                 98 %

 

        2018    7:59:00 AM    160 mmHg    82 mmHg    87 bpm    18 rpm    97.5 F    160 lbs    61 in

                          30.23 kg/m2    1.77 m2                   100 %

 

        10/30/2017    10:41:00 AM    152 mmHg    88 mmHg    80 bpm    20 rpm    98.1 F    153 lbs    61 

in                        28.9088 kg/m    1.7283 m                 100 %

 

        10/17/2016    9:17:00 AM    138 mmHg    70 mmHg    83 bpm    20 rpm    97.7 F    160 lbs    61 in

                          30.23 kg/m2    1.77 m2                   99 %

 

       2016    1:34:00 PM    132 mmHg    60 mmHg    87 bpm    22 rpm    99 F    152 lbs    61 in    

                28.7199 kg/m    1.7226 m                      99 %

 

        12/3/2015    10:17:00 AM    138 mmHg    72 mmHg    80 bpm    20 rpm    98.2 F    152 lbs    61 in

                          28.72 kg/m2    1.72 m2                   98 %

 

        2015    10:31:00 AM    130 mmHg    66 mmHg    66 bpm    20 rpm    98.1 F    143 lbs    61 in

                          27.0193 kg/m    1.6708 m                 98 %

 

        2014    9:39:00 AM    138 mmHg    88 mmHg    153 bpm    20 rpm    98.7 F    150 lbs    61 

in                        28.34 kg/m2    1.71 m2                   98 %

 

        6/3/2014    3:55:00 PM    130 mmHg    62 mmHg    69 bpm    18 rpm    97.9 F    151 lbs    61 in 

                          28.53 kg/m2    1.72 m2                   99 %

 

        2014    10:56:00 AM    150 mmHg    74 mmHg    81 bpm    16 rpm    96.2 F    152 lbs    61 in

                          28.7199 kg/m    1.7226 m                 97 %

 

       2014    8:52:00 AM    138 mmHg    78 mmHg    61 bpm    20 rpm    98 F    168 lbs    61 in    

                31.74 kg/m2     1.81 m2                         100 %

 

        10/8/2013    10:08:00 AM    138 mmHg    78 mmHg    148 bpm    22 rpm    98.2 F    168 lbs    61 

in                        31.743 kg/m    1.811 m                 98 %

 

        2013    8:54:00 AM    150 mmHg    80 mmHg    78 bpm    16 rpm    98.9 F    163 lbs    61 in

                          30.80 kg/m2    1.78 m2                   99 %

 

       2013    10:13:00 AM    136 mmHg    78 mmHg    70 bpm    18 rpm    97.8 F    156 lbs            

                                                                 

 

        3/5/2013    9:30:00 AM    148 mmHg    80 mmHg    76 bpm    16 rpm    97.8 F    152 lbs    61 in 

                          28.72 kg/m2    1.72 m2                    

 

        2012    10:33:00 AM    136 mmHg    74 mmHg    89 bpm    18 rpm    98.6 F    154.375 lbs    

61 in                     29.1686 kg/m    1.736 m                 99 %

 

        7/3/2012    8:08:00 AM    134 mmHg    72 mmHg    78 bpm    22 rpm    98.2 F    145 lbs    61 in 

                          27.40 kg/m2    1.68 m2                    

 

        2012    8:03:00 AM    132 mmHg    76 mmHg    70 bpm    18 rpm    97.9 F    143 lbs    61 in 

                          27.0193 kg/m    1.6708 m                  

 

        3/26/2012    11:28:00 AM    136 mmHg    74 mmHg    89 bpm    18 rpm    97.9 F    149.125 lbs    

61 in                     28.18 kg/m2    1.71 m2                    

 

        2011    8:12:00 AM    132 mmHg    82 mmHg    84 bpm    16 rpm    97.8 F    150 lbs    61 in

                          28.342 kg/m    1.7112 m                  

 

        2011    3:15:00 PM    130 mmHg    74 mmHg    68 bpm    18 rpm    98.3 F    156 lbs    61 in

                          29.48 kg/m2    1.75 m2                    

 

        8/10/2011    2:58:00 PM    120 mmHg    74 mmHg    100 bpm    20 rpm    99.4 F    169.25 lbs     

                                                                  

 

       2011    9:49:00 AM    134 mmHg    78 mmHg    74 bpm    18 rpm    98.3 F    164 lbs             

                                                                 

 

       2011    8:15:00 AM    128 mmHg    78 mmHg    62 bpm    18 rpm    98.2 F    157 lbs             

                                                                 

 

       2011    8:54:00 AM    128 mmHg    72 mmHg    104 bpm    22 rpm    98.6 F    153 lbs             

                                                                100 %

 

      3/15/2011    3:04:00 PM    144 mmHg    82 mmHg    72 bpm    18 rpm    98 F    163 lbs                 

                                                             

 

     2010    9:17:00 AM    124 mmHg    78 mmHg    76 bpm              145 lbs                             

 

 

       2010    10:13:00 AM    130 mmHg    68 mmHg    70 bpm    16 rpm    98.3 F    144 lbs             

                                                                 

 

      2010    8:21:00 AM    134 mmHg    76 mmHg    70 bpm    20 rpm    98.6 F    147 lbs                

                                                             







Social History







                    Name                Description         Comments

 

                    Tobacco             Never smoker         







History of Procedures







                    Date Ordered        Description         Order Status

 

                    12/3/2015 12:00 AM    LIPID PANEL         Reviewed

 

                    12/3/2015 12:00 AM    GLYCOSYLATED HEMOGLOBIN TEST    Reviewed

 

                    12/3/2015 12:00 AM    COMPREHEN METABOLIC PANEL    Reviewed

 

                    12/3/2015 12:00 AM    Endocrinology Consultation    Reviewed

 

                    2011 12:00 AM    METABOLIC PANEL TOTAL CA    Reviewed

 

                    2011 12:00 AM    C-REACTIVE PROTEIN    Reviewed

 

                    2011 12:00 AM    RBC SED RATE AUTOMATED    Reviewed

 

                    2011 12:00 AM    X-RAY EXAM OF FOOT    Reviewed

 

                    2016 12:00 AM    CHEST X-RAY 2VW FRONTAL&LATL    Reviewed

 

                    2016 12:00 AM    Decadron, Per 1 Mg NDC# [address]-00    Reviewed

 

                    2016 12:00 AM    Depo-Medrol, Per 80 Mg NDC#[address]-75    Reviewed

 

                    10/17/2016 12:00 AM    PNEUMOCOCCAL VACC 13 AMRILEE IM    Reviewed

 

                    3/26/2012 12:00 AM    COMPLETE CBC W/AUTO DIFF WBC    Reviewed

 

                    3/26/2012 12:00 AM    PROTHROMBIN TIME    Reviewed

 

                    3/26/2012 12:00 AM    ASSAY OF BLOOD/URIC ACID    Reviewed

 

                    3/27/2012 12:00 AM    RBC SED RATE AUTOMATED    Reviewed

 

                    3/27/2012 12:00 AM    GLYCOSYLATED HEMOGLOBIN TEST    Reviewed

 

                    2017 12:00 AM    METABOLIC PANEL TOTAL CA    Reviewed

 

                    10/30/2017 12:00 AM    Rocephin 500 Mg Injection    Reviewed

 

                    2017 12:00 AM    METABOLIC PANEL TOTAL CA    Reviewed

 

                    2010 12:00 AM    BREATHING CAPACITY TEST    Reviewed

 

                    10/8/2013 12:00 AM    CHEST X-RAY 2VW FRONTAL&LATL    Reviewed

 

                    10/8/2013 12:00 AM    AIRWAY INHALATION TREATMENT    Reviewed

 

                    10/17/2013 12:00 AM    CHEST X-RAY 2VW FRONTAL&LATL    Reviewed

 

                    2019 12:00 AM    COMPLETE CBC W/AUTO DIFF WBC    Reviewed

 

                    2019 12:00 AM    COMPREHEN METABOLIC PANEL    Reviewed

 

                    2014 12:00 AM    CT HEAD/BRAIN W/O & W/DYE    Reviewed

 

                    2014 12:00 AM    BREATHING CAPACITY TEST    Reviewed

 

                    6/3/2014 12:00 AM    ECG MONIT/REPRT UP TO 48 HRS    Reviewed

 

                    6/3/2014 12:00 AM    TTE W/O DOPPLER COMPLETE    Reviewed

 

                    6/3/2014 12:00 AM    Carotid Doppler     Reviewed

 

                    2010 12:00 AM    COMPREHEN METABOLIC PANEL    Reviewed

 

                    2010 12:00 AM    LIPID PANEL         Reviewed

 

                    2010 12:00 AM    GLYCOSYLATED HEMOGLOBIN TEST    Reviewed

 

                    2010 12:00 AM    MICROALBUMIN SEMIQUANT    Reviewed

 

                    2010 12:00 AM    DRAINAGE OF SKIN ABSCESS    Reviewed

 

                    2010 12:00 AM    Urine drug screen    Reviewed

 

                    2014 12:00 AM    Physiatry Consult    Reviewed

 

                    2011 12:00 AM    METABOLIC PANEL TOTAL CA    Reviewed

 

                    2011 12:00 AM    METABOLIC PANEL TOTAL CA    Reviewed

 

                    2011 12:00 AM    C-REACTIVE PROTEIN    Reviewed

 

                    8/15/2011 12:00 AM    METABOLIC PANEL TOTAL CA    Reviewed

 

                    8/15/2011 12:00 AM    GLYCOSYLATED HEMOGLOBIN TEST    Reviewed







Results Summary







                          Date and Description      Results

 

                          2010 8:22 AM          TRIGLYCERIDES 666.0 mg/dLCHOLESTEROL 241.0 mg/dLHDL 34.0 mg/dLTOT

 CHOL/HDL 7.1 LDL (CALC) INVALID MG/DLGLYCOHEMOGLOBIN A1C 10.30 %GLUCOSE 336.0 
mg/dLSODIUM 138.0 mmol/LPOTASSIUM 3.40 mmol/LCHLORIDE 102.0 mmol/LCO2 22.0 
mmol/LBUN 8.0 mg/dLCREATININE 0.70 mg/dLSGOT/AST 15.0 IU/LSGPT/ALT 7.0 IU/LALK 
PHOS 93.0 IU/LTOTAL PROTEIN 7.30 g/dLALBUMIN 3.90 g/dLTOTAL BILI 0.40 
mg/dLCALCIUM 9.10 mg/dLAGE 60 GFR NonAA 86 GFR  eGFR >60 mL/min/1.73 
m2eGFR AA* >60 CREAT UR 57.20 mg/dLMICROALBUMIN UR 19.0 ug/mLALB:CREAT RATIO 33 

 

                          2011 9:15 AM         GLUCOSE 49.0 mg/dLSODIUM 143.0 mmol/LPOTASSIUM 3.30 mmol/LCHLORIDE

 101.0 mmol/LCO2 30.0 mmol/LBUN 12.0 mg/dLCREATININE 0.50 mg/dLCALCIUM 9.10 
mg/dLAGE 60 GFR NonAA 126 GFR  eGFR >60 mL/min/1.73 m2eGFR AA* >60 

 

                          2011 10:25 AM        C REACTIVE PROTEIN 6.0 mg/LGLUCOSE 88.0 mg/dLSODIUM 143.0 mmol/LPOTASSIUM

 3.60 mmol/LCHLORIDE 104.0 mmol/LCO2 26.0 mmol/LBUN 11.0 mg/dLCREATININE 0.70 
mg/dLCALCIUM 9.40 mg/dLAGE 61 GFR NonAA 85 GFR  eGFR >60 mL/min/1.73 m2
eGFR AA* >60 

 

                          2011 11:00 AM        GLUCOSE 160.0 mg/dLSODIUM 141.0 mmol/LPOTASSIUM 3.70 mmol/LCHLORIDE

 105.0 mmol/LCO2 21.0 mmol/LBUN 13.0 mg/dLCREATININE 0.70 mg/dLCALCIUM 9.50 
mg/dLAGE 61 GFR NonAA 85 GFR  eGFR >60 mL/min/1.73 m2eGFR AA* >60 
GLYCOHEMOGLOBIN A1C 6.80 %

 

                          2011 3:50 PM        C REACTIVE PROTEIN 5.0 mg/LGLUCOSE 361.0 mg/dLSODIUM 140.0 mmol/LPOTASSIUM

 3.80 mmol/LCHLORIDE 102.0 mmol/LCO2 24.0 mmol/LBUN 9.0 mg/dLCREATININE 1.0 
mg/dLCALCIUM 9.60 mg/dLAGE 61 GFR NonAA 57 GFR AA 69 eGFR 57 eGFR AA* >60 
SEDRATE 24.0 mm/hr

 

                          3/26/2012 12:00 PM        WBC 7.3 RBC 4.46 HGB 13.50 g/dLHCT 39.0 %MCV 87.0 fLMCH 30.30

 pgMCHC 34.60 g/dLRDW SD 42 RDW CV 13.20 %MPV 9.60 fLPLT 142 NRBC# 0.00 NRBC% 
0.0 %NEUT 59.60 %%LYMP 33.50 %%MONO 5.60 %%EOS 1.20 %%BASO 0.10 %#NEUT 4.32 
#LYMP 2.43 #MONO 0.41 #EOS 0.09 #BASO 0.01 MANUAL DIFF NOT IND PROTIME 10.60 
secsINR 1.0 URIC ACID 3.9 mg/dL

 

                          3/27/2012 8:45 PM         GLYCOHEMOGLOBIN A1C 10.20 %SEDRATE 31.0 mm/hr

 

                          12/3/2015 11:22 AM        TRIGLYCERIDES 182.0 mg/dLCHOLESTEROL 180.0 mg/dLHDL 50.0 mg/dLTOT

 CHOL/HDL 3.6 LDL (CALC) 94.0 mg/dLGLUCOSE 99.0 mg/dLSODIUM 142.0 
mmol/LPOTASSIUM 4.20 mmol/LCHLORIDE 112.0 mmol/LCO2 21.0 mmol/LBUN 17.0 
mg/dLCREATININE 0.70 mg/dLSGOT/AST 17.0 IU/LSGPT/ALT 12.0 IU/LALK PHOS 86.0 
IU/LTOTAL PROTEIN 6.30 g/dLALBUMIN 4.10 g/dLTOTAL BILI 0.50 mg/dLCALCIUM 9.30 
mg/dLAGE 65 GFR NonAA 84 GFR  eGFR >60 mL/min/1.73meGFR AA* >60 
Hemoglobin A1c 9.10 %Estim. Avg Glu (eAG) 214 

 

                          11/3/2017 8:02 AM         GLUCOSE 97.0 mg/dLSODIUM 145.0 mmol/LPOTASSIUM 3.40 mmol/LCHLORIDE

 112.0 mmol/LCO2 25.0 mmol/LBUN 9.0 mg/dLCREATININE 0.70 mg/dLCALCIUM 8.80 
mg/dLAGE 67 GFR NonAA 84 GFR  eGFR >60 mL/min/1.73meGFR AA* >60 

 

                          2017 7:37 AM        GLUCOSE 172.0 mg/dLSODIUM 142.0 mmol/LPOTASSIUM 3.70 mmol/LCHLORIDE

 108.0 mmol/LCO2 26.0 mmol/LBUN 16.0 mg/dLCREATININE 0.90 mg/dLCALCIUM 9.20 
mg/dLAGE 67 GFR NonAA 63 GFR AA 76 eGFR >60 mL/min/1.73meGFR AA* >60 

 

                          2018 8:23 AM        Falls in last 6 months? No Unsteady or worry about falling? 

No Fall Risk Assessment Not At Risk 

 

                          2019 2:00 PM         GLUCOSE 241 SODIUM 143 POTASSIUM 3.4 CHLORIDE 99 CO2 30 BUN 13

 CREATININE 0.98 SGOT/AST 26 SGPT/ALT 18 ALK PHOS 92 TOTAL PROTEIN 7.8 ALBUMIN 
4.6 TOTAL BILI 1.6 CALCIUM 9.8 AGE 68 GFR NonAA 57 GFR AA 69 eGFR 57 eGFR AA* 
>60 WBC 8.5 RBC 4.91 HGB 14.1 HCT 44.2 MCV 90 MCH 28.7 MCHC 31.9 RDW SD 46 RDW 
CV 14.2 MPV 9.6  NRBC# 0.00 NRBC% 0.0 %NEUT 70.1 %LYMP 22.5 %MONO 5.8 
%EOS 0.8 %BASO 0.4 #NEUT 5.97 #LYMP 1.91 #MONO 0.49 #EOS 0.07 #BASO 0.03 MANUAL 
DIFF NOT IND 







History Of Immunizations







       Name    Date Admin    Mfg Name    Mfg Code    Trade Name    Lot#    Route    Inj    Vis Given    Vis

 Pub                                    CVX

 

          Pneumococcal    10/17/2016    [employer]    [facility]       PREVNAR 13    [id number]    

Intramuscular    Left Deltoid    10/17/2016      2015       133







History of Past Illness







                    Name                Date of Onset       Comments

 

                    Diabetes Mellitus, Type II                         

 

                    Hyperlipidemia                           

 

                    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                         

 

                    Bronchitis, Chronic    2010  8:23AM     

 

                    Subcutaneous Nodule    2010  8:23AM     

 

                    Diabetes Mellitus, Type II    2010 10:15AM     

 

                    Hyperlipidemia, Mixed    2010 10:15AM     

 

                    Sebaceous Cyst      2010 11:32PM     

 

                    General Medical Exam, Adult    2010  9:19AM     

 

                    Bursitis, right knee    Mar 15 2011  3:05PM     

 

                    Gastroenteritis, Viral    May  2 2011  8:56AM     

 

                    Hypokalemia         May 11 2011  8:15AM     

 

                    Polymyalgia Rheumatica    May 11 2011  8:15AM     

 

                    Hypokalemia         2011  9:48AM     

 

                    Polymyalgia Rheumatica    2011  9:48AM     

 

                    Diabetes Mellitus, Type II    2011  9:48AM     

 

                    Diabetes Mellitus, Type II    Aug 10 2011  2:57PM     

 

                    Edema bilateral lower extremities    Aug 10 2011  2:57PM     

 

                    Polymyalgia Rheumatica    Dec 14 2011  3:17PM     

 

                    Arthralgia          Dec 14 2011  3:17PM     

 

                    Pain in  foot, Left Heel    Dec 27 2011  8:16AM     

 

                    Edema left lower ext.    Mar 26 2012 11:30AM     

 

                    Petechial Rash      Mar 26 2012 11:30AM     

 

                    Diabetes Mellitus, Type II    Mar 27 2012  1:39PM     

 

                    Polymyalgia Rheumatica    Mar 27 2012  1:39PM     

 

                    Edema left lower ext.    2012  8:05AM     

 

                    Diabetes Mellitus, Type II    2012  8:05AM     

 

                    Cellulitis left foot    2012  8:05AM     

 

                    Myalgia             Jul  3 2012  8:10AM     

 

                    Headache            Jul  3 2012  8:10AM     

 

                    Neuropathy, right foot    Jul  3 2012  8:10AM     

 

                    Upper Respiratory Infections    Dec  4 2012 10:35AM     

 

                    Diabetes Mellitus, Type II    Mar  5 2013  9:32AM     

 

                    Hyperlipidemia      Mar  5 2013  9:32AM     

 

                    Atrial Fibrillation    Mar  5 2013  9:32AM     

 

                    Osteoarthritis, hand    Mar  5 2013  9:32AM     

 

                    Cellulitis          2013 10:15AM     

 

                    Right Acute Otitis Media    Aug 28 2013  8:55AM     

 

                    Chronic Cough       Oct  8 2013 10:10AM     

 

                    Shortness of breath    Oct  8 2013 10:10AM     

 

                    Cough               Oct 17 2013  9:40AM     

 

                    Tension Headache    2014  8:54AM     

 

                    Blurred Vision      2014  8:54AM     

 

                    Bronchitis, Acute    2014  8:54AM     

 

                    Postoperative Follow-up    2014 10:56AM     

 

                    Basal Cell          2014 10:56AM     

 

                    Basal Cell Carcinoma of Skin    2014 10:59AM     

 

                    Sebaceous Cyst      2014 10:59AM     

 

                    Syncope             Oc  3 2014  3:57PM     

 

                    Basal cell carcinoma of skin, site unspecified    2014  7:25AM     

 

                    Lumbar back pain    Dec 19 2014  9:42AM     

 

                    Left Radiculopathy of leg    Dec 19 2014  9:42AM     

 

                    Depressive Disorder    Dec 19 2014  9:42AM     

 

                    Diabetes Mellitus, Type II    May 13 2015 10:33AM     

 

                    Hyperlipidemia      May 13 2015 10:33AM     

 

                    Systolic CHF, acute    May 13 2015 10:33AM     

 

                    Resolved Atrial fibrillation    May 13 2015 10:33AM     

 

                    Mixed hyperlipidemia    Dec  3 2015 10:19AM     

 

                    Diabetes mellitus type 2, uncontrolled    Dec  3 2015 10:19AM     

 

                    Other osteoarthritis involving multiple joints    Dec  3 2015 10:19AM     

 

                    Shortness of breath    May 17 2016  1:36PM     

 

                          Chronic obstructive pulmonary disease with (acute) exacerbation    May 17 2016  1:36PM

                                         

 

                    Need for Prevnar vaccine    Oct 17 2016  9:19AM     

 

                    Paroxysmal atrial fibrillation    Oct 17 2016  9:19AM     

 

                    Diabetes Mellitus, Type II    Oct 17 2016  9:19AM     

 

                    Hypokalemia         Oct 30 2017 10:43AM     

 

                    Hypokalemia         Nov  3 2017 11:41AM     

 

                    Cellulitis of left lower extremity    Oct 30 2017 10:43AM     

 

                    Diabetes Mellitus, Type II    Dec 14 2018  8:01AM     

 

                    Hyperlipidemia      Dec 14 2018  8:01AM     

 

                    Atrial fibrillation with controlled ventricular rate    Dec 14 2018  8:01AM     

 

                    Diabetes Mellitus, Type II    May 21 2019  1:17PM     

 

                    Hyperlipidemia      May 21 2019  1:17PM     

 

                    Acute on chronic systolic (congestive) heart failure    May 21 2019  1:17PM     







Payers







           Insurance Name    Company Name    Plan Name    Plan Number    Policy Number    Policy Group

 Number                                 Start Date

 

                    [employer]      [employer]              [id number]              2015

 

                    [employer]              [id number]              N/A

 

                    [employer]      [employer] [place]              [id number]              

 

                                [facility]    PMRV PHYS/DS

                    [id number]                               N/A







History of Encounters







                    Visit Date          Visit Type          Provider

 

                    2019           Office visit        Mateo Cooper DO

 

                    2018          Office visit         

 

                    2018          Office visit        Mateo Cooper DO

 

                    2018          Hospital            LE[facility] Holm MD

 

                    10/30/2017          Office visit        Mateo Cooper DO

 

                    10/29/2017          Hospital            LEXX Holm MD

 

                    10/17/2016          Office visit        Mateo Cooper DO

 

                    2016           Office visit        Mateo Cooper DO

 

                    12/3/2015           Office visit        Mateo Cooper DO

 

                    5/15/2015           Hospital            LEXX Holm MD

 

                    2015           Office visit        Mateo Cooper DO

 

                    2014          Office visit        Mateo Cooper DO

 

                    2014           Hospital            LEXX Holm MD

 

                    2014           Procedures          David Suresh MD

 

                    6/3/2014            Office visit        Mateo Cooper DO

 

                    2014           Office visit        David Suresh MD

 

                    2014            Procedures          David Suresh MD

 

                    2014           [facility]            LEXX Holm MD

 

                    2014           Office visit        Mateo Cooper DO

 

                    10/8/2013           Office visit        Mateo Cooper DO

 

                    2013           Office visit        Mateo Cooper DO

 

                    2013           Office visit        Mateo Cooper DO

 

                    3/5/2013            Office visit        Mateo Cooper DO

 

                    2013           [facility]            LEXX Holm MD

 

                    2012           Office visit        Jenni MARTÍNEZ

 

                    2012          [facility]            LEXX Holm MD

 

                    7/3/2012            Office visit        Mateo Cooper DO

 

                    [facility]

 

                    [facility]            [facility]            LE[facility] Holm MD

 

                    2012            [facility]            Kristian Kiser MD

 

                    2012            Hospital            LEXX Holm MD

 

                    2012            Office visit        Mateo Cooper DO

 

                    3/26/2012           Office visit        Jenni Katz APRN

 

                    2011          Office visit        Mateo Cooper DO

 

                    2011          Office visit        Jenni Katz APRN

 

                    8/10/2011           Office visit        Jenni Katz APRN

 

                    2011           Office visit        Mateo Cooper DO

 

                    2011           Office visit        Mateo Cooper DO

 

                    2011            [facility]            Bandar Zelaya MD

 

                    2011            [facility]            Bandar Zelaya MD

 

                    2011            [facility]            Bandar Zelaya MD

 

                    2011            [facility]            LEXX Holm MD

 

                    2011            Office visit        Mateo Cooper DO

 

                    3/15/2011           Office visit        Mateo Cooper DO

 

                    2010           Office visit        David Burciaga PA-C

 

                    2010            Office visit        Mateo Cooper DO

 

                    2010           Laboratory          Ayush Finn MD

 

                    2010           Laboratory          Ayush Finn MD

 

                    2010            Office visit        Mateo Cooper DO

## 2019-06-24 NOTE — XMS REPORT
MU2 Ambulatory Summary

                             Created on: 2019



Ambar Grider

External Reference #: [id number]

: 1950

Sex: Female



Demographics







                          Address                   [address] [place]  [address]

 

                          Home Phone                [phone]

 

                          Preferred Language        English

 

                          Marital Status            

 

                          Jew Affiliation     Unknown

 

                          Race                      White

 

                          Ethnic Group              Not  or 





Author







                          Mateo Pathak

 

                          [facility]

 

                          Address                   [address]

[place], [place]  [id number]



 

                          Phone                     [phone]







Care Team Providers







                    Care Team Member Name    Role                Phone

 

                    Mateo Cooper    PCP                 [phone]

 

                    Mateo Cooper    PreferredProvider    [phone]







Allergies and Adverse Reactions







                    Name                Reaction            Notes

 

                    Phenergan                                

 

                    etomidate           "stopped heart"      







Plan of Treatment







             Planned Activity    Comments     Planned Date    Planned Time    Plan/Goal

 

             Uncontrolled Diabetes and Thyroid Nodules                                            







Medications







                                        Active 

 

             Name         Start Date    Estimated Completion Date    SIG          Comments

 

                Novolog Flexpen 100 unit/mL subcutaneous insulin pen    2016                       INJECT 14 UNITS

 SUBCUTANEOUSLY WITH BREAKFAST. LUNCH, AND SUPPER     

 

                acetaminophen-codeine 300-30 mg oral tablet    2016                       take 1 tablet by oral

 route every 4 hours as needed           

 

                gabapentin 600 mg oral tablet    2018        TAKE 1 TABLET BY MOUTH FOUR

 TIMES DAILY                             

 

                metformin 1,000 mg oral tablet    2018       TAKE 1 TABLET BY MOUTH TWICE

 DAILY WITH MORNING AND EVENING MEALS for 90 days     

 

                diltiazem HCl 120 mg oral capsule,extended release 24 hr                                    take 1 capsule (120

 mg) by oral route once daily            

 

                pravastatin 20 mg oral tablet                                    take 1 tablet (20 mg) by oral route once daily

                                         

 

                duloxetine 30 mg oral capsule,delayed release(DR/EC)                                    take 2 capsules (60 

mg) by oral route once daily             

 

                amiodarone 400 mg oral tablet                                    take 1 tablet (400 mg) by oral route once daily

                                         

 

                Toprol XL 50 mg oral tablet extended release 24 hr                                    take 1 tablet (50 mg) 

by oral route once daily                 

 

             Lasix 40 mg oral tablet                              take 1 tablet (40 mg) by oral route once daily     



 

                potassium chloride 10 mEq oral tablet extended release                                    take 1 tablet (10 

meq) by oral route once daily with food     

 

                clindamycin HCl 300 mg oral capsule    2019       take 1 capsule (300 mg)

 by oral route 2 times per day for 10 days     









                                         

 

             Name         Start Date    Expiration Date    SIG          Comments

 

                Bactrim -160 mg oral tablet    2010       take 1 tablet by oral route

 every 12 hours for 10 days              

 

                Keflex 500 mg oral capsule    2010        take 1 capsule (500 mg) by oral 

route every 12 hours for 7 days          

 

                Reglan 10 mg oral tablet    2011        6/3/2011        take 1 tablet (10 mg) by oral route

 twice daily                             

 

                prednisone 20 mg oral tablet    2011      take 1 tablet by oral route

 daily for 10 days                       

 

                nabumetone 500 mg oral tablet    2011                      take 1 tablet (500 mg) by oral route

 2 times per day with food               

 

                indomethacin 50 mg oral capsule    2012       take 1 capsule (50 mg) by 

oral route 3 times per day with food for 7 days    "stopped it 2-3 weeks ago due to

 high glucose"

 

                Bactrim -160 mg oral tablet    2012       take 1 tablet by oral route

 every 12 hours for 7 days               

 

                Levemir 100 unit/mL subcutaneous solution    2012       inject 45 units

 by subcutaneous route twice daily      "using pen"

 

                Augmentin 500-125 mg oral tablet    2012      take 1 tablet by oral route

 every 12 hours for 7 days               

 

                cephalexin 500 mg oral tablet    2013       take 1 tablet (500 mg) by oral

 route every 12 hours for 10 days        

 

                Augmentin 500-125 mg oral tablet    2013        take 1 tablet by oral route

 every 12 hours for 7 days               

 

                Xarelto 20 mg oral tablet    12/3/2015       2016       take 1 tablet (20 mg) by oral route

 once daily with the evening meal for 30 days     

 

                amoxicillin-pot clavulanate 500-125 mg oral tablet    2016        2/15/2016       take 1 

tablet by oral route every 12 hours for 7 days     

 

                          Zostavax (PF) 19,400 unit/0.65 mL subcutaneous suspension for reconstitution    10/17/2016

                    10/18/2016          inject 0.65 milliliter by subcutaneous route once     









                                        Discontinued 

 

             Name         Start Date    Discontinued Date    SIG          Comments

 

                Aldara 5 % topical cream in packet    2010        3/15/2011       apply to the affected area(s)

 before bedtime by topical route 3 times per week and leave on skin for 6 to 10 
hours                                    

 

                fenofibrate 160 mg oral tablet    2010       take 1 tablet (160 mg) by oral

 route once daily                        

 

                meloxicam 15 mg oral tablet    3/15/2011       2011      take 1 tablet (15 mg) by oral

 route once daily as needed              

 

                Zofran (as hydrochloride) 4 mg/5 mL oral solution    2011       take 5-10

 milliliters by oral route every 6 hours as needed     

 

                Klor-Con M20 20 mEq oral tablet,ER particles/crystals                    3/5/2013        take 1 tablet

 (20 meq) by oral route once daily with food    "not takiing"

 

                Diflucan 150 mg oral tablet    8/10/2011       2012        take 1 tablet (150 mg) by oral

 route once                              

 

                amoxicillin 500 mg oral capsule                    2012        take 1 capsule (500 mg) by oral route

 every 12 hours for 7 days               

 

             Medrol (Dominic) 4 mg oral tablets,dose pack                 2012     take as directed     

 

                Lasix 20 mg oral tablet    4/10/2012       3/5/2013        take 1 tablet (20 mg) by oral route

 once daily as needed                    

 

                amitriptyline 10 mg oral tablet    7/3/2012        2015       take 1 tablet by oral route

 once a day (at bedtime)                 

 

                cyclobenzaprine 5 mg oral tablet    7/3/2012        10/8/2013       take 1 tablet by oral route

 once a day (at bedtime) as needed       

 

                cephalexin 500 mg oral tablet    7/3/2012        3/5/2013        take 1 tablet (500 mg) by oral

 route every 12 hours                    

 

                Tessalon 200 mg oral capsule    2012       take 1 capsule (200 mg) by oral

 route 3 times per day as needed         

 

                    Claritin-D 12 Hour 5-120 mg oral tablet extended release 12 hr    2012           3/5/2013

                          take 1 tablet by oral route every 12 hours     

 

                naproxen 500 mg oral tablet    2014      take 1 tablet (500 mg) by oral

 route 2 times per day with food        "not taking now"

 

                    Dulera 100-5 mcg/actuation inhalation HFA aerosol inhaler    10/8/2013           12/3/2015 

                          inhale 2 puffs by inhalation route 2 times per day in the morning and evening     



 

                flecainide 100 mg oral tablet                    2015       take 1 tablet (100 mg) by oral route

 every 12 hours                          

 

                cyclobenzaprine 10 mg oral tablet    2014      take 1 tablet (10 mg) 

by oral route at bedtime as needed       

 

                Betapace 80 mg oral tablet                    2015       take 1 tablet (80 mg) by oral route 2 

times per day                            

 

                duloxetine 30 mg oral capsule,delayed release(DR/EC)    2014      12/3/2015       take

 1 capsule (30 mg) by oral route once a day     

 

                hydrocodone-acetaminophen 7.5-325 mg oral tablet    2014      12/3/2015       take 1 

tablet by oral route every 6 hours as needed for pain     

 

                diltiazem HCl 120 mg oral capsule, extended release                    12/3/2015       take 1 capsule

 (120 mg) by oral route once daily       

 

                carvedilol 12.5 mg oral tablet                    12/3/2015       take 1 tablet (12.5 mg) by oral route

 every 12 hours with food               dose change

 

                sotalol 120 mg oral tablet                    12/3/2015       take 1 tablet (120 mg) by oral route 2

 times per day                           

 

                carvedilol 3.125 mg oral tablet                    2019       take 1 tablet (3.125 mg) by oral 

route once daily                         

 

                Benicar 40 mg oral tablet                    2018      take 1 tablet (40 mg) by oral route once

 daily                                   

 

                tramadol 50 mg oral tablet    12/3/2015       2018      take 1 tablet (50 mg) by oral

 route every 6 hours as needed           

 

                benzonatate 200 mg oral capsule    2016        10/17/2016      take 1 capsule (200 mg) by

 oral route 3 times per day as needed     

 

                    Levemir FlexTouch 100 unit/mL (3 mL) subcutaneous insulin pen    3/1/2016            10/17/2016

                          INJECT 43 UNITS BY SUBCUTANEOUS ROUTE TWICE A DAY    has an insulin pump

 

                amiodarone 400 mg oral tablet                    2018      take 1 tablet (400 mg) by oral route

 once daily                             dose change

 

                    potassium chloride 10 mEq oral capsule, extended release    10/30/2017          2018

                          take 2 capsules (20 meq) by oral route once daily with food for 30 days     

 

                amiodarone 400 mg oral tablet                    2019       take 2 tablets by oral route once daily

                                        dose updated







Problem List







                    Description         Status              Onset

 

                    Diabetes Mellitus, Type II    Active               

 

                    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               

 

                    Hyperlipidemia      Active               







Vital Signs







      Date    Time    BP-Sys(mm[Hg]    BP-Missy(mm[Hg])    HR(bpm)    RR(rpm)    Temp    WT    HT    HC    BMI

                    BSA                 BMI Percentile      O2 Sat(%)

 

        2019    9:05:00 AM    130 mmHg    78 mmHg    144 bpm    16 rpm    97.5 F    163 lbs    61 in

                          30.7983 kg/m    1.7839 m                 100 %

 

        2019    1:15:00 PM    116 mmHg    70 mmHg    89 bpm    22 rpm    97.5 F    155 lbs    61 in

                          29.29 kg/m2    1.74 m2                   98 %

 

        2018    7:59:00 AM    160 mmHg    82 mmHg    87 bpm    18 rpm    97.5 F    160 lbs    61 in

                          30.2314 kg/m    1.7674 m                 100 %

 

        10/30/2017    10:41:00 AM    152 mmHg    88 mmHg    80 bpm    20 rpm    98.1 F    153 lbs    61 

in                        28.91 kg/m2    1.73 m2                   100 %

 

        10/17/2016    9:17:00 AM    138 mmHg    70 mmHg    83 bpm    20 rpm    97.7 F    160 lbs    61 in

                          30.2314 kg/m    1.7674 m                 99 %

 

       2016    1:34:00 PM    132 mmHg    60 mmHg    87 bpm    22 rpm    99 F    152 lbs    61 in    

                28.72 kg/m2     1.72 m2                         99 %

 

        12/3/2015    10:17:00 AM    138 mmHg    72 mmHg    80 bpm    20 rpm    98.2 F    152 lbs    61 in

                          28.7199 kg/m    1.7226 m                 98 %

 

        2015    10:31:00 AM    130 mmHg    66 mmHg    66 bpm    20 rpm    98.1 F    143 lbs    61 in

                          27.02 kg/m2    1.67 m2                   98 %

 

        2014    9:39:00 AM    138 mmHg    88 mmHg    153 bpm    20 rpm    98.7 F    150 lbs    61 

in                        28.342 kg/m    1.7112 m                 98 %

 

        6/3/2014    3:55:00 PM    130 mmHg    62 mmHg    69 bpm    18 rpm    97.9 F    151 lbs    61 in 

                          28.53 kg/m2    1.72 m2                   99 %

 

        2014    10:56:00 AM    150 mmHg    74 mmHg    81 bpm    16 rpm    96.2 F    152 lbs    61 in

                          28.7199 kg/m    1.7226 m                 97 %

 

       2014    8:52:00 AM    138 mmHg    78 mmHg    61 bpm    20 rpm    98 F    168 lbs    61 in    

                31.74 kg/m2     1.81 m2                         100 %

 

        10/8/2013    10:08:00 AM    138 mmHg    78 mmHg    148 bpm    22 rpm    98.2 F    168 lbs    61 

in                        31.743 kg/m    1.811 m                 98 %

 

        2013    8:54:00 AM    150 mmHg    80 mmHg    78 bpm    16 rpm    98.9 F    163 lbs    61 in

                          30.80 kg/m2    1.78 m2                   99 %

 

       2013    10:13:00 AM    136 mmHg    78 mmHg    70 bpm    18 rpm    97.8 F    156 lbs            

                                                                 

 

        3/5/2013    9:30:00 AM    148 mmHg    80 mmHg    76 bpm    16 rpm    97.8 F    152 lbs    61 in 

                          28.72 kg/m2    1.72 m2                    

 

        2012    10:33:00 AM    136 mmHg    74 mmHg    89 bpm    18 rpm    98.6 F    154.375 lbs    

61 in                     29.1686 kg/m    1.736 m                 99 %

 

        7/3/2012    8:08:00 AM    134 mmHg    72 mmHg    78 bpm    22 rpm    98.2 F    145 lbs    61 in 

                          27.40 kg/m2    1.68 m2                    

 

        2012    8:03:00 AM    132 mmHg    76 mmHg    70 bpm    18 rpm    97.9 F    143 lbs    61 in 

                          27.0193 kg/m    1.6708 m                  

 

        3/26/2012    11:28:00 AM    136 mmHg    74 mmHg    89 bpm    18 rpm    97.9 F    149.125 lbs    

61 in                     28.18 kg/m2    1.71 m2                    

 

        2011    8:12:00 AM    132 mmHg    82 mmHg    84 bpm    16 rpm    97.8 F    150 lbs    61 in

                          28.342 kg/m    1.7112 m                  

 

        2011    3:15:00 PM    130 mmHg    74 mmHg    68 bpm    18 rpm    98.3 F    156 lbs    61 in

                          29.48 kg/m2    1.75 m2                    

 

        8/10/2011    2:58:00 PM    120 mmHg    74 mmHg    100 bpm    20 rpm    99.4 F    169.25 lbs     

                                                                  

 

       2011    9:49:00 AM    134 mmHg    78 mmHg    74 bpm    18 rpm    98.3 F    164 lbs             

                                                                 

 

       2011    8:15:00 AM    128 mmHg    78 mmHg    62 bpm    18 rpm    98.2 F    157 lbs             

                                                                 

 

       2011    8:54:00 AM    128 mmHg    72 mmHg    104 bpm    22 rpm    98.6 F    153 lbs             

                                                                100 %

 

      3/15/2011    3:04:00 PM    144 mmHg    82 mmHg    72 bpm    18 rpm    98 F    163 lbs                 

                                                             

 

     2010    9:17:00 AM    124 mmHg    78 mmHg    76 bpm              145 lbs                             

 

 

       2010    10:13:00 AM    130 mmHg    68 mmHg    70 bpm    16 rpm    98.3 F    144 lbs             

                                                                 

 

      2010    8:21:00 AM    134 mmHg    76 mmHg    70 bpm    20 rpm    98.6 F    147 lbs                

                                                             







Social History







                    Name                Description         Comments

 

                    Tobacco             Never smoker         







History of Procedures







                    Date Ordered        Description         Order Status

 

                    12/3/2015 12:00 AM    LIPID PANEL         Reviewed

 

                    12/3/2015 12:00 AM    GLYCOSYLATED HEMOGLOBIN TEST    Reviewed

 

                    12/3/2015 12:00 AM    COMPREHEN METABOLIC PANEL    Reviewed

 

                    12/3/2015 12:00 AM    Endocrinology Consultation    Reviewed

 

                    2011 12:00 AM    METABOLIC PANEL TOTAL CA    Reviewed

 

                    2011 12:00 AM    C-REACTIVE PROTEIN    Reviewed

 

                    2011 12:00 AM    RBC SED RATE AUTOMATED    Reviewed

 

                    2011 12:00 AM    X-RAY EXAM OF FOOT    Reviewed

 

                    2016 12:00 AM    CHEST X-RAY 2VW FRONTAL&LATL    Reviewed

 

                    2016 12:00 AM    Decadron, Per 1 Mg NDC# [address]-50    Reviewed

 

                    2016 12:00 AM    Depo-Medrol, Per 80 Mg NDC#[address]-39    Reviewed

 

                    10/17/2016 12:00 AM    PNEUMOCOCCAL VACC 13 MARILEE IM    Reviewed

 

                    3/26/2012 12:00 AM    COMPLETE CBC W/AUTO DIFF WBC    Reviewed

 

                    3/26/2012 12:00 AM    PROTHROMBIN TIME    Reviewed

 

                    3/26/2012 12:00 AM    ASSAY OF BLOOD/URIC ACID    Reviewed

 

                    3/27/2012 12:00 AM    RBC SED RATE AUTOMATED    Reviewed

 

                    3/27/2012 12:00 AM    GLYCOSYLATED HEMOGLOBIN TEST    Reviewed

 

                    2017 12:00 AM    METABOLIC PANEL TOTAL CA    Reviewed

 

                    10/30/2017 12:00 AM    Rocephin 500 Mg Injection    Reviewed

 

                    2017 12:00 AM    METABOLIC PANEL TOTAL CA    Reviewed

 

                    2010 12:00 AM    BREATHING CAPACITY TEST    Reviewed

 

                    10/8/2013 12:00 AM    CHEST X-RAY 2VW FRONTAL&LATL    Reviewed

 

                    10/8/2013 12:00 AM    AIRWAY INHALATION TREATMENT    Reviewed

 

                    10/17/2013 12:00 AM    CHEST X-RAY 2VW FRONTAL&LATL    Reviewed

 

                    2019 12:00 AM    COMPLETE CBC W/AUTO DIFF WBC    Reviewed

 

                    2019 12:00 AM    COMPREHEN METABOLIC PANEL    Reviewed

 

                    2014 12:00 AM    CT HEAD/BRAIN W/O & W/DYE    Reviewed

 

                    2014 12:00 AM    BREATHING CAPACITY TEST    Reviewed

 

                    6/3/2014 12:00 AM    ECG MONIT/REPRT UP TO 48 HRS    Reviewed

 

                    6/3/2014 12:00 AM    TTE W/O DOPPLER COMPLETE    Reviewed

 

                    6/3/2014 12:00 AM    Carotid Doppler     Reviewed

 

                    2010 12:00 AM    COMPREHEN METABOLIC PANEL    Reviewed

 

                    2010 12:00 AM    LIPID PANEL         Reviewed

 

                    2010 12:00 AM    GLYCOSYLATED HEMOGLOBIN TEST    Reviewed

 

                    2010 12:00 AM    MICROALBUMIN SEMIQUANT    Reviewed

 

                    2010 12:00 AM    DRAINAGE OF SKIN ABSCESS    Reviewed

 

                    2010 12:00 AM    Urine drug screen    Reviewed

 

                    2014 12:00 AM    Physiatry Consult    Reviewed

 

                    2011 12:00 AM    METABOLIC PANEL TOTAL CA    Reviewed

 

                    2011 12:00 AM    METABOLIC PANEL TOTAL CA    Reviewed

 

                    2011 12:00 AM    C-REACTIVE PROTEIN    Reviewed

 

                    8/15/2011 12:00 AM    METABOLIC PANEL TOTAL CA    Reviewed

 

                    8/15/2011 12:00 AM    GLYCOSYLATED HEMOGLOBIN TEST    Reviewed







Results Summary







                          Date and Description      Results

 

                          2010 8:22 AM          TRIGLYCERIDES 666.0 mg/dLCHOLESTEROL 241.0 mg/dLHDL 34.0 mg/dLTOT

 CHOL/HDL 7.1 LDL (CALC) INVALID MG/DLGLYCOHEMOGLOBIN A1C 10.30 %GLUCOSE 336.0 
mg/dLSODIUM 138.0 mmol/LPOTASSIUM 3.40 mmol/LCHLORIDE 102.0 mmol/LCO2 22.0 
mmol/LBUN 8.0 mg/dLCREATININE 0.70 mg/dLSGOT/AST 15.0 IU/LSGPT/ALT 7.0 IU/LALK 
PHOS 93.0 IU/LTOTAL PROTEIN 7.30 g/dLALBUMIN 3.90 g/dLTOTAL BILI 0.40 
mg/dLCALCIUM 9.10 mg/dLAGE 60 GFR NonAA 86 GFR  eGFR >60 mL/min/1.73 
m2eGFR AA* >60 CREAT UR 57.20 mg/dLMICROALBUMIN UR 19.0 ug/mLALB:CREAT RATIO 33 

 

                          2011 9:15 AM         GLUCOSE 49.0 mg/dLSODIUM 143.0 mmol/LPOTASSIUM 3.30 mmol/LCHLORIDE

 101.0 mmol/LCO2 30.0 mmol/LBUN 12.0 mg/dLCREATININE 0.50 mg/dLCALCIUM 9.10 
mg/dLAGE 60 GFR NonAA 126 GFR  eGFR >60 mL/min/1.73 m2eGFR AA* >60 

 

                          2011 10:25 AM        C REACTIVE PROTEIN 6.0 mg/LGLUCOSE 88.0 mg/dLSODIUM 143.0 mmol/LPOTASSIUM

 3.60 mmol/LCHLORIDE 104.0 mmol/LCO2 26.0 mmol/LBUN 11.0 mg/dLCREATININE 0.70 
mg/dLCALCIUM 9.40 mg/dLAGE 61 GFR NonAA 85 GFR  eGFR >60 mL/min/1.73 m2
eGFR AA* >60 

 

                          2011 11:00 AM        GLUCOSE 160.0 mg/dLSODIUM 141.0 mmol/LPOTASSIUM 3.70 mmol/LCHLORIDE

 105.0 mmol/LCO2 21.0 mmol/LBUN 13.0 mg/dLCREATININE 0.70 mg/dLCALCIUM 9.50 
mg/dLAGE 61 GFR NonAA 85 GFR  eGFR >60 mL/min/1.73 m2eGFR AA* >60 
GLYCOHEMOGLOBIN A1C 6.80 %

 

                          2011 3:50 PM        C REACTIVE PROTEIN 5.0 mg/LGLUCOSE 361.0 mg/dLSODIUM 140.0 mmol/LPOTASSIUM

 3.80 mmol/LCHLORIDE 102.0 mmol/LCO2 24.0 mmol/LBUN 9.0 mg/dLCREATININE 1.0 
mg/dLCALCIUM 9.60 mg/dLAGE 61 GFR NonAA 57 GFR AA 69 eGFR 57 eGFR AA* >60 
SEDRATE 24.0 mm/hr

 

                          3/26/2012 12:00 PM        WBC 7.3 RBC 4.46 HGB 13.50 g/dLHCT 39.0 %MCV 87.0 fLMCH 30.30

 pgMCHC 34.60 g/dLRDW SD 42 RDW CV 13.20 %MPV 9.60 fLPLT 142 NRBC# 0.00 NRBC% 
0.0 %NEUT 59.60 %%LYMP 33.50 %%MONO 5.60 %%EOS 1.20 %%BASO 0.10 %#NEUT 4.32 
#LYMP 2.43 #MONO 0.41 #EOS 0.09 #BASO 0.01 MANUAL DIFF NOT IND PROTIME 10.60 
secsINR 1.0 URIC ACID 3.9 mg/dL

 

                          3/27/2012 8:45 PM         GLYCOHEMOGLOBIN A1C 10.20 %SEDRATE 31.0 mm/hr

 

                          12/3/2015 11:22 AM        TRIGLYCERIDES 182.0 mg/dLCHOLESTEROL 180.0 mg/dLHDL 50.0 mg/dLTOT

 CHOL/HDL 3.6 LDL (CALC) 94.0 mg/dLGLUCOSE 99.0 mg/dLSODIUM 142.0 
mmol/LPOTASSIUM 4.20 mmol/LCHLORIDE 112.0 mmol/LCO2 21.0 mmol/LBUN 17.0 
mg/dLCREATININE 0.70 mg/dLSGOT/AST 17.0 IU/LSGPT/ALT 12.0 IU/LALK PHOS 86.0 
IU/LTOTAL PROTEIN 6.30 g/dLALBUMIN 4.10 g/dLTOTAL BILI 0.50 mg/dLCALCIUM 9.30 
mg/dLAGE 65 GFR NonAA 84 GFR  eGFR >60 mL/min/1.73meGFR AA* >60 
Hemoglobin A1c 9.10 %Estim. Avg Glu (eAG) 214 

 

                          11/3/2017 8:02 AM         GLUCOSE 97.0 mg/dLSODIUM 145.0 mmol/LPOTASSIUM 3.40 mmol/LCHLORIDE

 112.0 mmol/LCO2 25.0 mmol/LBUN 9.0 mg/dLCREATININE 0.70 mg/dLCALCIUM 8.80 
mg/dLAGE 67 GFR NonAA 84 GFR  eGFR >60 mL/min/1.73meGFR AA* >60 

 

                          2017 7:37 AM        GLUCOSE 172.0 mg/dLSODIUM 142.0 mmol/LPOTASSIUM 3.70 mmol/LCHLORIDE

 108.0 mmol/LCO2 26.0 mmol/LBUN 16.0 mg/dLCREATININE 0.90 mg/dLCALCIUM 9.20 
mg/dLAGE 67 GFR NonAA 63 GFR AA 76 eGFR >60 mL/min/1.73meGFR AA* >60 

 

                          2018 8:23 AM        Falls in last 6 months? No Unsteady or worry about falling? 

No Fall Risk Assessment Not At Risk 

 

                          2019 2:00 PM         GLUCOSE 241 SODIUM 143 POTASSIUM 3.4 CHLORIDE 99 CO2 30 BUN 13

 CREATININE 0.98 SGOT/AST 26 SGPT/ALT 18 ALK PHOS 92 TOTAL PROTEIN 7.8 ALBUMIN 
4.6 TOTAL BILI 1.6 CALCIUM 9.8 AGE 68 GFR NonAA 57 GFR AA 69 eGFR 57 eGFR AA* 
>60 WBC 8.5 RBC 4.91 HGB 14.1 HCT 44.2 MCV 90 MCH 28.7 MCHC 31.9 RDW SD 46 RDW 
CV 14.2 MPV 9.6  NRBC# 0.00 NRBC% 0.0 %NEUT 70.1 %LYMP 22.5 %MONO 5.8 
%EOS 0.8 %BASO 0.4 #NEUT 5.97 #LYMP 1.91 #MONO 0.49 #EOS 0.07 #BASO 0.03 MANUAL 
DIFF NOT IND 







History Of Immunizations







       Name    Date Admin    Mfg Name    Mfg Code    Trade Name    Lot#    Route    Inj    Vis Given    Vis

 Pub                                    CVX

 

          Pneumococcal    10/17/2016    [employer]    [facility]       PREVNAR 13    [id number]    

Intramuscular    Left Deltoid    10/17/2016      2015       133







History of Past Illness







                    Name                Date of Onset       Comments

 

                    Diabetes Mellitus, Type II                         

 

                    Hyperlipidemia                           

 

                    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                         

 

                    Bronchitis, Chronic    2010  8:23AM     

 

                    Subcutaneous Nodule    2010  8:23AM     

 

                    Diabetes Mellitus, Type II    2010 10:15AM     

 

                    Hyperlipidemia, Mixed    2010 10:15AM     

 

                    Sebaceous Cyst      2010 11:32PM     

 

                    General Medical Exam, Adult    2010  9:19AM     

 

                    Bursitis, right knee    Mar 15 2011  3:05PM     

 

                    Gastroenteritis, Viral    May  2 2011  8:56AM     

 

                    Hypokalemia         May 11 2011  8:15AM     

 

                    Polymyalgia Rheumatica    May 11 2011  8:15AM     

 

                    Hypokalemia         2011  9:48AM     

 

                    Polymyalgia Rheumatica    2011  9:48AM     

 

                    Diabetes Mellitus, Type II    2011  9:48AM     

 

                    Diabetes Mellitus, Type II    Aug 10 2011  2:57PM     

 

                    Edema bilateral lower extremities    Aug 10 2011  2:57PM     

 

                    Polymyalgia Rheumatica    Dec 14 2011  3:17PM     

 

                    Arthralgia          Dec 14 2011  3:17PM     

 

                    Pain in  foot, Left Heel    Dec 27 2011  8:16AM     

 

                    Edema left lower ext.    Mar 26 2012 11:30AM     

 

                    Petechial Rash      Mar 26 2012 11:30AM     

 

                    Diabetes Mellitus, Type II    Mar 27 2012  1:39PM     

 

                    Polymyalgia Rheumatica    Mar 27 2012  1:39PM     

 

                    Edema left lower ext.    2012  8:05AM     

 

                    Diabetes Mellitus, Type II    2012  8:05AM     

 

                    Cellulitis left foot    2012  8:05AM     

 

                    Myalgia             Jul  3 2012  8:10AM     

 

                    Headache            Jul  3 2012  8:10AM     

 

                    Neuropathy, right foot    Jul  3 2012  8:10AM     

 

                    Upper Respiratory Infections    Dec  4 2012 10:35AM     

 

                    Diabetes Mellitus, Type II    Mar  5 2013  9:32AM     

 

                    Hyperlipidemia      Mar  5 2013  9:32AM     

 

                    Atrial Fibrillation    Mar  5 2013  9:32AM     

 

                    Osteoarthritis, hand    Mar  5 2013  9:32AM     

 

                    Cellulitis          2013 10:15AM     

 

                    Right Acute Otitis Media    Aug 28 2013  8:55AM     

 

                    Chronic Cough       Oct  8 2013 10:10AM     

 

                    Shortness of breath    Oct  8 2013 10:10AM     

 

                    Cough               Oct 17 2013  9:40AM     

 

                    Tension Headache    2014  8:54AM     

 

                    Blurred Vision      2014  8:54AM     

 

                    Bronchitis, Acute    2014  8:54AM     

 

                    Postoperative Follow-up    2014 10:56AM     

 

                    Basal Cell          2014 10:56AM     

 

                    Basal Cell Carcinoma of Skin    2014 10:59AM     

 

                    Sebaceous Cyst      2014 10:59AM     

 

                    Syncope             Oc  3 2014  3:57PM     

 

                    Basal cell carcinoma of skin, site unspecified    2014  7:25AM     

 

                    Lumbar back pain    Dec 19 2014  9:42AM     

 

                    Left Radiculopathy of leg    Dec 19 2014  9:42AM     

 

                    Depressive Disorder    Dec 19 2014  9:42AM     

 

                    Diabetes Mellitus, Type II    May 13 2015 10:33AM     

 

                    Hyperlipidemia      May 13 2015 10:33AM     

 

                    Systolic CHF, acute    May 13 2015 10:33AM     

 

                    Resolved Atrial fibrillation    May 13 2015 10:33AM     

 

                    Mixed hyperlipidemia    Dec  3 2015 10:19AM     

 

                    Diabetes mellitus type 2, uncontrolled    Dec  3 2015 10:19AM     

 

                    Other osteoarthritis involving multiple joints    Dec  3 2015 10:19AM     

 

                    Shortness of breath    May 17 2016  1:36PM     

 

                          Chronic obstructive pulmonary disease with (acute) exacerbation    May 17 2016  1:36PM

                                         

 

                    Need for Prevnar vaccine    Oct 17 2016  9:19AM     

 

                    Paroxysmal atrial fibrillation    Oct 17 2016  9:19AM     

 

                    Diabetes Mellitus, Type II    Oct 17 2016  9:19AM     

 

                    Hypokalemia         Oct 30 2017 10:43AM     

 

                    Hypokalemia         Nov  3 2017 11:41AM     

 

                    Cellulitis of left lower extremity    Oct 30 2017 10:43AM     

 

                    Diabetes Mellitus, Type II    Dec 14 2018  8:01AM     

 

                    Hyperlipidemia      Dec 14 2018  8:01AM     

 

                    Atrial fibrillation with controlled ventricular rate    Dec 14 2018  8:01AM     

 

                    Diabetes Mellitus, Type II    May 21 2019  1:17PM     

 

                    Hyperlipidemia      May 21 2019  1:17PM     

 

                    Acute on chronic systolic (congestive) heart failure    May 21 2019  1:17PM     

 

                    Cellulitis of right lower extremity    2019  9:07AM     







Payers







           Insurance Name    Company Name    Plan Name    Plan Number    Policy Number    Policy Group

 Number                                 Start Date

 

                    [employer]      [employer] [place]              [id number]              2015

 

                    [employer] [place]              [id number]              N/A

 

                    [employer]      [employer] [place]              [id number]              

 

                                [facility]    PMRV PHYS/DS

                    [id number]                               N/A







History of Encounters







                    Visit Date          Visit Type          Provider

 

                    2019            Office visit        Mateo Cooper DO

 

                    2019           Office visit        Mateo Cooper DO

 

                    5/15/2019           Hospital            Isaias Pelletier MD

 

                    2019           Hospital            LEXX Holm MD

 

                    2018          Office visit         

 

                    2018          Office visit        Mateo Cooper DO

 

                    2018          Aren Holm MD

 

                    10/30/2017          Office visit        Mateo Cooper DO

 

                    10/29/2017          Hospital            LEXX Holm MD

 

                    10/17/2016          Office visit        Mateo Cooper DO

 

                    2016           Office visit        Mateo Cooper DO

 

                    12/3/2015           Office visit        Mateo Cooper DO

 

                    5/15/2015           Hospital            LEXX Holm MD

 

                    2015           Office visit        Mateo Cooper DO

 

                    2014          Office visit        Mateo Cooper DO

 

                    2014           Aren Holm MD

 

                    2014           Procedures          David Suresh MD

 

                    6/3/2014            Office visit        Mateo Cooper DO

 

                    2014           Office visit        David Suresh MD

 

                    2014            Procedures          David Suresh MD

 

                    2014           [facility]            LEXX Holm MD

 

                    2014           Office visit        Mateo Kenneth DO

 

                    10/8/2013           Office visit        Mateo Kenneth DO

 

                    2013           Office visit        Mateo Kenneth DO

 

                    2013           Office visit        Mateo Angelte DO

 

                    3/5/2013            Office visit        Mateo Cooper DO

 

                    2013           Hospital            LEXX Holm MD

 

                    2012           Office visit        Jenni Katz APRN

 

                    2012          Hospital            LEXX Holm MD

 

                    7/3/2012            Office visit        Mateo Cooper DO

 

                    2012            [facility]            Kristian Kiser MD

 

                    2012            Hospital            LEXX Holm MD

 

                    2012            [facility]            Kristian Kiser MD

 

                    2012            Hospital            LEXX Holm MD

 

                    2012            Office visit        Mateo Cooper DO

 

                    3/26/2012           Office visit        Jenni Katz APRN

 

                    2011          Office visit        Mateo Cooper DO

 

                    2011          Office visit        Jenni Katz APRN

 

                    8/10/2011           Office visit        Jenni Katz APRN

 

                    2011           Office visit        Mateo Cooper DO

 

                    2011           Office visit        Mateo Cooper DO

 

                    2011            [facility]            Bandar Zelaya MD

 

                    2011            [facility]            Bandar Zelaya MD

 

                    2011            Hospital            Bandar Zelaya MD

 

                    2011            [facility]            LEXX Holm MD

 

                    2011            Office visit        Mateo Cooper DO

 

                    3/15/2011           Office visit        Mateo Cooper DO

 

                    2010           Office visit        David Burciaga PA-C

 

                    2010            Office visit        Mateo Cooper DO

 

                    2010           Laboratory          Ayush Finn MD

 

                    2010           Laboratory          Ayush Finn MD

 

                    2010            Office visit        Mateo Cooper DO

## 2019-06-24 NOTE — XMS REPORT
Patient Summary (HL7 CCD)

                             Created on: 2018



RICK POWERS

External Reference #: [id number]

: 1950

Sex: Female



Demographics







                          Address                   [address], [place]  [address]

 

                          Home Phone                [phone]

 

                          Preferred Language        Unknown

 

                          Marital Status            Unknown

 

                          Buddhist Affiliation     Unknown

 

                          Race                      White

 

                          Ethnic Group              Not  or 





Author







                          Author                    MATTEO BROWN              Unknown

 

                          Address                   [address]

[place]  [address]



 

                          Phone                     [phone]







Care Team Providers







                    Care Team Member Name    Role                Phone

 

                    ANDREW BAKER MD    Attphys             0

 

                    ANDREW BAKER MD    Prisurg             0



                                                                



Allergies

          





             Allergy          Code          Allergy Type          Reaction          Status    

 

             PHENERGAN          873872          Drug allergy          BREATHING PROBLEMS          Active

    

 

                ETOMIDATE          4177            Drug allergy          LARYNGOSPASM, VENTRICULAR ARRHYTHMIA

                                        Active    



                                                                                
                 



Active Medications

          Unknown or Not Available.                                             
                                          



Problems

          





             Problem          Code          Start Date          Resolved Date          Status    

 

                    ATRIAL FIBRILLATION WITH RAPID VENTRICULAR RESPONSE          [id number]           2013

                                                    Active    



                                                                                
       



Procedures

          





                Procedure          Code            Procedure Type          Date    

 

                LACTIC ACID          8561059          SNOMED CT          10/29/2017    

 

                BASIC METABOLIC PANEL          892913215          SNOMED CT          10/29/2017    

 

                CBC W/ AUTO DIFF (RFLX MAN DIFF IF IND)          [id number]          SNOMED CT          10/29/2017

    

 

                ^CBC W/AUTO DIFF          7565021          SNOMED CT          10/29/2017    



                                                                                
                                     



Results

          







                                        BASIC METABOLIC PANEL - Collect Date/Time: 10/29/2017 14:55     

 

             Test Name          Code          Test Result          Test Units          Test Ref Range

    

 

             GLUCOSE          2345-7          177          MG/DL          L=70       H=100    

 

             SODIUM          2951-2          141          MEQ/L          L=135      H=148    

 

             POTASSIUM          2823-3          2.9          MEQ/L          L=3.5      H=5.3    

 

             CHLORIDE          2075-0          106          MEQ/L          L=96       H=110    

 

             CO2          2028-9          23           MEQ/L          L=22       H=29    

 

             BUN          3094-0          9            MG/DL          L=8        H=22    

 

             CREATININE          2160-0          0.7          MG/DL          L=0.6      H=1.6    

 

             CALCIUM          34291-8          9.2          MG/DL          L=8.2      H=10.6    

 

             AGE                       67           yrs               

 

             GFR NonAA                       84                             

 

             GFR AA                       102                            

 

             eGFR                       >60          N/A               

 

             eGFR AA*                       >60          N/A               











                                        CBC W/ AUTO DIFF (RFLX MAN DIFF IF IND) - Collect Date/Time: 10/29/2017 14:55   

  

 

             Test Name          Code          Test Result          Test Units          Test Ref Range

    

 

             WBC          44103-9          8.8          TH/CMM          L=4.5      H=10.8    

 

             RBC          789-8          4.02          ML/CMM          L=4.20     H=5.40    

 

             HGB          718-7          12.0          G/DL          L=12.0     H=16.0    

 

             HCT          4544-3          34.8          %            L=37.0     H=47.0    

 

             MCV                       87           FL           L=81       H=99    

 

             MCH                       29.9          PG           L=27.0     H=33.0    

 

             MCHC                       34.5          G/DL          L=31.0     H=36.0    

 

             RDW SD                       42           FL           L=36       H=50    

 

             RDW CV                       13.2          %            L=0.0      H=14.8    

 

             MPV                       9.3          FL           L=9.3      H=12.5    

 

             PLT          777-3          162          TH/CMM          L=130      H=440    

 

             NRBC#                       0.00          TH/CMM          L=0.00     H=0.00    

 

             NRBC%                       0.0          /100WBC          L=0.0      H=2.0    

 

             %NEUT                       64.4          %                 

 

             %LYMP                       27.6          %                 

 

             %MONO                       6.9          %                 

 

             %EOS                       0.6          %                 

 

             %BASO                       0.2          %                 

 

             #NEUT                       5.68          TH/CMM          L=2.10     H=8.20    

 

             #LYMP                       2.43          TH/CMM          L=0.90     H=5.20    

 

             #MONO                       0.61          TH/CMM          L=0.16     H=1.00    

 

             #EOS                       0.05          TH/CMM          L=0.00     H=0.80    

 

             #BASO                       0.02          TH/CMM          L=0.00     H=0.20    

 

             MANUAL DIFF                       NOT IND          N/A               











                                        LACTIC ACID - Collect Date/Time: 10/29/2017 14:55     

 

             Test Name          Code          Test Result          Test Units          Test Ref Range

    

 

             LACTIC ACID          2524-7          2.5          mmol/L          L=0.5      H=1.6    



                                                                                
                           



Function Status

          Unknown or Not Available.                                             
                                



History of Immunizations

          





                    Immunization          Code                Date    

 

                    pneumococcal, unspecified formulation          109                 2012    

 

                    Pneumococcal conjugate PCV 13          133                 10/17/2016    

 

                    Influenza, seasonal, injectable          141                 10/18/2012    



                                                                                
                 



Plan of Treatment

          Unknown or Not Available.                                             
                      



Social History

          





                Smoking Status          Code            Start Date          End Date    

 

                Never smoker          [id number]                               



                                                                                
       



Vital Signs

          Unknown or Not Available.                                             
                      



Function Status

          Unknown or Not Available.                                             
  



Goals

          Unknown or Not Available.                                             
            



ASSESSMENTS

          Unknown or Not Available.                                             
                      



Health Concerns Section

          Unknown or Not Available.

## 2019-06-24 NOTE — XMS REPORT
Clinical Summary

                             Created on: 2019



[employer]

External Reference #: [id number]

: 1950

Sex: Female



Demographics







                          Address                   [address]

[place]  [id number]

 

                          Home Phone                [phone]

 

                          Preferred Language        Unknown

 

                          Marital Status            Unknown

 

                          Buddhist Affiliation     BAP

 

                          Race                      White

 

                          Ethnic Group              Not  or 





Author







                          Author                    [facility]

 

                          Organization              [facility]

 

                          Address                   Unknown

 

                          Phone                     Unavailable







Support







                Name            Relationship    Address         Phone

 

                    [employer]ider          [address]                [address]

[place]  [phone]                         [phone]







Care Team Providers







                    Care Team Member Name    Role                Phone

 

                    Unverified, Unverified Md    PCP                 Unavailable

 

                    Vicki Guzman MD    Unavailable         [phone]







Source Comments

Some departments are not documenting in the electronic medical record.  If you d
o not see the information that you expected, contact Release of Information in [facility] Information Management department at [phone] for further assistan
ce in locating additional records.[facility]



Allergies

Not on File



Medications

Not on file



Active Problems







 



                           Problem                   Noted Date

 

 



                           Displacement of lumbar intervertebral disc without myelopathy     10/09/2008







Social History







                                        Date



                 Tobacco Use     Types           Packs/Day       Years Used 

 

                                         



                                         Never Assessed    









 



                           Sex Assigned at Birth     Date Recorded

 

 



                                         Not on file 









                                        Industry



                           Job Start Date            Occupation 

 

                                        Not on file



                           Not on file               Not on file 









                                        Travel End



                           Travel History            Travel Start 













                                         No recent travel history available.







Last Filed Vital Signs

Not on file



Plan of Treatment







   



                 Health Maintenance     Due Date        Last Done       Comments

 

   



                           HEPATITIS C SCREENING     1950  

 

   



                           PHYSICAL (COMPREHENSIVE)     1958  



                                         EXAM   

 

   



                           DTAP/TDAP VACCINES (1 -     1969  



                                         Tdap)   

 

   



                           BREAST CANCER SCREENING     1991  

 

   



                           COLORECTAL CANCER         2001  



                                         SCREENING   

 

   



                           SHINGLES RECOMBINANT      2001  



                                         VACCINE (1 of 2)   

 

   



                           OSTEOPOROSIS              2016  



                                         SCREENING/MONITORING   

 

   



                           PNEUMONIA (PCV13/PPSV23)     2016  



                                         VACCINES (1 of 2 - PCV13)   

 

   



                           INFLUENZA VACCINE         10/01/2019  







Results

Not on filefrom Last 3 Months

## 2019-06-24 NOTE — XMS REPORT
MU2 Ambulatory Summary

                             Created on: 2018



Ambar Grider

External Reference #: [id number]

: 1950

Sex: Female



Demographics







                          Address                   [address] [place]  [address]

 

                          Home Phone                [phone]

 

                          Preferred Language        English

 

                          Marital Status            

 

                          [place] Affiliation     Unknown

 

                          Race                      White

 

                          Ethnic Group              Not  or 





Author







                          Author                    Mateo Cooper

 

                          [facility]

 

                          Address                   [address]

[place], [place]  [id number]



 

                          Phone                     [phone]







Care Team Providers







                    Care Team Member Name    Role                Phone

 

                    Mateo Cooper    PCP                 [phone]

 

                    Mateo Cooper    PreferredProvider    [phone]







Allergies and Adverse Reactions







                    Name                Reaction            Notes

 

                    Phenergan                                







Plan of Treatment







             Planned Activity    Comments     Planned Date    Planned Time    Plan/Goal

 

             Uncontrolled Diabetes and Thyroid Nodules                                            







Medications







                                        Active 

 

             Name         Start Date    Estimated Completion Date    SIG          Comments

 

                Pen Needle 31 gauge x 3/16" miscellaneous needle    2014                        USES 3 INJECTIONS

 A DAY                                   

 

                metformin 1,000 mg oral tablet    2014                        take 1 tablet (1,000 mg) by oral route

 2 times per day with morning and evening meals     

 

                metformin 1,000 mg oral tablet    2015                       TAKE 1 TABLET BY MOUTH TWICE DAILY

 WITH MORNING AND EVENING MEALS          

 

                metformin 1,000 mg oral tablet    2015                       TAKE 1 TABLET BY MOUTH TWICE DAILY

 WITH MORNING AND EVENING MEALS          

 

                carvedilol 3.125 mg oral tablet                                    take 1 tablet (3.125 mg) by oral route once

 daily                                   

 

                Benicar 40 mg oral tablet                                    take 1 tablet (40 mg) by oral route once daily 

                                         

 

                Levemir FlexTouch 100 unit/mL (3 mL) subcutaneous insulin pen    12/3/2015                       inject

 45 units by subcutaneous route twice a day     

 

                tramadol 50 mg oral tablet    12/3/2015                       take 1 tablet (50 mg) by oral route every

 6 hours as needed                       

 

                Novolog Flexpen 100 unit/mL subcutaneous insulin pen    2016                       INJECT 14 UNITS

 SUBCUTANEOUSLY WITH BREAKFAST. LUNCH, AND SUPPER     

 

                acetaminophen-codeine 300-30 mg oral tablet    2016                       take 1 tablet by oral

 route every 4 hours as needed           

 

                amiodarone 400 mg oral tablet                                    take 1 tablet (400 mg) by oral route once daily

                                         

 

                potassium chloride 10 mEq oral capsule, extended release    10/30/2017                      take 2 

capsules (20 meq) by oral route once daily with food for 30 days     









                                         

 

             Name         Start Date    Expiration Date    SIG          Comments

 

                Bactrim -160 mg oral tablet    2010       take 1 tablet by oral route

 every 12 hours for 10 days              

 

                Keflex 500 mg oral capsule    2010        take 1 capsule (500 mg) by oral 

route every 12 hours for 7 days          

 

                Reglan 10 mg oral tablet    2011        6/3/2011        take 1 tablet (10 mg) by oral route

 twice daily                             

 

                prednisone 20 mg oral tablet    2011      take 1 tablet by oral route

 daily for 10 days                       

 

                nabumetone 500 mg oral tablet    2011                      take 1 tablet (500 mg) by oral route

 2 times per day with food               

 

                indomethacin 50 mg oral capsule    2012       take 1 capsule (50 mg) by 

oral route 3 times per day with food for 7 days    "stopped it 2-3 weeks ago due to

 high glucose"

 

                Bactrim -160 mg oral tablet    2012       take 1 tablet by oral route

 every 12 hours for 7 days               

 

                          insulin syringe-needle U-100 1 mL 31 gauge x 5/16 miscellaneous syringe    2012            USE AS DIRECTED FOUR TIMES DAILY     

 

                Levemir 100 unit/mL subcutaneous solution    2012       inject 45 units

 by subcutaneous route twice daily      "using pen"

 

                Augmentin 500-125 mg oral tablet    2012      take 1 tablet by oral route

 every 12 hours for 7 days               

 

                Pen Needle 31 gauge x 3/16" miscellaneous needle    2013       Uses 3 injections

 a day                                   

 

                cephalexin 500 mg oral tablet    2013       take 1 tablet (500 mg) by oral

 route every 12 hours for 10 days        

 

                Augmentin 500-125 mg oral tablet    2013        take 1 tablet by oral route

 every 12 hours for 7 days               

 

                Novolog Flexpen 100 unit/mL subcutaneous insulin pen    2014        INJECT

 12 UNITS SUBCUTANEOUSLY WITH BREAKFAST. LUNCH, AND SUPPER     

 

                gabapentin 600 mg oral tablet    2014       TAKE 1 TABLET BY MOUTH FOUR 

TIMES DAILY                              

 

                Xarelto 20 mg oral tablet    12/3/2015       2016       take 1 tablet (20 mg) by oral route

 once daily with the evening meal for 30 days     

 

                amoxicillin-pot clavulanate 500-125 mg oral tablet    2016        2/15/2016       take 1 

tablet by oral route every 12 hours for 7 days     

 

                Accu-Chek Compact Test miscellaneous strip    2/15/2016       2016       Check glucose 

4 times a day Dx:e11.65                  

 

                          Zostavax (PF) 19,400 unit/0.65 mL subcutaneous suspension for reconstitution    10/17/2016

                    10/18/2016          inject 0.65 milliliter by subcutaneous route once     









                                        Discontinued 

 

             Name         Start Date    Discontinued Date    SIG          Comments

 

                Aldara 5 % topical cream in packet    2010        3/15/2011       apply to the affected area(s)

 before bedtime by topical route 3 times per week and leave on skin for 6 to 10 
hours                                    

 

                fenofibrate 160 mg oral tablet    2010       take 1 tablet (160 mg) by oral

 route once daily                        

 

                meloxicam 15 mg oral tablet    3/15/2011       2011      take 1 tablet (15 mg) by oral

 route once daily as needed              

 

                Zofran (as hydrochloride) 4 mg/5 mL oral solution    2011       take 5-10

 milliliters by oral route every 6 hours as needed     

 

                Klor-Con M20 20 mEq oral tablet,ER particles/crystals                    3/5/2013        take 1 tablet

 (20 meq) by oral route once daily with food    "not takiing"

 

                Diflucan 150 mg oral tablet    8/10/2011       2012        take 1 tablet (150 mg) by oral

 route once                              

 

                amoxicillin 500 mg oral capsule                    2012        take 1 capsule (500 mg) by oral route

 every 12 hours for 7 days               

 

             Medrol (Dominic) 4 mg oral tablets,dose pack                 2012     take as directed     

 

                Lasix 20 mg oral tablet    4/10/2012       3/5/2013        take 1 tablet (20 mg) by oral route

 once daily as needed                    

 

                amitriptyline 10 mg oral tablet    7/3/2012        2015       take 1 tablet by oral route

 once a day (at bedtime)                 

 

                cyclobenzaprine 5 mg oral tablet    7/3/2012        10/8/2013       take 1 tablet by oral route

 once a day (at bedtime) as needed       

 

                cephalexin 500 mg oral tablet    7/3/2012        3/5/2013        take 1 tablet (500 mg) by oral

 route every 12 hours                    

 

                Tessalon 200 mg oral capsule    2012       take 1 capsule (200 mg) by oral

 route 3 times per day as needed         

 

                    Claritin-D 12 Hour 5-120 mg oral tablet extended release 12 hr    2012           3/5/2013

                          take 1 tablet by oral route every 12 hours     

 

                naproxen 500 mg oral tablet    2014      take 1 tablet (500 mg) by oral

 route 2 times per day with food        "not taking now"

 

                    Dulera 100-5 mcg/actuation inhalation HFA aerosol inhaler    10/8/2013           12/3/2015 

                          inhale 2 puffs by inhalation route 2 times per day in the morning and evening     



 

                flecainide 100 mg oral tablet                    2015       take 1 tablet (100 mg) by oral route

 every 12 hours                          

 

                cyclobenzaprine 10 mg oral tablet    2014      take 1 tablet (10 mg) 

by oral route at bedtime as needed       

 

                Betapace 80 mg oral tablet                    2015       take 1 tablet (80 mg) by oral route 2 

times per day                           Dr. Turner

 

                duloxetine 30 mg oral capsule,delayed release(/EC)    2014      12/3/2015       take

 1 capsule (30 mg) by oral route once a day     

 

                hydrocodone-acetaminophen 7.5-325 mg oral tablet    2014      12/3/2015       take 1 

tablet by oral route every 6 hours as needed for pain     

 

                diltiazem HCl 120 mg oral capsule, extended release                    12/3/2015       take 1 capsule

 (120 mg) by oral route once daily       

 

                carvedilol 12.5 mg oral tablet                    12/3/2015       take 1 tablet (12.5 mg) by oral route

 every 12 hours with food               dose change

 

                sotalol 120 mg oral tablet                    12/3/2015       take 1 tablet (120 mg) by oral route 2

 times per day                           

 

                benzonatate 200 mg oral capsule    2016        10/17/2016      take 1 capsule (200 mg) by

 oral route 3 times per day as needed     

 

                    Levemir FlexTouch 100 unit/mL (3 mL) subcutaneous insulin pen    3/1/2016            10/17/2016

                          INJECT 43 UNITS BY SUBCUTANEOUS ROUTE TWICE A DAY     

 

                    Levemir FlexTouch 100 unit/mL (3 mL) subcutaneous insulin pen    3/1/2016            10/17/2016

                          INJECT 43 UNITS BY SUBCUTANEOUS ROUTE TWICE A DAY    has an insulin pump







Problem List







                    Description         Status              Onset

 

                    Diabetes Mellitus, Type II    Active               

 

                    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               

 

                    Hyperlipidemia      Active               







Vital Signs







      Date    Time    BP-Sys(mm[Hg]    BP-Missy(mm[Hg])    HR(bpm)    RR(rpm)    Temp    WT    HT    HC    BMI

                    BSA                 BMI Percentile      O2 Sat(%)

 

        10/30/2017    10:41:00 AM    152 mmHg    88 mmHg    80 bpm    20 rpm    98.1 F    153 lbs    61 

in                        28.91 kg/m2    1.73 m2                   100 %

 

        10/17/2016    9:17:00 AM    138 mmHg    70 mmHg    83 bpm    20 rpm    97.7 F    160 lbs    61 in

                          30.2314 kg/m    1.7674 m                 99 %

 

       2016    1:34:00 PM    132 mmHg    60 mmHg    87 bpm    22 rpm    99 F    152 lbs    61 in    

                28.72 kg/m2     1.72 m2                         99 %

 

        12/3/2015    10:17:00 AM    138 mmHg    72 mmHg    80 bpm    20 rpm    98.2 F    152 lbs    61 in

                          28.7199 kg/m    1.7226 m                 98 %

 

        2015    10:31:00 AM    130 mmHg    66 mmHg    66 bpm    20 rpm    98.1 F    143 lbs    61 in

                          27.02 kg/m2    1.67 m2                   98 %

 

        2014    9:39:00 AM    138 mmHg    88 mmHg    153 bpm    20 rpm    98.7 F    150 lbs    61 

in                        28.342 kg/m    1.7112 m                 98 %

 

        6/3/2014    3:55:00 PM    130 mmHg    62 mmHg    69 bpm    18 rpm    97.9 F    151 lbs    61 in 

                          28.53 kg/m2    1.72 m2                   99 %

 

        2014    10:56:00 AM    150 mmHg    74 mmHg    81 bpm    16 rpm    96.2 F    152 lbs    61 in

                          28.7199 kg/m    1.7226 m                 97 %

 

       2014    8:52:00 AM    138 mmHg    78 mmHg    61 bpm    20 rpm    98 F    168 lbs    61 in    

                31.74 kg/m2     1.81 m2                         100 %

 

        10/8/2013    10:08:00 AM    138 mmHg    78 mmHg    148 bpm    22 rpm    98.2 F    168 lbs    61 

in                        31.743 kg/m    1.811 m                 98 %

 

        2013    8:54:00 AM    150 mmHg    80 mmHg    78 bpm    16 rpm    98.9 F    163 lbs    61 in

                          30.80 kg/m2    1.78 m2                   99 %

 

       2013    10:13:00 AM    136 mmHg    78 mmHg    70 bpm    18 rpm    97.8 F    156 lbs            

                                                                 

 

        3/5/2013    9:30:00 AM    148 mmHg    80 mmHg    76 bpm    16 rpm    97.8 F    152 lbs    61 in 

                          28.72 kg/m2    1.72 m2                    

 

        2012    10:33:00 AM    136 mmHg    74 mmHg    89 bpm    18 rpm    98.6 F    154.375 lbs    

61 in                     29.1686 kg/m    1.736 m                 99 %

 

        7/3/2012    8:08:00 AM    134 mmHg    72 mmHg    78 bpm    22 rpm    98.2 F    145 lbs    61 in 

                          27.40 kg/m2    1.68 m2                    

 

        2012    8:03:00 AM    132 mmHg    76 mmHg    70 bpm    18 rpm    97.9 F    143 lbs    61 in 

                          27.0193 kg/m    1.6708 m                  

 

        3/26/2012    11:28:00 AM    136 mmHg    74 mmHg    89 bpm    18 rpm    97.9 F    149.125 lbs    

61 in                     28.18 kg/m2    1.71 m2                    

 

        2011    8:12:00 AM    132 mmHg    82 mmHg    84 bpm    16 rpm    97.8 F    150 lbs    61 in

                          28.342 kg/m    1.7112 m                  

 

        2011    3:15:00 PM    130 mmHg    74 mmHg    68 bpm    18 rpm    98.3 F    156 lbs    61 in

                          29.48 kg/m2    1.75 m2                    

 

        8/10/2011    2:58:00 PM    120 mmHg    74 mmHg    100 bpm    20 rpm    99.4 F    169.25 lbs     

                                                                  

 

       2011    9:49:00 AM    134 mmHg    78 mmHg    74 bpm    18 rpm    98.3 F    164 lbs             

                                                                 

 

       2011    8:15:00 AM    128 mmHg    78 mmHg    62 bpm    18 rpm    98.2 F    157 lbs             

                                                                 

 

       2011    8:54:00 AM    128 mmHg    72 mmHg    104 bpm    22 rpm    98.6 F    153 lbs             

                                                                100 %

 

      3/15/2011    3:04:00 PM    144 mmHg    82 mmHg    72 bpm    18 rpm    98 F    163 lbs                 

                                                             

 

     2010    9:17:00 AM    124 mmHg    78 mmHg    76 bpm              145 lbs                             

 

 

       2010    10:13:00 AM    130 mmHg    68 mmHg    70 bpm    16 rpm    98.3 F    144 lbs             

                                                                 

 

      2010    8:21:00 AM    134 mmHg    76 mmHg    70 bpm    20 rpm    98.6 F    147 lbs                

                                                             







Social History







                    Name                Description         Comments

 

                    Tobacco             Never smoker         







History of Procedures







                    Date Ordered        Description         Order Status

 

                    12/3/2015 12:00 AM    LIPID PANEL         Reviewed

 

                    12/3/2015 12:00 AM    GLYCOSYLATED HEMOGLOBIN TEST    Reviewed

 

                    12/3/2015 12:00 AM    COMPREHEN METABOLIC PANEL    Reviewed

 

                    12/3/2015 12:00 AM    Endocrinology Consultation    Reviewed

 

                    2011 12:00 AM    METABOLIC PANEL TOTAL CA    Reviewed

 

                    2011 12:00 AM    C-REACTIVE PROTEIN    Reviewed

 

                    2011 12:00 AM    RBC SED RATE AUTOMATED    Reviewed

 

                    2011 12:00 AM    X-RAY EXAM OF FOOT    Reviewed

 

                    2016 12:00 AM    CHEST X-RAY 2VW FRONTAL&LATL    Reviewed

 

                    2016 12:00 AM    Decadron, Per 1 Mg NDC# [address]-16    Reviewed

 

                    2016 12:00 AM    Depo-Medrol, Per 80 Mg NDC#[address]-58    Reviewed

 

                    10/17/2016 12:00 AM    PNEUMOCOCCAL VACC 13 MARILEE IM    Reviewed

 

                    3/26/2012 12:00 AM    COMPLETE CBC W/AUTO DIFF WBC    Reviewed

 

                    3/26/2012 12:00 AM    PROTHROMBIN TIME    Reviewed

 

                    3/26/2012 12:00 AM    ASSAY OF BLOOD/URIC ACID    Reviewed

 

                    3/27/2012 12:00 AM    RBC SED RATE AUTOMATED    Reviewed

 

                    3/27/2012 12:00 AM    GLYCOSYLATED HEMOGLOBIN TEST    Reviewed

 

                    2017 12:00 AM    METABOLIC PANEL TOTAL CA    Reviewed

 

                    10/30/2017 12:00 AM    Rocephin 500 Mg Injection    Reviewed

 

                    2017 12:00 AM    METABOLIC PANEL TOTAL CA    Reviewed

 

                    2010 12:00 AM    BREATHING CAPACITY TEST    Reviewed

 

                    10/8/2013 12:00 AM    CHEST X-RAY 2VW FRONTAL&LATL    Reviewed

 

                    10/8/2013 12:00 AM    AIRWAY INHALATION TREATMENT    Reviewed

 

                    10/17/2013 12:00 AM    CHEST X-RAY 2VW FRONTAL&LATL    Reviewed

 

                    2014 12:00 AM    CT HEAD/BRAIN W/O & W/DYE    Reviewed

 

                    2014 12:00 AM    BREATHING CAPACITY TEST    Reviewed

 

                    6/3/2014 12:00 AM    ECG MONIT/REPRT UP TO 48 HRS    Reviewed

 

                    6/3/2014 12:00 AM    TTE W/O DOPPLER COMPLETE    Reviewed

 

                    6/3/2014 12:00 AM    Carotid Doppler     Reviewed

 

                    2010 12:00 AM    COMPREHEN METABOLIC PANEL    Reviewed

 

                    2010 12:00 AM    LIPID PANEL         Reviewed

 

                    2010 12:00 AM    GLYCOSYLATED HEMOGLOBIN TEST    Reviewed

 

                    2010 12:00 AM    MICROALBUMIN SEMIQUANT    Reviewed

 

                    2010 12:00 AM    DRAINAGE OF SKIN ABSCESS    Reviewed

 

                    2010 12:00 AM    Urine drug screen    Reviewed

 

                    2014 12:00 AM    Physiatry Consult    Reviewed

 

                    2011 12:00 AM    METABOLIC PANEL TOTAL CA    Reviewed

 

                    2011 12:00 AM    METABOLIC PANEL TOTAL CA    Reviewed

 

                    2011 12:00 AM    C-REACTIVE PROTEIN    Reviewed

 

                    8/15/2011 12:00 AM    METABOLIC PANEL TOTAL CA    Reviewed

 

                    8/15/2011 12:00 AM    GLYCOSYLATED HEMOGLOBIN TEST    Reviewed







Results Summary







                          Date and Description      Results

 

                          2010 8:22 AM          TRIGLYCERIDES 666.0 mg/dLCHOLESTEROL 241.0 mg/dLHDL 34.0 mg/dLTOT

 CHOL/HDL 7.1 LDL (CALC) INVALID MG/DLGLYCOHEMOGLOBIN A1C 10.30 %GLUCOSE 336.0 
mg/dLSODIUM 138.0 mmol/LPOTASSIUM 3.40 mmol/LCHLORIDE 102.0 mmol/LCO2 22.0 
mmol/LBUN 8.0 mg/dLCREATININE 0.70 mg/dLSGOT/AST 15.0 IU/LSGPT/ALT 7.0 IU/LALK 
PHOS 93.0 IU/LTOTAL PROTEIN 7.30 g/dLALBUMIN 3.90 g/dLTOTAL BILI 0.40 
mg/dLCALCIUM 9.10 mg/dLAGE 60 GFR NonAA 86 GFR  eGFR >60 mL/min/1.73 
m2eGFR AA* >60 CREAT UR 57.20 mg/dLMICROALBUMIN UR 19.0 ug/mLALB:CREAT RATIO 33 

 

                          2011 9:15 AM         GLUCOSE 49.0 mg/dLSODIUM 143.0 mmol/LPOTASSIUM 3.30 mmol/LCHLORIDE

 101.0 mmol/LCO2 30.0 mmol/LBUN 12.0 mg/dLCREATININE 0.50 mg/dLCALCIUM 9.10 
mg/dLAGE 60 GFR NonAA 126 GFR  eGFR >60 mL/min/1.73 m2eGFR AA* >60 

 

                          2011 10:25 AM        C REACTIVE PROTEIN 6.0 mg/LGLUCOSE 88.0 mg/dLSODIUM 143.0 mmol/LPOTASSIUM

 3.60 mmol/LCHLORIDE 104.0 mmol/LCO2 26.0 mmol/LBUN 11.0 mg/dLCREATININE 0.70 
mg/dLCALCIUM 9.40 mg/dLAGE 61 GFR NonAA 85 GFR  eGFR >60 mL/min/1.73 m2
eGFR AA* >60 

 

                          2011 11:00 AM        GLUCOSE 160.0 mg/dLSODIUM 141.0 mmol/LPOTASSIUM 3.70 mmol/LCHLORIDE

 105.0 mmol/LCO2 21.0 mmol/LBUN 13.0 mg/dLCREATININE 0.70 mg/dLCALCIUM 9.50 
mg/dLAGE 61 GFR NonAA 85 GFR  eGFR >60 mL/min/1.73 m2eGFR AA* >60 
GLYCOHEMOGLOBIN A1C 6.80 %

 

                          2011 3:50 PM        C REACTIVE PROTEIN 5.0 mg/LGLUCOSE 361.0 mg/dLSODIUM 140.0 mmol/LPOTASSIUM

 3.80 mmol/LCHLORIDE 102.0 mmol/LCO2 24.0 mmol/LBUN 9.0 mg/dLCREATININE 1.0 
mg/dLCALCIUM 9.60 mg/dLAGE 61 GFR NonAA 57 GFR AA 69 eGFR 57 eGFR AA* >60 
SEDRATE 24.0 mm/hr

 

                          3/26/2012 12:00 PM        WBC 7.3 RBC 4.46 HGB 13.50 g/dLHCT 39.0 %MCV 87.0 fLMCH 30.30

 pgMCHC 34.60 g/dLRDW SD 42 RDW CV 13.20 %MPV 9.60 fLPLT 142 NRBC# 0.00 NRBC% 
0.0 %NEUT 59.60 %%LYMP 33.50 %%MONO 5.60 %%EOS 1.20 %%BASO 0.10 %#NEUT 4.32 
#LYMP 2.43 #MONO 0.41 #EOS 0.09 #BASO 0.01 MANUAL DIFF NOT IND PROTIME 10.60 
secsINR 1.0 URIC ACID 3.9 mg/dL

 

                          3/27/2012 8:45 PM         GLYCOHEMOGLOBIN A1C 10.20 %SEDRATE 31.0 mm/hr

 

                          12/3/2015 11:22 AM        TRIGLYCERIDES 182.0 mg/dLCHOLESTEROL 180.0 mg/dLHDL 50.0 mg/dLTOT

 CHOL/HDL 3.6 LDL (CALC) 94.0 mg/dLGLUCOSE 99.0 mg/dLSODIUM 142.0 
mmol/LPOTASSIUM 4.20 mmol/LCHLORIDE 112.0 mmol/LCO2 21.0 mmol/LBUN 17.0 
mg/dLCREATININE 0.70 mg/dLSGOT/AST 17.0 IU/LSGPT/ALT 12.0 IU/LALK PHOS 86.0 
IU/LTOTAL PROTEIN 6.30 g/dLALBUMIN 4.10 g/dLTOTAL BILI 0.50 mg/dLCALCIUM 9.30 
mg/dLAGE 65 GFR NonAA 84 GFR  eGFR >60 mL/min/1.73meGFR AA* >60 
Hemoglobin A1c 9.10 %Estim. Avg Glu (eAG) 214 

 

                          11/3/2017 8:02 AM         GLUCOSE 97.0 mg/dLSODIUM 145.0 mmol/LPOTASSIUM 3.40 mmol/LCHLORIDE

 112.0 mmol/LCO2 25.0 mmol/LBUN 9.0 mg/dLCREATININE 0.70 mg/dLCALCIUM 8.80 
mg/dLAGE 67 GFR NonAA 84 GFR  eGFR >60 mL/min/1.73meGFR AA* >60 

 

                          2017 7:37 AM        GLUCOSE 172.0 mg/dLSODIUM 142.0 mmol/LPOTASSIUM 3.70 mmol/LCHLORIDE

 108.0 mmol/LCO2 26.0 mmol/LBUN 16.0 mg/dLCREATININE 0.90 mg/dLCALCIUM 9.20 
mg/dLAGE 67 GFR NonAA 63 GFR AA 76 eGFR >60 mL/min/1.73meGFR AA* >60 







History Of Immunizations







       Name    Date Admin    Mfg Name    Mfg Code    Trade Name    Lot#    Route    Inj    Vis Given    Vis

 Pub                                    CVX

 

          Pneumococcal    10/17/2016    [employer]-Prax[employer]    [facility]       Prevnar 13    [id number]    

Intramuscular    Left Deltoid    10/17/2016      2015       133







History of Past Illness







                    Name                Date of Onset       Comments

 

                    Diabetes Mellitus, Type II                         

 

                    Hyperlipidemia                           

 

                    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                         

 

                    Bronchitis, Chronic    Apr  8 2010  8:23AM     

 

                    Subcutaneous Nodule    2010  8:23AM     

 

                    Diabetes Mellitus, Type II    2010 10:15AM     

 

                    Hyperlipidemia, Mixed    2010 10:15AM     

 

                    Sebaceous Cyst      2010 11:32PM     

 

                    General Medical Exam, Adult    2010  9:19AM     

 

                    Bursitis, right knee    Mar 15 2011  3:05PM     

 

                    Gastroenteritis, Viral    May  2 2011  8:56AM     

 

                    Hypokalemia         May 11 2011  8:15AM     

 

                    Polymyalgia Rheumatica    May 11 2011  8:15AM     

 

                    Hypokalemia         2011  9:48AM     

 

                    Polymyalgia Rheumatica    2011  9:48AM     

 

                    Diabetes Mellitus, Type II    2011  9:48AM     

 

                    Diabetes Mellitus, Type II    Aug 10 2011  2:57PM     

 

                    Edema bilateral lower extremities    Aug 10 2011  2:57PM     

 

                    Polymyalgia Rheumatica    Dec 14 2011  3:17PM     

 

                    Arthralgia          Dec 14 2011  3:17PM     

 

                    Pain in  foot, Left Heel    Dec 27 2011  8:16AM     

 

                    Edema left lower ext.    Mar 26 2012 11:30AM     

 

                    Petechial Rash      Mar 26 2012 11:30AM     

 

                    Diabetes Mellitus, Type II    Mar 27 2012  1:39PM     

 

                    Polymyalgia Rheumatica    Mar 27 2012  1:39PM     

 

                    Edema left lower ext.    2012  8:05AM     

 

                    Diabetes Mellitus, Type II    2012  8:05AM     

 

                    Cellulitis left foot    2012  8:05AM     

 

                    Myalgia             Jul  3 2012  8:10AM     

 

                    Headache            Jul  3 2012  8:10AM     

 

                    Neuropathy, right foot    Jul  3 2012  8:10AM     

 

                    Upper Respiratory Infections    Dec  4 2012 10:35AM     

 

                    Diabetes Mellitus, Type II    Mar  5 2013  9:32AM     

 

                    Hyperlipidemia      Mar  5 2013  9:32AM     

 

                    Atrial Fibrillation    Mar  5 2013  9:32AM     

 

                    Osteoarthritis, hand    Mar  5 2013  9:32AM     

 

                    Cellulitis          2013 10:15AM     

 

                    Right Acute Otitis Media    Aug 28 2013  8:55AM     

 

                    Chronic Cough       Oct  8 2013 10:10AM     

 

                    Shortness of breath    Oct  8 2013 10:10AM     

 

                    Cough               Oct 17 2013  9:40AM     

 

                    Tension Headache    2014  8:54AM     

 

                    Blurred Vision      2014  8:54AM     

 

                    Bronchitis, Acute    2014  8:54AM     

 

                    Postoperative Follow-up    2014 10:56AM     

 

                    Basal Cell          2014 10:56AM     

 

                    Basal Cell Carcinoma of Skin    2014 10:59AM     

 

                    Sebaceous Cyst      2014 10:59AM     

 

                    Syncope             Oc  3 2014  3:57PM     

 

                    Basal cell carcinoma of skin, site unspecified    2014  7:25AM     

 

                    Lumbar back pain    Dec 19 2014  9:42AM     

 

                    Left Radiculopathy of leg    Dec 19 2014  9:42AM     

 

                    Depressive Disorder    Dec 19 2014  9:42AM     

 

                    Diabetes Mellitus, Type II    May 13 2015 10:33AM     

 

                    Hyperlipidemia      May 13 2015 10:33AM     

 

                    Systolic CHF, acute    May 13 2015 10:33AM     

 

                    Resolved Atrial fibrillation    May 13 2015 10:33AM     

 

                    Mixed hyperlipidemia    Dec  3 2015 10:19AM     

 

                    Diabetes mellitus type 2, uncontrolled    Dec  3 2015 10:19AM     

 

                    Other osteoarthritis involving multiple joints    Dec  3 2015 10:19AM     

 

                    Shortness of breath    May 17 2016  1:36PM     

 

                          Chronic obstructive pulmonary disease with (acute) exacerbation    May 17 2016  1:36PM

                                         

 

                    Need for Prevnar vaccine    Oct 17 2016  9:19AM     

 

                    Paroxysmal atrial fibrillation    Oct 17 2016  9:19AM     

 

                    Diabetes Mellitus, Type II    Oct 17 2016  9:19AM     

 

                    Hypokalemia         Oct 30 2017 10:43AM     

 

                    Hypokalemia         Nov  3 2017 11:41AM     

 

                    Cellulitis of left lower extremity    Oct 30 2017 10:43AM     







Payers







           Insurance Name    Company Name    Plan Name    Plan Number    Policy Number    Policy Group

 Number                                 Start Date

 

                    [employer]      [employer] [place]              [id number]              2015

 

                    [employer]      [employer]              [id number]              

 

                                [facility]    PMRV PHYS/DS

                    [id number]                               N/A







History of Encounters







                    Visit Date          Visit Type          Provider

 

                    10/30/2017          Office visit        Mateo Cooper DO

 

                    10/29/2017          Hospital            LEXX Holm MD

 

                    10/17/2016          Office visit        Mateo Cooper DO

 

                    2016           Office visit        Mateo Cooper DO

 

                    12/3/2015           Office visit        Mateo Cooper DO

 

                    5/15/2015           Hospital            LEXX Holm MD

 

                    2015           Office visit        Mateo Cooper DO

 

                    2014          Office visit        Mateo Cooper DO

 

                    2014           Aren Holm MD

 

                    2014           Procedures          David Suresh MD

 

                    6/3/2014            Office visit        Mateo Cooper DO

 

                    2014           Office visit        David Suresh MD

 

                    2014            Procedures          David Suresh MD

 

                    2014           Hospital            LEXX Holm MD

 

                    2014           Office visit        Mateo Cooper DO

 

                    10/8/2013           Office visit        Mateo Cooper DO

 

                    2013           Office visit        Mateo Cooper DO

 

                    2013           Office visit        Mateo Cooper DO

 

                    3/5/2013            Office visit        Mateo Cooper DO

 

                    2013           Hospital            LEXX Holm MD

 

                    2012           Office visit        Jenni Katz APRN

 

                    2012          Hospital            LEXX Holm MD

 

                    7/3/2012            Office visit        Mateo Cooper DO

 

                    2012            [facility]            Kristian Kiser MD

 

                    2012            Hospital            LEXX Holm MD

 

                    2012            [facility]            Kristian Kiser MD

 

                    2012            [facility]            LEXX Holm MD

 

                    2012            Office visit        Mateo Cooper DO

 

                    3/26/2012           Office visit        Jenni Katz APRN

 

                    2011          Office visit        Mateo Cooper DO

 

                    2011          Office visit        Jenni Katz APRN

 

                    8/10/2011           Office visit        Jenni Katz APRN

 

                    2011           Office visit        Mateo Cooper DO

 

                    2011           Office visit        Mateo Cooper DO

 

                    2011            [facility]            Bandar Zelaya MD

 

                    2011            [facility]            Bandar Zelaya MD

 

                    2011            [facility]            Bandar Zelaya MD

 

                    2011            [facility]            LEXX Holm MD

 

                    2011            Office visit        Mateo Cooper DO

 

                    3/15/2011           Office visit        Mateo Cooper DO

 

                    2010           Office visit        David Burciaga PA-C

 

                    2010            Office visit        Mateo Cooper DO

 

                    2010           Laboratory          Ayush Finn MD

 

                    2010           Laboratory          Ayush Finn MD

 

                    2010            Office visit        Mateo Cooper DO

## 2019-06-24 NOTE — XMS REPORT
Patient Summary (HL7 CCD)

                             Created on: 2018



RICK POWERS

External Reference #: [id number]

: 1950

Sex: Female



Demographics







                          Address                   [address], [place]  [address]

 

                          Home Phone                [phone]

 

                          Preferred Language        Unknown

 

                          Marital Status            Unknown

 

                          Congregational Affiliation     Unknown

 

                          Race                      White

 

                          Ethnic Group              Not  or 





Author







                          Author                    MATTEO BROWN              Unknown

 

                          Address                   [address] [address]

[place]  [address]



 

                          Phone                     [phone]







Care Team Providers







                    Care Team Member Name    Role                Phone

 

                    MARIA T ER, ALEXI DO    Attphys             [phone]

 

                    [place]imb[place] ER, ALEXI DO    Prisurg             [phone]



                                                                



Allergies

          





             Allergy          Code          Allergy Type          Reaction          Status    

 

             PHENERGAN          209249          Drug allergy          BREATHING PROBLEMS          Active

    

 

                ETOMIDATE          4178            Drug allergy          LARYNGOSPASM, VENTRICULAR ARRHYTHMIA

                                        Active    



                                                                                
                 



Active Medications

          Unknown or Not Available.                                             
                                          



Problems

          





             Problem          Code          Start Date          Resolved Date          Status    

 

                    ATRIAL FIBRILLATION WITH RAPID VENTRICULAR RESPONSE          [id number]           2013

                                                    Active    



                                                                                
       



Procedures

          





                Procedure          Code            Procedure Type          Date    

 

                 VENOUS UPP OR LOW EXT UNILATERAL          [id number]          SNOMED CT          10/12/2017

    

 

                C REACTIVE PROTEIN          24090861          SNOMED CT          10/12/2017    

 

                LACTIC ACID          6812197          SNOMED CT          10/12/2017    

 

                D DIMER QUANT          [id number]          SNOMED CT          10/12/2017    

 

                COMPREHENSIVE METABOLIC PANEL          593233080          SNOMED CT          10/12/2017

    

 

                CBC W/ AUTO DIFF (RFLX MAN DIFF IF IND)          6158921          SNOMED CT          10/12/2017

    

 

                ^CBC W/AUTO DIFF          3090967          SNOMED CT          10/12/2017    



                                                                                
                                                                   



Results

          







                                        COMPREHENSIVE METABOLIC PANEL - Collect Date/Time: 10/12/2017 10:35     

 

             Test Name          Code          Test Result          Test Units          Test Ref Range

    

 

             GLUCOSE          2345-7          146          MG/DL          L=70       H=100    

 

             SODIUM          2951-2          143          MEQ/L          L=135      H=148    

 

             POTASSIUM          2823-3          3.2          MEQ/L          L=3.5      H=5.3    

 

             CHLORIDE          2075-0          107          MEQ/L          L=96       H=110    

 

             CO2          2028-9          29           MEQ/L          L=22       H=29    

 

             BUN          3094-0          10           MG/DL          L=8        H=22    

 

             CREATININE          2160-0          0.7          MG/DL          L=0.6      H=1.6    

 

             SGOT/AST          1920-8          17           IU/L          L=10       H=40    

 

             SGPT/ALT          1742-6          6            IU/L          L=8        H=54    

 

             ALK PHOS          6768-6          94           IU/L          L=35       H=115    

 

             TOTAL PROTEIN          2885-2          7.8          G/DL          L=5.5      H=8.5    

 

             ALBUMIN          1751-7          4.2          G/DL          L=3.1      H=5.4    

 

             TOTAL BILI          1975-2          0.5          MG/DL          L=0.0      H=1.5    

 

             CALCIUM          53887-4          9.4          MG/DL          L=8.2      H=10.6    

 

             AGE                       67           yrs               

 

             GFR NonAA                       84                             

 

             GFR AA                       102                            

 

             eGFR                       >60          N/A               

 

             eGFR AA*                       >60          N/A               











                                        CBC W/ AUTO DIFF (RFLX MAN DIFF IF IND) - Collect Date/Time: 10/12/2017 10:35   

  

 

             Test Name          Code          Test Result          Test Units          Test Ref Range

    

 

             WBC          14518-9          6.1          TH/CMM          L=4.5      H=10.8    

 

             RBC          789-8          4.21          ML/CMM          L=4.20     H=5.40    

 

             HGB          718-7          12.6          G/DL          L=12.0     H=16.0    

 

             HCT          4544-3          37.1          %            L=37.0     H=47.0    

 

             MCV                       88           FL           L=81       H=99    

 

             MCH                       29.9          PG           L=27.0     H=33.0    

 

             MCHC                       34.0          G/DL          L=31.0     H=36.0    

 

             RDW SD                       43           FL           L=36       H=50    

 

             RDW CV                       13.5          %            L=0.0      H=14.8    

 

             MPV                       9.0          FL           L=9.3      H=12.5    

 

             PLT          777-3          191          TH/CMM          L=130      H=440    

 

             NRBC#                       0.00          TH/CMM          L=0.00     H=0.00    

 

             NRBC%                       0.0          /100WBC          L=0.0      H=2.0    

 

             %NEUT                       47.7          %                 

 

             %LYMP                       42.0          %                 

 

             %MONO                       6.9          %                 

 

             %EOS                       2.8          %                 

 

             %BASO                       0.3          %                 

 

             #NEUT                       2.92          TH/CMM          L=2.10     H=8.20    

 

             #LYMP                       2.57          TH/CMM          L=0.90     H=5.20    

 

             #MONO                       0.42          TH/CMM          L=0.16     H=1.00    

 

             #EOS                       0.17          TH/CMM          L=0.00     H=0.80    

 

             #BASO                       0.02          TH/CMM          L=0.00     H=0.20    

 

             MANUAL DIFF                       NOT IND          N/A               











                                        D DIMER QUANT - Collect Date/Time: 10/12/2017 10:35     

 

             Test Name          Code          Test Result          Test Units          Test Ref Range

    

 

             D-DIMER QUANT          20873-4          0.33          MG/L FEU          L=0.00     H=0.50

    











                                        PT/PTT - Collect Date/Time: 10/12/2017 10:35     

 

             Test Name          Code          Test Result          Test Units          Test Ref Range

    

 

             PROTIME          5964-2          10.8          SEC          L=9.9      H=11.9    

 

             INR          66959-0          1.0                            

 

             PTT          3173-2          25.6          SEC          L=22.2     H=37.2    











                                        C REACTIVE PROTEIN - Collect Date/Time: 10/12/2017 10:35     

 

             Test Name          Code          Test Result          Test Units          Test Ref Range

    

 

             C REACTIVE PROTEIN          1988-5          <0.5          MG/DL          L=0.0      H=1.0

    











                                        LACTIC ACID - Collect Date/Time: 10/12/2017 10:35     

 

             Test Name          Code          Test Result          Test Units          Test Ref Range

    

 

             LACTIC ACID          2524-7          1.2          mmol/L          L=0.5      H=1.6    



                                                                                
                                                         



Function Status

          Unknown or Not Available.                                             
                                



History of Immunizations

          





                    Immunization          Code                Date    

 

                    pneumococcal, unspecified formulation          109                 2012    

 

                    Pneumococcal conjugate PCV 13          133                 10/17/2016    

 

                    Influenza, seasonal, injectable          141                 10/18/2012    



                                                                                
                 



Plan of Treatment

          Unknown or Not Available.                                             
                      



Social History

          





                Smoking Status          Code            Start Date          End Date    

 

                Never smoker          [id number]                               



                                                                                
       



Vital Signs

          Unknown or Not Available.                                             
                      



Function Status

          Unknown or Not Available.                                             
  



Goals

          Unknown or Not Available.                                             
            



ASSESSMENTS

          Unknown or Not Available.                                             
                      



Health Concerns Section

          Unknown or Not Available.

## 2019-06-24 NOTE — ED INTEGUMENTARY GENERAL
General


Chief Complaint:  Skin/Wound Problems


Stated Complaint:  BLISTERS


Nursing Triage Note:  


PT HAS WEEPING CELLULITUS TO RIGHT LEG THAT STARTED ABOUT 3 DAYS AGO. PT STATES 


FEVER YESTERDAY. PT STATES NAUSEA BUT NO VOMITING. PT STATES SHE WAS IN THE 


HOSPITAL 1 MONTH AGO WITH SEPSIS DUE TO RIGHT LEG BEING INFECTED.





History of Present Illness


Date Seen by Provider:  2019


Time Seen by Provider:  11:40


Initial Comments


69-year-old  female reports 2-3 day history of blisters, cellulitis and

weeping wounds from her right lower extremity. She is a registered nurse and 

works at the [employer] and [employer], she worked last 

night. She was treated for similar symptoms at [facility] in 2019, she

finished a course of doxycycline and clindamycin. She is a type I diabetic 

reports her morning glucose to be 120. She has a history of atrial fibrillation 

and was cardioverted by her cardiologist at [facility] last Wednesday. She 

desires to be evaluated here but if she needs to be admitted she wants to be 

taken to [facility].


Timing/Duration:  getting worse


Severity:  moderate


Location:  extremities (right lower extremity cellulitis with weeping wounds)


Possible Cause:  no cause identified


Associated Symptoms:  blisters, change in skin texture, edema





Allergies and Home Medications


Allergies


Coded Allergies:  


     promethazine (Verified  Allergy, Intermediate, 3/11/08)





Home Medications


Amitriptyline Hcl 10 Mg Tablet, 1 EACH PO DAILY, (Reported)


Cyclobenzaprine HCl 5 Mg Tablet, 5 MG PO Q8H


   Prescribed by: DARA PAL on 3/23/18 1220


Cyclobenzaprine Hcl 10 Mg Tablet, 1 EACH PO TID PRN for SPASMS, (Reported)


Flecainide Acetate 150 Mg Tablet, 100 MG PO BID PRN for palpitations, (Reported)


Gabapentin 400 Mg Cap, 600 MG PO QID, (Reported)


Insulin Aspart 10 Unit/0.1 Ml Vial, 8 UNIT SC UD, (Reported)


   DAILY AT LUNCH 


Insulin Aspart 10 Unit/0.1 Ml Vial, 6 UNIT SC UD, (Reported)


   DAILY WITH EVENING MEAL 


Insulin Aspart 10 Unit/0.1 Ml Vial, 6 UNIT SC UD, (Reported)


   DAILY WITH BREAKFAST 


Insulin Determir 100 Unit/1 Ml Insuln.pen, 100 UNIT SQ BID, (Reported)


Metformin Hcl 1,000 Mg Tablet, 1 EACH PO BID WITH MEALS, (Reported)


Rivaroxaban 1 Each Tab.ds.pk, 1 EACH PO DAILY, (Reported)


Sotalol Hcl 80 Mg Tablet, 40 MG PO BID, (Reported)


Tramadol Hcl 50 Mg Tablet, 100 MG PO Q6H PRN for PAIN, (Reported)





Patient Home Medication List


Home Medication List Reviewed:  Yes





Review of Systems


Review of Systems


Constitutional:  no symptoms reported, see HPI


Cardiovascular:  see HPI; No chest pain; Hx of Intervention, other (atrial 

fibrillation)


Gastrointestinal:  no symptoms reported, see HPI


Skin:  see HPI, change in color, rash (right lower extremity distal to the knee 

extending to the ankle, no right foot involvement.)


Psychiatric/Neurological:  No Symptoms Reported, See HPI





All Other Systems Reviewed


Negative Unless Noted:  Yes





Past Medical-Social-Family Hx


Past Med/Social Hx:  Reviewed Nursing Past Med/Soc Hx


Patient Social History


Alcohol Use:  Denies Use


Recreational Drug Use:  No


Smoking Status:  Never a Smoker


2nd Hand Smoke Exposure:  No


Recent Foreign Travel:  No


Contact w/Someone Who Travel:  No


Recent Infectious Disease Expo:  No


Recent Hopitalizations:  Yes


Physical Abuse:  No


Sexual Abuse:  No





Past Medical History


Surgeries:  Yes


 Section, Gallbladder, Hysterectomy, Orthopedic, Pacemaker


Respiratory:  Yes


COPD


Cardiac:  Yes


Atrial Fibrillation, High Cholesterol, Hypertension


Neurological:  No


Reproductive Disorders:  No


Genitourinary:  No


Gastrointestinal:  Yes


Gastroesophageal Reflux


Musculoskeletal:  Yes


Chronic Back Pain


Endocrine:  Yes (INSULIN PUMP)


Diabetes, Insulin dep


HEENT:  No


Cancer:  No


Psychosocial:  No


Blood Disorders:  No





Physical Exam


Vital Signs





Vital Signs - First Documented








 19





 11:40 13:40


 


Temp 98.0 


 


Pulse 164 


 


Resp 18 


 


B/P (MAP) 144/108 (120) 


 


Pulse Ox 99 


 


O2 Delivery  Room Air





Capillary Refill : Less Than 3 Seconds


General Appearance:  WD/WN, no apparent distress


HEENT:  PERRL/EOMI, normal ENT inspection, TMs normal, pharynx normal


Neck:  non-tender, full range of motion, supple, normal inspection


Cardiovascular:  normal peripheral pulses, no edema, tachycardia


Respiratory:  chest non-tender, lungs clear, normal breath sounds


Gastrointestinal:  normal bowel sounds, non tender, soft; No distended, No 

guarding, No rebound, No tenderness


Extremities:  normal range of motion, no pedal edema, normal capillary refill, 

other (pedal pulses 1+ and symmetric)


Neurologic/Psychiatric:  no motor/sensory deficits, alert, normal mood/affect, 

oriented x 3


Skin:  normal color, warm/dry


Skin Problem Location:  lower extremities (right lower extremity distal to the 

knee)


Skin Problem Character:  drainage (serosanguineous. Distal to knee and extending

to just above ankle. No foot invlovment. Circumferential), rash, scales, 

swelling, vesicular


Lymphatic:  no adenopathy





Progress/Results/Core Measures


Results/Orders


Lab Results





Laboratory Tests








Test


 19


12:00 19


13:35 19


14:20 Range/Units


 


 


White Blood Count


 5.1 


 


 


 4.3-11.0


10^3/uL


 


Red Blood Count


 4.27 L


 


 


 4.35-5.85


10^6/uL


 


Hemoglobin 12.1    11.5-16.0  G/DL


 


Hematocrit 39    35-52  %


 


Mean Corpuscular Volume 92    80-99  FL


 


Mean Corpuscular Hemoglobin 28    25-34  PG


 


Mean Corpuscular Hemoglobin


Concent 31 L


 


 


 32-36  G/DL





 


Red Cell Distribution Width 15.6 H   10.0-14.5  %


 


Platelet Count


 201 


 


 


 130-400


10^3/uL


 


Mean Platelet Volume 9.8    7.4-10.4  FL


 


Neutrophils (%) (Auto) 62    42-75  %


 


Lymphocytes (%) (Auto) 28    12-44  %


 


Monocytes (%) (Auto) 6    0-12  %


 


Eosinophils (%) (Auto) 4    0-10  %


 


Basophils (%) (Auto) 0    0-10  %


 


Neutrophils # (Auto) 3.1    1.8-7.8  X 10^3


 


Lymphocytes # (Auto) 1.4    1.0-4.0  X 10^3


 


Monocytes # (Auto) 0.3    0.0-1.0  X 10^3


 


Eosinophils # (Auto)


 0.2 


 


 


 0.0-0.3


10^3/uL


 


Basophils # (Auto)


 0.0 


 


 


 0.0-0.1


10^3/uL


 


Prothrombin Time 16.8 H   12.2-14.7  SEC


 


INR Comment 1.3    0.8-1.4  


 


Activated Partial


Thromboplast Time 29 


 


 


 24-35  SEC





 


Sodium Level 141    135-145  MMOL/L


 


Potassium Level 3.5 L   3.6-5.0  MMOL/L


 


Chloride Level 107      MMOL/L


 


Carbon Dioxide Level 22    21-32  MMOL/L


 


Anion Gap 12    5-14  MMOL/L


 


Blood Urea Nitrogen 8    7-18  MG/DL


 


Creatinine


 0.97 


 


 


 0.60-1.30


MG/DL


 


Estimat Glomerular Filtration


Rate 57 


 


 


  





 


BUN/Creatinine Ratio 8     


 


Glucose Level 281 H     MG/DL


 


Lactic Acid Level


 2.39 *H


 


 2.32 *H


 0.50-2.00


MMOL/L


 


Calcium Level 9.1    8.5-10.1  MG/DL


 


Corrected Calcium 9.0    8.5-10.1  MG/DL


 


Total Bilirubin 0.7    0.1-1.0  MG/DL


 


Aspartate Amino Transf


(AST/SGOT) 14 


 


 


 5-34  U/L





 


Alanine Aminotransferase


(ALT/SGPT) 7 


 


 


 0-55  U/L





 


Alkaline Phosphatase 82      U/L


 


Troponin I < 0.028    <0.028  NG/ML


 


Total Protein 7.2    6.4-8.2  GM/DL


 


Albumin 4.1    3.2-4.5  GM/DL


 


Digoxin Level


 < 0.30 L


 


 


 0.80-2.00


NG/ML


 


Urine Color  YELLOW    


 


Urine Clarity  CLEAR    


 


Urine pH  5   5-9  


 


Urine Specific Gravity  1.025 H  1.016-1.022  


 


Urine Protein  2+ H  NEGATIVE  


 


Urine Glucose (UA)  1+ H  NEGATIVE  


 


Urine Ketones  NEGATIVE   NEGATIVE  


 


Urine Nitrite  NEGATIVE   NEGATIVE  


 


Urine Bilirubin  NEGATIVE   NEGATIVE  


 


Urine Urobilinogen  NORMAL   NORMAL  MG/DL


 


Urine Leukocyte Esterase  1+ H  NEGATIVE  


 


Urine RBC (Auto)  NEGATIVE   NEGATIVE  


 


Urine RBC  NONE    /HPF


 


Urine WBC  2-5    /HPF


 


Urine Squamous Epithelial


Cells 


 10-25 H


 


  /HPF





 


Urine Crystals  NONE    /LPF


 


Urine Bacteria  TRACE    /HPF


 


Urine Casts  NONE    /LPF


 


Urine Mucus  NEGATIVE    /LPF


 


Urine Culture Indicated


 


 CULTURE


PENDING 


  











My Orders





Orders - GEOVANY APODACA


Cbc With Automated Diff (19 11:47)


Comprehensive Metabolic Panel (19 11:47)


Blood Culture (19 11:47)


Urinalysis (19 11:47)


Urine Culture (19 11:47)


Protime With Inr (19 11:47)


Partial Thromboplastin Time (19 11:47)


Chest 1 View, Ap/Pa Only (19 11:47)


Acetaminophen  Tablet (Tylenol  Tablet) (19 12:00)


Ed Iv/Invasive Line Start (19 11:47)


Ekg Tracing (19 11:47)


Troponin I (19 11:47)


Vital Signs Adult Sepsis Patie Q15M (19 11:47)


O2 (19 11:47)


Ns Iv 1000 Ml (Sodium Chloride 0.9%) (19 11:47)


Lactic Acid Analyzer (19 11:47)


Piperacillin/Tazobactam (Bulk) (Zosyn In (19 13:00)


Digoxin (19 14:12)


Metoprolol Tartrate Injection (Lopressor (19 14:30)


Wound Culture (19 14:32)


Metoprolol Tartrate Injection (Lopressor (19 14:31)





Medications Given in ED





Current Medications








 Medications  Dose


 Ordered  Sig/Chris


 Route  Start Time


 Stop Time Status Last Admin


Dose Admin


 


 Acetaminophen  1,000 mg  ONCE  PRN


 PO  19 12:00


 19 12:52 DC 19 12:51


1,000 MG


 


 Metoprolol


 Tartrate  5 mg  ONCE  ONCE


 IV  19 14:30


 19 14:35 DC 19 14:40


5 MG


 


 Piperacillin Sod/


 Tazobactam Sod


 4.5 gm/Sodium


 Chloride  120 ml @ 


 240 mls/hr  ONCE  ONCE


 IV  19 13:00


 19 13:29 DC 19 13:20


240 MLS/HR








Vital Signs/I&O











 19





 11:40 13:32 13:40 15:14


 


Temp 98.0 98.0  98.0


 


Pulse 164 164  164


 


Resp 18 18  18


 


B/P (MAP) 144/108 (120) 144/108  144/108 (120)


 


Pulse Ox 99 99 100 100


 


O2 Delivery   Room Air Room Air














Blood Pressure Mean:                    120











Progress


Progress Note :  


   Time:  11:40


Progress Note


Patient seen and evaluated will start normal saline 30 ML's per kilogram, labs 

for sepsis workup, EKG, troponin, and wound cultures. Will monitor her cardiac 

rhythm. She denies any pain at the present time.


1245 patient continues to have weeping from the right leg wound. Discussed her 

EKG and labs with her. She is requesting transfer to [place], to [facility],

she reports a bad experience with a cardiologist here and she would prefer to be

treated where she is normally admitted.


1330 spoke to her PCP, Dr. Monroe, she does not admit patients to the hospital 

recommended contacting the hospitalist. Spoke to Dr. Pop at [facility] 

is the hospitalist he would prefer the patient be seen in the emergency 

department at [facility] and admitted from there.


1400 spoke to Dr. Peter in ED at [facility], agreed to accept patient on

transfer. Continues to have A-Fib, rate 100-140, will give to Toprol 5 mg per 

IV. Second lactic acid drawn. Dressing applied to right lower extremity wound.


1430 Contacted [facility] for transport. 


1500 repeat lactic acid was 2.3, digoxin level less than 0.3. Heart rate 

improved after metoprolol .


Initial ECG Impression Date:  2019


Initial ECG Impression Time:  12:18


Initial ECG Rate:  147


Initial ECG Rhythm:  A Fib/Flutter


Initial ECG Intervals


QRSD 92, , . Axis QRS 91, T 2129.


Initial ECG Impression:  Atrial Fibrillation


Initial ECG Comparisson:  Unchanged


Comment


Reviewed with Dr. Chew, agreed with interpretation.





Diagnostic Imaging





   Diagonstic Imaging:  Xray


   Plain Films/CT/US/NM/MRI:  chest


Comments


NAME:   RICK POWERS


MED REC#:   [id number]


ACCOUNT#:   [id number]


PT STATUS:   REG ER


:   1950


PHYSICIAN:   GEOVANY APODACA


ADMIT DATE:   19/ER


                                   ***Draft***


Date of Exam:19





CHEST 1 VIEW, AP/PA ONLY








Indication: Fever and leg swelling.





Time of exam: 1:19 PM





Correlation is made with prior study from 2018.





The heart is enlarged but stable. Dual-lead left subclavian


cardiac pacemaker has tips in the region of the right atrium and


right ventricle. Lungs are clear. No infiltrate or failure is


seen. There is no effusion or pneumothorax.





Impression: Stable chest. No acute feature is detected.





  Dictated on workstation # [id number]








Dict:   19 1322


Trans:   19 1324


[facility] [id number]





Interpreted by:     MITCHEL SILVERMAN MD


Electronically signed by:





Departure


Impression





   Primary Impression:  


   Cellulitis of right lower extremity


   Additional Impression:  


   Atrial fibrillation


   Qualified Codes:  I48.2 - Chronic atrial fibrillation


Disposition:   XFER SHT-TRM HOSP


Condition:  Stable





Transfer


Time Spoke to Accepting Phy:  14:00


Transfer Progress Notes


Spoke to [facility] Hospitalist, Dr. Rosendo Andres and then ED Dr. Peter, 

agreed to accept patient via ED.


Transfer Facility:  


[facility]


Method of Transfer:  EMS





Departure-Patient Inst.


Referrals:  


JUAN ALBERTO TATUM DO (PCP/Family)


Primary Care Physician











GEOVANY APODACA                  2019 13:46

## 2019-06-26 NOTE — NUR
LAB REPORT A CULTURE REPORT FROM [facility] PATIENT WAS TRANSFERED TO [facility],[place] CALLED LAB AND ASKED BRENNEN TO FAX TO [facility].

## 2019-10-31 ENCOUNTER — HOSPITAL ENCOUNTER (OUTPATIENT)
Dept: HOSPITAL 75 - REHAB | Age: 69
Discharge: HOME | End: 2019-10-31
Payer: COMMERCIAL

## 2019-10-31 DIAGNOSIS — I69.354: Primary | ICD-10-CM

## 2019-10-31 DIAGNOSIS — I48.91: ICD-10-CM

## 2019-10-31 DIAGNOSIS — Z95.0: ICD-10-CM

## 2021-03-07 ENCOUNTER — HOSPITAL ENCOUNTER (OUTPATIENT)
Dept: HOSPITAL 75 - ER | Age: 71
Setting detail: OBSERVATION
LOS: 2 days | Discharge: HOME | End: 2021-03-09
Attending: INTERNAL MEDICINE | Admitting: INTERNAL MEDICINE
Payer: COMMERCIAL

## 2021-03-07 VITALS — SYSTOLIC BLOOD PRESSURE: 130 MMHG | DIASTOLIC BLOOD PRESSURE: 69 MMHG

## 2021-03-07 VITALS — SYSTOLIC BLOOD PRESSURE: 158 MMHG | DIASTOLIC BLOOD PRESSURE: 81 MMHG

## 2021-03-07 VITALS — SYSTOLIC BLOOD PRESSURE: 153 MMHG | DIASTOLIC BLOOD PRESSURE: 76 MMHG

## 2021-03-07 VITALS — DIASTOLIC BLOOD PRESSURE: 67 MMHG | SYSTOLIC BLOOD PRESSURE: 137 MMHG

## 2021-03-07 VITALS — DIASTOLIC BLOOD PRESSURE: 78 MMHG | SYSTOLIC BLOOD PRESSURE: 144 MMHG

## 2021-03-07 VITALS — SYSTOLIC BLOOD PRESSURE: 127 MMHG | DIASTOLIC BLOOD PRESSURE: 74 MMHG

## 2021-03-07 VITALS — SYSTOLIC BLOOD PRESSURE: 138 MMHG | DIASTOLIC BLOOD PRESSURE: 69 MMHG

## 2021-03-07 VITALS — DIASTOLIC BLOOD PRESSURE: 73 MMHG | SYSTOLIC BLOOD PRESSURE: 129 MMHG

## 2021-03-07 VITALS — SYSTOLIC BLOOD PRESSURE: 148 MMHG | DIASTOLIC BLOOD PRESSURE: 72 MMHG

## 2021-03-07 VITALS — WEIGHT: 130.07 LBS | BODY MASS INDEX: 25.54 KG/M2 | HEIGHT: 60 IN

## 2021-03-07 DIAGNOSIS — I48.0: ICD-10-CM

## 2021-03-07 DIAGNOSIS — Z88.8: ICD-10-CM

## 2021-03-07 DIAGNOSIS — Z95.0: ICD-10-CM

## 2021-03-07 DIAGNOSIS — K21.9: ICD-10-CM

## 2021-03-07 DIAGNOSIS — E78.5: ICD-10-CM

## 2021-03-07 DIAGNOSIS — E11.40: ICD-10-CM

## 2021-03-07 DIAGNOSIS — I50.9: ICD-10-CM

## 2021-03-07 DIAGNOSIS — Z79.899: ICD-10-CM

## 2021-03-07 DIAGNOSIS — I11.0: ICD-10-CM

## 2021-03-07 DIAGNOSIS — Z88.1: ICD-10-CM

## 2021-03-07 DIAGNOSIS — Z79.4: ICD-10-CM

## 2021-03-07 DIAGNOSIS — Z79.01: ICD-10-CM

## 2021-03-07 DIAGNOSIS — G89.29: ICD-10-CM

## 2021-03-07 DIAGNOSIS — F41.9: ICD-10-CM

## 2021-03-07 DIAGNOSIS — Z86.73: ICD-10-CM

## 2021-03-07 DIAGNOSIS — Z88.5: ICD-10-CM

## 2021-03-07 DIAGNOSIS — R07.9: Primary | ICD-10-CM

## 2021-03-07 DIAGNOSIS — Z90.710: ICD-10-CM

## 2021-03-07 DIAGNOSIS — J44.9: ICD-10-CM

## 2021-03-07 DIAGNOSIS — Z96.41: ICD-10-CM

## 2021-03-07 DIAGNOSIS — E78.00: ICD-10-CM

## 2021-03-07 LAB
ALBUMIN SERPL-MCNC: 3.9 GM/DL (ref 3.2–4.5)
ALP SERPL-CCNC: 88 U/L (ref 40–136)
ALT SERPL-CCNC: 11 U/L (ref 0–55)
AMYLASE SERPL-CCNC: 77 U/L (ref 25–125)
APTT BLD: 26 SEC (ref 24–35)
BASOPHILS # BLD AUTO: 0 10^3/UL (ref 0–0.1)
BASOPHILS NFR BLD AUTO: 0 % (ref 0–10)
BILIRUB SERPL-MCNC: 0.4 MG/DL (ref 0.1–1)
BUN/CREAT SERPL: 19
CALCIUM SERPL-MCNC: 8.7 MG/DL (ref 8.5–10.1)
CHLORIDE SERPL-SCNC: 111 MMOL/L (ref 98–107)
CK MB SERPL-MCNC: 0.7 NG/ML (ref ?–6.6)
CK SERPL-CCNC: 34 U/L (ref 29–168)
CO2 SERPL-SCNC: 21 MMOL/L (ref 21–32)
CREAT SERPL-MCNC: 0.86 MG/DL (ref 0.6–1.3)
EOSINOPHIL # BLD AUTO: 0.1 10^3/UL (ref 0–0.3)
EOSINOPHIL NFR BLD AUTO: 1 % (ref 0–10)
GFR SERPLBLD BASED ON 1.73 SQ M-ARVRAT: > 60 ML/MIN
GLUCOSE SERPL-MCNC: 133 MG/DL (ref 70–105)
HCT VFR BLD CALC: 36 % (ref 35–52)
HGB BLD-MCNC: 11.9 G/DL (ref 11.5–16)
INR PPP: 1.1 (ref 0.8–1.4)
LIPASE SERPL-CCNC: 30 U/L (ref 8–78)
LYMPHOCYTES # BLD AUTO: 2.2 10^3/UL (ref 1–4)
LYMPHOCYTES NFR BLD AUTO: 33 % (ref 12–44)
MAGNESIUM SERPL-MCNC: 2 MG/DL (ref 1.6–2.4)
MANUAL DIFFERENTIAL PERFORMED BLD QL: NO
MCH RBC QN AUTO: 30 PG (ref 25–34)
MCHC RBC AUTO-ENTMCNC: 33 G/DL (ref 32–36)
MCV RBC AUTO: 89 FL (ref 80–99)
MONOCYTES # BLD AUTO: 0.4 10^3/UL (ref 0–1)
MONOCYTES NFR BLD AUTO: 6 % (ref 0–12)
NEUTROPHILS # BLD AUTO: 4 10^3/UL (ref 1.8–7.8)
NEUTROPHILS NFR BLD AUTO: 60 % (ref 42–75)
PLATELET # BLD: 196 10^3/UL (ref 130–400)
PMV BLD AUTO: 9.6 FL (ref 9–12.2)
POTASSIUM SERPL-SCNC: 3.9 MMOL/L (ref 3.6–5)
PROT SERPL-MCNC: 7.1 GM/DL (ref 6.4–8.2)
PROTHROMBIN TIME: 14.2 SEC (ref 12.2–14.7)
SODIUM SERPL-SCNC: 143 MMOL/L (ref 135–145)
TSH SERPL DL<=0.05 MIU/L-ACNC: 0.79 UIU/ML (ref 0.35–4.94)
WBC # BLD AUTO: 6.6 10^3/UL (ref 4.3–11)

## 2021-03-07 PROCEDURE — 83880 ASSAY OF NATRIURETIC PEPTIDE: CPT

## 2021-03-07 PROCEDURE — 71045 X-RAY EXAM CHEST 1 VIEW: CPT

## 2021-03-07 PROCEDURE — 93306 TTE W/DOPPLER COMPLETE: CPT

## 2021-03-07 PROCEDURE — 82553 CREATINE MB FRACTION: CPT

## 2021-03-07 PROCEDURE — 85610 PROTHROMBIN TIME: CPT

## 2021-03-07 PROCEDURE — 96374 THER/PROPH/DIAG INJ IV PUSH: CPT

## 2021-03-07 PROCEDURE — 80053 COMPREHEN METABOLIC PANEL: CPT

## 2021-03-07 PROCEDURE — 85730 THROMBOPLASTIN TIME PARTIAL: CPT

## 2021-03-07 PROCEDURE — 78452 HT MUSCLE IMAGE SPECT MULT: CPT

## 2021-03-07 PROCEDURE — 83735 ASSAY OF MAGNESIUM: CPT

## 2021-03-07 PROCEDURE — 36415 COLL VENOUS BLD VENIPUNCTURE: CPT

## 2021-03-07 PROCEDURE — 93017 CV STRESS TEST TRACING ONLY: CPT

## 2021-03-07 PROCEDURE — 82150 ASSAY OF AMYLASE: CPT

## 2021-03-07 PROCEDURE — 80061 LIPID PANEL: CPT

## 2021-03-07 PROCEDURE — 85025 COMPLETE CBC W/AUTO DIFF WBC: CPT

## 2021-03-07 PROCEDURE — 83874 ASSAY OF MYOGLOBIN: CPT

## 2021-03-07 PROCEDURE — 82550 ASSAY OF CK (CPK): CPT

## 2021-03-07 PROCEDURE — 93005 ELECTROCARDIOGRAM TRACING: CPT

## 2021-03-07 PROCEDURE — 99284 EMERGENCY DEPT VISIT MOD MDM: CPT

## 2021-03-07 PROCEDURE — 83690 ASSAY OF LIPASE: CPT

## 2021-03-07 PROCEDURE — 93041 RHYTHM ECG TRACING: CPT

## 2021-03-07 PROCEDURE — 84443 ASSAY THYROID STIM HORMONE: CPT

## 2021-03-07 PROCEDURE — 84484 ASSAY OF TROPONIN QUANT: CPT

## 2021-03-07 RX ADMIN — Medication SCH ML: at 22:00

## 2021-03-07 NOTE — ED CHEST PAIN
General


Chief Complaint:  Chest Pain


Stated Complaint:  CP


Nursing Triage Note:  


PT HAS A CHIEF COMPLAINT OF CHEST PAIN THAT STARTED THIS MORNING. PT ARRIVED BY 


[facility]. PT STATED THAT PAIN GOT BETTER AFTER LUNCH SO SHE DECIDED TO GO TO WORK




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 EMS 


BROUGHT PATIENT IN, STARTED AN IV IN LEFT HAND (20 GAUGE) AND GAVE PT ONE 


SUBLINGUAL NITRO. THE NITRO HELPED THE PAIN TO BRING IT DOWN FROM A 10 TO A 7/8.




PT STATED SHE BROUGHT THE PAIN UP PREVIOUSLY WITH CARDIOLOGIST ABOUT PAIN AROUND




PACEMAKER AND UNDER LEFT BREAST. PT HAS ANOTHER APPOINTMENT NEXT TUESDAY. PT IS 


ALERT, ORIENTED X 4 ON ARRIVAL. VITALS WERE DONE, EKG WAS COMPLETED, BLOOD WAS 


DRAWN AND REPORT WAS GIVEN TO PROVIDER.


Nursing Sepsis Screen:  No Definite Risk


Source:  patient





History of Present Illness


Date Seen by Provider:  Mar 7, 2021


Time Seen by Provider:  18:39


Initial Comments


PT ARRIVES VIA EMS 


STATES SHE WAS DRIVING FROM HER HOME IN [place], TO WORK AT [facility] [employer] 

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 

BEGAN TO HAVE CHEST PAIN 


PAIN IS IN LEFT CHEST--STATES AROUND HER PACEMAKER AND GOES ALL AROUND HER LEFT 

CHEST. STATES SHE HAS HAD PAIN OVER HER PACEMAKER SINCE IT WAS PUT IN 4 YEARS 

AGO. 


STATES PAIN WAS 10/10--FIRST RESPONDERS GAVE 324 MG ASPIRIN AND EMS GAVE NTG X 

1--STATES PAIN DOWN TO 7-8/10 NOW. 


WAS SLIGHTLY SHORT OF BREATH WHEN THE PAIN BEGAN


WAS NAUSEATED BEFORE SHE LEFT TO GO TO WORK


BROKE OUT IN A SWEAT WHEN PAIN BEGAN


BECAME DIZZY SO SHE PULLED OVER AND CALLED 911. NOT DIZZY NOW


NOT SURE IF SHE WAS HAVING SOME PALPITATIONS OR NOT





PT HAS ATRIAL FIBRILLATION AND IS ON ELIQUIS AND AMIODARIONE AND ENTRESTO AND 

METOPROLOL. 


PT HAS A PACEMAKER IN PLACE X 4 YEARS


PT HAS HAD CARDIAC ABLATION 2019, FOLLOWED BY MAZE PROCEDURE 2020 AND LEFT

LATERAL APPENDAGE CLIP 2020





NO HISTORY OF CAD OR MI OR STENTS. 





STATES SHE HAD SOME CHEST PAIN THIS AM, BUT WENT AWAY ON IT'S OWN, AND FELT FINE

FOR THE REST OF THE DAY, UNTIL SHE WAS DRIVING TO WORK [facility]. 





PT IS ALSO INSULIN DEPENDENT DIABETIC WITH INSULIN PUMP.


PCP: [facility] IN [place]


CARDIOLOGIST": DR. INTERIANO AND DR. FULLER IN [place], [place]--HAS ROUTINE 

CARDIOLOGY FOLLOW UP 21





Allergies and Home Medications


Allergies


Coded Allergies:  


     promethazine (Verified  Allergy, Intermediate, 3/11/08)


     clindamycin (Verified  Allergy, Unknown, 3/7/21)


     hydrocodone (Verified  Allergy, Unknown, 3/7/21)


     morphine (Verified  Allergy, Unknown, 3/7/21)





Home Medications


Amitriptyline Hcl 10 Mg Tablet, 1 EACH PO DAILY, (Reported)


Cyclobenzaprine HCl 5 Mg Tablet, 5 MG PO Q8H


   Prescribed by: DARA PAL on 3/23/18 1220


Cyclobenzaprine Hcl 10 Mg Tablet, 1 EACH PO TID PRN for SPASMS, (Reported)


Flecainide Acetate 150 Mg Tablet, 100 MG PO BID PRN for palpitations, (Reported)


Gabapentin 400 Mg Cap, 600 MG PO QID, (Reported)


Insulin Aspart 10 Unit/0.1 Ml Vial, 8 UNIT SC UD, (Reported)


   DAILY AT LUNCH 


Insulin Aspart 10 Unit/0.1 Ml Vial, 6 UNIT SC UD, (Reported)


   DAILY WITH EVENING MEAL 


Insulin Aspart 10 Unit/0.1 Ml Vial, 6 UNIT SC UD, (Reported)


   DAILY WITH BREAKFAST 


Insulin Determir 100 Unit/1 Ml Insuln.pen, 100 UNIT SQ BID, (Reported)


Metformin Hcl 1,000 Mg Tablet, 1 EACH PO BID WITH MEALS, (Reported)


Rivaroxaban 1 Each Tab.ds.pk, 1 EACH PO DAILY, (Reported)


Sotalol Hcl 80 Mg Tablet, 40 MG PO BID, (Reported)


Tramadol Hcl 50 Mg Tablet, 100 MG PO Q6H PRN for PAIN, (Reported)





Patient Home Medication List


Home Medication List Reviewed:  Yes





Review of Systems


Review of Systems


Constitutional:  see HPI, diaphoresis, dizziness


Respiratory:  See HPI, Shortness of Air


Cardiovascular:  See HPI, Chest Pain; Denies Edema; Lightheadedness


Gastrointestinal:  See HPI; Denies Abdominal Pain; Nausea; Denies Vomiting


Genitourinary:  No Symptoms Reported


Musculoskeletal:  no symptoms reported


Skin:  no symptoms reported


Psychiatric/Neurological:  Anxiety, Pre-Existing Deficit (PERIPHERAL NEUROPATHY 

IN HANDS AND FEET)


Endocrine:  See HPI


Hematologic/Lymphatic:  No Symptoms Reported





Past Medical-Social-Family Hx


Patient Social History


Alcohol Use:  Denies Use


Drug of Choice:  AGUEDA


Smoking Status:  Never a Smoker


2nd Hand Smoke Exposure:  No


Recent Infectious Disease Expo:  No





Past Medical History


Surgeries:  Yes


Cardiac,  Section, Gallbladder, Hysterectomy, Orthopedic, Pacemaker


Respiratory:  Yes


COPD


Cardiac:  Yes (PACEMAKER, CARDIAC ABLATION, MAZE PROCEDURE, LEFT APPENDAGE 

CLIPPED)


Atrial Fibrillation, High Cholesterol, Hypertension, Irregular Heartbeat


Neurological:  Yes


Neuropathy (PERIPHERAL NEUROPATHY IN HANDS AND FEET)


Reproductive Disorders:  No


Genitourinary:  No


Gastrointestinal:  Yes


Gastroesophageal Reflux


Musculoskeletal:  Yes


Chronic Back Pain


Endocrine:  Yes (INSULIN PUMP)


Diabetes, Insulin dep


HEENT:  No


Cancer:  No


Psychosocial:  No


Integumentary:  No


Blood Disorders:  No





Family Medical History





PAST SURGICAL HISTORY:


-LEFT EYE SURGERY FOR RETINAL BLEED 3 YEARS AGO


- X 2  IN , 


-HYSTERECTOMY 


-CHOLECYSTECTOMY ( OPEN ) 


-LEFT FOOT SURGERY 7 YEARS AGO


-LUMBAR LAMINECTOMY 20 YEARS AGO


-PACEMAKER 4 YEARS AGO


-CARDIAC ABLATION 2019


-MAZE PROCEDURE 2020


-LEFT LATERAL APPENDAGE CLIP 2020





Physical Exam


Vital Signs





Vital Signs - First Documented




















Capillary Refill : Less Than 3 Seconds


Height, Weight, BMI


Height: 5'1.00"


Weight: 151lbs. 0.0oz. 68.173463aj; 24.00 BMI


Method:Stated


General Appearance:  No Apparent Distress, WD/WN, Anxious (VERY )


Neck:  Full Range of Motion, Normal Inspection, Non Tender, Supple; No Carotid 

Bruit, No JVD


Respiratory:  Normal Breath Sounds, No Accessory Muscle Use, No Respiratory 

Distress, Other (DOES HAVE TENDERNESS OVER PACEMAKER SITE. NO SIGNS OF 

INFECTION)


Cardiovascular:  Regular Rate, Rhythm, No Edema, No JVD, No Murmur, Normal 

Peripheral Pulses


Gastrointestinal:  Non Tender, Soft, Other (INSULIN PUMP IN PLACE)


Extremity:  Normal Capillary Refill, Normal Inspection, Normal Range of Motion, 

Non Tender, No Calf Tenderness, No Pedal Edema


Neurologic/Psychiatric:  Alert, Oriented x3, No Motor/Sensory Deficits 

(PERIPHERAL NEUROPATHY WITH SUBJECTIVE TINGLING TO HANDS AND FEET), CNs II-XII 

Norm as Tested, Other (VERY ANXIOUS)


Skin:  Normal Color, Warm/Dry





Progress/Results/Core Measures


Results/Orders


Lab Results





Laboratory Tests








Test


 3/7/21


18:44 Range/Units


 


 


White Blood Count


 6.6 


 4.3-11.0


10^3/uL


 


Red Blood Count


 3.98 


 3.80-5.11


10^6/uL


 


Hemoglobin 11.9  11.5-16.0  g/dL


 


Hematocrit 36  35-52  %


 


Mean Corpuscular Volume 89  80-99  fL


 


Mean Corpuscular Hemoglobin 30  25-34  pg


 


Mean Corpuscular Hemoglobin


Concent 33 


 32-36  g/dL





 


Red Cell Distribution Width 12.7  10.0-14.5  %


 


Platelet Count


 196 


 130-400


10^3/uL


 


Mean Platelet Volume 9.6  9.0-12.2  fL


 


Immature Granulocyte % (Auto) 0   %


 


Neutrophils (%) (Auto) 60  42-75  %


 


Lymphocytes (%) (Auto) 33  12-44  %


 


Monocytes (%) (Auto) 6  0-12  %


 


Eosinophils (%) (Auto) 1  0-10  %


 


Basophils (%) (Auto) 0  0-10  %


 


Neutrophils # (Auto)


 4.0 


 1.8-7.8


10^3/uL


 


Lymphocytes # (Auto)


 2.2 


 1.0-4.0


10^3/uL


 


Monocytes # (Auto)


 0.4 


 0.0-1.0


10^3/uL


 


Eosinophils # (Auto)


 0.1 


 0.0-0.3


10^3/uL


 


Basophils # (Auto)


 0.0 


 0.0-0.1


10^3/uL


 


Immature Granulocyte # (Auto)


 0.0 


 0.0-0.1


10^3/uL


 


Prothrombin Time 14.2  12.2-14.7  SEC


 


INR Comment 1.1  0.8-1.4  


 


Activated Partial


Thromboplast Time 26 


 24-35  SEC





 


Sodium Level 143  135-145  MMOL/L


 


Potassium Level 3.9  3.6-5.0  MMOL/L


 


Chloride Level 111 H   MMOL/L


 


Carbon Dioxide Level 21  21-32  MMOL/L


 


Anion Gap 11  5-14  MMOL/L


 


Blood Urea Nitrogen 16  7-18  MG/DL


 


Creatinine


 0.86 


 0.60-1.30


MG/DL


 


Estimat Glomerular Filtration


Rate > 60 


  





 


BUN/Creatinine Ratio 19   


 


Glucose Level 133 H   MG/DL


 


Calcium Level 8.7  8.5-10.1  MG/DL


 


Corrected Calcium 8.8  8.5-10.1  MG/DL


 


Magnesium Level 2.0  1.6-2.4  MG/DL


 


Total Bilirubin 0.4  0.1-1.0  MG/DL


 


Aspartate Amino Transf


(AST/SGOT) 26 


 5-34  U/L





 


Alanine Aminotransferase


(ALT/SGPT) 11 


 0-55  U/L





 


Alkaline Phosphatase 88    U/L


 


Total Creatine Kinase 34    U/L


 


Creatine Kinase MB 0.7  <6.6  NG/ML


 


Myoglobin


 18.1 


 10.0-92.0


NG/ML


 


Troponin I < 0.028  <0.028  NG/ML


 


B-Type Natriuretic Peptide 27.7  <100.0  PG/ML


 


Total Protein 7.1  6.4-8.2  GM/DL


 


Albumin 3.9  3.2-4.5  GM/DL


 


Amylase Level 77    U/L


 


Lipase 30  8-78  U/L


 


TSH Nuckolls Testing


 0.79 


 0.35-4.94


UIU/ML








My Orders





Orders - DARA PAL DO


Ed Iv/Invasive Line Start (3/7/21 18:47)


Ekg Tracing (3/7/21 18:47)


O2 (3/7/21 18:47)


Monitor-Rhythm Ecg Trace Only (3/7/21 18:47)


Amylase (3/7/21 18:47)


BNP (3/7/21 18:47)


Cbc With Automated Diff (3/7/21 18:47)


Comprehensive Metabolic Panel (3/7/21 18:47)


Creatine Kinase (3/7/21 18:47)


Creatine Kinase Mb (3/7/21 18:47)


Lipase (3/7/21 18:47)


Magnesium (3/7/21 18:47)


Protime With Inr (3/7/21 18:47)


Partial Thromboplastin Time (3/7/21 18:47)


Thyroid Analyzer (3/7/21 18:47)


Myoglobin Serum (3/7/21 18:47)


Troponin I (3/7/21 18:47)


Chest 1 View, Ap/Pa Only (3/7/21 18:47)


Nitroglycerin Ointment (Nitrobid Ointme (3/7/21 18:47)


Fentanyl  Injection (Sublimaze Injection (3/7/21 20:30)





Vital Signs/I&O











 3/7/21 3/7/21





 18:40 18:40


 


Temp 37.3 


 


Pulse 86 


 


Resp 18 


 


B/P (MAP) 148/86 (106) 


 


Pulse Ox 98 


 


O2 Delivery Room Air Room Air














Blood Pressure Mean:                    106











Progress


Progress Note :  


Progress Note


GIVEN NITROPASTE--STATES PAIN DOWN TO 5/10


GIVEN FENTANYL FOR PAIN WITH SOME IMPROVEMENT





NO DETERIORATION IN PT'S CONDITION DURING ER STAY


Initial ECG Impression Date:  Mar 7, 2021


Initial ECG Impression Time:  18:43


Initial ECG Rate:  83


Initial ECG Rhythm:  Normal Sinus (ATRIAL PACED COMPLEXES)





Diagnostic Imaging





Comments


CXR--PER RADIOLOGIST REPORT


FINDINGS: There is a dual-chamber pacemaker. There are postop


changes from left atrial appendage clipping. Heart size and


pulmonary vascularity are normal. Lungs are clear. There is no


effusion or pneumothorax.





IMPRESSION: No acute abnormality in the chest.


   Reviewed:  Reviewed by Me





Departure


Communication (Admissions)


--SPOKE WITH DR. WATERS, HOSPITALIST, ACCEPTS PT FOR ADMIT. WILL CONSULT 

CARDIOLOGY IN AM.





Impression





   Primary Impression:  


   Chest pain


   Additional Impressions:  


   History of permanent cardiac pacemaker placement


   History of atrial fibrillation


   Anxiety


   HTN (hypertension)


   IDDM (insulin dependent diabetes mellitus)


Disposition:   ADMITTED AS INPATIENT


Condition:  Improved





Admissions


Decision to Admit Reason:  Admit from ER (General)


Decision to Admit/Date:  Mar 7, 2021


Time/Decision to Admit Time:  20:20





Departure-Patient Inst.


Referrals:  


SAVANAH BOWMAN (PCP)


Primary Care Physician











DARA PAL DO                  Mar 7, 2021 19:13

## 2021-03-07 NOTE — DIAGNOSTIC IMAGING REPORT
INDICATION: Chest pain.



EXAMINATION: Portable chest at 7:22 p.m.



FINDINGS: There is a dual-chamber pacemaker. There are postop

changes from left atrial appendage clipping. Heart size and

pulmonary vascularity are normal. Lungs are clear. There is no

effusion or pneumothorax.



IMPRESSION: No acute abnormality in the chest. 



Dictated by: 



  Dictated on workstation # [id number]

## 2021-03-08 VITALS — SYSTOLIC BLOOD PRESSURE: 140 MMHG | DIASTOLIC BLOOD PRESSURE: 71 MMHG

## 2021-03-08 VITALS — DIASTOLIC BLOOD PRESSURE: 63 MMHG | SYSTOLIC BLOOD PRESSURE: 121 MMHG

## 2021-03-08 VITALS — DIASTOLIC BLOOD PRESSURE: 84 MMHG | SYSTOLIC BLOOD PRESSURE: 126 MMHG

## 2021-03-08 VITALS — DIASTOLIC BLOOD PRESSURE: 70 MMHG | SYSTOLIC BLOOD PRESSURE: 138 MMHG

## 2021-03-08 VITALS — SYSTOLIC BLOOD PRESSURE: 126 MMHG | DIASTOLIC BLOOD PRESSURE: 84 MMHG

## 2021-03-08 VITALS — SYSTOLIC BLOOD PRESSURE: 120 MMHG | DIASTOLIC BLOOD PRESSURE: 64 MMHG

## 2021-03-08 VITALS — SYSTOLIC BLOOD PRESSURE: 114 MMHG | DIASTOLIC BLOOD PRESSURE: 53 MMHG

## 2021-03-08 VITALS — DIASTOLIC BLOOD PRESSURE: 75 MMHG | SYSTOLIC BLOOD PRESSURE: 134 MMHG

## 2021-03-08 VITALS — SYSTOLIC BLOOD PRESSURE: 137 MMHG | DIASTOLIC BLOOD PRESSURE: 64 MMHG

## 2021-03-08 VITALS — DIASTOLIC BLOOD PRESSURE: 72 MMHG | SYSTOLIC BLOOD PRESSURE: 124 MMHG

## 2021-03-08 LAB
ALBUMIN SERPL-MCNC: 3.7 GM/DL (ref 3.2–4.5)
ALP SERPL-CCNC: 77 U/L (ref 40–136)
ALT SERPL-CCNC: 9 U/L (ref 0–55)
BASOPHILS # BLD AUTO: 0 10^3/UL (ref 0–0.1)
BASOPHILS NFR BLD AUTO: 0 % (ref 0–10)
BILIRUB SERPL-MCNC: 0.4 MG/DL (ref 0.1–1)
BUN/CREAT SERPL: 17
CALCIUM SERPL-MCNC: 8.4 MG/DL (ref 8.5–10.1)
CHLORIDE SERPL-SCNC: 112 MMOL/L (ref 98–107)
CHOLEST SERPL-MCNC: 149 MG/DL (ref ?–200)
CO2 SERPL-SCNC: 23 MMOL/L (ref 21–32)
CREAT SERPL-MCNC: 0.78 MG/DL (ref 0.6–1.3)
EOSINOPHIL # BLD AUTO: 0.1 10^3/UL (ref 0–0.3)
EOSINOPHIL NFR BLD AUTO: 1 % (ref 0–10)
GFR SERPLBLD BASED ON 1.73 SQ M-ARVRAT: > 60 ML/MIN
GLUCOSE SERPL-MCNC: 92 MG/DL (ref 70–105)
HCT VFR BLD CALC: 35 % (ref 35–52)
HDLC SERPL-MCNC: 54 MG/DL (ref 40–60)
HGB BLD-MCNC: 11.5 G/DL (ref 11.5–16)
LYMPHOCYTES # BLD AUTO: 2.4 10^3/UL (ref 1–4)
LYMPHOCYTES NFR BLD AUTO: 37 % (ref 12–44)
MANUAL DIFFERENTIAL PERFORMED BLD QL: NO
MCH RBC QN AUTO: 30 PG (ref 25–34)
MCHC RBC AUTO-ENTMCNC: 33 G/DL (ref 32–36)
MCV RBC AUTO: 91 FL (ref 80–99)
MONOCYTES # BLD AUTO: 0.4 10^3/UL (ref 0–1)
MONOCYTES NFR BLD AUTO: 6 % (ref 0–12)
NEUTROPHILS # BLD AUTO: 3.5 10^3/UL (ref 1.8–7.8)
NEUTROPHILS NFR BLD AUTO: 55 % (ref 42–75)
PLATELET # BLD: 166 10^3/UL (ref 130–400)
PMV BLD AUTO: 9.2 FL (ref 9–12.2)
POTASSIUM SERPL-SCNC: 3.4 MMOL/L (ref 3.6–5)
PROT SERPL-MCNC: 6.3 GM/DL (ref 6.4–8.2)
SODIUM SERPL-SCNC: 144 MMOL/L (ref 135–145)
TRIGL SERPL-MCNC: 69 MG/DL (ref ?–150)
VLDLC SERPL CALC-MCNC: 14 MG/DL (ref 5–40)
WBC # BLD AUTO: 6.4 10^3/UL (ref 4.3–11)

## 2021-03-08 RX ADMIN — AMIODARONE HYDROCHLORIDE SCH MG: 200 TABLET ORAL at 09:47

## 2021-03-08 RX ADMIN — ASPIRIN SCH MG: 81 TABLET ORAL at 09:47

## 2021-03-08 RX ADMIN — NITROGLYCERIN SCH INCH: 20 OINTMENT TOPICAL at 04:32

## 2021-03-08 RX ADMIN — APIXABAN SCH MG: 5 TABLET, FILM COATED ORAL at 09:47

## 2021-03-08 RX ADMIN — INSULIN ASPART SCH UNIT: 100 INJECTION, SOLUTION INTRAVENOUS; SUBCUTANEOUS at 21:00

## 2021-03-08 RX ADMIN — NITROGLYCERIN SCH INCH: 20 OINTMENT TOPICAL at 08:14

## 2021-03-08 RX ADMIN — APIXABAN SCH MG: 5 TABLET, FILM COATED ORAL at 21:14

## 2021-03-08 RX ADMIN — Medication SCH ML: at 23:55

## 2021-03-08 RX ADMIN — INSULIN ASPART SCH UNIT: 100 INJECTION, SOLUTION INTRAVENOUS; SUBCUTANEOUS at 11:34

## 2021-03-08 RX ADMIN — SACUBITRIL AND VALSARTAN SCH TAB: 24; 26 TABLET, FILM COATED ORAL at 09:46

## 2021-03-08 RX ADMIN — SACUBITRIL AND VALSARTAN SCH TAB: 24; 26 TABLET, FILM COATED ORAL at 21:14

## 2021-03-08 RX ADMIN — Medication SCH ML: at 14:42

## 2021-03-08 RX ADMIN — Medication SCH ML: at 06:04

## 2021-03-08 RX ADMIN — INSULIN ASPART SCH UNIT: 100 INJECTION, SOLUTION INTRAVENOUS; SUBCUTANEOUS at 17:34

## 2021-03-08 RX ADMIN — INSULIN ASPART SCH UNIT: 100 INJECTION, SOLUTION INTRAVENOUS; SUBCUTANEOUS at 06:04

## 2021-03-08 RX ADMIN — ASPIRIN SCH MG: 81 TABLET ORAL at 08:14

## 2021-03-08 NOTE — HISTORY & PHYSICAL-HOSPITALIST
History of Present Illness


HPI/Chief Complaint


Pt is a 70yoCF with a PMH of IDDM, HTn, and a-fib who presented to the ER due to

chest pain. She states she was driving to work and her pain started bu really it

has been going on for a while. She varied the timeframe of symptoms from a few 

days to a few months throughout our interview but ultimately she believes it 

started before her surgery in November but has worsened recently. She states she

had 10/10 chest pain around her left chest. She was SOB and diaphoretic with 

this. She called EMS from the side of the road and was given a nitro which took 

her pain down to a 7/10.  She has a LA appendage clipping with PAIGE in [facility]

on last year with Dr Villagran in [place]. She is supposed to see him 

next week. She is very tearful during my exam and reports she thinks some of her

symptoms are due to her anxiety.


Source:  patient


Date Seen


3/8/21


Time Seen by a Provider:  08:45


Attending Physician


Erna Bro MD


PCP


Marla Lovell


Referring Physician





Date of Admission


Mar 7, 2021 at 20:20





Home Medications & Allergies


Home Medications


Reviewed patient Home Medication Reconciliation performed by pharmacy medication

reconciliations technician and/or nursing.


Patients Allergies have been reviewed.





Allergies





Allergies


Coded Allergies


  promethazine (Verified Allergy, Intermediate, 3/11/08)


  clindamycin (Verified Allergy, Unknown, 3/7/21)


  hydrocodone (Verified Allergy, Unknown, 3/7/21)


  morphine (Verified Allergy, Unknown, 3/7/21)








Past Medical-Social-Family Hx


Past Med/Social Hx:  Reviewed Nursing Past Med/Soc Hx


Patient Social History


Alcohol Use:  Denies Use


Recreational Drug Use:  No


Drug of Choice:  DEINIES


Smoking Status:  Never a Smoker


2nd Hand Smoke Exposure:  No


Recent Foreign Travel:  No


Contact w/other who traveled:  No


Recent Infectious Disease Expo:  No





Past Medical History


Surgeries:  Cardiac,  Section, Gallbladder, Hysterectomy, Orthopedic, 

Pacemaker


Cardiac:  Atrial Fibrillation, High Cholesterol, Hypertension, Irregular 

Heartbeat


Neurological:  Neuropathy (PERIPHERAL NEUROPATHY IN HANDS AND FEET)


Reproductive:  No


Gastrointestinal:  Gastroesophageal Reflux


Musculoskeletal:  Chronic Back Pain


Endocrine:  Diabetes, Insulin dep


History of Blood Disorders:  No





Family History





PAST SURGICAL HISTORY:


-LEFT EYE SURGERY FOR RETINAL BLEED 3 YEARS AGO


- X 2  IN , 


-HYSTERECTOMY 


-CHOLECYSTECTOMY ( OPEN ) 


-LEFT FOOT SURGERY 7 YEARS AGO


-LUMBAR LAMINECTOMY 20 YEARS AGO


-PACEMAKER 4 YEARS AGO


-CARDIAC ABLATION 2019


-MAZE PROCEDURE 2020


-LEFT LATERAL APPENDAGE CLIP 2020





Review of Systems


Constitutional:  No chills, No fever


EENTM:  no symptoms reported


Respiratory:  see HPI, short of breath


Cardiovascular:  see HPI, chest pain; No edema; Hx of Intervention, palpitations


Gastrointestinal:  see HPI


Genitourinary:  No dysuria, No frequency


Musculoskeletal:  no symptoms reported


Skin:  no symptoms reported


Psychiatric/Neurological:  Anxiety





Physical Exam


Physical Exam


Vital Signs





Vital Signs - First Documented








 3/7/21





 23:16


 


O2 Flow Rate 1.00





Capillary Refill : Less Than 3 Seconds


Height, Weight, BMI


Height: 5'1.00"


Weight: 151lbs. 0.0oz. 68.770404hc; 25.57 BMI


Method:Stated


General Appearance:  No Apparent Distress, Anxious, Other (tearful during exam)


HEENT:  PERRL/EOMI, Moist Mucous Membranes


Neck:  Normal Inspection, Supple


Respiratory:  Lungs Clear, No Respiratory Distress


Cardiovascular:  Regular Rate, Rhythm, No Murmur


Gastrointestinal:  Normal Bowel Sounds, Non Tender, Soft


Extremity:  No Calf Tenderness, No Pedal Edema


Neurologic/Psychiatric:  Alert, Oriented x3


Skin:  Normal Color, Warm/Dry





Results


Results/Procedures


Labs


Patient resulted labs reviewed.


Imaging:  Reviewed Imaging Report


Imaging


                 [facility] [place], [place]





NAME:   RICK POWERS SAIMA


[facility] REC#:   [id number]


ACCOUNT#:   [id number]


PT STATUS:   REG ER


:   1950


PHYSICIAN:   DARA PAL DO


ADMIT DATE:   21/ER


                                  ***Signed***


Date of Exam:21





CHEST 1 VIEW, AP/PA ONLY








INDICATION: Chest pain.





EXAMINATION: Portable chest at 7:22 p.m.





FINDINGS: There is a dual-chamber pacemaker. There are postop


changes from left atrial appendage clipping. Heart size and


pulmonary vascularity are normal. Lungs are clear. There is no


effusion or pneumothorax.





IMPRESSION: No acute abnormality in the chest. 





Dictated by: 





  Dictated on workstation # [id number]








Dict:   21


Trans:   21


[facility] [id number]





Interpreted by:     ARIANNE YADAV MD


Electronically signed by: ARIANNE YADAV MD 21





Assessment/Plan


Admission Diagnosis


Chest pain


Admission Status:  Observation





Assessment and Plan


Chest pain


A-fib


   Troponin negative x2


   Monitor on telemetry


   Request records from [place]


   Cardiology consulted, appreciate recs


   Continue home meds when med rec available





IDDMII


   On insulin pump and BS 92


   Continue home insulin regimen





Anxiety


   Reports worsening anxiety due to symptoms, tearful while I was in room


   Atarax added prn


   





DVT ppx: JEFFRY Barboza MD               Mar 8, 2021 09:03

## 2021-03-08 NOTE — CONSULTATION-CARDIOLOGY
HPI-Cardiology


Cardiology Consultation:


Date of Consultation


3/8/21


Date of Admission


3-7-21


Attending Physician


Erna Bro MD


Admitting Physician


Marla Lovell


Consulting Physician


Alexander Garcia MD





HPI:


Chief Complaint:


Chest pain


Ms. Powers is a 70 yr old female admitted to ICU 1 from the ED with  c/o CP.  She

is very tearful this morning.  She states she has had chest pain off and on for 

approx 8-9 yrs.  She reports yesterday she had chest pain, left sided, radiated 

around to her back which lasted all day.  She reports at times activity would 

make it worse, at other times it did not affect it at all.  She denies any c/o 

SOB, syncope or near syncope.  She reports when she has chest pain she will 

usually rest and it will resolve on its own.  She reports she received nitro in 

the ED which did provide some relief, but has not completely resolved her pain. 

She states she sees Dr. Peacock with cardiology services at [place] and Dr. Rausch of EP services at [place].  She reports she saw him approx a 

month ago and is d/t see him next week.  She reports she has had LA appendage 

clip with MAZE procedure in 2020 by Dr. Villagran and feels her pain has 

been worse since then.  She reports she had a pacemaker placed by Dr. Holm 

at [facility] approx 5 yrs ago.  She states she did have a stress test and

echo with Dr. Villagran last year, she is unsure of the results. No c/o fever 

or chills.  No c/o injury to chest or pacemaker site recently.  She reports no 

n/v/d.





Review of Systems-Cardiology


Review of Systems


Constitutional:  No chills, No fever; lightheadedness, tiredness


Eyes:  No vision change


Ears/Nose/Throat:  No epistaxis, No recent hearing loss


Respiratory:  As described under HPI


Cardiovascular:  As described under HPI


Gastrointestinal:  As described under HPI


Genitourinary:  No dysuria, No hematuria


Musculoskeletal:  As describe under HPI


Skin:  No rash on exposed areas, No ulcerations on exposed areas


Psychiatric/Neurological:  anxiety; No depression, No seizure, No focal 

weakness, No syncope


Hematologic:  No bleeding abnormalities





PMH-Social-Family Hx


Patient Social History


Smoking Status:  Never a Smoker


2nd Hand Smoke Exposure:  No


Have you traveled recently?:  No


Alcohol Use?:  No


Pt feels they are or have been:  No





Past Medical History


PMH


As described under Assessment.





Family Medical History


Family Medical History:  


She reports her mother had cancer and her father had emphysema.  She does


not report any family h/o CAD.





Allergies and Home Medications


Allergies


Coded Allergies:  


     promethazine (Verified  Allergy, Intermediate, 3/11/08)


     clindamycin (Verified  Allergy, Unknown, 3/7/21)


     hydrocodone (Verified  Allergy, Unknown, 3/7/21)


     morphine (Verified  Allergy, Unknown, 3/7/21)





Home Medications


Amiodarone HCl 100 Mg Tablet, 100 MG PO DAILY, (Reported)


   LAST FILLED 2020 #30 


Apixaban 5 Mg Tablet, 5 MG PO BID, (Reported)


Benzonatate 200 Mg Capsule, 200 MG PO TID PRN for COUGH, (Reported)


Insulin Aspart 100 Unit/1 Ml Susp, UNITS SC PER PUMP, (Reported)


Insulin Determir 1,000 Units/10 Ml Soln, 11 UNITS SC DAILY PRN for IF INSULIN 

PUMP IS UNAVALIABLE, (Reported)


Metoprolol Succinate 50 Mg Tab.er.24h, 50 MG PO DAILY, (Reported)


   HOLD IF SBP IS LESS THAN 120 


Peg 400/Hypromellose/Glycerin 15 Ml Drops, 2 DROPS OU PRN PRN for DRY EYES, 

(Reported)


Polyethylene Glycol 3350 17 Gm Powd.pack, 17 GM PO DAILY PRN for CONSTIPATION-

2ND LINE, (Reported)


Potassium Chloride 10 Meq Tablet.er, 10 MEQ PO DAILY, (Reported)


Pravastatin Sodium 20 Mg Tablet, 20 MG PO HS, (Reported)


Sacubitril/Valsartan 1 Each Tablet, 1 EA PO BID, (Reported)


Spironolactone 25 Mg Tablet, 12.5 MG PO DAILY, (Reported)


   TAKES  OF A 25MG 


Vit C/E/Zn/Coppr/Lutein/Zeaxan 1 Each Capsule, 1 EACH PO DAILY, (Reported)





Physical Exam-Cardiology


Physical Exam


Vital Signs/I&O











 3/9/21 3/9/21 3/9/21 3/9/21





 01:00 04:00 06:46 07:58


 


Temp    36.8


 


Pulse 71 77 72 87


 


Resp  18  18


 


B/P (MAP)  122/66 (84)  133/77 (95)


 


Pulse Ox  99  97


 


O2 Delivery  Room Air  Room Air


 


    





 3/9/21 3/9/21  





 08:00 10:05  


 


Pulse  86  


 


Resp  16  


 


B/P (MAP)  153/74 (100)  


 


Pulse Ox 100 95  


 


O2 Delivery Room Air Room Air  














 3/9/21





 00:00


 


Intake Total 900 ml


 


Balance 900 ml





Capillary Refill : Less Than 3 Seconds


Constitutional:  AAO x 3, well-developed, well-nourished


HEENT:  PERRL, hearing is well preserved, oral hygience is good


Neck:  No carotid bruit; carotid pulses are 2 + bilaterally


Respiratory:  No accessory muscle use, No respiratory distress; chest expansion 

is symmetric, chest is bilaterally symmetric, lungs clear to auscultation


Cardiovascular:  regular rate-rhythm; No JVD; S1 and S2


Gastrointestinal:  No tender; soft, round, audible bowel sounds


Extremities:  no lower extremity edema bilateral


Neurologic/Psychiatric:  grossly intact (moves all extremities)


Skin:  No rash on exposed areas, No ulcerations on exposed areas





Data Review


Labs








Radiology


NAME:   RICK POWERS


[facility] REC#:   [id number]


ACCOUNT#:   [id number]


PT STATUS:   REG ER


:   1950


PHYSICIAN:   DARA PAL DO


ADMIT DATE:   21/ER


***Signed***


Date of Exam:21





CHEST 1 VIEW, AP/PA ONLY








INDICATION: Chest pain.





EXAMINATION: Portable chest at 7:22 p.m.





FINDINGS: There is a dual-chamber pacemaker. There are postop


changes from left atrial appendage clipping. Heart size and


pulmonary vascularity are normal. Lungs are clear. There is no


effusion or pneumothorax.





IMPRESSION: No acute abnormality in the chest. 





Dictated by: 





  Dictated on workstation # [id number]








Dict:   21


Trans:   21


[facility] [id number]





Interpreted by:     ARIANNE YADAV MD


Electronically signed by: ARIANNE YADAV MD 21





ECG Impression


ECG


Comment


atrial pacing





A/P-Cardiology


Assessment/Admission Diagnosis


Chest pain of undetermined etiology





H/O SSS - post pacemaker implant 5 yrs ago by Dr. Holm at [facility] for stroke prophylaxis





H/O LA appendage clip with MAZE procedure by Dr. Villagran in 2020





H/O ablation in 2019 by Dr. Villagran





Reports h/o CHF





DM - insulin pump - managed by Dr. Mendez of endocrinology services





HLD - statin tx





H/O CVA approx 2 years ago - no residual





Discussion and Recomendations


Chest pain of undetermined etiology - no evidence of ACS thus far


Request records from [place], Dr. Villagran


Request records from [facility] 


Advise resumption of Entresto, Amiodarone, Aldactone, BB and OAC


Echocardiogram today


Continue tele


Monitor lab closely


Replace electrolytes as indicated


Further recs will be based on her hospital course


We would like to thank medical services for this consult


I have spoken with Dr. Boateng of medical services this morning











AI MONAHAN            Mar 8, 2021 08:21

## 2021-03-08 NOTE — CONSULTATION-CARDIOLOGY
HPI-Cardiology


Cardiology Consultation:


Date of Consultation


3/8/21


Time Seen by a Provider:  09:10


Date of Admission





Attending Physician


Erna Bro MD


Admitting Physician


Marla Lovell


Consulting Physician


FLORENCIA FERRARA MD, MA, FACP, FACC, FSCAI, CCDS





HPI:


Chief Complaint:


CC: Chest pain


HPI


Ms. Grider is a 70 yr old female admitted to ICU 1 from the ED with  c/o CP.  She

is very tearful this morning.  She states she has had chest pain off and on for 

approx 8-9 yrs.  She reports yesterday she had chest pain, left sided, radiated 

around to her back which lasted all day.  She reports at times activity would 

make it worse, at other times it did not affect it at all.  She denies any c/o 

SOB, syncope or near syncope.  She reports when she has chest pain she will 

usually rest and it will resolve on its own.  She reports she received nitro in 

the ED which did provide some relief, but has not completely resolved her pain. 

She states she sees Dr. Peacock with cardiology services at [place] and Dr. Rausch of EP services at [place].  She reports she saw him approx a 

month ago and is d/t see him next week.  She reports she has had LA appendage 

clip with MAZE procedure in Nov 2020 by Dr. Villagran and feels her pain has 

been worse since then.  She reports she had a pacemaker placed by Dr. Holm 

at [facility] approx 5 yrs ago.  She states she did have a stress test and

echo with Dr. Villagran last year, she is unsure of the results. No c/o fever 

or chills.  No c/o injury to chest or pacemaker site recently.  She reports no 

n/v/d.





Review of Systems-Cardiology


Review of Systems


Constitutional:  No chills, No fever; lightheadedness, tiredness


Eyes:  No vision change


Ears/Nose/Throat:  No epistaxis, No recent hearing loss


Respiratory:  As described under HPI


Cardiovascular:  As described under HPI


Gastrointestinal:  As described under HPI


Genitourinary:  No dysuria, No hematuria


Musculoskeletal:  As describe under HPI


Skin:  No rash on exposed areas, No ulcerations on exposed areas


Psychiatric/Neurological:  anxiety; No depression, No seizure, No focal 

weakness, No syncope


Hematologic:  No bleeding abnormalities





PMH-Social-Family Hx


Patient Social History


Smoking Status:  Never a Smoker


2nd Hand Smoke Exposure:  No


Have you traveled recently?:  No


Alcohol Use?:  No


Pt feels they are or have been:  No





Past Medical History


PMH


As described under Assessment.





Family Medical History


Family Medical History:  


She reports her mother had cancer and her father had emphysema.  She does


not report any family h/o CAD.





Allergies and Home Medications


Allergies


Coded Allergies:  


     promethazine (Verified  Allergy, Intermediate, 3/11/08)


     clindamycin (Verified  Allergy, Unknown, 3/7/21)


     hydrocodone (Verified  Allergy, Unknown, 3/7/21)


     morphine (Verified  Allergy, Unknown, 3/7/21)





Home Medications


Amiodarone HCl 100 Mg Tablet, 100 MG PO DAILY, (Reported)


   LAST FILLED 12- #30 


Apixaban 5 Mg Tablet, 5 MG PO BID, (Reported)


Benzonatate 200 Mg Capsule, 200 MG PO TID PRN for COUGH, (Reported)


Insulin Aspart 100 Unit/1 Ml Susp, UNITS SC PER PUMP, (Reported)


Insulin Determir 1,000 Units/10 Ml Soln, 11 UNITS SC DAILY PRN for IF INSULIN 

PUMP IS UNAVALIABLE, (Reported)


Metoprolol Succinate 50 Mg Tab.er.24h, 50 MG PO DAILY, (Reported)


   HOLD IF SBP IS LESS THAN 120 


Peg 400/Hypromellose/Glycerin 15 Ml Drops, 2 DROPS OU PRN PRN for DRY EYES, 

(Reported)


Polyethylene Glycol 3350 17 Gm Powd.pack, 17 GM PO DAILY PRN for CONSTIPATION-

2ND LINE, (Reported)


Potassium Chloride 10 Meq Tablet.er, 10 MEQ PO DAILY, (Reported)


Pravastatin Sodium 20 Mg Tablet, 20 MG PO HS, (Reported)


Sacubitril/Valsartan 1 Each Tablet, 1 EA PO BID, (Reported)


Spironolactone 25 Mg Tablet, 12.5 MG PO DAILY, (Reported)


   TAKES  OF A 25MG 


Vit C/E/Zn/Coppr/Lutein/Zeaxan 1 Each Capsule, 1 EACH PO DAILY, (Reported)





Patient Home Medication List


Home Medication List Reviewed:  Yes





Physical Exam-Cardiology


Physical Exam


Vital Signs/I&O











 3/8/21 3/8/21 3/8/21 3/8/21





 05:00 06:00 06:31 07:27


 


Temp    36.6


 


Pulse 70 67 69 


 


Resp 13 14  


 


B/P (MAP) 134/75 (94) 124/72 (89)  


 


Pulse Ox 97 94  


 


O2 Delivery Nasal Cannula Room Air  


 


O2 Flow Rate 1.00   


 


    





 3/8/21 3/8/21 3/8/21 3/8/21





 08:00 08:00 11:33 12:00


 


Temp   36.1 


 


Pulse 76   88


 


Resp 18   


 


B/P (MAP) 137/64 (88)   


 


Pulse Ox 99 100  87


 


O2 Delivery Room Air Room Air  Room Air


 


    





 3/8/21 3/8/21  





 12:36 13:00  


 


Pulse 79 89  


 


Resp 18   


 


B/P (MAP) 120/64 (82)   


 


Pulse Ox 99   


 


O2 Delivery Room Air   














 3/8/21





 00:00


 


Intake Total 50 ml


 


Balance 50 ml





Capillary Refill : Less Than 3 Seconds


Constitutional:  AAO x 3, well-developed, well-nourished


HEENT:  PERRL, hearing is well preserved, oral hygience is good


Neck:  No carotid bruit; carotid pulses are 2 + bilaterally


Respiratory:  No accessory muscle use, No respiratory distress; chest expansion 

is symmetric, chest is bilaterally symmetric, lungs clear to auscultation


Cardiovascular:  regular rate-rhythm; No JVD; S1 and S2


Gastrointestinal:  No tender; soft, round, audible bowel sounds


Extremities:  no lower extremity edema bilateral


Neurologic/Psychiatric:  grossly intact (moves all extremities)


Skin:  No rash on exposed areas, No ulcerations on exposed areas





Data Review


Labs


Laboratory Tests


3/7/21 18:44: 


White Blood Count 6.6, Red Blood Count 3.98, Hemoglobin 11.9, Hematocrit 36, 

Mean Corpuscular Volume 89, Mean Corpuscular Hemoglobin 30, Mean Corpuscular 

Hemoglobin Concent 33, Red Cell Distribution Width 12.7, Platelet Count 196, 

Mean Platelet Volume 9.6, Immature Granulocyte % (Auto) 0, Neutrophils (%) 

(Auto) 60, Lymphocytes (%) (Auto) 33, Monocytes (%) (Auto) 6, Eosinophils (%) 

(Auto) 1, Basophils (%) (Auto) 0, Neutrophils # (Auto) 4.0, Lymphocytes # (Auto)

2.2, Monocytes # (Auto) 0.4, Eosinophils # (Auto) 0.1, Basophils # (Auto) 0.0, 

Immature Granulocyte # (Auto) 0.0, Prothrombin Time 14.2, INR Comment 1.1, 

Activated Partial Thromboplast Time 26, Sodium Level 143, Potassium Level 3.9, 

Chloride Level 111H, Carbon Dioxide Level 21, Anion Gap 11, Blood Urea Nitrogen 

16, Creatinine 0.86, Estimat Glomerular Filtration Rate > 60, BUN/Creatinine 

Ratio 19, Glucose Level 133H, Calcium Level 8.7, Corrected Calcium 8.8, 

Magnesium Level 2.0, Total Bilirubin 0.4, Aspartate Amino Transf (AST/SGOT) 26, 

Alanine Aminotransferase (ALT/SGPT) 11, Alkaline Phosphatase 88, Total Creatine 

Kinase 34, Creatine Kinase MB 0.7, Myoglobin 18.1, Troponin I < 0.028, B-Type 

Natriuretic Peptide 27.7, Total Protein 7.1, Albumin 3.9, Amylase Level 77, 

Lipase 30, TSH Cascade Testing 0.79


3/8/21 02:50: 


White Blood Count 6.4, Red Blood Count 3.87, Hemoglobin 11.5, Hematocrit 35, [place]

n Corpuscular Volume 91, Mean Corpuscular Hemoglobin 30, Mean Corpuscular 

Hemoglobin Concent 33, Red Cell Distribution Width 12.7, Platelet Count 166, 

Mean Platelet Volume 9.2, Immature Granulocyte % (Auto) 0, Neutrophils (%) 

(Auto) 55, Lymphocytes (%) (Auto) 37, Monocytes (%) (Auto) 6, Eosinophils (%) 

(Auto) 1, Basophils (%) (Auto) 0, Neutrophils # (Auto) 3.5, Lymphocytes # (Auto)

2.4, Monocytes # (Auto) 0.4, Eosinophils # (Auto) 0.1, Basophils # (Auto) 0.0, 

Immature Granulocyte # (Auto) 0.0, Sodium Level 144, Potassium Level 3.4L, 

Chloride Level 112H, Carbon Dioxide Level 23, Anion Gap 9, Blood Urea Nitrogen 

13, Creatinine 0.78, Estimat Glomerular Filtration Rate > 60, BUN/Creatinine 

Ratio 17, Glucose Level 92, Calcium Level 8.4L, Corrected Calcium 8.6, Total 

Bilirubin 0.4, Aspartate Amino Transf (AST/SGOT) 18, Alanine Aminotransferase 

(ALT/SGPT) 9, Alkaline Phosphatase 77, Troponin I < 0.028, Total Protein 6.3L, 

Albumin 3.7, Triglycerides Level 69, Cholesterol Level 149, LDL Cholesterol 

Direct 92, VLDL Cholesterol 14, HDL Cholesterol 54





Laboratory Tests


3/7/21 18:44








3/8/21 02:50











A/P-Cardiology


Assessment/Admission Diagnosis





Chest pain of undetermined etiology; no evidence of ACS. Pt reports a card cath 

last year in  did not show significant CAD





H/O SSS - post pacemaker implant 5 yrs ago by Dr. Holm at [facility]





PAF





Eliquis for stroke prophylaxis





H/O LA appendage clip with MAZE procedure by Dr. Villagran in Nov 2020





H/O ablation in 2019 by Dr. Villagran





Reports h/o CHF





DM - insulin pump - managed by Dr. Mendez of endocrinology services





HLD - statin tx





H/O CVA approx 2 years ago - no residual





Discussion and Recomendations


Chest pain of undetermined etiology - no evidence of ACS thus far


Request records from [place], Dr. Villagran


Request records from [facility] 


Advise resumption of Entresto, Amiodarone, Aldactone, BB and OAC


Echocardiogram today


Continue tele


Monitor lab closely


Replace electrolytes as indicated


Further recs will be based on her hospital course


We would like to thank Medical services for this consult











FLORENCIA FERRARA MD FACP FACC CCDS    Mar 8, 2021 16:46

## 2021-03-09 VITALS — SYSTOLIC BLOOD PRESSURE: 122 MMHG | DIASTOLIC BLOOD PRESSURE: 66 MMHG

## 2021-03-09 VITALS — DIASTOLIC BLOOD PRESSURE: 66 MMHG | SYSTOLIC BLOOD PRESSURE: 122 MMHG

## 2021-03-09 VITALS — SYSTOLIC BLOOD PRESSURE: 153 MMHG | DIASTOLIC BLOOD PRESSURE: 74 MMHG

## 2021-03-09 VITALS — SYSTOLIC BLOOD PRESSURE: 125 MMHG | DIASTOLIC BLOOD PRESSURE: 71 MMHG

## 2021-03-09 VITALS — SYSTOLIC BLOOD PRESSURE: 133 MMHG | DIASTOLIC BLOOD PRESSURE: 77 MMHG

## 2021-03-09 VITALS — DIASTOLIC BLOOD PRESSURE: 75 MMHG | SYSTOLIC BLOOD PRESSURE: 119 MMHG

## 2021-03-09 RX ADMIN — INSULIN ASPART SCH UNIT: 100 INJECTION, SOLUTION INTRAVENOUS; SUBCUTANEOUS at 16:00

## 2021-03-09 RX ADMIN — SACUBITRIL AND VALSARTAN SCH TAB: 24; 26 TABLET, FILM COATED ORAL at 07:56

## 2021-03-09 RX ADMIN — AMIODARONE HYDROCHLORIDE SCH MG: 200 TABLET ORAL at 07:56

## 2021-03-09 RX ADMIN — INSULIN ASPART SCH UNIT: 100 INJECTION, SOLUTION INTRAVENOUS; SUBCUTANEOUS at 12:43

## 2021-03-09 RX ADMIN — APIXABAN SCH MG: 5 TABLET, FILM COATED ORAL at 07:56

## 2021-03-09 RX ADMIN — Medication SCH ML: at 06:35

## 2021-03-09 RX ADMIN — ASPIRIN SCH MG: 81 TABLET ORAL at 07:56

## 2021-03-09 RX ADMIN — INSULIN ASPART SCH UNIT: 100 INJECTION, SOLUTION INTRAVENOUS; SUBCUTANEOUS at 06:35

## 2021-03-09 RX ADMIN — Medication SCH ML: at 10:07

## 2021-03-09 NOTE — PROGRESS NOTE - CARDIOLOGY
Cardiology SOAP Progress Note


Subjective:


No cp or palp or syncope


No shortness of breath


No weakness


No n/v/d


Wishes to go home and resume work tomorrow





Objective:


I&O/Vital Signs











 3/9/21 3/9/21 3/9/21 3/9/21





 07:58 08:00 10:05 12:00


 


Temp 36.8   


 


Pulse 87  86 88


 


Resp 18  16 16


 


B/P (MAP) 133/77 (95)  153/74 (100) 119/75 (90)


 


Pulse Ox 97 100 95 97


 


O2 Delivery Room Air Room Air Room Air Room Air


 


    





 3/9/21 3/9/21  





 12:57 15:52  


 


Temp  36.4  


 


Pulse 88 86  


 


Resp  18  


 


B/P (MAP)  125/71 (89)  


 


Pulse Ox  96  


 


O2 Delivery  Room Air  














 3/9/21





 00:00


 


Intake Total 900 ml


 


Balance 900 ml








Weight (Pounds):  151


Weight (Ounces):  0.0


Weight (Calculated Kilograms):  68.967348


Constitutional:  AAO x 3, well-developed, well-nourished


Respiratory:  No accessory muscle use, No respiratory distress; chest expansion 

is symmetric, chest is bilaterally symmetric, lungs clear to auscultation


Cardiovascular:  regular rate-rhythm; No JVD; S1 and S2


Gastrointestional:  No tender; soft, round, audible bowel sounds


Extremities:  no lower extremity edema bilateral


Neurologic/Psychiatric:  grossly intact (moves all extremities)


Skin:  No rash on exposed areas, No ulcerations on exposed areas





Results/Procedures:


Labs





Laboratory Tests


3/8/21 02:50











A/P:


Assessment:





Chest pain of undetermined etiology; no evidence of ACS


- Pt reports a card cath last year in  did not show significant CAD


- Echo of 3-8-21 shows LVEF 55-60%


- MPI of 3-9-21: no ischemia or infarcton





H/o SSS and PAF


- post pacemaker implant in or around 2016 by Dr. Holm at [facility] for stroke prophylaxis


- H/o ablation in 2019 by Dr. Villagran


- H/o LA appendage clip with MAZE procedure by Dr. Villagran in Nov 2020





Cardiac risk factors


- DM - insulin pump - managed by Dr. Mendez of endocrinology services


- HLD - statin tx





H/O CVA approx 2 years ago - no residual


Plan:





Cardiac w/u at this hospital (see above) does not indicate any ACS


Continue current medication regimen


I discussed her w/u with her


I discussed her case with Dr Boateng


Outpt cardiac f/u advised











FLORENCIA FERRARA MD FACP FAC CCDS    Mar 9, 2021 18:55

## 2021-03-09 NOTE — PROGRESS NOTE - CARDIOLOGY
Cardiology SOAP Progress Note


Subjective:


No c/o CP this morning


No c/o SOB, palpitations, syncope or near syncope


States she is feeling better





Objective:


I&O/Vital Signs





Weight (Pounds):  151


Weight (Ounces):  0.0


Weight (Calculated Kilograms):  68.675402


Constitutional:  AAO x 3, well-developed, well-nourished


Respiratory:  No accessory muscle use, No respiratory distress; chest expansion 

is symmetric, chest is bilaterally symmetric, lungs clear to auscultation


Cardiovascular:  regular rate-rhythm; No JVD; S1 and S2


Gastrointestional:  No tender; soft, round, audible bowel sounds


Extremities:  no lower extremity edema bilateral


Neurologic/Psychiatric:  grossly intact (moves all extremities)


Skin:  No rash on exposed areas, No ulcerations on exposed areas





Results/Procedures:


Labs








A/P:


Assessment:





Chest pain of undetermined etiology; no evidence of ACS. Pt reports a card cath 

last year in  did not show significant CAD





Echo of 3-8-21 shows LVEF 55-60%.





H/O SSS - post pacemaker implant 5 yrs ago by Dr. Holm at [facility]





PAF





Eliquis for stroke prophylaxis





H/O LA appendage clip with MAZE procedure by Dr. Villagran in Nov 2020





H/O ablation in 2019 by Dr. Villagran





Reports h/o CHF





DM - insulin pump - managed by Dr. Mendez of endocrinology services





HLD - statin tx





H/O CVA approx 2 years ago - no residual


Plan:


Chest pain of undetermined etiology - no evidence of ACS thus far - advis MPI to

be done today


Request records from Dr. Tyshawn Nicole - still waiting


Request records from [facility] - per [facility] - no records found


Continue current medication regimen


Monitor lab closely


Replace electrolytes as indicated











AI MONAHAN            Mar 9, 2021 12:45

## 2021-03-09 NOTE — STRESS TEST
DATE OF SERVICE:  03/09/2021



RESTING AND POST REGADENOSON TECHNETIUM-99M TETROFOSMIN SPECT CT IMAGING



ORDERING PHYSICIAN:

MAURICIO Lowry



PRIMARY PHYSICIAN:

Dr. Boateng.



CLINICAL DIAGNOSIS:

Chest discomfort.



Baseline images were carried out after injection of 11 mCi of technetium-99m

Tetrofosmin.  This was followed by 0.4 mg regadenoson and 31.4 mCi of

technetium-99m Tetrofosmin for stress imaging.  The electrocardiogram showed

sinus rhythm at baseline.  Isolated premature atrial contractions were seen. 

The electrocardiogram did not change significantly with regadenoson infusion. 

Review of images at rest and following stress does not indicate any significant

perfusion defects consistent with myocardial ischemia or infarction.  Gated

images showed normal global left ventricular systolic function with normal

regional wall motion.  Left ventricular ejection fraction is calculated to be

53%.  Left ventricular end diastolic volume is 31 mL.  TID is absent (1.07).



CONCLUSIONS:

1.  No evidence of any significant myocardial ischemia or infarction on this

study.

2.  Normal regional wall motion.

3.  Normal global left ventricular systolic function with a calculated ejection

fraction of 53%.





Job ID: [id number]

DocumentID: [id number]

Dictated Date:  03/09/2021 16:43:42

Transcription Date: 03/09/2021 17:28:37

Dictated By: FLORENCIA FERRARA MD, MA, FACP, FACC,

## 2021-03-09 NOTE — PROGRESS NOTE - HOSPITALIST
Subjective


HPI/CC On Admission


Date Seen by Provider:  Mar 9, 2021


Time Seen by Provider:  09:25


Pt is a 70yoCF with a PMH of IDDM, HTn, and a-fib who presented to the ER due to

chest pain. She states she was driving to work and her pain started bu really it

has been going on for a while. She varied the timeframe of symptoms from a few 

days to a few months throughout our interview but ultimately she believes it 

started before her surgery in November but has worsened recently. She states she

had 10/10 chest pain around her left chest. She was SOB and diaphoretic with th

is. She called EMS from the side of the road and was given a nitro which took 

her pain down to a 7/10.  She has a LA appendage clipping with PAIGE in [facility]

on last year with Dr Villagran in [place]. She is supposed to see him 

next week. She is very tearful during my exam and reports she thinks some of her

symptoms are due to her anxiety.


Subjective/Events-last exam


Pt reports feeling much better today. Still anxious but no more chest pain and 

overall feeling better. Discussed plan with her about stress test today per Dr Garcia and she is very relieved to have a further workup.





Objective


Exam


Vital Signs





Vital Signs








  Date Time  Temp Pulse Resp B/P (MAP) Pulse Ox O2 Delivery O2 Flow Rate FiO2


 


3/9/21 08:00     100 Room Air  


 


3/9/21 07:58 36.8 87 18 133/77 (95)    


 


3/8/21 05:00       1.00 





Capillary Refill : Less Than 3 Seconds


General Appearance:  No Apparent Distress, Anxious


Respiratory:  Lungs Clear, No Respiratory Distress


Cardiovascular:  Regular Rate, Rhythm, No Murmur


Neurologic/Psychiatric:  Alert, Oriented x3





Results/Procedures


Lab


Patient resulted labs reviewed.





Assessment/Plan


Assessment and Plan


Assess & Plan/Chief Complaint


Chest pain


A-fib


   Troponin negative x2


   Monitor on telemetry


   Request records from [place], still awaiting these


   Cardiology consulted, appreciate recs


   Plan for stress test today


   Continue amiodarona, entresto, ASA, and Eliquis





IDDMII


   On insulin pump and BS 92


   Continue home insulin regimen





Anxiety


   Reports worsening anxiety due to symptoms


   Atarax added prn


   





DVT ppx: JEFFRY Sepulveda MD               Mar 9, 2021 09:28

## 2021-03-12 NOTE — PHYSICIAN QUERY-FINAL DX
PAULO WALTON 3/12/21 1039:


Final Diagnosis


Give Final Diagnosis


Please give Final Diagnosis





JEFFRY AUGUST MD 3/14/21 1726:


Final Diagnosis


Give Final Diagnosis


Chest pain











PAULO WALTON                    Mar 12, 2021 10:39


JEFFRY AUGUST MD              Mar 14, 2021 17:26

## 2021-04-09 ENCOUNTER — HOSPITAL ENCOUNTER (OUTPATIENT)
Dept: HOSPITAL 75 - ER | Age: 71
Setting detail: OBSERVATION
LOS: 1 days | Discharge: HOME | End: 2021-04-10
Attending: FAMILY MEDICINE | Admitting: FAMILY MEDICINE
Payer: COMMERCIAL

## 2021-04-09 VITALS — HEIGHT: 60.63 IN | BODY MASS INDEX: 26.56 KG/M2 | WEIGHT: 138.89 LBS

## 2021-04-09 DIAGNOSIS — E11.40: ICD-10-CM

## 2021-04-09 DIAGNOSIS — Z79.899: ICD-10-CM

## 2021-04-09 DIAGNOSIS — I42.8: ICD-10-CM

## 2021-04-09 DIAGNOSIS — Z86.73: ICD-10-CM

## 2021-04-09 DIAGNOSIS — Z79.4: ICD-10-CM

## 2021-04-09 DIAGNOSIS — E78.00: ICD-10-CM

## 2021-04-09 DIAGNOSIS — K21.9: ICD-10-CM

## 2021-04-09 DIAGNOSIS — I11.0: ICD-10-CM

## 2021-04-09 DIAGNOSIS — E87.6: ICD-10-CM

## 2021-04-09 DIAGNOSIS — Z95.0: ICD-10-CM

## 2021-04-09 DIAGNOSIS — Z88.5: ICD-10-CM

## 2021-04-09 DIAGNOSIS — G89.29: ICD-10-CM

## 2021-04-09 DIAGNOSIS — M54.9: ICD-10-CM

## 2021-04-09 DIAGNOSIS — I48.0: Primary | ICD-10-CM

## 2021-04-09 DIAGNOSIS — I50.22: ICD-10-CM

## 2021-04-09 DIAGNOSIS — Z90.710: ICD-10-CM

## 2021-04-09 DIAGNOSIS — F41.9: ICD-10-CM

## 2021-04-09 LAB
ALBUMIN SERPL-MCNC: 4.3 GM/DL (ref 3.2–4.5)
ALP SERPL-CCNC: 100 U/L (ref 40–136)
ALT SERPL-CCNC: 9 U/L (ref 0–55)
AMYLASE SERPL-CCNC: 79 U/L (ref 25–125)
APTT BLD: 28 SEC (ref 24–35)
BASOPHILS # BLD AUTO: 0 10^3/UL (ref 0–0.1)
BASOPHILS NFR BLD AUTO: 0 % (ref 0–10)
BILIRUB SERPL-MCNC: 0.7 MG/DL (ref 0.1–1)
BUN/CREAT SERPL: 13
CALCIUM SERPL-MCNC: 9.3 MG/DL (ref 8.5–10.1)
CHLORIDE SERPL-SCNC: 111 MMOL/L (ref 98–107)
CK MB SERPL-MCNC: 1.1 NG/ML (ref ?–6.6)
CK SERPL-CCNC: 52 U/L (ref 29–168)
CO2 SERPL-SCNC: 21 MMOL/L (ref 21–32)
CREAT SERPL-MCNC: 0.91 MG/DL (ref 0.6–1.3)
EOSINOPHIL # BLD AUTO: 0.1 10^3/UL (ref 0–0.3)
EOSINOPHIL NFR BLD AUTO: 1 % (ref 0–10)
GFR SERPLBLD BASED ON 1.73 SQ M-ARVRAT: > 60 ML/MIN
GLUCOSE SERPL-MCNC: 120 MG/DL (ref 70–105)
HCT VFR BLD CALC: 39 % (ref 35–52)
HGB BLD-MCNC: 13.2 G/DL (ref 11.5–16)
INR PPP: 1 (ref 0.8–1.4)
LIPASE SERPL-CCNC: 20 U/L (ref 8–78)
LYMPHOCYTES # BLD AUTO: 2.1 10^3/UL (ref 1–4)
LYMPHOCYTES NFR BLD AUTO: 27 % (ref 12–44)
MAGNESIUM SERPL-MCNC: 1.9 MG/DL (ref 1.6–2.4)
MANUAL DIFFERENTIAL PERFORMED BLD QL: NO
MCH RBC QN AUTO: 30 PG (ref 25–34)
MCHC RBC AUTO-ENTMCNC: 34 G/DL (ref 32–36)
MCV RBC AUTO: 88 FL (ref 80–99)
MONOCYTES # BLD AUTO: 0.4 10^3/UL (ref 0–1)
MONOCYTES NFR BLD AUTO: 6 % (ref 0–12)
NEUTROPHILS # BLD AUTO: 4.9 10^3/UL (ref 1.8–7.8)
NEUTROPHILS NFR BLD AUTO: 65 % (ref 42–75)
PLATELET # BLD: 197 10^3/UL (ref 130–400)
PMV BLD AUTO: 9.3 FL (ref 9–12.2)
POTASSIUM SERPL-SCNC: 3.1 MMOL/L (ref 3.6–5)
PROT SERPL-MCNC: 7.4 GM/DL (ref 6.4–8.2)
PROTHROMBIN TIME: 13.9 SEC (ref 12.2–14.7)
SODIUM SERPL-SCNC: 144 MMOL/L (ref 135–145)
TSH SERPL DL<=0.05 MIU/L-ACNC: 0.79 UIU/ML (ref 0.35–4.94)
WBC # BLD AUTO: 7.5 10^3/UL (ref 4.3–11)

## 2021-04-09 PROCEDURE — 83880 ASSAY OF NATRIURETIC PEPTIDE: CPT

## 2021-04-09 PROCEDURE — 93041 RHYTHM ECG TRACING: CPT

## 2021-04-09 PROCEDURE — 85730 THROMBOPLASTIN TIME PARTIAL: CPT

## 2021-04-09 PROCEDURE — 82150 ASSAY OF AMYLASE: CPT

## 2021-04-09 PROCEDURE — 36415 COLL VENOUS BLD VENIPUNCTURE: CPT

## 2021-04-09 PROCEDURE — 84484 ASSAY OF TROPONIN QUANT: CPT

## 2021-04-09 PROCEDURE — 83735 ASSAY OF MAGNESIUM: CPT

## 2021-04-09 PROCEDURE — 82550 ASSAY OF CK (CPK): CPT

## 2021-04-09 PROCEDURE — 85610 PROTHROMBIN TIME: CPT

## 2021-04-09 PROCEDURE — 99284 EMERGENCY DEPT VISIT MOD MDM: CPT

## 2021-04-09 PROCEDURE — 80053 COMPREHEN METABOLIC PANEL: CPT

## 2021-04-09 PROCEDURE — 93005 ELECTROCARDIOGRAM TRACING: CPT

## 2021-04-09 PROCEDURE — 82962 GLUCOSE BLOOD TEST: CPT

## 2021-04-09 PROCEDURE — 82553 CREATINE MB FRACTION: CPT

## 2021-04-09 PROCEDURE — 71045 X-RAY EXAM CHEST 1 VIEW: CPT

## 2021-04-09 PROCEDURE — 83690 ASSAY OF LIPASE: CPT

## 2021-04-09 PROCEDURE — 80061 LIPID PANEL: CPT

## 2021-04-09 PROCEDURE — 83874 ASSAY OF MYOGLOBIN: CPT

## 2021-04-09 PROCEDURE — 85025 COMPLETE CBC W/AUTO DIFF WBC: CPT

## 2021-04-09 PROCEDURE — 84443 ASSAY THYROID STIM HORMONE: CPT

## 2021-04-09 RX ADMIN — Medication SCH ML: at 22:21

## 2021-04-09 RX ADMIN — NITROGLYCERIN PRN MG: 0.4 TABLET SUBLINGUAL at 14:18

## 2021-04-09 RX ADMIN — INSULIN ASPART SCH UNIT: 100 INJECTION, SOLUTION INTRAVENOUS; SUBCUTANEOUS at 16:19

## 2021-04-09 RX ADMIN — INSULIN ASPART SCH UNIT: 100 INJECTION, SOLUTION INTRAVENOUS; SUBCUTANEOUS at 20:46

## 2021-04-09 RX ADMIN — NITROGLYCERIN PRN MG: 0.4 TABLET SUBLINGUAL at 14:28

## 2021-04-09 RX ADMIN — NITROGLYCERIN PRN MG: 0.4 TABLET SUBLINGUAL at 14:12

## 2021-04-09 NOTE — ED CARDIAC GENERAL
History of Present Illness


General


Chief Complaint:  Chest Pain


Stated Complaint:  CP,DIZZINESS


Source:  patient (SOMEWHAT LIMITED HISTORIAN)





History of Present Illness


Date Seen by Provider:  2021


Time Seen by Provider:  13:48


Initial Comments


PT ARRIVES VIA POV FROM HOME


C/O CHEST PAIN, PALPITATIONS, SHORTNESS OF BREATH, FEELING TIRED,  AND DIZZINESS

AND FEELING LIKE SHE IS GOING TO PASS OUT FOR THE LAST 2-3 DAYS


WENT TO [facility] IN [place] EARLIER THIS MORNING FOR THIS PROBLEM. EKG WAS

DONE WHICH SHOWED ATRIAL FIBRILLATION


PT HAS LONGSTANDING HISTORY OF ATRIAL FIBRILLATION, HAS HAD MULTIPLE PROCEDURES,

INCLUDING PACEMAKER, AND IS ON ELIQUIS


DENIES ANY MISSED DOSES OF MEDICATIONS OR CHANGES IN MEDICATIONS--TOOK MORNING 

MEDICATIONS


PT IS ALSO ON ENTRESTO AND METOPROLOL





RATES PAIN 5-6/10. NO RADIATION OF PAIN


PAIN IN CENTER OF CHEST AND ALSO OVER PACEMAKER--STATES SHE ALWAYS HAS PAIN OVER

PACEMAKER





LATER PT STATES THAT AMIODARONE WAS STOPPED A MONTH AGO, AND WAS SUPPOSED TO 

HAVE STARTED SOTALOL, BUT STATES "THE PHARMACY NEVER GOT THE ORDER"--HAS  NOT 

ATTEMPTED TO CONTACT HER CARDIOLOGISTS AT ANY TIME 





NO SWEATS


NO NAUSEA/VOMITING


NO ACTUAL SYNCOPE


NO SWELLING IN FEET /LEGS--STATES RIGHT LEG IS ALWAYS A LITTLE SWOLLEN





HAS HAD THE SAME MULTIPLE TIMES IN THE PAST. 





PT IS ALSO INSULIN DEPENDENT DIABETIC ON INSULIN PUM--STATES BLOOD GLUCOSE WAS 

113 EARLIER








DENIES ANY OTHER COVID-19 SYMPTOMS OR KNOWN EXPOSURE TO COVID-19


HAS NOT HAD COVID-19 VACCINE.





PT SEEN HERE 21 FOR CHEST PAIN





PCP: [facility] IN [place]


CARDIOLOGIST: DR. INTERIANO, EP CARDIOLOGIST  IN [place], [place] AND DR. KATHIE FULLER IN [place], [place]





Allergies and Home Medications


Allergies


Coded Allergies:  


     promethazine (Verified  Allergy, Intermediate, 3/11/08)


     clindamycin (Verified  Allergy, Unknown, 3/7/21)


     hydrocodone (Verified  Allergy, Unknown, 3/7/21)


     morphine (Verified  Allergy, Unknown, 3/7/21)





Home Medications


Amiodarone HCl 100 Mg Tablet, 100 MG PO DAILY, (Reported)


   LAST FILLED 2020 #30 


Apixaban 5 Mg Tablet, 5 MG PO BID, (Reported)


Benzonatate 200 Mg Capsule, 200 MG PO TID PRN for COUGH, (Reported)


Insulin Aspart 100 Unit/1 Ml Susp, UNITS SC PER PUMP, (Reported)


Insulin Determir 1,000 Units/10 Ml Soln, 11 UNITS SC DAILY PRN for IF INSULIN 

PUMP IS UNAVALIABLE, (Reported)


Metoprolol Succinate 50 Mg Tab.er.24h, 50 MG PO DAILY, (Reported)


   HOLD IF SBP IS LESS THAN 120 


Peg 400/Hypromellose/Glycerin 15 Ml Drops, 2 DROPS OU PRN PRN for DRY EYES, 

(Reported)


Polyethylene Glycol 3350 17 Gm Powd.pack, 17 GM PO DAILY PRN for CONSTIPATION-

2ND LINE, (Reported)


Potassium Chloride 10 Meq Tablet.er, 10 MEQ PO DAILY, (Reported)


Pravastatin Sodium 20 Mg Tablet, 20 MG PO HS, (Reported)


Sacubitril/Valsartan 1 Each Tablet, 1 EA PO BID, (Reported)


Spironolactone 25 Mg Tablet, 12.5 MG PO DAILY, (Reported)


   TAKES  OF A 25MG 


Vit C/E/Zn/Coppr/Lutein/Zeaxan 1 Each Capsule, 1 EACH PO DAILY, (Reported)





Patient Home Medication List


Home Medication List Reviewed:  Yes





Review of Systems


Review of Systems


Constitutional:  see HPI; No diaphoresis; dizziness, malaise


Respiratory:  See HPI, Shortness of Air


Cardiovascular:  See HPI, Chest Pain; Denies Edema; Irregular Heart Rate, 

Lightheadedness, Palpitations; Denies Syncope


Gastrointestinal:  No Symptoms Reported; Denies Abdominal Pain, Denies Nausea, 

Denies Vomiting


Genitourinary:  No Symptoms Reported


Musculoskeletal:  no symptoms reported; No gout


Skin:  no symptoms reported


Psychiatric/Neurological:  Anxiety; Denies Headache, Denies Numbness, Denies 

Paresthesia, Denies Seizure, Denies Tingling, Denies Weakness


Endocrine:  No Symptoms Reported


Hematologic/Lymphatic:  No Symptoms Reported





Past Medical-Social-Family Hx


Past Med/Social Hx:  Reviewed and Corrections made


Patient Social History


Alcohol Use:  Denies Use


Drug of Choice:  DENIES


Smoking Status:  Never a Smoker


2nd Hand Smoke Exposure:  No





Past Medical History


Surgeries:  Yes


Cardiac,  Section, Gallbladder, Hysterectomy, Orthopedic, Pacemaker


Respiratory:  Yes


COPD


Cardiac:  Yes (PACEMAKER, CARDIAC ABLATION, MAZE PROCEDURE, LEFT APPENDAGE CLI

PPED)


Atrial Fibrillation, High Cholesterol, Hypertension, Irregular Heartbeat


Neurological:  Yes (PERIPHERAL NEUROPATHY IN HANDS AND FEET;HX OF CVA 2019-NO 

RESIDUAL)


Neuropathy, Stroke


Reproductive Disorders:  No


Genitourinary:  No


Gastrointestinal:  Yes


Gastroesophageal Reflux


Musculoskeletal:  Yes


Chronic Back Pain


Endocrine:  Yes (INSULIN PUMP)


Diabetes, Insulin dep


HEENT:  Yes (DIABETIC RETINOPATHY)


Cancer:  No


Psychosocial:  No


Integumentary:  No


Blood Disorders:  No





Family Medical History





PAST SURGICAL HISTORY:


-LEFT EYE SURGERY FOR RETINAL BLEED 3 YEARS AGO


- X 2  IN , 


-HYSTERECTOMY 


-CHOLECYSTECTOMY ( OPEN ) 


-LEFT FOOT SURGERY 7 YEARS AGO


-LUMBAR LAMINECTOMY 20 YEARS AGO


-PACEMAKER 4 YEARS AGO


-CARDIAC ABLATION 2019


-MAZE PROCEDURE 2020


-LEFT LATERAL APPENDAGE CLIP 2020


-PT REPORTS CARDIAC CATH DONE IN  IN  SHOWED NO SIGNIFICANT CAD. 





ECHOCARDIOGRAM 21--EF 55-60%


MPI 21--NO INSCHEMIA OR INFARCTIONS





Physical Exam


Vital Signs





Vital Signs - First Documented








 21





 13:46


 


Temp 36.3


 


Pulse 84


 


Resp 16


 


B/P (MAP) 140/68 (92)


 


Pulse Ox 98


 


O2 Delivery Room Air





Capillary Refill :


Height, Weight, BMI


Height: 5'1.00"


Weight: 151lbs. 0.0oz. [id number]; 25.57 BMI


Method:Stated


General Appearance:  No Apparent Distress, WD/WN, Anxious (EXTREMELY ANXIOUS), 

Other (WALKS INTO ER ON HER OWN WITHOUT DIFFICULTY)


Neck:  Normal Inspection, Non Tender, Supple; No Carotid Bruit, No JVD


Respiratory:  Normal Breath Sounds, No Accessory Muscle Use, No Respiratory 

Distress, Other (DOES HAVE TENDERNESS OVER PACEMAKER SITE. NO SIGNS OF 

INFECTION)


Cardiovascular:  No Edema, No JVD, No Murmur, Normal Peripheral Pulses, 

Irregularly Irregular, Other (RATE VARIES FROM 'S)


Gastrointestinal:  Non Tender, Soft, Other (INSULIN PUMP IN PLACE)


Extremity:  Normal Capillary Refill, Normal Inspection, Normal Range of Motion, 

Non Tender, No Calf Tenderness, No Pedal Edema


Neurologic/Psychiatric:  Alert, Oriented x3, No Motor/Sensory Deficits 

(SUBJECTIVE TINGLING IN HANDS AND FEET, BUT DOES HAVE SENSATION), CNs II-XII 

Norm as Tested


Skin:  Normal Color, Warm/Dry; No Rash





Progress/Results/Core Measures


Results/Orders


Lab Results





Laboratory Tests








Test


 21


13:55 Range/Units


 


 


White Blood Count


 7.5 


 4.3-11.0


10^3/uL


 


Red Blood Count


 4.42 


 3.80-5.11


10^6/uL


 


Hemoglobin 13.2  11.5-16.0  g/dL


 


Hematocrit 39  35-52  %


 


Mean Corpuscular Volume 88  80-99  fL


 


Mean Corpuscular Hemoglobin 30  25-34  pg


 


Mean Corpuscular Hemoglobin


Concent 34 


 32-36  g/dL





 


Red Cell Distribution Width 13.2  10.0-14.5  %


 


Platelet Count


 197 


 130-400


10^3/uL


 


Mean Platelet Volume 9.3  9.0-12.2  fL


 


Immature Granulocyte % (Auto) 0   %


 


Neutrophils (%) (Auto) 65  42-75  %


 


Lymphocytes (%) (Auto) 27  12-44  %


 


Monocytes (%) (Auto) 6  0-12  %


 


Eosinophils (%) (Auto) 1  0-10  %


 


Basophils (%) (Auto) 0  0-10  %


 


Neutrophils # (Auto)


 4.9 


 1.8-7.8


10^3/uL


 


Lymphocytes # (Auto)


 2.1 


 1.0-4.0


10^3/uL


 


Monocytes # (Auto)


 0.4 


 0.0-1.0


10^3/uL


 


Eosinophils # (Auto)


 0.1 


 0.0-0.3


10^3/uL


 


Basophils # (Auto)


 0.0 


 0.0-0.1


10^3/uL


 


Immature Granulocyte # (Auto)


 0.0 


 0.0-0.1


10^3/uL


 


Prothrombin Time 13.9  12.2-14.7  SEC


 


INR Comment 1.0  0.8-1.4  


 


Activated Partial


Thromboplast Time 28 


 24-35  SEC





 


Sodium Level 144  135-145  MMOL/L


 


Potassium Level 3.1 L 3.6-5.0  MMOL/L


 


Chloride Level 111 H   MMOL/L


 


Carbon Dioxide Level 21  21-32  MMOL/L


 


Anion Gap 12  5-14  MMOL/L


 


Blood Urea Nitrogen 12  7-18  MG/DL


 


Creatinine


 0.91 


 0.60-1.30


MG/DL


 


Estimat Glomerular Filtration


Rate > 60 


  





 


BUN/Creatinine Ratio 13   


 


Glucose Level 120 H   MG/DL


 


Calcium Level 9.3  8.5-10.1  MG/DL


 


Corrected Calcium 9.1  8.5-10.1  MG/DL


 


Magnesium Level 1.9  1.6-2.4  MG/DL


 


Total Bilirubin 0.7  0.1-1.0  MG/DL


 


Aspartate Amino Transf


(AST/SGOT) 17 


 5-34  U/L





 


Alanine Aminotransferase


(ALT/SGPT) 9 


 0-55  U/L





 


Alkaline Phosphatase 100    U/L


 


Total Creatine Kinase 52    U/L


 


Creatine Kinase MB 1.1  <6.6  NG/ML


 


Myoglobin


 38.9 


 10.0-92.0


NG/ML


 


Troponin I < 0.028  <0.028  NG/ML


 


B-Type Natriuretic Peptide 37.1  <100.0  PG/ML


 


Total Protein 7.4  6.4-8.2  GM/DL


 


Albumin 4.3  3.2-4.5  GM/DL


 


Amylase Level 79    U/L


 


Lipase 20  8-78  U/L


 


TSH Dysart Testing


 0.79 


 0.35-4.94


UIU/ML








My Orders





Orders - DARA PAL DO


Cbc With Automated Diff (21 13:58)


Magnesium (21 13:58)


Chest 1 View, Ap/Pa Only (21 13:58)


Ekg Tracing (21 13:58)


Comprehensive Metabolic Panel (21 13:58)


Myoglobin Serum (21 13:58)


Protime With Inr (21 13:58)


Partial Thromboplastin Time (21 13:58)


O2 (21 13:58)


Monitor-Rhythm Ecg Trace Only (21 13:58)


Ed Iv/Invasive Line Start (21 13:58)


Creatine Kinase (21 13:58)


Creatine Kinase Mb (21 13:58)


Lipase (21 13:58)


Amylase (21 13:58)


BNP (21 13:58)


Troponin I (21 13:58)


Nitroglycerin 0.4 Mg Btl 25's (Nitrostat (21 14:00)


Aspirin Chewable Tablet (Baby Aspirin Ch (21 14:00)


Thyroid Analyzer (21 13:58)


Ekg Tracing (21 15:02)





Medications Given in ED





Current Medications








 Medications  Dose


 Ordered  Sig/Chris


 Route  Start Time


 Stop Time Status Last Admin


Dose Admin


 


 Aspirin  324 mg  ONCE  ONCE


 PO  21 14:00


 21 14:01 DC 21 14:11


324 MG


 


 Nitroglycerin  0.4 mg  UD  PRN


 SL  21 14:00


 21 16:11 DC 21 14:28


0.4 MG








Vital Signs/I&O











 21





 13:46


 


Temp 36.3


 


Pulse 84


 


Resp 16


 


B/P (MAP) 140/68 (92)


 


Pulse Ox 98


 


O2 Delivery Room Air











Progress


Progress Note :  


Progress Note


GIVEN ASPIRIN AND NTG X 2 WITH COMPLETE RELIEF OF PAIN





HEART RATE DOWN TO 90'S AND REMAINED THERE. 





PT MUCH CALMER


Initial ECG Impression Date:  2021


Initial ECG Impression Time:  13:50


Initial ECG Rate:  125


Initial ECG Rhythm:  A Fib/Flutter (WITH SOME ATRIAL PACED COMPLEXES)


Initial ECG Impression:  Nonspecific Changes


EKG :  


   EKG Time:  15:07


   Rate:  86


Comment


ATRIAL PACED COMPLEXES, IVCD, PAC'S





Diagnostic Imaging





Comments


CXR--PER RADIOLOGIST REPORT


IMPRESSION:


1. Chronic findings in the chest with no acute pulmonary


abnormality seen.


   Reviewed:  Reviewed by Me





Departure


Communication (Admissions)


1504--SPOKE WITH DR. AUGUST, HOSPITALIST, ACCEPTS PT FOR ADMIT


1505--SPOKE WITH DR. SNELL, CARDIOLOGIST, NO ADDITIONAL RECOMMENDATIONS AT THIS 

TIME





Impression





   Primary Impression:  


   Chest pain


   Additional Impressions:  


   ATRIAL FIBRILLATION


   Anxiety


   IDDM (insulin dependent diabetes mellitus)


   HTN (hypertension)


   History of permanent cardiac pacemaker placement


Disposition:   ADMITTED AS INPATIENT


Condition:  Improved





Admissions


Decision to Admit Reason:  Admit from ER (General)


Decision to Admit/Date:  2021


Time/Decision to Admit Time:  15:00





Departure-Patient Inst.


Referrals:  


JOSSELYN WILLIS MD (PCP)


Primary Care Physician








SAVANAH BOWMAN (Family)


Primary Care Physician











DARA PAL DO                  2021 14:05

## 2021-04-09 NOTE — DIAGNOSTIC IMAGING REPORT
HISTORY: Chest pain



COMPARISON: 3/7/2021



TECHNIQUE: Frontal view of the chest.



FINDINGS:



Lung volumes are large. There is stable cardiomegaly. There is no

pleural effusion or pneumothorax. The left-sided pacemaker leads

appear normal. Calcification adjacent to the right humeral head

likely represents calcific tendinitis.



IMPRESSION:

1. Chronic findings in the chest with no acute pulmonary

abnormality seen.



Dictated by: 



  Dictated on workstation # [id number]

## 2021-04-10 VITALS — DIASTOLIC BLOOD PRESSURE: 80 MMHG | SYSTOLIC BLOOD PRESSURE: 117 MMHG

## 2021-04-10 LAB
ALBUMIN SERPL-MCNC: 3.9 GM/DL (ref 3.2–4.5)
ALP SERPL-CCNC: 85 U/L (ref 40–136)
ALT SERPL-CCNC: 8 U/L (ref 0–55)
BASOPHILS # BLD AUTO: 0 10^3/UL (ref 0–0.1)
BASOPHILS NFR BLD AUTO: 0 % (ref 0–10)
BILIRUB SERPL-MCNC: 0.8 MG/DL (ref 0.1–1)
BUN/CREAT SERPL: 12
CALCIUM SERPL-MCNC: 8.8 MG/DL (ref 8.5–10.1)
CHLORIDE SERPL-SCNC: 112 MMOL/L (ref 98–107)
CHOLEST SERPL-MCNC: 159 MG/DL (ref ?–200)
CO2 SERPL-SCNC: 21 MMOL/L (ref 21–32)
CREAT SERPL-MCNC: 0.81 MG/DL (ref 0.6–1.3)
EOSINOPHIL # BLD AUTO: 0.1 10^3/UL (ref 0–0.3)
EOSINOPHIL NFR BLD AUTO: 2 % (ref 0–10)
GFR SERPLBLD BASED ON 1.73 SQ M-ARVRAT: > 60 ML/MIN
GLUCOSE SERPL-MCNC: 73 MG/DL (ref 70–105)
HCT VFR BLD CALC: 37 % (ref 35–52)
HDLC SERPL-MCNC: 57 MG/DL (ref 40–60)
HGB BLD-MCNC: 12.2 G/DL (ref 11.5–16)
LYMPHOCYTES # BLD AUTO: 2.3 10^3/UL (ref 1–4)
LYMPHOCYTES NFR BLD AUTO: 39 % (ref 12–44)
MANUAL DIFFERENTIAL PERFORMED BLD QL: NO
MCH RBC QN AUTO: 29 PG (ref 25–34)
MCHC RBC AUTO-ENTMCNC: 33 G/DL (ref 32–36)
MCV RBC AUTO: 90 FL (ref 80–99)
MONOCYTES # BLD AUTO: 0.4 10^3/UL (ref 0–1)
MONOCYTES NFR BLD AUTO: 7 % (ref 0–12)
NEUTROPHILS # BLD AUTO: 3.1 10^3/UL (ref 1.8–7.8)
NEUTROPHILS NFR BLD AUTO: 52 % (ref 42–75)
PLATELET # BLD: 173 10^3/UL (ref 130–400)
PMV BLD AUTO: 9.5 FL (ref 9–12.2)
POTASSIUM SERPL-SCNC: 2.8 MMOL/L (ref 3.6–5)
PROT SERPL-MCNC: 6.7 GM/DL (ref 6.4–8.2)
SODIUM SERPL-SCNC: 145 MMOL/L (ref 135–145)
TRIGL SERPL-MCNC: 94 MG/DL (ref ?–150)
VLDLC SERPL CALC-MCNC: 19 MG/DL (ref 5–40)
WBC # BLD AUTO: 6 10^3/UL (ref 4.3–11)

## 2021-04-10 RX ADMIN — POTASSIUM CHLORIDE SCH MLS/HR: 200 INJECTION, SOLUTION INTRAVENOUS at 08:02

## 2021-04-10 RX ADMIN — INSULIN ASPART SCH UNIT: 100 INJECTION, SOLUTION INTRAVENOUS; SUBCUTANEOUS at 11:50

## 2021-04-10 RX ADMIN — POTASSIUM CHLORIDE SCH MLS/HR: 200 INJECTION, SOLUTION INTRAVENOUS at 11:50

## 2021-04-10 RX ADMIN — Medication SCH ML: at 04:26

## 2021-04-10 RX ADMIN — INSULIN ASPART SCH UNIT: 100 INJECTION, SOLUTION INTRAVENOUS; SUBCUTANEOUS at 06:24

## 2021-04-10 NOTE — CONSULTATION-CARDIOLOGY
HPI-Cardiology


Cardiology Consultation


Date of Consultation


4/10/21


Date of Admission





Time Seen by Provider:  09:57


Indication:  Chest pain





HPI


70-year-old lady with extensive cardiac history including congestive heart 

failure, atrial fibrillation, permanent pacemaker.  Following with Dr. Larson.  Had extensive work-up as described below.  Started to have chest 

pain, reported that she was in atrial fibrillation and came into the emergency 

room, since her hospitalization she has been in sinus rhythm/atrial paced 

rhythm.  Denied any shortness of breath.  No pedal edema.  No syncope or near 

syncopal episode.  Cardiac enzymes were negative, EKG did not show any acute 

changes





Home Medications & Allergies


Allergies:  


Coded Allergies:  


     promethazine (Verified  Allergy, Intermediate, 3/11/08)


     clindamycin (Verified  Allergy, Unknown, 3/7/21)


     hydrocodone (Verified  Allergy, Unknown, 3/7/21)


     morphine (Verified  Allergy, Unknown, 3/7/21)


Home Medication List Reviewed:  Yes





PMH-Social-Family Hx


Patient Social History


Marital Status:  


Employed/Student:  retired


Recreational Drug Use:  No


Drug of Choice:  DENIES


Smoking Status:  Never a Smoker


2nd Hand Smoke Exposure:  No


Recent Hopitalizations:  Yes


Have you traveled recently?:  No


Alcohol Use?:  No





Immunizations Up To Date


Tetanus Booster (TDap):  Unknown


Date of Influenza Vaccine:  Dec 1, 2020





Past Medical History


Discussed below





Review of Systems-General


Review of Systems


Constitutional:  see HPI; No diaphoresis; dizziness, malaise


EENTM:  see HPI, no symptoms reported


Respiratory:  see HPI; No cough, No dyspnea on exertion, No hemoptysis, No 

orthopnea, No phlegm, No short of breath, No stridor, No wheezing, No other


Cardiovascular:  see HPI, chest pain, palpitations


Gastrointestinal:  no symptoms reported, see HPI


Genitourinary:  no symptoms reported, see HPI


Musculoskeletal:  no symptoms reported, see HPI; No gout


Skin:  no symptoms reported, see HPI


Psychiatric/Neurological:  See HPI, Anxiety; Denies Headache, Denies Numbness, 

Denies Paresthesia, Denies Seizure, Denies Tingling, Denies Weakness





Reviewed Test Results


Reviewed Test Results


Lab





Laboratory Tests








Test


 4/9/21


13:55 4/9/21


17:25 4/10/21


02:50 Range/Units


 


 


White Blood Count


 7.5 


 


 6.0 


 4.3-11.0


10^3/uL


 


Red Blood Count


 4.42 


 


 4.16 


 3.80-5.11


10^6/uL


 


Hemoglobin 13.2   12.2  11.5-16.0  g/dL


 


Hematocrit 39   37  35-52  %


 


Mean Corpuscular Volume 88   90  80-99  fL


 


Mean Corpuscular Hemoglobin 30   29  25-34  pg


 


Mean Corpuscular Hemoglobin


Concent 34 


 


 33 


 32-36  g/dL





 


Red Cell Distribution Width 13.2   13.2  10.0-14.5  %


 


Platelet Count


 197 


 


 173 


 130-400


10^3/uL


 


Mean Platelet Volume 9.3   9.5  9.0-12.2  fL


 


Immature Granulocyte % (Auto) 0   0   %


 


Neutrophils (%) (Auto) 65   52  42-75  %


 


Lymphocytes (%) (Auto) 27   39  12-44  %


 


Monocytes (%) (Auto) 6   7  0-12  %


 


Eosinophils (%) (Auto) 1   2  0-10  %


 


Basophils (%) (Auto) 0   0  0-10  %


 


Neutrophils # (Auto)


 4.9 


 


 3.1 


 1.8-7.8


10^3/uL


 


Lymphocytes # (Auto)


 2.1 


 


 2.3 


 1.0-4.0


10^3/uL


 


Monocytes # (Auto)


 0.4 


 


 0.4 


 0.0-1.0


10^3/uL


 


Eosinophils # (Auto)


 0.1 


 


 0.1 


 0.0-0.3


10^3/uL


 


Basophils # (Auto)


 0.0 


 


 0.0 


 0.0-0.1


10^3/uL


 


Immature Granulocyte # (Auto)


 0.0 


 


 0.0 


 0.0-0.1


10^3/uL


 


Prothrombin Time 13.9    12.2-14.7  SEC


 


INR Comment 1.0    0.8-1.4  


 


Activated Partial


Thromboplast Time 28 


 


 


 24-35  SEC





 


Sodium Level 144   145  135-145  MMOL/L


 


Potassium Level 3.1 L  2.8 L 3.6-5.0  MMOL/L


 


Chloride Level 111 H  112 H   MMOL/L


 


Carbon Dioxide Level 21   21  21-32  MMOL/L


 


Anion Gap 12   12  5-14  MMOL/L


 


Blood Urea Nitrogen 12   10  7-18  MG/DL


 


Creatinine


 0.91 


 


 0.81 


 0.60-1.30


MG/DL


 


Estimat Glomerular Filtration


Rate > 60 


 


 > 60 


  





 


BUN/Creatinine Ratio 13   12   


 


Glucose Level 120 H  73    MG/DL


 


Calcium Level 9.3   8.8  8.5-10.1  MG/DL


 


Corrected Calcium 9.1   8.9  8.5-10.1  MG/DL


 


Magnesium Level 1.9    1.6-2.4  MG/DL


 


Total Bilirubin 0.7   0.8  0.1-1.0  MG/DL


 


Aspartate Amino Transf


(AST/SGOT) 17 


 


 17 


 5-34  U/L





 


Alanine Aminotransferase


(ALT/SGPT) 9 


 


 8 


 0-55  U/L





 


Alkaline Phosphatase 100   85    U/L


 


Total Creatine Kinase 52      U/L


 


Creatine Kinase MB 1.1    <6.6  NG/ML


 


Myoglobin


 38.9 


 


 


 10.0-92.0


NG/ML


 


Troponin I < 0.028  < 0.028   <0.028  NG/ML


 


B-Type Natriuretic Peptide 37.1    <100.0  PG/ML


 


Total Protein 7.4   6.7  6.4-8.2  GM/DL


 


Albumin 4.3   3.9  3.2-4.5  GM/DL


 


Amylase Level 79      U/L


 


Lipase 20    8-78  U/L


 


TSH Runnels Testing


 0.79 


 


 


 0.35-4.94


UIU/ML


 


Triglycerides Level   94  <150  MG/DL


 


Cholesterol Level   159  < 200  MG/DL


 


LDL Cholesterol Direct   90  1-129  MG/DL


 


VLDL Cholesterol   19  5-40  MG/DL


 


HDL Cholesterol   57  40-60  MG/DL











Physical Exam


Physical Exam


Vital Signs





Vital Signs - First Documented








 4/9/21





 13:46


 


Temp 36.3


 


Pulse 84


 


Resp 16


 


B/P (MAP) 140/68 (92)


 


Pulse Ox 98


 


O2 Delivery Room Air





Capillary Refill : Less Than 3 Seconds


Height, Weight, BMI


Height: 5'1.00"


Weight: 151lbs. 0.0oz. 68.471887js; 26.56 BMI


Method:Stated


General Appearance:  No Apparent Distress, WD/WN, Anxious (EXTREMELY ANXIOUS), 

Other (WALKS INTO ER ON HER OWN WITHOUT DIFFICULTY)


Eyes:  Bilateral Eye Normal Inspection, Bilateral Eye PERRL, Bilateral Eye EOMI


HEENT:  PERRL/EOMI, TMs Normal, Normal ENT Inspection, Pharynx Normal, Moist 

Mucous Membranes


Neck:  Normal Inspection, Non Tender, Supple; No Carotid Bruit, No JVD


Respiratory:  Normal Breath Sounds, No Accessory Muscle Use, No Respiratory 

Distress, Other (DOES HAVE TENDERNESS OVER PACEMAKER SITE. NO SIGNS OF 

INFECTION)


Cardiovascular:  Regular Rate, Rhythm, No Edema, No JVD, No Murmur, Normal 

Peripheral Pulses, Other (RATE VARIES FROM 'S)


Gastrointestinal:  Non Tender, Soft, Other (INSULIN PUMP IN PLACE)


Back:  Normal Inspection, No CVA Tenderness, No Vertebral Tenderness


Extremity:  Normal Capillary Refill, Normal Inspection, Normal Range of Motion, 

Non Tender, No Calf Tenderness, No Pedal Edema


Neurologic/Psychiatric:  Alert, Oriented x3, No Motor/Sensory Deficits 

(SUBJECTIVE TINGLING IN HANDS AND FEET, BUT DOES HAVE SENSATION), CNs II-XII 

Norm as Tested


Skin:  Normal Color, Warm/Dry; No Rash


Lymphatic:  No Adenopathy





A/P-Cardiology


Admission Diagnosis


Chest pain


Palpitation


Paroxysmal atrial fibrillation


Congestive heart failure, chronic compensated left ventricular systolic 

dysfunction, nonischemic cardiomyopathy





Assessment/Plan


Paroxysmal atrial fibrillation, maintained on oral anticoagulation, patient was 

on amiodarone until about a month ago where it was stopped by Dr. Larson, 

patient was not sure if she needed to start on sotalol.  At this point she is 

back into atrial paced rhythm, I will restart her on amiodarone and advised her 

to contact her electrophysiologist for any changes in the future.  At this point

it is safe to resume amiodarone at 200 mg twice daily and continue on Eliquis 

and monitor tolerance and response





History of Maze procedure, left atrial appendage closure done in the past.  

Maintained on oral anticoagulation





Congestive heart failure, nonischemic cardiomyopathy, chronic left ventricular 

systolic dysfunction, no documentation of her ejection fraction, maintained on 

Entresto and Coreg.  Continue on current medications and monitor





Hypokalemia, will replace it and instructed her on increasing potassium supp

lement to 20 mEq daily.





History of cardiac catheterization done multiple times in the past at least 

twice in the past 6 months and did not have any obstructive coronary artery 

disease





Chest pain nonspecific etiology probably secondary to atrial fibrillation with 

rapid response.





History of permanent pacemaker, [employer], did not interrogate it on this 

evaluation





Diabetes mellitus, followed and managed by primary care physician











KEON SNELL MD              Apr 10, 2021 10:02

## 2021-04-10 NOTE — DISCHARGE INST-SIMPLE/STANDARD
Discharge Inst-Standard


Discharge Medications


New, Converted or Re-Newed RX:  Transmitted to Pharmacy





Patient Instructions/Follow Up


Plan of Care/Instructions/FU:  


Please continue to take your medications as written. Please follow up with


your primary care doctor next week and with your cardiologist/EP in the


next 1-2 weeks to follow up this hospital stay.


Activity as Tolerated:  Yes


Discharge Diet:  Cardiac Diet


Return to The Hospital For:  


Chest pain, dizziness, cough, fever, shortness of breath, palpitations, if


you feel you are getting worse.











JEFFRY AUGUST MD              Apr 10, 2021 08:38

## 2021-04-10 NOTE — SHORT STAY SUMMARY-HOSPITALIST
History of Present Illness


HPI/Chief Complaint


Pt is a 70yoCF with a PMH of atrial fibrillation, CHF, IDDM, HTn, who presented 

to the ER due to chest pain and dizziness. She was here roughly 1 month ago with

similar symptoms and had a negative stress test. She follow up with Dr Villagran on 3/16 who took her off her amiodarone and scheduled her for a cath.

Dr Burr did a cath on 3/18 and it was reported as clean. Over the past week 

or so she has had dizziness and palpitations and she thought she was in a-fib.  

Dr Burr thought she was supposed to be on sotalol but this was never arranged

by her EP. She saw her PCP yesterday and an EKG was done and revealed her to be 

in a-fib with a rate of 144. She was refer to the ER. Her rate was controlled by

the time she arrived here though but she was admitted for observation. She has 

done well overnight and is in sinus rhythm now. She became slightly hypoglycemia

last night and got a half amp of D50 and her BS has improved.


Source:  patient


Date Seen


4/10/21


Time Seen by a Provider:  08:55


Attending Physician


Jeffry Boateng MD


PCP


Fabian Hendricks MD


Referring Physician





Date of Admission


2021 at 15:27





Home Medications & Allergies


Home Medications


Reviewed patient Home Medication Reconciliation performed by pharmacy medication

reconciliations technician and/or nursing.


Patients Allergies have been reviewed.





Allergies





Allergies


Coded Allergies


  promethazine (Verified Allergy, Intermediate, 3/11/08)


  clindamycin (Verified Allergy, Unknown, 3/7/21)


  hydrocodone (Verified Allergy, Unknown, 3/7/21)


  morphine (Verified Allergy, Unknown, 3/7/21)








Past Medical-Social-Family Hx


Past Med/Social Hx:  Reviewed and Corrections made


Patient Social History


Alcohol Use:  Denies Use


Recreational Drug Use:  No


Drug of Choice:  DENIES


Smoking Status:  Never a Smoker


2nd Hand Smoke Exposure:  No


Recent Foreign Travel:  No


Contact w/other who traveled:  No


Recent Hopitalizations:  Yes


Recent Infectious Disease Expo:  No





Immunizations Up To Date


Tetanus Booster (TDap):  Unknown


Date of Influenza Vaccine:  Dec 1, 2020





Past Medical History


Surgeries:  Cardiac,  Section, Gallbladder, Hysterectomy, Orthopedic, 

Pacemaker


Cardiac:  Atrial Fibrillation, High Cholesterol, Hypertension, Irregular 

Heartbeat


Neurological:  Neuropathy, Stroke


Reproductive:  No


Gastrointestinal:  Gastroesophageal Reflux


Musculoskeletal:  Chronic Back Pain


Endocrine:  Diabetes, Insulin dep


History of Blood Disorders:  No





Family History


Reviewed Nursing Family Hx





PAST SURGICAL HISTORY:


-LEFT EYE SURGERY FOR RETINAL BLEED 3 YEARS AGO


- X 2  IN , 


-HYSTERECTOMY 


-CHOLECYSTECTOMY ( OPEN ) 


-LEFT FOOT SURGERY 7 YEARS AGO


-LUMBAR LAMINECTOMY 20 YEARS AGO


-PACEMAKER 4 YEARS AGO


-CARDIAC ABLATION 2019


-MAZE PROCEDURE 2020


-LEFT LATERAL APPENDAGE CLIP 2020


-PT REPORTS CARDIAC CATH DONE IN  IN  SHOWED NO SIGNIFICANT CAD. 





ECHOCARDIOGRAM 21--EF 55-60%


MPI 21--NO INSCHEMIA OR INFARCTIONS





Review of Systems


Constitutional:  No chills, No diaphoresis, No fever, No malaise


EENTM:  no symptoms reported


Respiratory:  No cough, No short of breath


Cardiovascular:  chest pain; No edema; Hx of Intervention, palpitations


Gastrointestinal:  no symptoms reported


Genitourinary:  no symptoms reported


Musculoskeletal:  no symptoms reported


Skin:  no symptoms reported


Psychiatric/Neurological:  No Symptoms Reported





Physical Exam


Physical Exam


Vital Signs





Vital Signs - First Documented








 21





 13:46


 


Temp 36.3


 


Pulse 84


 


Resp 16


 


B/P (MAP) 140/68 (92)


 


Pulse Ox 98


 


O2 Delivery Room Air





Capillary Refill : Less Than 3 Seconds


Height, Weight, BMI


Height: 5'1.00"


Weight: 151lbs. 0.0oz. [id number]; 26.56 BMI


Method:Stated


General Appearance:  No Apparent Distress, WD/WN, Anxious (EXTREMELY ANXIOUS), 

Other (WALKS INTO ER ON HER OWN WITHOUT DIFFICULTY)


Neck:  Normal Inspection, Non Tender, Supple; No Carotid Bruit, No JVD


Respiratory:  Normal Breath Sounds, No Accessory Muscle Use, No Respiratory 

Distress, Other (DOES HAVE TENDERNESS OVER PACEMAKER SITE. NO SIGNS OF 

INFECTION)


Cardiovascular:  No Edema, No JVD, No Murmur, Normal Peripheral Pulses, 

Irregularly Irregular, Other (RATE VARIES FROM 'S)


Gastrointestinal:  Non Tender, Soft, Other (INSULIN PUMP IN PLACE)


Extremity:  Normal Capillary Refill, Normal Inspection, Normal Range of Motion, 

Non Tender, No Calf Tenderness, No Pedal Edema


Neurologic/Psychiatric:  Alert, Oriented x3, No Motor/Sensory Deficits (SUB

JECTIVE TINGLING IN HANDS AND FEET, BUT DOES HAVE SENSATION), CNs II-XII Norm as

Tested


Skin:  Normal Color, Warm/Dry; No Rash





Results


Results/Procedures


Labs


Laboratory Tests


21 13:55








4/10/21 02:50








Patient resulted labs reviewed.


Imaging:  Reviewed Imaging Report


Imaging


                 [facility] [place], [place]





NAME:   RICK POWERS


[facility] REC#:   [id number]


ACCOUNT#:   [id number]


PT STATUS:   REG ER


:   1950


PHYSICIAN:   DARA PAL DO


ADMIT DATE:   21/ER


                                  ***Signed***


Date of Exam:21





CHEST 1 VIEW, AP/PA ONLY








HISTORY: Chest pain





COMPARISON: 3/7/2021





TECHNIQUE: Frontal view of the chest.





FINDINGS:





Lung volumes are large. There is stable cardiomegaly. There is no


pleural effusion or pneumothorax. The left-sided pacemaker leads


appear normal. Calcification adjacent to the right humeral head


likely represents calcific tendinitis.





IMPRESSION:


1. Chronic findings in the chest with no acute pulmonary


abnormality seen.





Dictated by: 





  Dictated on workstation # [id number]








Dict:   21 [id number]


Trans:   21 1514


CVB [id number]





Interpreted by:     SEAMUS RAE MD


Electronically signed by: SEAMUS RAE MD 21 1514





Short Stay Diagnosis


Discharge Diagnosis-Short Stay


Admission Diagnosis


Atrial Fibrillation


Final Discharge Diagnosis


Atrial Fibrillation





Conclusion


Plan


Atrial Fibrillation


Hypokalemia


HTN


CHF


   s/p multiple procedures and pacemaker; follows with EP at [facility]


   Cardiology consulted, appreciate recs


   Resume amiodarone  per Dr Mcmahan until she can follow up with Dr Villagran


   Replace potassium


   Has an appointment with Dr Burr next week


   Troponin negative x2


   Will DC home with outpatient follow up on amiodarone 





IDDM


   Was hypoglycemic last night


   Start regular diet


   Insulin pump on patient with CGM





DVT ppx: On eliquJEFFRY Rollins MD              Apr 10, 2021 09:05

## 2021-05-24 ENCOUNTER — HOSPITAL ENCOUNTER (OUTPATIENT)
Dept: HOSPITAL 75 - ER | Age: 71
Setting detail: OBSERVATION
LOS: 1 days | Discharge: HOME | End: 2021-05-25
Attending: INTERNAL MEDICINE | Admitting: INTERNAL MEDICINE
Payer: COMMERCIAL

## 2021-05-24 VITALS — SYSTOLIC BLOOD PRESSURE: 138 MMHG | DIASTOLIC BLOOD PRESSURE: 63 MMHG

## 2021-05-24 VITALS — BODY MASS INDEX: 26.06 KG/M2 | WEIGHT: 138.01 LBS | HEIGHT: 60.98 IN

## 2021-05-24 DIAGNOSIS — Z79.4: ICD-10-CM

## 2021-05-24 DIAGNOSIS — Z95.0: ICD-10-CM

## 2021-05-24 DIAGNOSIS — N30.00: Primary | ICD-10-CM

## 2021-05-24 DIAGNOSIS — K21.9: ICD-10-CM

## 2021-05-24 DIAGNOSIS — R41.82: ICD-10-CM

## 2021-05-24 DIAGNOSIS — Z79.899: ICD-10-CM

## 2021-05-24 DIAGNOSIS — I10: ICD-10-CM

## 2021-05-24 DIAGNOSIS — N17.9: ICD-10-CM

## 2021-05-24 DIAGNOSIS — I48.91: ICD-10-CM

## 2021-05-24 DIAGNOSIS — G89.29: ICD-10-CM

## 2021-05-24 DIAGNOSIS — Z90.710: ICD-10-CM

## 2021-05-24 DIAGNOSIS — E11.40: ICD-10-CM

## 2021-05-24 DIAGNOSIS — Z86.73: ICD-10-CM

## 2021-05-24 DIAGNOSIS — E78.00: ICD-10-CM

## 2021-05-24 DIAGNOSIS — M54.9: ICD-10-CM

## 2021-05-24 LAB
ALBUMIN SERPL-MCNC: 4.2 GM/DL (ref 3.2–4.5)
ALP SERPL-CCNC: 95 U/L (ref 40–136)
ALT SERPL-CCNC: 11 U/L (ref 0–55)
APAP SERPL-MCNC: 13 UG/ML (ref 10–30)
APTT BLD: 29 SEC (ref 24–35)
APTT PPP: YELLOW S
BACTERIA #/AREA URNS HPF: (no result) /HPF
BARBITURATES UR QL: NEGATIVE
BASOPHILS # BLD AUTO: 0 10^3/UL (ref 0–0.1)
BASOPHILS NFR BLD AUTO: 1 % (ref 0–10)
BENZODIAZ UR QL SCN: NEGATIVE
BILIRUB SERPL-MCNC: 0.7 MG/DL (ref 0.1–1)
BILIRUB UR QL STRIP: NEGATIVE
BUN/CREAT SERPL: 15
CALCIUM SERPL-MCNC: 9.7 MG/DL (ref 8.5–10.1)
CHLORIDE SERPL-SCNC: 112 MMOL/L (ref 98–107)
CO2 SERPL-SCNC: 23 MMOL/L (ref 21–32)
COCAINE UR QL: NEGATIVE
CREAT SERPL-MCNC: 1.32 MG/DL (ref 0.6–1.3)
EOSINOPHIL # BLD AUTO: 0.1 10^3/UL (ref 0–0.3)
EOSINOPHIL NFR BLD AUTO: 2 % (ref 0–10)
FIBRINOGEN PPP-MCNC: (no result) MG/DL
GFR SERPLBLD BASED ON 1.73 SQ M-ARVRAT: 40 ML/MIN
GLUCOSE SERPL-MCNC: 112 MG/DL (ref 70–105)
GLUCOSE UR STRIP-MCNC: NEGATIVE MG/DL
HCT VFR BLD CALC: 38 % (ref 35–52)
HGB BLD-MCNC: 12.7 G/DL (ref 11.5–16)
INR PPP: 1.4 (ref 0.8–1.4)
KETONES UR QL STRIP: NEGATIVE
LEUKOCYTE ESTERASE UR QL STRIP: (no result)
LYMPHOCYTES # BLD AUTO: 2.2 10^3/UL (ref 1–4)
LYMPHOCYTES NFR BLD AUTO: 33 % (ref 12–44)
MANUAL DIFFERENTIAL PERFORMED BLD QL: NO
MCH RBC QN AUTO: 30 PG (ref 25–34)
MCHC RBC AUTO-ENTMCNC: 34 G/DL (ref 32–36)
MCV RBC AUTO: 88 FL (ref 80–99)
METHADONE UR QL SCN: NEGATIVE
METHAMPHETAMINE SCREEN URINE S: NEGATIVE
MONOCYTES # BLD AUTO: 0.4 10^3/UL (ref 0–1)
MONOCYTES NFR BLD AUTO: 7 % (ref 0–12)
NEUTROPHILS # BLD AUTO: 3.8 10^3/UL (ref 1.8–7.8)
NEUTROPHILS NFR BLD AUTO: 58 % (ref 42–75)
NITRITE UR QL STRIP: NEGATIVE
OPIATES UR QL SCN: NEGATIVE
OXYCODONE UR QL: NEGATIVE
PH UR STRIP: 7.5 [PH] (ref 5–9)
PLATELET # BLD: 162 10^3/UL (ref 130–400)
PMV BLD AUTO: 10.8 FL (ref 9–12.2)
POTASSIUM SERPL-SCNC: 3.9 MMOL/L (ref 3.6–5)
PROPOXYPH UR QL: NEGATIVE
PROT SERPL-MCNC: 7.1 GM/DL (ref 6.4–8.2)
PROT UR QL STRIP: (no result)
PROTHROMBIN TIME: 17.1 SEC (ref 12.2–14.7)
RBC #/AREA URNS HPF: (no result) /HPF
SALICYLATES SERPL-MCNC: < 5 MG/DL (ref 5–20)
SODIUM SERPL-SCNC: 146 MMOL/L (ref 135–145)
SP GR UR STRIP: 1.01 (ref 1.02–1.02)
SQUAMOUS #/AREA URNS HPF: (no result) /HPF
TRICYCLICS UR QL SCN: NEGATIVE
WBC # BLD AUTO: 6.6 10^3/UL (ref 4.3–11)
WBC #/AREA URNS HPF: (no result) /HPF

## 2021-05-24 PROCEDURE — 93005 ELECTROCARDIOGRAM TRACING: CPT

## 2021-05-24 PROCEDURE — 87077 CULTURE AEROBIC IDENTIFY: CPT

## 2021-05-24 PROCEDURE — 36415 COLL VENOUS BLD VENIPUNCTURE: CPT

## 2021-05-24 PROCEDURE — 85025 COMPLETE CBC W/AUTO DIFF WBC: CPT

## 2021-05-24 PROCEDURE — 87186 SC STD MICRODIL/AGAR DIL: CPT

## 2021-05-24 PROCEDURE — 80320 DRUG SCREEN QUANTALCOHOLS: CPT

## 2021-05-24 PROCEDURE — 80053 COMPREHEN METABOLIC PANEL: CPT

## 2021-05-24 PROCEDURE — 81000 URINALYSIS NONAUTO W/SCOPE: CPT

## 2021-05-24 PROCEDURE — 87088 URINE BACTERIA CULTURE: CPT

## 2021-05-24 PROCEDURE — 85730 THROMBOPLASTIN TIME PARTIAL: CPT

## 2021-05-24 PROCEDURE — 99283 EMERGENCY DEPT VISIT LOW MDM: CPT

## 2021-05-24 PROCEDURE — 80306 DRUG TEST PRSMV INSTRMNT: CPT

## 2021-05-24 PROCEDURE — 80048 BASIC METABOLIC PNL TOTAL CA: CPT

## 2021-05-24 PROCEDURE — 82947 ASSAY GLUCOSE BLOOD QUANT: CPT

## 2021-05-24 PROCEDURE — 84484 ASSAY OF TROPONIN QUANT: CPT

## 2021-05-24 PROCEDURE — 70450 CT HEAD/BRAIN W/O DYE: CPT

## 2021-05-24 PROCEDURE — 80329 ANALGESICS NON-OPIOID 1 OR 2: CPT

## 2021-05-24 PROCEDURE — 71045 X-RAY EXAM CHEST 1 VIEW: CPT

## 2021-05-24 PROCEDURE — 85610 PROTHROMBIN TIME: CPT

## 2021-05-24 RX ADMIN — INSULIN ASPART SCH UNIT: 100 INJECTION, SOLUTION INTRAVENOUS; SUBCUTANEOUS at 21:29

## 2021-05-24 RX ADMIN — SODIUM CHLORIDE SCH MLS/HR: 900 INJECTION, SOLUTION INTRAVENOUS at 21:29

## 2021-05-24 NOTE — DIAGNOSTIC IMAGING REPORT
EXAMINATION: Chest 1 view.



HISTORY: Altered mental status.



COMPARISON: 04/09/2021.



FINDINGS: 



The lung volumes are normal. No focal consolidation is seen. No

large pleural effusion or pneumothorax is seen. The

cardiomediastinal silhouette is normal in size and contour. A

left pectoral pacemaker is in place. No acute osseous abnormality

is seen.



IMPRESSION: 



1. No acute pleuroparenchymal process.



Dictated by: 



  Dictated on workstation # [id number]

## 2021-05-24 NOTE — ED NEUROLOGICAL PROBLEM
General


Chief Complaint:  Altered Mental Status


Stated Complaint:  CONFUSION


Source:  patient, EMS


Exam Limitations:  no limitations





History of Present Illness


Date Seen by Provider:  May 24, 2021


Time Seen by Provider:  16:30


Initial Comments


Patient is a 70-year-old female who presents to the emergency department today 

with a chief complaint of altered mental status.  Patient states that she was at

home and had a sudden onset just prior to arrival of feeling confused not being 

able to remember her phone number or her son's phone number or her birthdate.  

Patient tells me that she has had a previous stroke about 2 years ago with left 

her with a little bit of left-sided weakness.  Patient states for the last 2 

years she has had intermittent infrequent episodes of memory loss/confusion.  

Patient is an insulin-dependent diabetic with an insulin pump.  Her blood sugar 

is normal today.  She denies any recent illnesses such as fevers, chills, cough,

congestion.  No recent nausea, vomiting, diarrhea or urinary complaints.  

Patient states that she suffers with chronic headaches.  She states she saw a 

neurologist, Dr. Dubois with [facility] a couple of days ago with 

a complaint of some balance issues in recent weeks.


She continues to work as a nurse.  She has had no recent trauma.





All other review of systems reviewed and negative except as stated above.


Timing/Duration:  1 hour


Severity:  moderate


Associated Symptoms:  confusion





Allergies and Home Medications


Allergies


Coded Allergies:  


     promethazine (Verified  Allergy, Intermediate, 3/11/08)


     clindamycin (Verified  Allergy, Unknown, 3/7/21)


     hydrocodone (Verified  Allergy, Unknown, 3/7/21)


     morphine (Verified  Allergy, Unknown, 3/7/21)





Home Medications


Amiodarone HCl 200 Mg Tablet, 200 MG PO BID


   Prescribed by: JEFFRY AUGUST on 4/10/21 0836


Amlodipine Besylate 5 Mg Tablet, 5 MG PO DAILY, (Reported)


Apixaban 5 Mg Tablet, 5 MG PO BID, (Reported)


Benzonatate 200 Mg Capsule, 200 MG PO TID PRN for COUGH, (Reported)


Insulin Aspart 100 Unit/1 Ml Susp, UNITS SC PER PUMP, (Reported)


Insulin Determir 1,000 Units/10 Ml Soln, 11 UNITS SC DAILY PRN for IF INSULIN 

PUMP IS UNAVALIABLE, (Reported)


Metoprolol Succinate 50 Mg Tab.er.24h, 50 MG PO DAILY, (Reported)


   HOLD IF SBP IS LESS THAN 120 


Peg 400/Hypromellose/Glycerin 15 Ml Drops, 2 DROPS OU PRN PRN for DRY EYES, 

(Reported)


Polyethylene Glycol 3350 17 Gm Powd.pack, 17 GM PO DAILY PRN for CONSTIPATION-

2ND LINE, (Reported)


Potassium Chloride 10 Meq Tablet.er, 10 MEQ PO DAILY, (Reported)


Pravastatin Sodium 20 Mg Tablet, 20 MG PO HS, (Reported)


Sacubitril/Valsartan 1 Each Tablet, 1 EA PO BID, (Reported)


Spironolactone 25 Mg Tablet, 12.5 MG PO DAILY, (Reported)


   TAKES  OF A 25MG 


Topiramate 25 Mg Tablet, 25 MG PO BID, (Reported)


Vit C/E/Zn/Coppr/Lutein/Zeaxan 1 Each Capsule, 1 EACH PO DAILY, (Reported)





Patient Home Medication List


Home Medication List Reviewed:  Yes





Review of Systems


Review of Systems


Constitutional:  see HPI


Eyes:  No Symptoms Reported


Ears, Nose, Mouth, Throat:  no symptoms reported


Respiratory:  no symptoms reported


Cardiovascular:  no symptoms reported


Gastrointestinal:  no symptoms reported


Genitourinary:  no symptoms reported


Musculoskeletal:  no symptoms reported


Skin:  no symptoms reported


Psychiatric/Neurological:  Anxiety, Headache, Other (Trouble with memory)





All Other Systems Reviewed


Negative Unless Noted:  Yes





Past Medical-Social-Family Hx


Patient Social History


Alcohol Use:  Denies Use


Drug of Choice:  DENIES


2nd Hand Smoke Exposure:  No


Recent Hopitalizations:  Yes





Immunizations Up To Date


Tetanus Booster (TDap):  Unknown


Date of Influenza Vaccine:  Dec 1, 2020





Past Medical History


Surgeries:  Yes


Cardiac,  Section, Gallbladder, Hysterectomy, Orthopedic, Pacemaker


Respiratory:  Yes


COPD


Cardiac:  Yes (PACEMAKER, CARDIAC ABLATION, MAZE PROCEDURE, LEFT APPENDAGE 

CLIPPED)


Atrial Fibrillation, High Cholesterol, Hypertension, Irregular Heartbeat


Neurological:  Yes (PERIPHERAL NEUROPATHY IN HANDS AND FEET;HX OF CVA -NO 

RESIDUAL)


Neuropathy, Stroke


Reproductive Disorders:  No


Genitourinary:  No


Gastrointestinal:  Yes


Gastroesophageal Reflux


Musculoskeletal:  Yes


Chronic Back Pain


Endocrine:  Yes (INSULIN PUMP)


Diabetes, Insulin dep


HEENT:  Yes (DIABETIC RETINOPATHY)


Cancer:  No


Psychosocial:  No


Integumentary:  No


Blood Disorders:  No





Family Medical History





PAST SURGICAL HISTORY:


-LEFT EYE SURGERY FOR RETINAL BLEED 3 YEARS AGO


- X 2  IN , 


-HYSTERECTOMY 


-CHOLECYSTECTOMY ( OPEN ) 


-LEFT FOOT SURGERY 7 YEARS AGO


-LUMBAR LAMINECTOMY 20 YEARS AGO


-PACEMAKER 4 YEARS AGO


-CARDIAC ABLATION 2019


-MAZE PROCEDURE 2020


-LEFT LATERAL APPENDAGE CLIP 2020


-PT REPORTS CARDIAC CATH DONE IN  IN  SHOWED NO SIGNIFICANT CAD. 





ECHOCARDIOGRAM 21--EF 55-60%


MPI 21--NO INSCHEMIA OR INFARCTIONS





Physical Exam


Vital Signs





Vital Signs - First Documented








 21





 16:12


 


Temp 36.0


 


Pulse 68


 


Resp 14


 


B/P (MAP) 141/74 (96)


 


Pulse Ox 99


 


O2 Delivery Room Air





Capillary Refill :


Height, Weight, BMI


Height: 5'1.00"


Weight: 151lbs. 0.0oz. 68.048918ae; 26.56 BMI


Method:Stated


General Appearance:  WD/WN, no apparent distress


HEENT:  PERRL/EOMI, normal ENT inspection


Respiratory:  lungs clear, normal breath sounds, no respiratory distress, no 

accessory muscle use


Cardiovascular:  regular rate, rhythm


Gastrointestinal:  non tender, soft


Extremities:  normal range of motion, non-tender, normal inspection


Neurologic/Psychiatric:  CNs II-XII nml as tested, no motor/sensory deficits, 

alert, normal mood/affect, oriented x 3; No abnormal cerebellar tests; motor 

weakness (Left upper extremity, left lower extremity), sensory deficit (Left 

lower extremity), other (Patient has very slow and deliberate finger-to-nose.  

She has a little bit of left-sided drift to the upper and lower extremity 

secondary to previous weakness from previous stroke.  She initially presented 

confused and unable to remember her birthdate however this has cleared since she

has been in the department.  She has mild sensory deficit to the left leg which 

she states is chronic since her stroke 2 years ago she has a little bit of 

chronic weakness in the left upper extremity since her stroke 2 years ago)


Crainal Nerves:  normal hearing, normal speech, PERRL


Coordination/Gait:  normal finger to nose (Slow and deliberate), negative 

Romberg's sign


Skin:  normal color, warm/dry





Stroke


NIH Stroke Scale Assessment





   Select: Initial Level of Consciousness: 0=Alert (0), Level of Consciousness-

   Questions: 0=Answers both month/age (0), LOC Commands: 0=Performs both tasks 

   (0), Gaze: Normal (0), Visual Fields: 0=No visual loss (0), Facial Movement 

   (Facial Paresis): 0=Normal symmetrical mnt (0), Motor Function-Arms Right: 

   0=No drift (0), Motor Function-Arms Left: 0=No drift (0), Motor Function-Legs

   Right: 0=No drift (0), Motor Function-Legs Left: 1=Drift (1), Limb Ataxia: 

   0=Absent (0), Sensory: 1=Mild to Moderate loss (1), Best Language: 1=Mild to 

   moderat aphasia (1), Dysarthria: 0=Normal (0), Extinction & Inattention: 0=No

   abnormality (0), Total: 3





Progress/Results/Core Measures


Results/Orders


Lab Results





Laboratory Tests








Test


 21


16:18 21


16:32 21


16:50 21


17:10 Range/Units


 


 


White Blood Count


 6.6 


 


 


 


 4.3-11.0


10^3/uL


 


Red Blood Count


 4.25 


 


 


 


 3.80-5.11


10^6/uL


 


Hemoglobin 12.7     11.5-16.0  g/dL


 


Hematocrit 38     35-52  %


 


Mean Corpuscular Volume 88     80-99  fL


 


Mean Corpuscular Hemoglobin 30     25-34  pg


 


Mean Corpuscular Hemoglobin


Concent 34 


 


 


 


 32-36  g/dL





 


Red Cell Distribution Width 13.5     10.0-14.5  %


 


Platelet Count


 162 


 


 


 


 130-400


10^3/uL


 


Mean Platelet Volume 10.8     9.0-12.2  fL


 


Immature Granulocyte % (Auto) 0      %


 


Neutrophils (%) (Auto) 58     42-75  %


 


Lymphocytes (%) (Auto) 33     12-44  %


 


Monocytes (%) (Auto) 7     0-12  %


 


Eosinophils (%) (Auto) 2     0-10  %


 


Basophils (%) (Auto) 1     0-10  %


 


Neutrophils # (Auto)


 3.8 


 


 


 


 1.8-7.8


10^3/uL


 


Lymphocytes # (Auto)


 2.2 


 


 


 


 1.0-4.0


10^3/uL


 


Monocytes # (Auto)


 0.4 


 


 


 


 0.0-1.0


10^3/uL


 


Eosinophils # (Auto)


 0.1 


 


 


 


 0.0-0.3


10^3/uL


 


Basophils # (Auto)


 0.0 


 


 


 


 0.0-0.1


10^3/uL


 


Immature Granulocyte # (Auto)


 0.0 


 


 


 


 0.0-0.1


10^3/uL


 


Glucometer  128 H     MG/DL


 


Urine Color   YELLOW    


 


Urine Clarity   SL CLOUDY    


 


Urine pH   7.5   5-9  


 


Urine Specific Gravity   1.015 L  1.016-1.022  


 


Urine Protein   TRACE H  NEGATIVE  


 


Urine Glucose (UA)   NEGATIVE   NEGATIVE  


 


Urine Ketones   NEGATIVE   NEGATIVE  


 


Urine Nitrite   NEGATIVE   NEGATIVE  


 


Urine Bilirubin   NEGATIVE   NEGATIVE  


 


Urine Urobilinogen   0.2   < = 1.0  MG/DL


 


Urine Leukocyte Esterase   1+ H  NEGATIVE  


 


Urine RBC (Auto)   NEGATIVE   NEGATIVE  


 


Urine RBC   NONE    /HPF


 


Urine WBC   5-10 H   /HPF


 


Urine Squamous Epithelial


Cells 


 


 2-5 


 


  /HPF





 


Urine Crystals   NONE    /LPF


 


Urine Bacteria   TRACE    /HPF


 


Urine Casts   PRESENT    /LPF


 


Urine Hyaline Casts   10-25 H   /LPF


 


Urine Mucus   NEGATIVE    /LPF


 


Urine Culture Indicated   YES    


 


Urine Opiates Screen   NEGATIVE   NEGATIVE  


 


Urine Oxycodone Screen   NEGATIVE   NEGATIVE  


 


Urine Methadone Screen   NEGATIVE   NEGATIVE  


 


Urine Propoxyphene Screen   NEGATIVE   NEGATIVE  


 


Urine Barbiturates Screen   NEGATIVE   NEGATIVE  


 


Ur Tricyclic Antidepressants


Screen 


 


 NEGATIVE 


 


 NEGATIVE  





 


Urine Phencyclidine Screen   NEGATIVE   NEGATIVE  


 


Urine Amphetamines Screen   NEGATIVE   NEGATIVE  


 


Urine Methamphetamines Screen   NEGATIVE   NEGATIVE  


 


Urine Benzodiazepines Screen   NEGATIVE   NEGATIVE  


 


Urine Cocaine Screen   NEGATIVE   NEGATIVE  


 


Urine Cannabinoids Screen   NEGATIVE   NEGATIVE  


 


Prothrombin Time    17.1 H 12.2-14.7  SEC


 


INR Comment    1.4  0.8-1.4  


 


Activated Partial


Thromboplast Time 


 


 


 29 


 24-35  SEC





 


Sodium Level    146 H 135-145  MMOL/L


 


Potassium Level    3.9  3.6-5.0  MMOL/L


 


Chloride Level    112 H   MMOL/L


 


Carbon Dioxide Level    23  21-32  MMOL/L


 


Anion Gap    11  5-14  MMOL/L


 


Blood Urea Nitrogen    20 H 7-18  MG/DL


 


Creatinine


 


 


 


 1.32 H


 0.60-1.30


MG/DL


 


Estimat Glomerular Filtration


Rate 


 


 


 40 


  





 


BUN/Creatinine Ratio    15   


 


Glucose Level    112 H   MG/DL


 


Calcium Level    9.7  8.5-10.1  MG/DL


 


Corrected Calcium    9.5  8.5-10.1  MG/DL


 


Total Bilirubin    0.7  0.1-1.0  MG/DL


 


Aspartate Amino Transf


(AST/SGOT) 


 


 


 18 


 5-34  U/L





 


Alanine Aminotransferase


(ALT/SGPT) 


 


 


 11 


 0-55  U/L





 


Alkaline Phosphatase    95    U/L


 


Troponin I    < 0.028  <0.028  NG/ML


 


Total Protein    7.1  6.4-8.2  GM/DL


 


Albumin    4.2  3.2-4.5  GM/DL


 


Salicylates Level    < 5.0 L 5.0-20.0  MG/DL


 


Acetaminophen Level    13  10-30  UG/ML


 


Serum Alcohol    < 10  <10  MG/DL








My Orders





Orders - KRISTI ALCALA MD


Ed Iv/Invasive Line Start (21 16:46)


Cbc With Automated Diff (21 16:46)


Comprehensive Metabolic Panel (21 16:46)


Troponin I (21 16:46)


Ua Culture If Indicated (21 16:46)


Ekg Tracing (21 16:46)


Chest 1 View, Ap/Pa Only (21 16:46)


Protime With Inr (21 16:46)


Partial Thromboplastin Time (21 16:46)


Drug Screen Stat (Urine) (21 16:46)


Acetaminophen (21 16:46)


Salicylate (21 16:46)


Alcohol (21 16:46)


Ct Head Wo (21 16:51)


Urine Culture (21 16:50)


Ns Iv 1000 Ml (Sodium Chloride 0.9%) (21 18:30)





Vital Signs/I&O











 21





 16:12


 


Temp 36.0


 


Pulse 68


 


Resp 14


 


B/P (MAP) 141/74 (96)


 


Pulse Ox 99


 


O2 Delivery Room Air











Progress


Progress Note :  


   Time:  18:26


Progress Note


Patient seen and examined, 70-year-old female with a presentation of altered 

mental status this evening.  Patient evaluation includes CT scan of the brain 

without contrast, chest x-ray, basic laboratory studies including coagulation 

profile and urine toxicology with aspirin Tylenol and alcohol levels.  Patient 

is noted to have an increase in her BUN and creatinine.  Her normal creatinine 

is 0.7- 0.8 and today it is 1.3.  Patient continues to be confused.  She has 

repetitive questioning.  She has no focal other neurologic deficits.  She has an

old stroke that affected her left side with a little sensory disturbance to the 

left lower extremity more than the left upper extremity.  She has a little bit 

of left lower extremity drift greater than right.  She states that this is old 

as a result of her stroke from 2 years ago.  At baseline I believe her 

presentation NIH stroke scale would be at most a 2 for some confusion and the 

sensory and drift issues.  I do not believe she would be a candidate for TPA.  

Her CT scan of the brain did show evidence of old stroke on the right side of 

her brain.  Nothing new.  The rest of her laboratory studies were unremarkable. 

I talked to her about staying in the hospital and receiving some fluid 

rehydration therapy and monitoring for her mental status change.  She is 

agreeable to this plan of care but would like to be discharged tomorrow.  I 

discussed the case with Dr. Bro on for the hospitalist service who accepts th

e patient for observation admission to monitor her mental status and repeat her 

renal function panel in the morning.


All questions are sought and answered.


Initial ECG Impression Date:  May 24, 2021


Initial ECG Impression Time:  16:21


Initial ECG Rate:  60


Initial ECG Intervals


paced rhythm


Initial ECG Impression:  Normal





Diagnostic Imaging





   Diagonstic Imaging:  CT


Comments


                 [facility] [place], [place]





NAME:   RICK POWERS


[facility] REC#:   [id number]


ACCOUNT#:   [id number]


PT STATUS:   REG ER


:   1950


PHYSICIAN:   KRISTI ALCALA MD


ADMIT DATE:   21/ER


                                  ***Signed***


Date of Exam:21





CT HEAD WO








EXAMINATION: CT head without contrast.





TECHNIQUE: Multiple contiguous axial images were obtained through


the brain without the use of intravenous contrast. All CT scans


use one or more of the following dose optimizing techniques:


automated exposure control, MA and/or KvP adjustment based on


patient size and exam type or iterative reconstruction. 





HISTORY: Altered mental status. Confusion.





COMPARISON: 2018.





FINDINGS: No large acute territorial ischemia, mass, or


hemorrhage. No midline shift or mass effect. Old infarct is seen


in the right occipital lobe. The ventricles, cortical sulci, and


basilar cisterns are patent and unremarkable. 





The orbits are normal. Paranasal sinuses are normal. Left-sided


mastoid effusion is present. No soft tissue abnormality is seen.


No osseus lesions or fractures are seen.





IMPRESSION:  


1. No large acute territorial ischemia, mass, or hemorrhage.


2. Old infarct in the right occipital lobe.





Dictated by: 





  Dictated on workstation # [id number]








Dict:   21 [id number]


Trans:   21 1743


[facility] [id number]





Interpreted by:     SASHA THOMPSON DO


Electronically signed by: SASHA THOMPSON DO 21





Departure


Communication (Admissions)


Time/Spoke to Admitting Phy:  18:22


Discussed with Dr. Bro accepts patient for observation admission for altered 

mental status and acute kidney injury





Impression





   Primary Impression:  


   Altered mental status


   Qualified Codes:  R41.0 - Disorientation, unspecified


   Additional Impressions:  


   Diabetes


   Qualified Codes:  E11.9 - Type 2 diabetes mellitus without complications; 

   Z79.4 - Long term (current) use of insulin


   Acute kidney injury


Disposition:   ADMITTED AS INPATIENT


Condition:  Stable





Admissions


Decision to Admit Reason:  Admit from ER (General)


Decision to Admit/Date:  May 24, 2021


Time/Decision to Admit Time:  18:11





Departure-Patient Inst.


Referrals:  


JOSSELYN WILLIS MD (PCP)


Primary Care Physician








SAVANAH BOWMAN (Family)


Primary Care Physician











KRISTI ALCALA MD         May 24, 2021 16:51

## 2021-05-24 NOTE — DIAGNOSTIC IMAGING REPORT
EXAMINATION: CT head without contrast.



TECHNIQUE: Multiple contiguous axial images were obtained through

the brain without the use of intravenous contrast. All CT scans

use one or more of the following dose optimizing techniques:

automated exposure control, MA and/or KvP adjustment based on

patient size and exam type or iterative reconstruction. 



HISTORY: Altered mental status. Confusion.



COMPARISON: 03/23/2018.



FINDINGS: No large acute territorial ischemia, mass, or

hemorrhage. No midline shift or mass effect. Old infarct is seen

in the right occipital lobe. The ventricles, cortical sulci, and

basilar cisterns are patent and unremarkable. 



The orbits are normal. Paranasal sinuses are normal. Left-sided

mastoid effusion is present. No soft tissue abnormality is seen.

No osseus lesions or fractures are seen.



IMPRESSION:  

1. No large acute territorial ischemia, mass, or hemorrhage.

2. Old infarct in the right occipital lobe.



Dictated by: 



  Dictated on workstation # [id number]

## 2021-05-25 VITALS — SYSTOLIC BLOOD PRESSURE: 159 MMHG | DIASTOLIC BLOOD PRESSURE: 72 MMHG

## 2021-05-25 VITALS — DIASTOLIC BLOOD PRESSURE: 64 MMHG | SYSTOLIC BLOOD PRESSURE: 128 MMHG

## 2021-05-25 VITALS — DIASTOLIC BLOOD PRESSURE: 74 MMHG | SYSTOLIC BLOOD PRESSURE: 141 MMHG

## 2021-05-25 VITALS — SYSTOLIC BLOOD PRESSURE: 134 MMHG | DIASTOLIC BLOOD PRESSURE: 63 MMHG

## 2021-05-25 VITALS — DIASTOLIC BLOOD PRESSURE: 58 MMHG | SYSTOLIC BLOOD PRESSURE: 120 MMHG

## 2021-05-25 VITALS — DIASTOLIC BLOOD PRESSURE: 64 MMHG | SYSTOLIC BLOOD PRESSURE: 102 MMHG

## 2021-05-25 LAB
BUN/CREAT SERPL: 15
CALCIUM SERPL-MCNC: 8.4 MG/DL (ref 8.5–10.1)
CHLORIDE SERPL-SCNC: 116 MMOL/L (ref 98–107)
CO2 SERPL-SCNC: 21 MMOL/L (ref 21–32)
CREAT SERPL-MCNC: 1.05 MG/DL (ref 0.6–1.3)
GFR SERPLBLD BASED ON 1.73 SQ M-ARVRAT: 52 ML/MIN
GLUCOSE SERPL-MCNC: 145 MG/DL (ref 70–105)
POTASSIUM SERPL-SCNC: 3.9 MMOL/L (ref 3.6–5)
SODIUM SERPL-SCNC: 145 MMOL/L (ref 135–145)

## 2021-05-25 RX ADMIN — INSULIN ASPART SCH UNIT: 100 INJECTION, SOLUTION INTRAVENOUS; SUBCUTANEOUS at 16:49

## 2021-05-25 RX ADMIN — SODIUM CHLORIDE SCH MLS/HR: 900 INJECTION, SOLUTION INTRAVENOUS at 02:01

## 2021-05-25 RX ADMIN — INSULIN ASPART SCH UNIT: 100 INJECTION, SOLUTION INTRAVENOUS; SUBCUTANEOUS at 06:25

## 2021-05-25 RX ADMIN — INSULIN ASPART SCH UNIT: 100 INJECTION, SOLUTION INTRAVENOUS; SUBCUTANEOUS at 11:43

## 2021-05-25 RX ADMIN — SODIUM CHLORIDE SCH MLS/HR: 900 INJECTION, SOLUTION INTRAVENOUS at 12:30

## 2021-05-26 NOTE — DISCHARGE SUMMARY
Discharge Summary


Hospital Course


Problems/Dx:  


(1) UTI (urinary tract infection)


Status:  Acute


Qualifiers:  


   Qualified Codes:  N30.00 - Acute cystitis without hematuria


(2) Acute kidney injury


Status:  Acute


Hospital Course


Date of Admission: May 24, 2021 at 18:23 


Admission Diagnosis :  UTI, KULWANT





Family Physician/Provider: Marla Lovell  





Date of Discharge: 5/25/21 


Discharge Diagnosis:  UTI, KULWANT








Hospital Course:


Ambar Grider is a 70 year old female who presented with altered mental status 

and was admitted with UTI and KULWANT. She was started on antibiotics and fluids and

improved. Her urine culture was growing Enterococcus faecalis. She was 

discharged on Amoxicillin. She was discharged home in stable condition.














Labs and Pending Lab Test:


Laboratory Tests


5/25/21 16:11: Glucometer 136H





Microbiology


5/24/21 Urine Culture - Preliminary, Resulted


          Enterococcus faecalis





Home Meds


Active


Amoxicillin 500 Mg Capsule 500 Mg PO Q12H 7 Days


Reported


Amiodarone HCl 200 Mg Tablet 200 Mg PO DAILY


Clonazepam 0.5 Mg Tablet 0.25-0.5 Mg PO BID PRN


Vitamin D2 (Ergocalciferol (Vitamin D2)) 1,250 Mcg Capsule 1,250 Mcg PO SAT


Indomethacin 50 Mg Capsule 50 Ng PO BID PRN


Topiramate 25 Mg Tablet 50 Mg PO BID


     TAKES 2 (25MG) TABS


Miralax (Polyethylene Glycol 3350) 17 Gm Powd.pack 17 Gm PO DAILY PRN


Preservision Areds 2 Softgel (Vit C/E/Zn/Coppr/Lutein/Zeaxan) 1 Each Capsule 1 

Each PO DAILY


Visine Dry Eye Relief Drop (Peg 400/Hypromellose/Glycerin) 15 Ml Drops 2 Drops 

OU PRN PRN


Benzonatate 200 Mg Capsule 200 Mg PO TID PRN


K-Tab ER (Potassium Chloride) 10 Meq Tablet.er 10 Meq PO DAILY


Entresto 24 mg-26 mg Tablet (Sacubitril/Valsartan) 1 Each Tablet 1 Ea PO BID


     LAST FILLED 03- #60/30 DAY SUPPLY


Eliquis (Apixaban) 5 Mg Tablet 5 Mg PO BID


Levemir (Insulin Determir) 1,000 Units/10 Ml Soln 11 Units SC DAILY PRN


Novolog (Insulin Aspart) 100 Unit/1 Ml Susp Units SC PER PUMP


Pravastatin Sodium 20 Mg Tablet 20 Mg PO HS


Metoprolol Succinate 50 Mg Tab.er.24h 50 Mg PO DAILY


     HOLD IF SBP IS LESS THAN 120


Spironolactone 25 Mg Tablet 12.5 Mg PO DAILY


     TAKES  OF A 25MG


Assessment/Pt Instructions


Take medications as prescribed. Follow up with your PCP. Return with worsening 

symptoms.


Discharge Planning:  <30 minutes discharge planning





Discharge Instructions


Discharge Diet:  No Restrictions


Activity as Tolerated:  Yes





Discharge Physical Examination


Vital Signs





Vital Signs








  Date Time  Temp Pulse Resp B/P (MAP) Pulse Ox O2 Delivery O2 Flow Rate FiO2


 


5/25/21 16:00 36.0 62 18 120/58 (78) 95 Room Air  


 


5/25/21 00:24        21








General Appearance:  No Apparent Distress, WD/WN


HEENT:  PERRL/EOMI, Pharynx Normal


Respiratory:  Lungs Clear, Normal Breath Sounds, No Respiratory Distress


Cardiovascular:  Regular Rate, Rhythm, No Edema, No Murmur


Gastrointestinal:  Normal Bowel Sounds, Non Tender, Soft


Extremity:  Normal Inspection, Non Tender, No Pedal Edema


Skin:  Normal Color, Warm/Dry


Neurologic/Psychiatric:  Alert, Oriented x3, No Motor/Sensory Deficits, Normal 

Mood/Affect


Allergies:  


Coded Allergies:  


     promethazine (Verified  Allergy, Intermediate, 3/11/08)


     clindamycin (Verified  Allergy, Unknown, 3/7/21)


     hydrocodone (Verified  Allergy, Unknown, 3/7/21)


     morphine (Verified  Allergy, Unknown, 3/7/21)





Copy


Copies To 1:   JOSSELYN WILLIS MD





Discharge Summary


Date of Admission


May 24, 2021 at 18:23


Date of Discharge


May 25, 2021 at 17:00


Discharge Date:  May 25, 2021


Discharge Time:  17:00


Admission Diagnosis


UTI, KULWANT


Discharge Diagnosis





(1) UTI (urinary tract infection)


Status:  Acute


Qualifiers:  


   Qualified Codes:  N30.00 - Acute cystitis without hematuria


(2) Acute kidney injury


Status:  Acute











RICA WATERS MD              May 26, 2021 12:44

## 2021-07-12 ENCOUNTER — HOSPITAL ENCOUNTER (EMERGENCY)
Dept: HOSPITAL 75 - ER | Age: 71
Discharge: HOME | End: 2021-07-12
Payer: COMMERCIAL

## 2021-07-12 VITALS — HEIGHT: 65.75 IN | BODY MASS INDEX: 22.45 KG/M2 | WEIGHT: 138.01 LBS

## 2021-07-12 VITALS — DIASTOLIC BLOOD PRESSURE: 68 MMHG | SYSTOLIC BLOOD PRESSURE: 143 MMHG

## 2021-07-12 DIAGNOSIS — E78.00: ICD-10-CM

## 2021-07-12 DIAGNOSIS — I10: ICD-10-CM

## 2021-07-12 DIAGNOSIS — K20.90: Primary | ICD-10-CM

## 2021-07-12 DIAGNOSIS — J44.9: ICD-10-CM

## 2021-07-12 DIAGNOSIS — E11.9: ICD-10-CM

## 2021-07-12 DIAGNOSIS — Z79.01: ICD-10-CM

## 2021-07-12 DIAGNOSIS — K52.9: ICD-10-CM

## 2021-07-12 DIAGNOSIS — I48.91: ICD-10-CM

## 2021-07-12 DIAGNOSIS — Z79.4: ICD-10-CM

## 2021-07-12 DIAGNOSIS — Z79.899: ICD-10-CM

## 2021-07-12 DIAGNOSIS — Z86.73: ICD-10-CM

## 2021-07-12 LAB
ALBUMIN SERPL-MCNC: 4 GM/DL (ref 3.2–4.5)
ALP SERPL-CCNC: 84 U/L (ref 40–136)
ALT SERPL-CCNC: 7 U/L (ref 0–55)
APTT BLD: 30 SEC (ref 24–35)
BASOPHILS # BLD AUTO: 0 10^3/UL (ref 0–0.1)
BASOPHILS NFR BLD AUTO: 0 % (ref 0–10)
BILIRUB SERPL-MCNC: 0.5 MG/DL (ref 0.1–1)
BUN/CREAT SERPL: 17
CALCIUM SERPL-MCNC: 9.2 MG/DL (ref 8.5–10.1)
CHLORIDE SERPL-SCNC: 112 MMOL/L (ref 98–107)
CO2 SERPL-SCNC: 23 MMOL/L (ref 21–32)
CREAT SERPL-MCNC: 0.86 MG/DL (ref 0.6–1.3)
EOSINOPHIL # BLD AUTO: 0.1 10^3/UL (ref 0–0.3)
EOSINOPHIL NFR BLD AUTO: 2 % (ref 0–10)
GFR SERPLBLD BASED ON 1.73 SQ M-ARVRAT: > 60 ML/MIN
GLUCOSE SERPL-MCNC: 137 MG/DL (ref 70–105)
HCT VFR BLD CALC: 36 % (ref 35–52)
HGB BLD-MCNC: 12.1 G/DL (ref 11.5–16)
INR PPP: 1 (ref 0.8–1.4)
LIPASE SERPL-CCNC: 27 U/L (ref 8–78)
LYMPHOCYTES # BLD AUTO: 1.5 10^3/UL (ref 1–4)
LYMPHOCYTES NFR BLD AUTO: 30 % (ref 12–44)
MANUAL DIFFERENTIAL PERFORMED BLD QL: NO
MCH RBC QN AUTO: 30 PG (ref 25–34)
MCHC RBC AUTO-ENTMCNC: 34 G/DL (ref 32–36)
MCV RBC AUTO: 89 FL (ref 80–99)
MONOCYTES # BLD AUTO: 0.3 10^3/UL (ref 0–1)
MONOCYTES NFR BLD AUTO: 6 % (ref 0–12)
NEUTROPHILS # BLD AUTO: 3 10^3/UL (ref 1.8–7.8)
NEUTROPHILS NFR BLD AUTO: 61 % (ref 42–75)
PLATELET # BLD: 166 10^3/UL (ref 130–400)
PMV BLD AUTO: 8.8 FL (ref 9–12.2)
POTASSIUM SERPL-SCNC: 3.6 MMOL/L (ref 3.6–5)
PROT SERPL-MCNC: 6.9 GM/DL (ref 6.4–8.2)
PROTHROMBIN TIME: 13.8 SEC (ref 12.2–14.7)
SODIUM SERPL-SCNC: 145 MMOL/L (ref 135–145)
WBC # BLD AUTO: 4.9 10^3/UL (ref 4.3–11)

## 2021-07-12 PROCEDURE — 85610 PROTHROMBIN TIME: CPT

## 2021-07-12 PROCEDURE — 85025 COMPLETE CBC W/AUTO DIFF WBC: CPT

## 2021-07-12 PROCEDURE — 84484 ASSAY OF TROPONIN QUANT: CPT

## 2021-07-12 PROCEDURE — 93005 ELECTROCARDIOGRAM TRACING: CPT

## 2021-07-12 PROCEDURE — 36415 COLL VENOUS BLD VENIPUNCTURE: CPT

## 2021-07-12 PROCEDURE — 71045 X-RAY EXAM CHEST 1 VIEW: CPT

## 2021-07-12 PROCEDURE — 80053 COMPREHEN METABOLIC PANEL: CPT

## 2021-07-12 PROCEDURE — 83690 ASSAY OF LIPASE: CPT

## 2021-07-12 PROCEDURE — 85730 THROMBOPLASTIN TIME PARTIAL: CPT

## 2021-07-12 NOTE — DIAGNOSTIC IMAGING REPORT
Indication: Chest pain, hematemesis



Portable chest 4:32 PM



There is a dual-chamber pacemaker. Patient's had left atrial

appendage clipping. Heart size and pulmonary vascularity are

normal. Lungs are clear. There are no effusions or

pneumothoraces.



IMPRESSION: Postsurgical changes in the chest. No acute

abnormality seen.



Dictated by: 



  Dictated on workstation # [id number]

## 2021-07-12 NOTE — ED GI
General


Stated Complaint:  VOMITING BLOOD/CP


Source of Information:  Patient


Exam Limitations:  No Limitations





History of Present Illness


Date Seen by Provider:  2021


Time Seen by Provider:  15:36


Initial Comments


Patient presents ER by private conveyance from home with chief complaint that 

she has been nauseated having some vomiting off and on since about 7:00 last 

night.  She ate just prior to that.  She feels some burning going up to the 

middle of her chest.  She does not have a history of coronary disease.  She had 

a cardiac catheterization a few months ago as a follow-up after an ablation for 

her atrial fibrillation.  She is on Eliquis.  Her cardiology team is at [facility].  Her primary care doctor is Dr. Hendricks in [place].  She does not

have a history of coronary disease stents or heart attacks.  She is not having 

any shortness of air.  She had an episode of vomiting just prior to coming in 

and saw bright red blood in it and this concerned her so she came to the ER.  

She does not have a history of cirrhosis, hepatitis, alcohol drinking, ulcers.  

She is not having any abdominal pain and rates her pain is just a mild 

discomfort now.  She did not take anything for the discomfort or the nausea at 

home.





She does have a history of hypertension, hyperlipidemia, diabetes.  She is on an

insulin pump.  She does not smoke use drugs or drink alcohol.





Allergies and Home Medications


Allergies


Coded Allergies:  


     promethazine (Verified  Allergy, Intermediate, 3/11/08)


     clindamycin (Verified  Allergy, Unknown, 3/7/21)


     hydrocodone (Verified  Allergy, Unknown, 3/7/21)


     morphine (Verified  Allergy, Unknown, 3/7/21)





Home Medications


Amiodarone HCl 200 Mg Tablet, 200 MG PO DAILY, (Reported)


Amoxicillin 500 Mg Capsule, 500 MG PO Q12H


   Prescribed by: RICA WATERS on 21 1104


Apixaban 5 Mg Tablet, 5 MG PO BID, (Reported)


Benzonatate 200 Mg Capsule, 200 MG PO TID PRN for COUGH, (Reported)


Clonazepam 0.5 Mg Tablet, 0.25-0.5 MG PO BID PRN for ANXIETY, (Reported)


Ergocalciferol (Vitamin D2) 1,250 Mcg Capsule, 1,250 MCG PO SAT, (Reported)


Indomethacin 50 Mg Capsule, 50 NG PO BID PRN for PAIN-BREAKTHROUGH, (Reported)


Insulin Aspart 100 Unit/1 Ml Susp, UNITS SC PER PUMP, (Reported)


Insulin Determir 1,000 Units/10 Ml Soln, 11 UNITS SC DAILY PRN for IF INSULIN 

PUMP IS UNAVALIABLE, (Reported)


Metoprolol Succinate 50 Mg Tab.er.24h, 50 MG PO DAILY, (Reported)


   HOLD IF SBP IS LESS THAN 120 


Peg 400/Hypromellose/Glycerin 15 Ml Drops, 2 DROPS OU PRN PRN for DRY EYES, 

(Reported)


Polyethylene Glycol 3350 17 Gm Powd.pack, 17 GM PO DAILY PRN for CONSTIPATION-

2ND LINE, (Reported)


Potassium Chloride 10 Meq Tablet.er, 10 MEQ PO DAILY, (Reported)


Pravastatin Sodium 20 Mg Tablet, 20 MG PO HS, (Reported)


Sacubitril/Valsartan 1 Each Tablet, 1 EA PO BID, (Reported)


   LAST FILLED 2021 #60/30 DAY SUPPLY 


Spironolactone 25 Mg Tablet, 12.5 MG PO DAILY, (Reported)


   TAKES  OF A 25MG 


Topiramate 25 Mg Tablet, 50 MG PO BID, (Reported)


   TAKES 2 (25MG) TABS 


Vit C/E/Zn/Coppr/Lutein/Zeaxan 1 Each Capsule, 1 EACH PO DAILY, (Reported)





Patient Home Medication List


Home Medication List Reviewed:  Yes





Review of Systems


Review of Systems


Constitutional:  No chills, No diaphoresis


EENTM:  No Blurred Vision, No Double Vision


Respiratory:  Denies Cough, Denies Shortness of Air


Cardiovascular:  See HPI, Chest Pain; Denies Lightheadedness


Gastrointestinal:  Denies Constipated, Denies Diarrhea; Nausea; Denies Poor 

Fluid Intake; Vomiting


Genitourinary:  Denies Burning, Denies Discharge





All Other Systems Reviewed


Negative Unless Noted:  Yes





Past Medical-Social-Family Hx


Patient Social History


Tobacco Use?:  No


Use of E-Cig and/or Vaping dev:  No


Substance use?:  No


Alcohol Use?:  No





Immunizations Up To Date


Tetanus Booster (TDap):  Unknown





Past Medical History


Surgeries:  Yes


Cardiac,  Section, Gallbladder, Hysterectomy, Orthopedic, Pacemaker


Respiratory:  Yes


COPD


Cardiac:  Yes (PACEMAKER, CARDIAC ABLATION, MAZE PROCEDURE, LEFT APPENDAGE 

CLIPPED)


Atrial Fibrillation, High Cholesterol, Hypertension, Irregular Heartbeat


Neurological:  Yes (PERIPHERAL NEUROPATHY IN HANDS AND FEET;HX OF CVA -NO 

RESIDUAL)


Neuropathy, Stroke


Reproductive Disorders:  No


Genitourinary:  No


Gastrointestinal:  Yes


Gastroesophageal Reflux


Musculoskeletal:  Yes


Chronic Back Pain


Endocrine:  Yes (INSULIN PUMP)


Diabetes, Insulin dep


HEENT:  Yes (DIABETIC RETINOPATHY)


Cancer:  No


Psychosocial:  No


Integumentary:  No


Blood Disorders:  No





Family Medical History





PAST SURGICAL HISTORY:


-LEFT EYE SURGERY FOR RETINAL BLEED 3 YEARS AGO


- X 2  IN , 


-HYSTERECTOMY 


-CHOLECYSTECTOMY ( OPEN ) 


-LEFT FOOT SURGERY 7 YEARS AGO


-LUMBAR LAMINECTOMY 20 YEARS AGO


-PACEMAKER 4 YEARS AGO


-CARDIAC ABLATION 2019


-MAZE PROCEDURE 2020


-LEFT LATERAL APPENDAGE CLIP 2020


-PT REPORTS CARDIAC CATH DONE IN  IN  SHOWED NO SIGNIFICANT CAD. 





ECHOCARDIOGRAM 21--EF 55-60%


MPI 21--NO INSCHEMIA OR INFARCTIONS





Physical Exam


Vital Signs





Vital Signs - First Documented








 21





 15:38


 


O2 Delivery Room Air





Capillary Refill :


Height/Weight/BMI


Height: 5'1.00"


Weight: 151lbs. 0.0oz. [id number]; 26.08 BMI


Method:Stated


General Appearance:  WD/WN, other (Anxious affect)


HEENT:  PERRL/EOMI, pharynx normal


Neck:  full range of motion, normal inspection


Respiratory:  lungs clear, normal breath sounds, no respiratory distress, no 

accessory muscle use


Cardiovascular:  normal peripheral pulses, regular rate, rhythm


Gastrointestinal:  normal bowel sounds, non tender, soft


Extremities:  normal inspection, normal capillary refill


Neurologic/Psychiatric:  alert, oriented x 3


Skin:  normal color, warm/dry





Progress/Results/Core Measures


Results/Orders


Lab Results





Laboratory Tests








Test


 21


15:50 Range/Units


 


 


White Blood Count


 4.9 


 4.3-11.0


10^3/uL


 


Red Blood Count


 4.07 


 3.80-5.11


10^6/uL


 


Hemoglobin 12.1  11.5-16.0  g/dL


 


Hematocrit 36  35-52  %


 


Mean Corpuscular Volume 89  80-99  fL


 


Mean Corpuscular Hemoglobin 30  25-34  pg


 


Mean Corpuscular Hemoglobin


Concent 34 


 32-36  g/dL





 


Red Cell Distribution Width 13.6  10.0-14.5  %


 


Platelet Count


 166 


 130-400


10^3/uL


 


Mean Platelet Volume 8.8 L 9.0-12.2  fL


 


Immature Granulocyte % (Auto) 0   %


 


Neutrophils (%) (Auto) 61  42-75  %


 


Lymphocytes (%) (Auto) 30  12-44  %


 


Monocytes (%) (Auto) 6  0-12  %


 


Eosinophils (%) (Auto) 2  0-10  %


 


Basophils (%) (Auto) 0  0-10  %


 


Neutrophils # (Auto)


 3.0 


 1.8-7.8


10^3/uL


 


Lymphocytes # (Auto)


 1.5 


 1.0-4.0


10^3/uL


 


Monocytes # (Auto)


 0.3 


 0.0-1.0


10^3/uL


 


Eosinophils # (Auto)


 0.1 


 0.0-0.3


10^3/uL


 


Basophils # (Auto)


 0.0 


 0.0-0.1


10^3/uL


 


Immature Granulocyte # (Auto)


 0.0 


 0.0-0.1


10^3/uL


 


Prothrombin Time 13.8  12.2-14.7  SEC


 


INR Comment 1.0  0.8-1.4  


 


Activated Partial


Thromboplast Time 30 


 24-35  SEC





 


Sodium Level 145  135-145  MMOL/L


 


Potassium Level 3.6  3.6-5.0  MMOL/L


 


Chloride Level 112 H   MMOL/L


 


Carbon Dioxide Level 23  21-32  MMOL/L


 


Anion Gap 10  5-14  MMOL/L


 


Blood Urea Nitrogen 15  7-18  MG/DL


 


Creatinine


 0.86 


 0.60-1.30


MG/DL


 


Estimat Glomerular Filtration


Rate > 60 


  





 


BUN/Creatinine Ratio 17   


 


Glucose Level 137 H   MG/DL


 


Calcium Level 9.2  8.5-10.1  MG/DL


 


Corrected Calcium 9.2  8.5-10.1  MG/DL


 


Total Bilirubin 0.5  0.1-1.0  MG/DL


 


Aspartate Amino Transf


(AST/SGOT) 16 


 5-34  U/L





 


Alanine Aminotransferase


(ALT/SGPT) 7 


 0-55  U/L





 


Alkaline Phosphatase 84    U/L


 


Troponin I < 0.028  <0.028  NG/ML


 


Total Protein 6.9  6.4-8.2  GM/DL


 


Albumin 4.0  3.2-4.5  GM/DL


 


Lipase 27  8-78  U/L








My Orders





Orders - DIVINA HAND


Pantoprazole Injection (Protonix Injecti (21 15:45)


Cbc With Automated Diff (21 15:45)


Comprehensive Metabolic Panel (21 15:45)


Protime With Inr (21 15:45)


Partial Thromboplastin Time (21 15:45)


Lipase (21 15:45)


Ondansetron Injection (Zofran Injectio (21 15:45)


Ed Iv/Invasive Line Start (21 15:45)


Troponin I (21 16:04)


Chest 1 View, Ap/Pa Only (21 16:04)





Medications Given in ED





Current Medications








 Medications  Dose


 Ordered  Sig/Chris


 Route  Start Time


 Stop Time Status Last Admin


Dose Admin


 


 Ondansetron HCl  4 mg  ONCE  ONCE


 IVP  21 15:45


 21 15:48 DC 21 15:56


4 MG


 


 Pantoprazole  40 mg  ONCE  ONCE


 IV  21 15:45


 21 15:48 DC 21 15:56


40 MG








Vital Signs/I&O











 21





 15:38


 


O2 Delivery Room Air











Progress


Progress Note #1:  


   Time:  15:55


Progress Note


Plan to check a CBC and some labs.  Suspect she has erosive esophagitis from the

vomiting and because of the Eliquis she had some bleeding.  She has not had any 

black tarry stools to suggest this is an ongoing problem.  Her previous 

hemoglobin was 12.7 from May, 2 months ago.


Progress Note #2:  


   Time:  16:56


Progress Note


Hemoglobin is stable and the patient's had no further nausea or discomfort.  

Plan to let her go home with some Zofran and follow-up with Dr. Mir to 

discuss endoscopy.  Told her to stop taking the Eliquis.  She can resume the 

Eliquis after the surgeon tells her or after her symptoms have abated for a 

couple days.  We will put her on Carafate and double her omeprazole to 20 twice 

daily.


Initial ECG Impression Date:  2021


Initial ECG Impression Time:  15:39


Initial ECG Rate:  77


Initial ECG Intervals:  Normal


Initial ECG Impression:  Normal, Nonspecific Changes


Comment


Atrial paced complexes.  No clinically relevant ST elevation or depression.





Diagnostic Imaging





   Diagonstic Imaging:  Xray


   Plain Films/CT/US/NM/MRI:  chest


Comments


NAME:   RICK POWERS


[facility] REC#:   [id number]


ACCOUNT#:   [id number]


PT STATUS:   REG ER


:   1950


PHYSICIAN:   DIVINA HAND MD


ADMIT DATE:   21/ER


                                  ***Signed***


Date of Exam:21





CHEST 1 VIEW, AP/PA ONLY








Indication: Chest pain, hematemesis





Portable chest 4:32 PM





There is a dual-chamber pacemaker. Patient's had left atrial


appendage clipping. Heart size and pulmonary vascularity are


normal. Lungs are clear. There are no effusions or


pneumothoraces.





IMPRESSION: Postsurgical changes in the chest. No acute


abnormality seen.





Dictated by: 





  Dictated on workstation # [id number]








Dict:   21 1635


Trans:   21 1636


TCB [id number]





Interpreted by:     ARIANNE YADAV MD


Electronically signed by: ARIANNE YADAV MD 21 1636


   Reviewed:  Reviewed by Me





Departure


Impression





   Primary Impression:  


   Esophagitis, acute


   Additional Impression:  


   Gastroenteritis


Disposition:  01 HOME, SELF-CARE


Condition:  Stable





Departure-Patient Inst.


Decision time for Depature:  16:57


Referrals:  


IBRAHIMA MIR,LOCAL PHYSICIAN (PCP)


Primary Care Physician


Patient Instructions:  Acid Reflux, Adult and Adolescent ED





Add. Discharge Instructions:  


Because you are on a blood thinner I suspect the esophagitis caused some extra 

bleeding today.  This could be from a viral infection of the gut.  I expect the 

symptoms will be self-limiting to 3 to 5 days or less.


Use Zofran 1 tablet every 6 hours as necessary to quell your nausea.


Stick to a liquid diet until you are no longer needing the Zofran and then you 

can advance to a soft diet.


Call Dr. Mir and request follow-up appointment in the clinic to discuss EGD 

to look in your esophagus and stomach.


Omeprazole 20 mg twice a day for the next 2 weeks and then resume your normal 

course.


Carafate 1 tablet half an hour prior before meals and at bedtime for the next 2 

weeks to help protect the lining of your stomach.


If you have worsening vomiting and bleeding, chest pain or other worrisome 

symptoms then promptly return to the nearest ER.


Scripts


Ondansetron (Ondansetron Odt) 4 Mg Tab.rapdis


4 MG PO Q6H PRN for NAUSEA/VOMITING, #15 TAB 0 Refills


   Prov: DIVINA HAND         21 


Omeprazole (Omeprazole) 20 Mg Tablet.dr


20 MG PO BID for 14 Days, #3 TAB 0 Refills


   Prov: DIVINA HAND         21 


Sucralfate (Carafate) 1 Gm Tablet


1 GM PO QIDACHS for 14 Days, #56 TAB 0 Refills


   Prov: DIVINA HAND         21





Copy


Copies To 1:   IBRAHIMA MIR DO











DIVINA HAND                 2021 15:54

## 2021-07-13 ENCOUNTER — HOSPITAL ENCOUNTER (OUTPATIENT)
Dept: HOSPITAL 75 - REHAB | Age: 71
LOS: 35 days | Discharge: HOME | End: 2021-08-17
Attending: PSYCHIATRY & NEUROLOGY
Payer: COMMERCIAL

## 2021-07-13 DIAGNOSIS — Z79.4: ICD-10-CM

## 2021-07-13 DIAGNOSIS — Z95.0: ICD-10-CM

## 2021-07-13 DIAGNOSIS — E11.9: ICD-10-CM

## 2021-07-13 DIAGNOSIS — I48.91: ICD-10-CM

## 2021-07-13 DIAGNOSIS — I10: ICD-10-CM

## 2021-07-13 DIAGNOSIS — Z86.73: ICD-10-CM

## 2021-07-13 DIAGNOSIS — R26.0: Primary | ICD-10-CM

## 2021-07-20 ENCOUNTER — HOSPITAL ENCOUNTER (EMERGENCY)
Dept: HOSPITAL 75 - ER | Age: 71
Discharge: HOME | End: 2021-07-20
Payer: COMMERCIAL

## 2021-07-20 VITALS — SYSTOLIC BLOOD PRESSURE: 119 MMHG | DIASTOLIC BLOOD PRESSURE: 53 MMHG

## 2021-07-20 VITALS — HEIGHT: 65.75 IN | BODY MASS INDEX: 22.45 KG/M2 | WEIGHT: 138.01 LBS

## 2021-07-20 DIAGNOSIS — K21.9: ICD-10-CM

## 2021-07-20 DIAGNOSIS — E78.00: ICD-10-CM

## 2021-07-20 DIAGNOSIS — Z79.4: ICD-10-CM

## 2021-07-20 DIAGNOSIS — I10: ICD-10-CM

## 2021-07-20 DIAGNOSIS — Z79.01: ICD-10-CM

## 2021-07-20 DIAGNOSIS — K44.9: ICD-10-CM

## 2021-07-20 DIAGNOSIS — Z86.73: ICD-10-CM

## 2021-07-20 DIAGNOSIS — Z20.822: ICD-10-CM

## 2021-07-20 DIAGNOSIS — R50.9: ICD-10-CM

## 2021-07-20 DIAGNOSIS — E11.9: ICD-10-CM

## 2021-07-20 DIAGNOSIS — Z79.899: ICD-10-CM

## 2021-07-20 DIAGNOSIS — I48.91: ICD-10-CM

## 2021-07-20 DIAGNOSIS — R07.9: Primary | ICD-10-CM

## 2021-07-20 DIAGNOSIS — J44.9: ICD-10-CM

## 2021-07-20 LAB
ALBUMIN SERPL-MCNC: 4.2 GM/DL (ref 3.2–4.5)
ALP SERPL-CCNC: 104 U/L (ref 40–136)
ALT SERPL-CCNC: 7 U/L (ref 0–55)
AMORPH SED URNS QL MICRO: (no result) /LPF
APTT BLD: 32 SEC (ref 24–35)
APTT PPP: YELLOW S
BACTERIA #/AREA URNS HPF: (no result) /HPF
BASOPHILS # BLD AUTO: 0 10^3/UL (ref 0–0.1)
BASOPHILS NFR BLD AUTO: 0 % (ref 0–10)
BILIRUB SERPL-MCNC: 0.9 MG/DL (ref 0.1–1)
BILIRUB UR QL STRIP: NEGATIVE
BUN/CREAT SERPL: 12
CALCIUM SERPL-MCNC: 8.9 MG/DL (ref 8.5–10.1)
CHLORIDE SERPL-SCNC: 109 MMOL/L (ref 98–107)
CO2 SERPL-SCNC: 19 MMOL/L (ref 21–32)
CREAT SERPL-MCNC: 1.19 MG/DL (ref 0.6–1.3)
D DIMER PPP FEU-MCNC: 0.62 UG/ML (ref 0–0.49)
EOSINOPHIL # BLD AUTO: 0 10^3/UL (ref 0–0.3)
EOSINOPHIL NFR BLD AUTO: 0 % (ref 0–10)
FIBRINOGEN PPP-MCNC: CLEAR MG/DL
GFR SERPLBLD BASED ON 1.73 SQ M-ARVRAT: 45 ML/MIN
GLUCOSE SERPL-MCNC: 131 MG/DL (ref 70–105)
GLUCOSE UR STRIP-MCNC: NEGATIVE MG/DL
HCT VFR BLD CALC: 38 % (ref 35–52)
HGB BLD-MCNC: 12.3 G/DL (ref 11.5–16)
INR PPP: 1.1 (ref 0.8–1.4)
KETONES UR QL STRIP: (no result)
LEUKOCYTE ESTERASE UR QL STRIP: NEGATIVE
LYMPHOCYTES # BLD AUTO: 0.7 10^3/UL (ref 1–4)
LYMPHOCYTES NFR BLD AUTO: 8 % (ref 12–44)
MAGNESIUM SERPL-MCNC: 3.2 MG/DL (ref 1.6–2.4)
MANUAL DIFFERENTIAL PERFORMED BLD QL: NO
MCH RBC QN AUTO: 29 PG (ref 25–34)
MCHC RBC AUTO-ENTMCNC: 33 G/DL (ref 32–36)
MCV RBC AUTO: 90 FL (ref 80–99)
MONOCYTES # BLD AUTO: 0.5 10^3/UL (ref 0–1)
MONOCYTES NFR BLD AUTO: 6 % (ref 0–12)
NEUTROPHILS # BLD AUTO: 7.4 10^3/UL (ref 1.8–7.8)
NEUTROPHILS NFR BLD AUTO: 85 % (ref 42–75)
NITRITE UR QL STRIP: NEGATIVE
PH UR STRIP: 5.5 [PH] (ref 5–9)
PLATELET # BLD: 154 10^3/UL (ref 130–400)
PMV BLD AUTO: 9 FL (ref 9–12.2)
POTASSIUM SERPL-SCNC: 3.7 MMOL/L (ref 3.6–5)
PROT SERPL-MCNC: 7.3 GM/DL (ref 6.4–8.2)
PROT UR QL STRIP: NEGATIVE
PROTHROMBIN TIME: 14.6 SEC (ref 12.2–14.7)
RBC #/AREA URNS HPF: (no result) /HPF
SODIUM SERPL-SCNC: 139 MMOL/L (ref 135–145)
SP GR UR STRIP: 1.02 (ref 1.02–1.02)
WBC # BLD AUTO: 8.7 10^3/UL (ref 4.3–11)
WBC #/AREA URNS HPF: (no result) /HPF

## 2021-07-20 PROCEDURE — 36415 COLL VENOUS BLD VENIPUNCTURE: CPT

## 2021-07-20 PROCEDURE — 85610 PROTHROMBIN TIME: CPT

## 2021-07-20 PROCEDURE — 71045 X-RAY EXAM CHEST 1 VIEW: CPT

## 2021-07-20 PROCEDURE — 93041 RHYTHM ECG TRACING: CPT

## 2021-07-20 PROCEDURE — 84484 ASSAY OF TROPONIN QUANT: CPT

## 2021-07-20 PROCEDURE — 82947 ASSAY GLUCOSE BLOOD QUANT: CPT

## 2021-07-20 PROCEDURE — 83874 ASSAY OF MYOGLOBIN: CPT

## 2021-07-20 PROCEDURE — 71275 CT ANGIOGRAPHY CHEST: CPT

## 2021-07-20 PROCEDURE — 81000 URINALYSIS NONAUTO W/SCOPE: CPT

## 2021-07-20 PROCEDURE — 86141 C-REACTIVE PROTEIN HS: CPT

## 2021-07-20 PROCEDURE — 83880 ASSAY OF NATRIURETIC PEPTIDE: CPT

## 2021-07-20 PROCEDURE — 87636 SARSCOV2 & INF A&B AMP PRB: CPT

## 2021-07-20 PROCEDURE — 83735 ASSAY OF MAGNESIUM: CPT

## 2021-07-20 PROCEDURE — 93005 ELECTROCARDIOGRAM TRACING: CPT

## 2021-07-20 PROCEDURE — 85730 THROMBOPLASTIN TIME PARTIAL: CPT

## 2021-07-20 PROCEDURE — 84145 PROCALCITONIN (PCT): CPT

## 2021-07-20 PROCEDURE — 80053 COMPREHEN METABOLIC PANEL: CPT

## 2021-07-20 PROCEDURE — 85025 COMPLETE CBC W/AUTO DIFF WBC: CPT

## 2021-07-20 PROCEDURE — 85379 FIBRIN DEGRADATION QUANT: CPT

## 2021-07-20 NOTE — ED CHEST PAIN
General


Chief Complaint:  Chest Pain


Stated Complaint:  CHEST PAIN


Nursing Triage Note:  


TO ED VIA POV AND AMBULATORY TO ROOM 3 WITH C/O CP ON AND OFF SINCE NOON TODAY. 


STATES IN CENTER OF CHEST AND LEFT ARM FEELS NUMB. NO ASA PTA. PT HX AFIB AND 


TAKES AMIODARONES QDAY AND ELIQUIS BID AND HAS NOT MISSED ANY DOSES.


Source:  patient, old records


Exam Limitations:  no limitations





History of Present Illness


Date Seen by Provider:  2021


Time Seen by Provider:  19:57


Initial Comments


This is 71-year-old woman presents to the emergency room with complaints of 

intermittent left-sided chest pain throughout the day today.  She is not having 

pain at present.  She has had some radiation and numbness of pain into the left 

upper arm.  She has a low-grade fever and also has felt a bit confused today.  

She denies any other symptoms of infectious illness.  She has received 1 dose of

the Covid vaccine.  She has a significant cardiac history which includes atrial 

fibrillation, permanent pacemaker, heart failure, history of ablation, and 

history of Maze procedure.  She denies missing any doses of her medications.  

Her primary cardiologist is Dr. Villagran at [facility].  Patient 

had recent ED visit for gastritis/esophagitis.  She reports compliance with 

medications prescribed at that time.  She was advised follow-up for endoscopy 

during that visit.  She is a type I diabetic with a blood sugar of 126.  Pain 

has dissipated and she is not experiencing any pain at this time.  Seems to be 

worse when lying flat or taking a deep breath.  She later stated she has had 

intermittent episodes of this pain for the past 3 years since having her cardiac

procedures.  She has no evidence of coronary artery disease by cardiac 

catheterizations performed over the last several years.





Allergies and Home Medications


Allergies


Coded Allergies:  


     promethazine (Verified  Allergy, Intermediate, 3/11/08)


     clindamycin (Verified  Allergy, Unknown, 3/7/21)


     hydrocodone (Verified  Allergy, Unknown, 3/7/21)


     morphine (Verified  Allergy, Unknown, 3/7/21)





Home Medications


Amiodarone HCl 200 Mg Tablet, 200 MG PO DAILY, (Reported)


Amoxicillin 500 Mg Capsule, 500 MG PO Q12H


   Prescribed by: RICA WATERS on 21 1104


Apixaban 5 Mg Tablet, 5 MG PO BID, (Reported)


Benzonatate 200 Mg Capsule, 200 MG PO TID PRN for COUGH, (Reported)


Clonazepam 0.5 Mg Tablet, 0.25-0.5 MG PO BID PRN for ANXIETY, (Reported)


Ergocalciferol (Vitamin D2) 1,250 Mcg Capsule, 1,250 MCG PO SAT, (Reported)


Indomethacin 50 Mg Capsule, 50 NG PO BID PRN for PAIN-BREAKTHROUGH, (Reported)


Insulin Aspart 100 Unit/1 Ml Susp, UNITS SC PER PUMP, (Reported)


Insulin Determir 1,000 Units/10 Ml Soln, 11 UNITS SC DAILY PRN for IF INSULIN 

PUMP IS UNAVALIABLE, (Reported)


Metoprolol Succinate 50 Mg Tab.er.24h, 50 MG PO DAILY, (Reported)


   HOLD IF SBP IS LESS THAN 120 


Omeprazole 20 Mg Tablet.dr, 20 MG PO BID


   Prescribed by: DIVINA HAND on 21


Ondansetron 4 Mg Tab.rapdis, 4 MG PO Q6H PRN for NAUSEA/VOMITING


   Prescribed by: DIVINA HAND on 21


Peg 400/Hypromellose/Glycerin 15 Ml Drops, 2 DROPS OU PRN PRN for DRY EYES, 

(Reported)


Polyethylene Glycol 3350 17 Gm Powd.pack, 17 GM PO DAILY PRN for CONSTIPATION-

2ND LINE, (Reported)


Potassium Chloride 10 Meq Tablet.er, 10 MEQ PO DAILY, (Reported)


Pravastatin Sodium 20 Mg Tablet, 20 MG PO HS, (Reported)


Sacubitril/Valsartan 1 Each Tablet, 1 EA PO BID, (Reported)


   LAST FILLED 2021 #60/30 DAY SUPPLY 


Spironolactone 25 Mg Tablet, 12.5 MG PO DAILY, (Reported)


   TAKES  OF A 25MG 


Sucralfate 1 Gm Tablet, 1 GM PO QIDACHS


   Prescribed by: DIVINA HAND on 21


Topiramate 25 Mg Tablet, 50 MG PO BID, (Reported)


   TAKES 2 (25MG) TABS 


Vit C/E/Zn/Coppr/Lutein/Zeaxan 1 Each Capsule, 1 EACH PO DAILY, (Reported)





Patient Home Medication List


Home Medication List Reviewed:  Yes





Review of Systems


Review of Systems


Constitutional:  no symptoms reported


EENTM:  No Symptoms Reported


Respiratory:  No Symptoms Reported


Cardiovascular:  See HPI


Gastrointestinal:  See HPI


Genitourinary:  No Symptoms Reported


Musculoskeletal:  no symptoms reported


Skin:  no symptoms reported


Psychiatric/Neurological:  See HPI


Endocrine:  No Symptoms Reported


Hematologic/Lymphatic:  No Symptoms Reported





Past Medical-Social-Family Hx


Patient Social History


Tobacco Use?:  No


Substance use?:  No


Alcohol Use?:  No





Immunizations Up To Date


Tetanus Booster (TDap):  Unknown


First/Initial COVID19 Vaccinat:  APPROX 3 WEEKS AGO


Second COVID19 Vaccination Rodger:  TO BE HAD


COVID19 Vaccine :  MODERNA





Past Medical History


Surgeries:  Yes


Cardiac (Ablation and maze procedure),  Section, Gallbladder, 

Hysterectomy, Orthopedic, Pacemaker


Respiratory:  Yes


COPD


Cardiac:  Yes (PACEMAKER, CARDIAC ABLATION, MAZE PROCEDURE, LEFT APPENDAGE 

CLIPPED)


Atrial Fibrillation, High Cholesterol, Hypertension, Irregular Heartbeat


Neurological:  Yes (PERIPHERAL NEUROPATHY IN HANDS AND FEET;HX OF CVA -NO 

RESIDUAL)


Neuropathy, Stroke


Pregnant:  No


Reproductive Disorders:  No


Genitourinary:  No


Gastrointestinal:  Yes


Gastroesophageal Reflux


Musculoskeletal:  Yes


Chronic Back Pain


Endocrine:  Yes (INSULIN PUMP)


Diabetes, Insulin dep


HEENT:  Yes (DIABETIC RETINOPATHY)


Cancer:  No


Psychosocial:  No


Integumentary:  No


Blood Disorders:  No





Family Medical History





PAST SURGICAL HISTORY:


-LEFT EYE SURGERY FOR RETINAL BLEED 3 YEARS AGO


- X 2  IN , 


-HYSTERECTOMY 


-CHOLECYSTECTOMY ( OPEN ) 


-LEFT FOOT SURGERY 7 YEARS AGO


-LUMBAR LAMINECTOMY 20 YEARS AGO


-PACEMAKER 4 YEARS AGO


-CARDIAC ABLATION 2019


-MAZE PROCEDURE 2020


-LEFT LATERAL APPENDAGE CLIP 2020


-PT REPORTS CARDIAC CATH DONE IN  IN  SHOWED NO SIGNIFICANT CAD. 





ECHOCARDIOGRAM 21--EF 55-60%


MPI 21--NO INSCHEMIA OR INFARCTIONS





Physical Exam


Vital Signs





Vital Signs - First Documented








 21





 20:01 23:25


 


Temp 37.8 


 


Pulse 90 


 


Resp 16 


 


B/P (MAP) 152/75 (100) 


 


Pulse Ox  96


 


O2 Delivery Room Air 





Capillary Refill : Less Than 3 Seconds


Height, Weight, BMI


Height: 5'1.00"


Weight: 151lbs. 0.0oz. 68.496302fl; 22.00 BMI


Method:Stated


General Appearance:  No Apparent Distress, WD/WN, Thin


HEENT:  PERRL/EOMI, Normal ENT Inspection


Neck:  Normal Inspection


Respiratory:  Chest Non Tender, Lungs Clear, Normal Breath Sounds, No Accessory 

Muscle Use


Cardiovascular:  Regular Rate, Rhythm, No Edema, No Murmur, Normal Peripheral 

Pulses


Gastrointestinal:  Non Tender, Soft; No Distended


Extremity:  Normal Inspection, Non Tender, No Pedal Edema


Neurologic/Psychiatric:  Alert, Oriented x3, No Motor/Sensory Deficits, Normal 

Mood/Affect, CNs II-XII Norm as Tested


Skin:  Normal Color, Warm/Dry





Progress/Results/Core Measures


Results/Orders


Lab Results





Laboratory Tests








Test


 21


20:14 21


20:15 21


20:30 21


21:30 Range/Units


 


 


Glucometer 126 H      MG/DL


 


White Blood Count


 


 8.7 


 


 


 4.3-11.0


10^3/uL


 


Red Blood Count


 


 4.18 


 


 


 3.80-5.11


10^6/uL


 


Hemoglobin  12.3    11.5-16.0  g/dL


 


Hematocrit  38    35-52  %


 


Mean Corpuscular Volume  90    80-99  fL


 


Mean Corpuscular Hemoglobin  29    25-34  pg


 


Mean Corpuscular Hemoglobin


Concent 


 33 


 


 


 32-36  g/dL





 


Red Cell Distribution Width  13.5    10.0-14.5  %


 


Platelet Count


 


 154 


 


 


 130-400


10^3/uL


 


Mean Platelet Volume  9.0    9.0-12.2  fL


 


Immature Granulocyte % (Auto)  0     %


 


Neutrophils (%) (Auto)  85 H   42-75  %


 


Lymphocytes (%) (Auto)  8 L   12-44  %


 


Monocytes (%) (Auto)  6    0-12  %


 


Eosinophils (%) (Auto)  0    0-10  %


 


Basophils (%) (Auto)  0    0-10  %


 


Neutrophils # (Auto)


 


 7.4 


 


 


 1.8-7.8


10^3/uL


 


Lymphocytes # (Auto)


 


 0.7 L


 


 


 1.0-4.0


10^3/uL


 


Monocytes # (Auto)


 


 0.5 


 


 


 0.0-1.0


10^3/uL


 


Eosinophils # (Auto)


 


 0.0 


 


 


 0.0-0.3


10^3/uL


 


Basophils # (Auto)


 


 0.0 


 


 


 0.0-0.1


10^3/uL


 


Immature Granulocyte # (Auto)


 


 0.0 


 


 


 0.0-0.1


10^3/uL


 


Prothrombin Time  14.6    12.2-14.7  SEC


 


INR Comment  1.1    0.8-1.4  


 


Activated Partial


Thromboplast Time 


 32 


 


 


 24-35  SEC





 


D-Dimer


 


 0.62 H


 


 


 0.00-0.49


UG/ML


 


Sodium Level  139    135-145  MMOL/L


 


Potassium Level  3.7    3.6-5.0  MMOL/L


 


Chloride Level  109 H     MMOL/L


 


Carbon Dioxide Level  19 L   21-32  MMOL/L


 


Anion Gap  11    5-14  MMOL/L


 


Blood Urea Nitrogen  14    7-18  MG/DL


 


Creatinine


 


 1.19 


 


 


 0.60-1.30


MG/DL


 


Estimat Glomerular Filtration


Rate 


 45 


 


 


  





 


BUN/Creatinine Ratio  12     


 


Glucose Level  131 H     MG/DL


 


Calcium Level  8.9    8.5-10.1  MG/DL


 


Corrected Calcium  8.7    8.5-10.1  MG/DL


 


Magnesium Level  3.2 H   1.6-2.4  MG/DL


 


Total Bilirubin  0.9    0.1-1.0  MG/DL


 


Aspartate Amino Transf


(AST/SGOT) 


 18 


 


 


 5-34  U/L





 


Alanine Aminotransferase


(ALT/SGPT) 


 7 


 


 


 0-55  U/L





 


Alkaline Phosphatase  104      U/L


 


Myoglobin


 


 51.3 


 


 


 10.0-92.0


NG/ML


 


Troponin I  < 0.028    <0.028  NG/ML


 


C-Reactive Protein High


Sensitivity 


 1.49 H


 


 


 0.00-0.50


MG/DL


 


B-Type Natriuretic Peptide  62.9    <100.0  PG/ML


 


Total Protein  7.3    6.4-8.2  GM/DL


 


Albumin  4.2    3.2-4.5  GM/DL


 


Procalcitonin  0.16 H   <0.10  NG/ML


 


Influenza Type A (RT-PCR)   Not Detected   Not Detecte  


 


Influenza Type B (RT-PCR)   Not Detected   Not Detecte  


 


SARS-CoV-2 RNA (RT-PCR)   Not Detected   Not Detecte  


 


Urine Color    YELLOW   


 


Urine Clarity    CLEAR   


 


Urine pH    5.5  5-9  


 


Urine Specific Gravity    1.025 H 1.016-1.022  


 


Urine Protein    NEGATIVE  NEGATIVE  


 


Urine Glucose (UA)    NEGATIVE  NEGATIVE  


 


Urine Ketones    1+ H NEGATIVE  


 


Urine Nitrite    NEGATIVE  NEGATIVE  


 


Urine Bilirubin    NEGATIVE  NEGATIVE  


 


Urine Urobilinogen    0.2  < = 1.0  MG/DL


 


Urine Leukocyte Esterase    NEGATIVE  NEGATIVE  


 


Urine RBC (Auto)    NEGATIVE  NEGATIVE  


 


Urine RBC    NONE   /HPF


 


Urine WBC    2-5   /HPF


 


Urine Crystals    PRESENT H  /LPF


 


Urine Amorphous Sediment


 


 


 


 RARE ELLIE


URATES H  /LPF





 


Urine Bacteria    TRACE   /HPF


 


Urine Casts    NONE   /LPF


 


Urine Mucus    SMALL H  /LPF


 


Urine Culture Indicated    NO   


 


Test


 7/20/21


22:15 


 


 


 Range/Units


 


 


Troponin I < 0.028     <0.028  NG/ML








My Orders





Orders - CINDY NGO MD


Cbc With Automated Diff (21 20:09)


Magnesium (21 20:09)


Chest 1 View, Ap/Pa Only (21 20:09)


Comprehensive Metabolic Panel (21 20:09)


Myoglobin Serum (21 20:09)


Protime With Inr (21 20:09)


Partial Thromboplastin Time (21 20:09)


O2 (21 20:)


Monitor-Rhythm Ecg Trace Only (21 20:)


Ed Iv/Invasive Line Start (21 20:09)


Troponin I (21 20:09)


BNP (21 20:17)


Hs C Reactive Protein (21 20:17)


Aspirin Chewable Tablet (Baby Aspirin Ch (21 20:30)


Procalcitonin (Pct) (21 20:18)


Covid 19 Inhouse Test (21 20:18)


Influenza A And B By Pcr (21 20:18)


Fibrin Degradation Products (21 20:15)


Ua Culture If Indicated (21 20:49)


Lactated Ringers (Lr 1000 Ml Iv Solution (21 21:30)


Ct Angio Chest W (21 21:19)


Iohexol Injection (Omnipaque 350 Mg/Ml 1 (21 21:30)


Received Contrast (Hold Metformin- Contr (21 21:30)


Sodium Chloride Flush (Catheter Flush Sy (21 21:30)


Ns (Ivpb) (Sodium Chloride 0.9% Ivpb Bag (21 21:30)


Acetaminophen  Tablet (Tylenol  Tablet) (21 22:00)


Troponin I (21 22:15)





Medications Given in ED





Vital Signs/I&O











 21





 20:01 20:01 23:25


 


Temp  37.8 37.0


 


Pulse  90 75


 


Resp  16 18


 


B/P (MAP)  152/75 (100) 119/53


 


Pulse Ox   96


 


O2 Delivery Room Air Room Air Room Air














Blood Pressure Mean:                    100











FSBG Bedside Testing


Finger Stick Blood Glucose:  126





Progress


Progress Note :  


Progress Note


Covid screening was performed due to her fever.  Result was negative.  No other 

source of infection was identified.  Tylenol was given for the fever.  Fever is 

presumed to be caused by some other viral illness.  D-dimer was elevated and CT 

angiogram of the chest was obtained.  There is no evidence of pneumonia or 

pulmonary embolus.  No other dangerous pathologies were seen on CT.  Chart was 

reviewed and patient has no evidence of coronary artery disease by prior 

studies.  A repeat troponin was negative.  Ultimately patient was discharged 

home to outpatient follow-up.  See discharge instructions for discussion.


Initial ECG Impression Date:  2021


Initial ECG Impression Time:  20:06


Initial ECG Rate:  85


Comment


Atrial paced complexes with no ischemic changes.  No ST elevation or depression.

 No significant abnormal intervals or axis deviation.





Diagnostic Imaging





   Diagonstic Imaging:  Xray


   Plain Films/CT/US/NM/MRI:  chest


Comments


Chest x-ray viewed by me and report reviewed.  See report below:





NAME:   RICK POWERS  


MED REC#:   [id number]  


ACCOUNT#:   [id number]  


PT STATUS:   REG ER  


:   1950  


PHYSICIAN:   CINDY NGO MD  


ADMIT DATE:   21/ER  


                                                                      

***Draft***  


Date of Exam:21  





CHEST 1 VIEW, AP/PA ONLY  








INDICATION: Chest pain   





 COMPARISON: 2021   





 FINDINGS:   





 Single view of the chest demonstrates stable cardiac enlargement.  


 The lungs are clear. There is no pneumothorax. The pacemaker is  


 in stable position. Osseous structures are age-appropriate.  





 IMPRESSION:   





 Stable cardiac enlargement without pulmonary edema or acute  


 infiltrate.   





   Dictated on workstation # [id number]  








Dict:   21  


Trans:   21  


[facility] [id number]  





Interpreted by:     KEILA HICKS








   Diagonstic Imaging:  CT


   Plain Films/CT/US/NM/MRI:  chest


Comments


CT angiogram chest viewed by me and report reviewed.  See report below:





NAME:   RICK POWERS


MED REC#:   [id number]


ACCOUNT#:   [id number]


PT STATUS:   REG ER


:   1950


PHYSICIAN:   CINDY NGO MD


ADMIT DATE:   21/ER


***Signed***


Date of Exam:21





CT ANGIO CHEST W





PROCEDURE: CT angiography of the chest with contrast.





TECHNIQUE: Multiple contiguous axial images were obtained through


the chest after uneventful bolus administration of intravenous


contrast. 3D reconstructed CTA MIP acquisitions were also


performed.


Auto Exposure Controls were utilized during the CT exam to meet


ALARA standards for radiation dose reduction. 


 


INDICATION: Chest pain. Cardiac pacemaker.





COMPARISON: 2018





FINDINGS: 





The heart is mildly enlarged but stable. Cardiac pacemaker leads


are seen in the right heart. There is no pericardial effusion.


There is no lymphadenopathy. The pulmonary arteries and aorta are


grossly unremarkable. There is no dissection or aneurysm. No


pulmonary embolism is seen. The lungs are clear throughout. No


mass or  nodular infiltrate is seen. There is no pleural


effusion. Small hiatal hernia is present. Osseous structures are


age-appropriate. Visualized upper abdominal solid organs are


intact.





IMPRESSION: 


1. Small hiatal hernia.


2. Cardiac enlargement without pulmonary edema or acute


infiltrate.


3. No pulmonary embolism or acute aortic pathology identified. 





Dictated by: 





  Dictated on workstation # [id number]





Dict:   21


Trans:   21


[facility] [id number]





Interpreted by:     KEILA HICKS


Electronically signed by: KEILA HICKS 21





Departure


Impression





   Primary Impression:  


   Chest pain


   Qualified Codes:  R07.9 - Chest pain, unspecified


   Additional Impressions:  


   Fever


   Qualified Codes:  R50.9 - Fever, unspecified


   Hiatal hernia


Disposition:  01 HOME, SELF-CARE


Condition:  Improved





Departure-Patient Inst.


Decision time for Depature:  22:49


Referrals:  


IBRAHIMA MIR,LOCAL PHYSICIAN (PCP)


Primary Care Physician


Patient Instructions:  Chest Pain That Is Not Caused by the Heart (DC), Fever, 

Adult ED





Add. Discharge Instructions:  


Continue with your medications for acid reflux and hiatal hernia as previously 

directed.


For your fever and chest pain you may take Tylenol (acetaminophen) up to 1000 mg

every 6 hours as needed.


Avoid the following: Eating large meals, eating close to bedtime, caffeine, 

carbonation, tomato products, citrus fruits and juices, alcohol, tobacco, mints,

chocolate, NSAID medications such as ibuprofen or naproxen, fatty or greasy 

foods, spicy foods, or anything else you know irritates your stomach or causes 

acid reflux.  Elevating the head of your bed when you lay down may also be 

helpful.


Seek referral to a surgeon such as Dr. Mir (see below) to discuss potential 

for scoping your esophagus and the stomach.  Your primary care provider can also

assist you with arranging a referral for endoscopy.


Follow-up with your primary care provider as soon as possible.


Call with questions or concerns.


Return to the ER if you have worsening symptoms.





All discharge instructions reviewed with patient and/or family. Voiced under

standing.











CINDY NGO MD        2021 21:52

## 2021-07-20 NOTE — DIAGNOSTIC IMAGING REPORT
INDICATION: Chest pain 



COMPARISON: 07/12/2021 



FINDINGS: 



Single view of the chest demonstrates stable cardiac enlargement.

The lungs are clear. There is no pneumothorax. The pacemaker is

in stable position. Osseous structures are age-appropriate.



IMPRESSION: 



Stable cardiac enlargement without pulmonary edema or acute

infiltrate. 



Dictated by: 



  Dictated on workstation # [id number]

## 2021-07-20 NOTE — DIAGNOSTIC IMAGING REPORT
PROCEDURE: CT angiography of the chest with contrast.



TECHNIQUE: Multiple contiguous axial images were obtained through

the chest after uneventful bolus administration of intravenous

contrast. 3D reconstructed CTA MIP acquisitions were also

performed.

Auto Exposure Controls were utilized during the CT exam to meet

ALARA standards for radiation dose reduction. 

 

INDICATION: Chest pain. Cardiac pacemaker.



COMPARISON: 03/23/2018



FINDINGS: 



The heart is mildly enlarged but stable. Cardiac pacemaker leads

are seen in the right heart. There is no pericardial effusion.

There is no lymphadenopathy. The pulmonary arteries and aorta are

grossly unremarkable. There is no dissection or aneurysm. No

pulmonary embolism is seen. The lungs are clear throughout. No

mass or  nodular infiltrate is seen. There is no pleural

effusion. Small hiatal hernia is present. Osseous structures are

age-appropriate. Visualized upper abdominal solid organs are

intact.



IMPRESSION: 

1. Small hiatal hernia.

2. Cardiac enlargement without pulmonary edema or acute

infiltrate.

3. No pulmonary embolism or acute aortic pathology identified. 



Dictated by: 



  Dictated on workstation # [id number]

## 2022-01-03 ENCOUNTER — HOSPITAL ENCOUNTER (EMERGENCY)
Dept: HOSPITAL 75 - ER | Age: 72
Discharge: HOME | End: 2022-01-03
Payer: COMMERCIAL

## 2022-01-03 VITALS — HEIGHT: 65.75 IN | WEIGHT: 138.89 LBS | BODY MASS INDEX: 22.59 KG/M2

## 2022-01-03 VITALS — DIASTOLIC BLOOD PRESSURE: 61 MMHG | SYSTOLIC BLOOD PRESSURE: 155 MMHG

## 2022-01-03 DIAGNOSIS — K21.9: ICD-10-CM

## 2022-01-03 DIAGNOSIS — N39.0: Primary | ICD-10-CM

## 2022-01-03 DIAGNOSIS — Z79.899: ICD-10-CM

## 2022-01-03 DIAGNOSIS — J44.9: ICD-10-CM

## 2022-01-03 DIAGNOSIS — Z20.822: ICD-10-CM

## 2022-01-03 DIAGNOSIS — Z79.4: ICD-10-CM

## 2022-01-03 DIAGNOSIS — E11.9: ICD-10-CM

## 2022-01-03 DIAGNOSIS — I48.91: ICD-10-CM

## 2022-01-03 DIAGNOSIS — Z79.01: ICD-10-CM

## 2022-01-03 DIAGNOSIS — Z86.73: ICD-10-CM

## 2022-01-03 DIAGNOSIS — E78.00: ICD-10-CM

## 2022-01-03 LAB
ALBUMIN SERPL-MCNC: 3.6 GM/DL (ref 3.2–4.5)
ALP SERPL-CCNC: 79 U/L (ref 40–136)
ALT SERPL-CCNC: 6 U/L (ref 0–55)
APTT BLD: 35 SEC (ref 24–35)
APTT PPP: YELLOW S
BACTERIA #/AREA URNS HPF: (no result) /HPF
BASOPHILS # BLD AUTO: 0 10^3/UL (ref 0–0.1)
BASOPHILS NFR BLD AUTO: 0 % (ref 0–10)
BILIRUB SERPL-MCNC: 0.7 MG/DL (ref 0.1–1)
BILIRUB UR QL STRIP: NEGATIVE
BUN/CREAT SERPL: 16
CALCIUM SERPL-MCNC: 8.6 MG/DL (ref 8.5–10.1)
CHLORIDE SERPL-SCNC: 101 MMOL/L (ref 98–107)
CO2 SERPL-SCNC: 25 MMOL/L (ref 21–32)
CREAT SERPL-MCNC: 0.86 MG/DL (ref 0.6–1.3)
EOSINOPHIL # BLD AUTO: 0 10^3/UL (ref 0–0.3)
EOSINOPHIL NFR BLD AUTO: 0 % (ref 0–10)
ERYTHROCYTE [SEDIMENTATION RATE] IN BLOOD: 54 MM/HR (ref 0–30)
FIBRINOGEN PPP-MCNC: CLEAR MG/DL
GFR SERPLBLD BASED ON 1.73 SQ M-ARVRAT: 65 ML/MIN
GLUCOSE SERPL-MCNC: 143 MG/DL (ref 70–105)
GLUCOSE UR STRIP-MCNC: NEGATIVE MG/DL
HCT VFR BLD CALC: 29 % (ref 35–52)
HGB BLD-MCNC: 8.8 G/DL (ref 11.5–16)
INR PPP: 1.2 (ref 0.8–1.4)
KETONES UR QL STRIP: NEGATIVE
LEUKOCYTE ESTERASE UR QL STRIP: NEGATIVE
LYMPHOCYTES # BLD AUTO: 1.2 10^3/UL (ref 1–4)
LYMPHOCYTES NFR BLD AUTO: 17 % (ref 12–44)
MANUAL DIFFERENTIAL PERFORMED BLD QL: NO
MCH RBC QN AUTO: 25 PG (ref 25–34)
MCHC RBC AUTO-ENTMCNC: 30 G/DL (ref 32–36)
MCV RBC AUTO: 82 FL (ref 80–99)
MONOCYTES # BLD AUTO: 0.3 10^3/UL (ref 0–1)
MONOCYTES NFR BLD AUTO: 5 % (ref 0–12)
NEUTROPHILS # BLD AUTO: 5.2 10^3/UL (ref 1.8–7.8)
NEUTROPHILS NFR BLD AUTO: 77 % (ref 42–75)
NITRITE UR QL STRIP: NEGATIVE
PH UR STRIP: 8 [PH] (ref 5–9)
PLATELET # BLD: 208 10^3/UL (ref 130–400)
PMV BLD AUTO: 8.1 FL (ref 9–12.2)
POTASSIUM SERPL-SCNC: 3.9 MMOL/L (ref 3.6–5)
PROT SERPL-MCNC: 7.7 GM/DL (ref 6.4–8.2)
PROT UR QL STRIP: NEGATIVE
PROTHROMBIN TIME: 15.9 SEC (ref 12.2–14.7)
RBC #/AREA URNS HPF: (no result) /HPF
RENAL EPI CELLS #/AREA URNS HPF: (no result) /HPF
SODIUM SERPL-SCNC: 135 MMOL/L (ref 135–145)
SP GR UR STRIP: 1.01 (ref 1.02–1.02)
SQUAMOUS #/AREA URNS HPF: (no result) /HPF
WBC # BLD AUTO: 6.8 10^3/UL (ref 4.3–11)
WBC #/AREA URNS HPF: (no result) /HPF

## 2022-01-03 PROCEDURE — 81000 URINALYSIS NONAUTO W/SCOPE: CPT

## 2022-01-03 PROCEDURE — 87088 URINE BACTERIA CULTURE: CPT

## 2022-01-03 PROCEDURE — 85025 COMPLETE CBC W/AUTO DIFF WBC: CPT

## 2022-01-03 PROCEDURE — 71045 X-RAY EXAM CHEST 1 VIEW: CPT

## 2022-01-03 PROCEDURE — 85730 THROMBOPLASTIN TIME PARTIAL: CPT

## 2022-01-03 PROCEDURE — 85652 RBC SED RATE AUTOMATED: CPT

## 2022-01-03 PROCEDURE — 83605 ASSAY OF LACTIC ACID: CPT

## 2022-01-03 PROCEDURE — 87636 SARSCOV2 & INF A&B AMP PRB: CPT

## 2022-01-03 PROCEDURE — 96365 THER/PROPH/DIAG IV INF INIT: CPT

## 2022-01-03 PROCEDURE — 86141 C-REACTIVE PROTEIN HS: CPT

## 2022-01-03 PROCEDURE — 87186 SC STD MICRODIL/AGAR DIL: CPT

## 2022-01-03 PROCEDURE — 87040 BLOOD CULTURE FOR BACTERIA: CPT

## 2022-01-03 PROCEDURE — 36415 COLL VENOUS BLD VENIPUNCTURE: CPT

## 2022-01-03 PROCEDURE — 85610 PROTHROMBIN TIME: CPT

## 2022-01-03 PROCEDURE — 96361 HYDRATE IV INFUSION ADD-ON: CPT

## 2022-01-03 PROCEDURE — 87804 INFLUENZA ASSAY W/OPTIC: CPT

## 2022-01-03 PROCEDURE — 80053 COMPREHEN METABOLIC PANEL: CPT

## 2022-01-03 PROCEDURE — 87635 SARS-COV-2 COVID-19 AMP PRB: CPT

## 2022-01-03 PROCEDURE — 83615 LACTATE (LD) (LDH) ENZYME: CPT

## 2022-01-03 PROCEDURE — 87077 CULTURE AEROBIC IDENTIFY: CPT

## 2022-01-03 PROCEDURE — 84145 PROCALCITONIN (PCT): CPT

## 2022-01-03 NOTE — ED GENERAL
General


Chief Complaint:  Altered Mental Status


Stated Complaint:  FEVER,CONFUSION


Nursing Triage Note:  


brought in for confusion/fever since . glucose per insulin pump 119. pt 


alert/oreinted to place/time/self/situation.





Allergies and Home Medications


Allergies


Coded Allergies:  


     promethazine (Verified  Allergy, Intermediate, 3/11/08)


     clindamycin (Verified  Allergy, Unknown, 3/7/21)


     hydrocodone (Verified  Allergy, Unknown, 3/7/21)


     morphine (Verified  Allergy, Unknown, 3/7/21)





Patient Home Medication List


Amiodarone HCl (Amiodarone HCl) 200 Mg Tablet, 200 MG PO DAILY, (Reported)


   Entered as Reported by: DAKOTA BALL on 21 1030


Amoxicillin (Amoxicillin) 500 Mg Capsule, 500 MG PO Q12H


   Prescribed by: RICA WATERS on 21 1104


Apixaban (Eliquis) 5 Mg Tablet, 5 MG PO BID, (Reported)


   Entered as Reported by: DAKOTA BALL on 3/8/21 1009


Benzonatate (Benzonatate) 200 Mg Capsule, 200 MG PO TID PRN for COUGH, 

(Reported)


   Entered as Reported by: DAKOTA BALL on 3/8/21 1009


Celecoxib (Celecoxib) 100 Mg Capsule, (Reported)


   Entered as Reported by: ANIRUDH GOODY on 1/3/22 2154


   Last Action: New Order


Clonazepam (Clonazepam) 0.5 Mg Tablet, 0.25-0.5 MG PO BID PRN for ANXIETY, 

(Reported)


   Entered as Reported by: DAKOTA BALL on 21 1030


Colchicine (Colchicine) 0.6 Mg Tablet, (Reported)


   Entered as Reported by: ANIRUDH GODOY on 1/3/22 2154


   Last Action: New Order


Ergocalciferol (Vitamin D2) (Vitamin D2) 1,250 Mcg Capsule, 1,250 MCG PO SAT, 

(Reported)


   Entered as Reported by: DAKOTA BALL on 21 1030


Indomethacin (Indomethacin) 50 Mg Capsule, 50 NG PO BID PRN for PAIN-

BREAKTHROUGH, (Reported)


   Entered as Reported by: DAKOTA BALL on 21 1030


Insulin Aspart (Novolog) 100 Unit/1 Ml Susp, UNITS SC PER PUMP, (Reported)


   Entered as Reported by: DAKOTA BALL on 3/8/21 1009


Insulin Determir (Levemir) 1,000 Units/10 Ml Soln, 11 UNITS SC DAILY PRN for IF 

INSULIN PUMP IS UNAVALIABLE, (Reported)


   Entered as Reported by: DAKOTA BALL on 3/8/21 1009


Metoprolol Succinate (Metoprolol Succinate) 50 Mg Tab.er.24h, 50 MG PO DAILY, 

(Reported)


   Entered as Reported by: DAKOTA BALL on 3/8/21 1009


Omeprazole (Omeprazole) 20 Mg Tablet.dr, 20 MG PO BID


   Prescribed by: DIVINA HAND on 21


Ondansetron (Ondansetron Odt) 4 Mg Tab.rapdis, 4 MG PO Q6H PRN for 

NAUSEA/VOMITING


   Prescribed by: DIVINA HAND on 21


Peg 400/Hypromellose/Glycerin (Visine Dry Eye Relief Drop) 15 Ml Drops, 2 DROPS 

OU PRN PRN for DRY EYES, (Reported)


   Entered as Reported by: DAKOTA BALL on 3/8/21 1009


Polyethylene Glycol 3350 (Miralax) 17 Gm Powd.pack, 17 GM PO DAILY PRN for 

CONSTIPATION-2ND LINE, (Reported)


   Entered as Reported by: DAKOTA BALL on 3/8/21 1009


Potassium Chloride (K-Tab ER) 10 Meq Tablet.er, 10 MEQ PO DAILY, (Reported)


   Entered as Reported by: DAKOTA BALL on 3/8/21 1009


Pravastatin Sodium (Pravastatin Sodium) 20 Mg Tablet, 20 MG PO HS, (Reported)


   Entered as Reported by: DAKOTA BALL on 3/8/21 1009


Sacubitril/Valsartan (Entresto 24 mg-26 mg Tablet) 1 Each Tablet, 1 EA PO BID, 

(Reported)


   Entered as Reported by: DAKOTA BALL on 3/8/21 1009


Spironolactone (Spironolactone) 25 Mg Tablet, 12.5 MG PO DAILY, (Reported)


   Entered as Reported by: DAKOTA BALL on 3/8/21 1009


Sucralfate (Carafate) 1 Gm Tablet, 1 GM PO QIDACHS


   Prescribed by: DIVINA HAND on 21


Terbinafine HCl (Terbinafine HCl) 250 Mg Tablet, (Reported)


   Entered as Reported by: ANIRUDH GODOY on 1/3/22 2154


   Last Action: New Order


Topiramate (Topiramate) 25 Mg Tablet, 50 MG PO BID, (Reported)


   Entered as Reported by: CHUY SOLARES on 21 1730


Vit C/E/Zn/Coppr/Lutein/Zeaxan (Preservision Areds 2 Softgel) 1 Each Capsule, 1 

EACH PO DAILY, (Reported)


   Entered as Reported by: DAKOTA BALL on 3/8/21 1009





Past Medical-Social-Family Hx


Patient Social History


Tobacco Use?:  No


Substance use?:  No


Alcohol Use?:  No


Pt feels they are or have been:  No





Immunizations Up To Date


Tetanus Booster (TDap):  Unknown


First/Initial COVID19 Vaccinat:  


Second COVID19 Vaccination Rodger:  


COVID19 Vaccine :  moderna





Past Medical History


Surgery/Hospitalization HX:  


cardiac ablation, ppm, c-sect, hysterectomy, cholecystectomy, lumbar 


laminectomy, heart cath, htn, iddm, gerd, high cholesterol, a-fib, peripheral 


neuropathy, cva


Surgeries:  Yes


Cardiac,  Section, Gallbladder, Hysterectomy, Orthopedic, Pacemaker


Respiratory:  Yes


COPD


Cardiac:  Yes (PACEMAKER, CARDIAC ABLATION, MAZE PROCEDURE, LEFT APPENDAGE 

CLIPPED)


Atrial Fibrillation, High Cholesterol, Hypertension, Irregular Heartbeat


Neurological:  Yes (PERIPHERAL NEUROPATHY IN HANDS AND FEET;HX OF CVA -NO 

RESIDUAL)


Neuropathy, Stroke


Reproductive Disorders:  No


Genitourinary:  No


Gastrointestinal:  Yes


Gastroesophageal Reflux


Musculoskeletal:  Yes


Chronic Back Pain


Endocrine:  Yes (INSULIN PUMP)


Diabetes, Insulin dep


HEENT:  Yes (DIABETIC RETINOPATHY)


Cancer:  No


Psychosocial:  No


Integumentary:  No


Blood Disorders:  No





Family Medical History





PAST SURGICAL HISTORY:


-LEFT EYE SURGERY FOR RETINAL BLEED 3 YEARS AGO


- X 2  IN , 


-HYSTERECTOMY 


-CHOLECYSTECTOMY ( OPEN ) 


-LEFT FOOT SURGERY 7 YEARS AGO


-LUMBAR LAMINECTOMY 20 YEARS AGO


-PACEMAKER 4 YEARS AGO


-CARDIAC ABLATION 2019


-MAZE PROCEDURE 2020


-LEFT LATERAL APPENDAGE CLIP 2020


-PT REPORTS CARDIAC CATH DONE IN  IN  SHOWED NO SIGNIFICANT CAD. 





ECHOCARDIOGRAM 21--EF 55-60%


MPI 21--NO INSCHEMIA OR INFARCTIONS





Physical Exam


Vital Signs





Vital Signs - First Documented








 1/3/22





 21:45


 


Temp 36.9


 


Pulse 83


 


Resp 18


 


B/P (MAP) 145/62 (89)


 


Pulse Ox 99


 


O2 Delivery Room Air





Capillary Refill : Less Than 3 Seconds


Height, Weight, BMI


Height: 5'1.00"


Weight: 151lbs. 0.0oz. [id number]; 22.00 BMI


Method:Stated





Focused Exam


Lactate Level


1/3/22 22:16: Lactic Acid Level 0.96





Lactic Acid Level





Laboratory Tests








Test


 1/3/22


22:16


 


Lactic Acid Level


 0.96 MMOL/L


(0.50-2.00)











Progress/Results/Core Measures


Suspected Sepsis


SIRS


Temperature: 


Pulse: 83 


Respiratory Rate: 18


 


Laboratory Tests


1/3/22 22:16: White Blood Count 6.8


Blood Pressure 145 /62 


Mean: 89


 


1/3/22 22:16: Lactic Acid Level 0.96


Laboratory Tests


1/3/22 22:16: 


Creatinine 0.86, INR Comment 1.2, Platelet Count 208, Total Bilirubin 0.7








Results/Orders


Lab Results





Laboratory Tests








Test


 1/3/22


10:10 1/3/22


22:16 1/3/22


22:27 Range/Units


 


 


Urine Color YELLOW     


 


Urine Clarity CLEAR     


 


Urine pH 8.0    5-9  


 


Urine Specific Gravity 1.015 L   1.016-1.022  


 


Urine Protein NEGATIVE    NEGATIVE  


 


Urine Glucose (UA) NEGATIVE    NEGATIVE  


 


Urine Ketones NEGATIVE    NEGATIVE  


 


Urine Nitrite NEGATIVE    NEGATIVE  


 


Urine Bilirubin NEGATIVE    NEGATIVE  


 


Urine Urobilinogen 0.2    < = 1.0  MG/DL


 


Urine Leukocyte Esterase NEGATIVE    NEGATIVE  


 


Urine RBC (Auto) NEGATIVE    NEGATIVE  


 


Urine RBC NONE     /HPF


 


Urine WBC 2-5     /HPF


 


Urine Squamous Epithelial


Cells 0-2 


 


 


  /HPF





 


Urine Renal Epithelial Cells NONE     /HPF


 


Urine Crystals NONE     /LPF


 


Urine Bacteria LARGE H    /HPF


 


Urine Casts NONE     /LPF


 


Urine Mucus NEGATIVE     /LPF


 


Urine Culture Indicated


 CULTURE


PENDING 


 


  





 


White Blood Count


 


 6.8 


 


 4.3-11.0


10^3/uL


 


Red Blood Count


 


 3.54 L


 


 3.80-5.11


10^6/uL


 


Hemoglobin  8.8 L  11.5-16.0  g/dL


 


Hematocrit  29 L  35-52  %


 


Mean Corpuscular Volume  82   80-99  fL


 


Mean Corpuscular Hemoglobin  25   25-34  pg


 


Mean Corpuscular Hemoglobin


Concent 


 30 L


 


 32-36  g/dL





 


Red Cell Distribution Width  15.9 H  10.0-14.5  %


 


Platelet Count


 


 208 


 


 130-400


10^3/uL


 


Mean Platelet Volume  8.1 L  9.0-12.2  fL


 


Immature Granulocyte % (Auto)  0    %


 


Neutrophils (%) (Auto)  77 H  42-75  %


 


Lymphocytes (%) (Auto)  17   12-44  %


 


Monocytes (%) (Auto)  5   0-12  %


 


Eosinophils (%) (Auto)  0   0-10  %


 


Basophils (%) (Auto)  0   0-10  %


 


Neutrophils # (Auto)


 


 5.2 


 


 1.8-7.8


10^3/uL


 


Lymphocytes # (Auto)


 


 1.2 


 


 1.0-4.0


10^3/uL


 


Monocytes # (Auto)


 


 0.3 


 


 0.0-1.0


10^3/uL


 


Eosinophils # (Auto)


 


 0.0 


 


 0.0-0.3


10^3/uL


 


Basophils # (Auto)


 


 0.0 


 


 0.0-0.1


10^3/uL


 


Immature Granulocyte # (Auto)


 


 0.0 


 


 0.0-0.1


10^3/uL


 


Erythrocyte Sedimentation Rate  54 H  0-30  MM/HR


 


Prothrombin Time  15.9 H  12.2-14.7  SEC


 


INR Comment  1.2   0.8-1.4  


 


Activated Partial


Thromboplast Time 


 35 


 


 24-35  SEC





 


Sodium Level  135   135-145  MMOL/L


 


Potassium Level  3.9   3.6-5.0  MMOL/L


 


Chloride Level  101     MMOL/L


 


Carbon Dioxide Level  25   21-32  MMOL/L


 


Anion Gap  9   5-14  MMOL/L


 


Blood Urea Nitrogen  14   7-18  MG/DL


 


Creatinine


 


 0.86 


 


 0.60-1.30


MG/DL


 


Estimat Glomerular Filtration


Rate 


 65 


 


  





 


BUN/Creatinine Ratio  16    


 


Glucose Level  143 H    MG/DL


 


Lactic Acid Level


 


 0.96 


 


 0.50-2.00


MMOL/L


 


Calcium Level  8.6   8.5-10.1  MG/DL


 


Corrected Calcium  8.9   8.5-10.1  MG/DL


 


Total Bilirubin  0.7   0.1-1.0  MG/DL


 


Aspartate Amino Transf


(AST/SGOT) 


 14 


 


 5-34  U/L





 


Alanine Aminotransferase


(ALT/SGPT) 


 6 


 


 0-55  U/L





 


Alkaline Phosphatase  79     U/L


 


Lactate Dehydrogenase  260 H  125-220  U/L


 


C-Reactive Protein High


Sensitivity 


 2.26 H


 


 0.00-0.50


MG/DL


 


Total Protein  7.7   6.4-8.2  GM/DL


 


Albumin  3.6   3.2-4.5  GM/DL


 


Procalcitonin  0.19 H  <0.10  NG/ML


 


Influenza Type A Antigen   NEGATIVE  NEGATIVE  


 


Influenza Type B Antigen   NEGATIVE  NEGATIVE  


 


SARS-CoV-2 RNA (RT-PCR)   Negative  Negative  








My Orders





Orders - DARA PAL DO


Cbc With Automated Diff (1/3/22 21:55)


Comprehensive Metabolic Panel (1/3/22 21:55)


Procalcitonin (Pct) (1/3/22 21:55)


Hs C Reactive Protein (1/3/22 21:55)


Erythrocyte Sedimentation Rate (1/3/22 21:55)


LDH (1/3/22 21:55)


Blood Culture (1/3/22 21:55)


Chest 1 View, Ap/Pa Only (1/3/22 21:55)


Covid 19 Inhouse Test (1/3/22 21:55)


Influenza A & B Antigens (1/3/22 21:55)


Sputum Culture (1/3/22 21:55)


Urinalysis (1/3/22 21:55)


Urine Culture (1/3/22 21:55)


Protime With Inr (1/3/22 21:55)


Partial Thromboplastin Time (1/3/22 21:55)


Ed Iv/Invasive Line Start (1/3/22 21:55)


Ed Iv/Invasive Line Start (1/3/22 21:55)


Vital Signs Adult Sepsis Patie Q15M (1/3/22 21:55)


O2 (1/3/22 21:55)


Remove Rings In Anticipation O (1/3/22 21:55)


Lactic Acid Analyzer (1/3/22 21:55)


Isolation Central Supply Req (1/3/22 21:55)


Ed Iv/Invasive Line Start (1/3/22 21:55)


Lactated Ringers (Lr 1000 Ml Iv Solution (1/3/22 22:00)


Ceftriaxone 1 Gm Pre-Mix (Rocephin 1 Gm (1/3/22 23:10)


Coronavirus Sars-Cov-2 So  (1/3/22 23:24)





Medications Given in ED





Current Medications








 Medications  Dose


 Ordered  Sig/Chris


 Route  Start Time


 Stop Time Status Last Admin


Dose Admin


 


 Lactated Ringer's  1,000 ml @ 


 0 mls/hr  Q0M ONCE


 IV  1/3/22 22:00


 1/3/22 22:01 DC 1/3/22 22:25


0 MLS/HR








Vital Signs/I&O











 1/3/22





 21:45


 


Temp 36.9


 


Pulse 83


 


Resp 18


 


B/P (MAP) 145/62 (89)


 


Pulse Ox 99


 


O2 Delivery Room Air





Capillary Refill : Less Than 3 Seconds








Blood Pressure Mean:                    89











Departure


Impression





   Primary Impression:  


   UTI (urinary tract infection)


   Additional Impression:  


   IDDM (insulin dependent diabetes mellitus)


Disposition:   HOME, SELF-CARE


Condition:  Stable





Departure-Patient Inst.


Decision time for Depature:  23:40


Referrals:  


JOSSELYN WILLIS MD (PCP)


Primary Care Physician








NO,LOCAL PHYSICIAN (Family)


Primary Care Physician


Patient Instructions:  DIABETES, Urinary Tract Infection, Adult (DC)





Add. Discharge Instructions:  


TAKE YOUR MEDICATION AS PRESCRIBED





CHECK YOUR TEMPERATURE EVERY 2-3 HOURS AND ALTERNATE TYLENOL AND MOTRIN EVERY 2-

3 HOURS AS NEEDED FOR PAIN OR FEVER OVER 100





LOTS OF FLUIDS





FOLLOW UP WITH YOUR DR IN 2-3 DAYS FOR FURTHER CARE, RETURN TO ER IF WORSE





All discharge instructions reviewed with patient and/or family. Voiced 

understanding.


Scripts


Cefdinir (Cefdinir) 300 Mg Capsule


300 MG PO BID, #20 CAP


   Prov: DARA PAL DO         1/3/22











DARA PAL DO                  Ayush 3, 2022 23:42

## 2022-01-03 NOTE — DIAGNOSTIC IMAGING REPORT
CLINICAL INDICATION: Patient with fever.



EXAM: Portable chest x-ray upright view.



COMPARISON: Chest x-ray dated 07/20/2021.



FINDINGS:



Lungs/pleura: Bibasilar atelectasis is seen. There is no definite

lung infiltrate. There is no pneumothorax. There is no pleural

effusion.



Mediastinum: Unremarkable. 



Pulmonary vasculature: Unremarkable.



Heart: Heart size is mildly enlarged. Cardiac pacemaker again

seen.



Bones/extrathoracic soft tissue: There are degenerative spurs

involving the spine.



IMPRESSION:

1: There is no radiographic evidence of acute cardiopulmonary

process. There is mild bibasilar atelectasis.



2: The remainder of this exam shows no significant interval

change compared to the prior study of comparison.



Dictated by: 



  Dictated on workstation # [id number]

## 2022-01-05 ENCOUNTER — HOSPITAL ENCOUNTER (INPATIENT)
Dept: HOSPITAL 75 - ER | Age: 72
LOS: 1 days | Discharge: HOME | DRG: 690 | End: 2022-01-06
Attending: INTERNAL MEDICINE | Admitting: INTERNAL MEDICINE
Payer: COMMERCIAL

## 2022-01-05 VITALS — DIASTOLIC BLOOD PRESSURE: 70 MMHG | SYSTOLIC BLOOD PRESSURE: 136 MMHG

## 2022-01-05 VITALS — WEIGHT: 120.15 LBS | BODY MASS INDEX: 22.68 KG/M2 | HEIGHT: 61.02 IN

## 2022-01-05 VITALS — SYSTOLIC BLOOD PRESSURE: 162 MMHG | DIASTOLIC BLOOD PRESSURE: 76 MMHG

## 2022-01-05 VITALS — DIASTOLIC BLOOD PRESSURE: 75 MMHG | SYSTOLIC BLOOD PRESSURE: 154 MMHG

## 2022-01-05 DIAGNOSIS — I11.0: ICD-10-CM

## 2022-01-05 DIAGNOSIS — R78.81: ICD-10-CM

## 2022-01-05 DIAGNOSIS — Z95.0: ICD-10-CM

## 2022-01-05 DIAGNOSIS — E78.00: ICD-10-CM

## 2022-01-05 DIAGNOSIS — E11.319: ICD-10-CM

## 2022-01-05 DIAGNOSIS — K21.9: ICD-10-CM

## 2022-01-05 DIAGNOSIS — I48.91: ICD-10-CM

## 2022-01-05 DIAGNOSIS — Z79.4: ICD-10-CM

## 2022-01-05 DIAGNOSIS — I50.22: ICD-10-CM

## 2022-01-05 DIAGNOSIS — Z86.73: ICD-10-CM

## 2022-01-05 DIAGNOSIS — G89.29: ICD-10-CM

## 2022-01-05 DIAGNOSIS — Z79.899: ICD-10-CM

## 2022-01-05 DIAGNOSIS — N39.0: Primary | ICD-10-CM

## 2022-01-05 DIAGNOSIS — B95.2: ICD-10-CM

## 2022-01-05 DIAGNOSIS — M54.9: ICD-10-CM

## 2022-01-05 DIAGNOSIS — J44.9: ICD-10-CM

## 2022-01-05 DIAGNOSIS — E78.5: ICD-10-CM

## 2022-01-05 DIAGNOSIS — E11.42: ICD-10-CM

## 2022-01-05 LAB
ALBUMIN SERPL-MCNC: 3.7 GM/DL (ref 3.2–4.5)
ALP SERPL-CCNC: 73 U/L (ref 40–136)
ALT SERPL-CCNC: < 6 U/L (ref 0–55)
AMORPH SED URNS QL MICRO: (no result) /LPF
APTT BLD: 31 SEC (ref 24–35)
APTT PPP: YELLOW S
BACTERIA #/AREA URNS HPF: (no result) /HPF
BASOPHILS # BLD AUTO: 0 10^3/UL (ref 0–0.1)
BASOPHILS NFR BLD AUTO: 0 % (ref 0–10)
BILIRUB SERPL-MCNC: 0.6 MG/DL (ref 0.1–1)
BILIRUB UR QL STRIP: NEGATIVE
BUN/CREAT SERPL: 15
CALCIUM SERPL-MCNC: 8.9 MG/DL (ref 8.5–10.1)
CHLORIDE SERPL-SCNC: 105 MMOL/L (ref 98–107)
CO2 SERPL-SCNC: 25 MMOL/L (ref 21–32)
CREAT SERPL-MCNC: 0.85 MG/DL (ref 0.6–1.3)
EOSINOPHIL # BLD AUTO: 0 10^3/UL (ref 0–0.3)
EOSINOPHIL NFR BLD AUTO: 1 % (ref 0–10)
FIBRINOGEN PPP-MCNC: CLEAR MG/DL
GFR SERPLBLD BASED ON 1.73 SQ M-ARVRAT: 66 ML/MIN
GLUCOSE SERPL-MCNC: 142 MG/DL (ref 70–105)
GLUCOSE UR STRIP-MCNC: NEGATIVE MG/DL
HCT VFR BLD CALC: 31 % (ref 35–52)
HGB BLD-MCNC: 9.4 G/DL (ref 11.5–16)
INR PPP: 1.2 (ref 0.8–1.4)
KETONES UR QL STRIP: NEGATIVE
LEUKOCYTE ESTERASE UR QL STRIP: (no result)
LYMPHOCYTES # BLD AUTO: 1.2 X 10^3 (ref 1–4)
LYMPHOCYTES NFR BLD AUTO: 25 % (ref 12–44)
MANUAL DIFFERENTIAL PERFORMED BLD QL: NO
MCH RBC QN AUTO: 26 PG (ref 25–34)
MCHC RBC AUTO-ENTMCNC: 31 G/DL (ref 32–36)
MCV RBC AUTO: 85 FL (ref 80–99)
MONOCYTES # BLD AUTO: 0.3 X 10^3 (ref 0–1)
MONOCYTES NFR BLD AUTO: 7 % (ref 0–12)
NEUTROPHILS # BLD AUTO: 3.1 X 10^3 (ref 1.8–7.8)
NEUTROPHILS NFR BLD AUTO: 67 % (ref 42–75)
NITRITE UR QL STRIP: NEGATIVE
PH UR STRIP: 6.5 [PH] (ref 5–9)
PLATELET # BLD: 203 10^3/UL (ref 130–400)
PMV BLD AUTO: 8.2 FL (ref 9–12.2)
POTASSIUM SERPL-SCNC: 3.5 MMOL/L (ref 3.6–5)
PROT SERPL-MCNC: 8.1 GM/DL (ref 6.4–8.2)
PROT UR QL STRIP: NEGATIVE
PROTHROMBIN TIME: 15.6 SEC (ref 12.2–14.7)
RBC #/AREA URNS HPF: (no result) /HPF
SODIUM SERPL-SCNC: 141 MMOL/L (ref 135–145)
SP GR UR STRIP: 1.01 (ref 1.02–1.02)
WBC # BLD AUTO: 4.7 10^3/UL (ref 4.3–11)
WBC #/AREA URNS HPF: (no result) /HPF

## 2022-01-05 PROCEDURE — 82947 ASSAY GLUCOSE BLOOD QUANT: CPT

## 2022-01-05 PROCEDURE — 85610 PROTHROMBIN TIME: CPT

## 2022-01-05 PROCEDURE — 84145 PROCALCITONIN (PCT): CPT

## 2022-01-05 PROCEDURE — 85025 COMPLETE CBC W/AUTO DIFF WBC: CPT

## 2022-01-05 PROCEDURE — 71045 X-RAY EXAM CHEST 1 VIEW: CPT

## 2022-01-05 PROCEDURE — 87040 BLOOD CULTURE FOR BACTERIA: CPT

## 2022-01-05 PROCEDURE — 81000 URINALYSIS NONAUTO W/SCOPE: CPT

## 2022-01-05 PROCEDURE — 36415 COLL VENOUS BLD VENIPUNCTURE: CPT

## 2022-01-05 PROCEDURE — 83605 ASSAY OF LACTIC ACID: CPT

## 2022-01-05 PROCEDURE — 87088 URINE BACTERIA CULTURE: CPT

## 2022-01-05 PROCEDURE — 86141 C-REACTIVE PROTEIN HS: CPT

## 2022-01-05 PROCEDURE — 94760 N-INVAS EAR/PLS OXIMETRY 1: CPT

## 2022-01-05 PROCEDURE — 85730 THROMBOPLASTIN TIME PARTIAL: CPT

## 2022-01-05 PROCEDURE — 80053 COMPREHEN METABOLIC PANEL: CPT

## 2022-01-05 RX ADMIN — APIXABAN SCH MG: 5 TABLET, FILM COATED ORAL at 21:07

## 2022-01-05 RX ADMIN — INSULIN ASPART SCH UNIT: 100 INJECTION, SOLUTION INTRAVENOUS; SUBCUTANEOUS at 21:10

## 2022-01-05 RX ADMIN — DOCUSATE SODIUM SCH MG: 100 CAPSULE ORAL at 21:05

## 2022-01-05 RX ADMIN — SENNOSIDES SCH MG: 8.6 TABLET, FILM COATED ORAL at 21:05

## 2022-01-05 NOTE — ED GENERAL
General


Chief Complaint:  General Problems/Pain


Stated Complaint:  SEPTIC


Nursing Triage Note:  


WAS TOLD BY HER DR THAT SHE NEEDED TO COME BACK TO THE ED TO BE EVALUATED FOR 


POSSIBLE SEPSIS. PATIENT WAS CONTACTED ABOUT POSITIVE BLOOD CULTURES THIS AM. 


DENIES ADDITIONAL SYMPTOMS OTHER THAN JUST FEELING WEAK. 





 (CAITLIN MCDOWELL STUDENT)





History of Present Illness


Date Seen by Provider:  2022


Time Seen by Provider:  16:45


Initial Comments


Ms. Grider is a 71yoF who presents to ED with her daughter-in-law at the request 

of her PCP due to positive blood culture for Enterobacter. She came to [facility]

ED on 1/3/22 for confusion and fever and was diagnosed with a UTI caused by 

E.coli, given IV Ceftriaxone and sent home with oral Omnicef. She has had 3 

doses of her Omnicef and taken Tylenol PRN. She complains of weakness and 

dizziness as well as mild dysuria. She is afebrile. She presents today seeking 

further treatment and has been told IV abx may be required.


Timing/Duration:  2-3 Days


Severity:  Mild


Associated Systoms:  No Chest Pain, No Cough, No Diaphoresis, No Fever/Chills, 

No Headaches; Loss of Appetite, Malaise; No Nausea/Vomiting, No Rash, No Shortne

ss of Air; Weakness (CAITLIN MCDOWELL STUDENT)





Allergies and Home Medications


Allergies


Coded Allergies:  


     promethazine (Verified  Allergy, Intermediate, 3/11/08)


     clindamycin (Verified  Allergy, Unknown, 3/7/21)


     hydrocodone (Verified  Allergy, Unknown, 3/7/21)


     morphine (Verified  Allergy, Unknown, 3/7/21)





Patient Home Medication List


Home Medication List Reviewed:  Yes


 (KRISTI LWEIS MD)


Amoxicillin/Potassium Clav (Augmentin 875-125 Tablet) 1 Each Tablet, 1 EACH PO 

BID


   Prescribed by: RICA WATERS on 22 1230


Celecoxib (Celecoxib) 100 Mg Capsule, 100 MG PO BID PRN for ARTHRITIS PAIN, 

(Reported)


   Entered as Reported by: ANIRUDH GODOY on 1/3/22 2154


   Last Action: Reviewed


Colchicine (Colchicine) 0.6 Mg Tablet, 0.6 MG PO BID, (Reported)


   Entered as Reported by: ANIRUDH GODOY on 1/3/22 2154


   Last Action: Reviewed


Insulin Aspart (Novolog) 100 Unit/1 Ml Susp, UNITS SC PER PUMP, (Reported)


   Entered as Reported by: DAKOTA BALL on 3/8/21 1009


   Last Action: Reviewed


Metoprolol Succinate (Metoprolol Succinate) 50 Mg Tab.er.24h, 50 MG PO DAILY, 

(Reported)


   Entered as Reported by: DAKOTA BALL on 3/8/21 1009


   Last Action: Reviewed


Potassium Chloride (K-Tab ER) 10 Meq Tablet.er, 10 MEQ PO DAILY, (Reported)


   Entered as Reported by: DAKOTA BALL on 3/8/21 1009


   Last Action: Reviewed


Pravastatin Sodium (Pravastatin Sodium) 20 Mg Tablet, 20 MG PO HS, (Reported)


   Entered as Reported by: DAKOTA BALL on 3/8/21 1009


   Last Action: Reviewed


Sacubitril/Valsartan (Entresto 24 mg-26 mg Tablet) 1 Each Tablet, 1 EA PO BID, 

(Reported)


   Entered as Reported by: DAKOTA BALL on 3/8/21 1009


   Last Action: Reviewed


Terbinafine HCl (Terbinafine HCl) 250 Mg Tablet, 250 MG PO DAILY, (Reported)


   Entered as Reported by: ANIRUDH GODOY on 1/3/22 2154


   Last Action: Reviewed


Discontinued Medications


Amoxicillin (Amoxicillin) 500 Mg Capsule, 500 MG PO Q12H


   Discontinued Reason: No Longer Taking


   Prescribed by: RICA WATERS on 21 1104


   Last Action: Discontinued


Apixaban (Eliquis) 5 Mg Tablet, 5 MG PO BID, (Reported)


   Discontinued Reason: No Longer Taking


   Entered as Reported by: DAKOTA BALL on 3/8/21 1009


   Last Action: Discontinued


Benzonatate (Benzonatate) 200 Mg Capsule, 200 MG PO TID PRN for COUGH, 

(Reported)


   Discontinued Reason: No Longer Taking


   Entered as Reported by: DAKOTA BALL on 3/8/21 1009


   Last Action: Discontinued


Cefdinir (Cefdinir) 300 Mg Capsule, 300 MG PO BID


   Discontinued Reason: No Longer Taking


   Prescribed by: DARA PAL on 1/3/22 2341


   Last Action: Discontinued


Cefdinir (Cefdinir) 300 Mg Capsule, 300 MG PO BID, (Reported)


   Entered as Reported by: DAKOTA BALL on 22 1217


   Last Action: Reviewed


Clonazepam (Clonazepam) 0.5 Mg Tablet, 0.25-0.5 MG PO BID PRN for ANXIETY, 

(Reported)


   Discontinued Reason: No Longer Taking


   Entered as Reported by: DAKOTA BALL on 21


   Last Action: Discontinued


Ergocalciferol (Vitamin D2) (Vitamin D2) 1,250 Mcg Capsule, 1,250 MCG PO SAT, 

(Reported)


   Discontinued Reason: No Longer Taking


   Entered as Reported by: DAKOTA BALL on 21


   Last Action: Discontinued


Indomethacin (Indomethacin) 50 Mg Capsule, 50 NG PO BID PRN for PAIN-

BREAKTHROUGH, (Reported)


   Discontinued Reason: No Longer Taking


   Entered as Reported by: DAKOTA BALL on 21


   Last Action: Discontinued


Insulin Determir (Levemir) 1,000 Units/10 Ml Soln, 11 UNITS SC DAILY PRN for IF 

INSULIN PUMP IS UNAVALIABLE, (Reported)


   Discontinued Reason: No Longer Taking


   Entered as Reported by: DAKOTA BALL on 3/8/21 1009


   Last Action: Discontinued


Omeprazole (Omeprazole) 20 Mg Tablet.dr, 20 MG PO BID


   Discontinued Reason: No Longer Taking


   Prescribed by: DIVINA HAND on 21


   Last Action: Discontinued


Ondansetron (Ondansetron Odt) 4 Mg Tab.rapdis, 4 MG PO Q6H PRN for NAUSEA

/VOMITING


   Discontinued Reason: No Longer Taking


   Prescribed by: DIVINA HAND on 21


   Last Action: Discontinued


Peg 400/Hypromellose/Glycerin (Visine Dry Eye Relief Drop) 15 Ml Drops, 2 DROPS 

OU PRN PRN for DRY EYES, (Reported)


   Discontinued Reason: No Longer Taking


   Entered as Reported by: DAKOTA BALL on 3/8/21 1009


   Last Action: Discontinued


Polyethylene Glycol 3350 (Miralax) 17 Gm Powd.pack, 17 GM PO DAILY PRN for CO

NSTIPATION-2ND LINE, (Reported)


   Discontinued Reason: Duplicate Order


   Entered as Reported by: DAKOTA BALL on 3/8/21 1009


   Last Action: Discontinued


Spironolactone (Spironolactone) 25 Mg Tablet, 12.5 MG PO DAILY, (Reported)


   Discontinued Reason: No Longer Taking


   Entered as Reported by: DAKOTA BALL on 3/8/21 1009


   Last Action: Discontinued


Sucralfate (Carafate) 1 Gm Tablet, 1 GM PO QIDACHS


   Discontinued Reason: No Longer Taking


   Prescribed by: DIVINA HAND on 21 1701


   Last Action: Discontinued


Topiramate (Topiramate) 25 Mg Tablet, 50 MG PO BID, (Reported)


   Discontinued Reason: No Longer Taking


   Entered as Reported by: CHUY SOLARES on 21 1730


   Last Action: Discontinued


Vit C/E/Zn/Coppr/Lutein/Zeaxan (Preservision Areds 2 Softgel) 1 Each Capsule, 1 

EACH PO DAILY, (Reported)


   Discontinued Reason: No Longer Taking


   Entered as Reported by: DAKOTA BALL on 3/8/21 1009


   Last Action: Discontinued





Review of Systems


Review of Systems


Constitutional:  No chills, No diaphoresis; dizziness, malaise, weakness


EENTM:  No blurred vision, No nose congestion, No throat pain


Respiratory:  No cough, No short of breath, No wheezing


Cardiovascular:  No chest pain, No edema, No palpitations


Gastrointestinal:  RLQ, LLQ, loss of appetite; No nausea, No vomiting


Genitourinary:  dysuria, frequency; No hematuria


Musculoskeletal:  back pain; No neck pain


Skin:  No change in color, No dryness


Psychiatric/Neurological:  Denies Anxiety, Denies Depressed


Hematologic/Lymphatic:  No Symptoms Reported


Immunological/Allergic:  no symptoms reported (CAITLIN MCDOWELL MED STUDENT)





Past Medical-Social-Family Hx


Patient Social History


Tobacco Use?:  No


Substance use?:  No


Alcohol Use?:  No


Pt feels they are or have been:  No


 (CAITLIN MCDOWELL [employer] STUDENT)





Immunizations Up To Date


Tetanus Booster (TDap):  Unknown


Influenza Vaccine Up-to-Date:  Yes; Up-to-Date


First/Initial COVID19 Vaccinat:  


Second COVID19 Vaccination Rodger:  


COVID19 Vaccine :  MODERNA


 (CATILIN MCDOWELL MED STUDENT)





Past Medical History


Surgery/Hospitalization HX:  


cardiac ablation, ppm, c-sect, hysterectomy, cholecystectomy, lumbar 


laminectomy, heart cath, htn, iddm, gerd, high cholesterol, a-fib, peripheral 


neuropathy, cva


Surgeries:  Yes


Cardiac,  Section, Gallbladder, Hysterectomy, Orthopedic, Pacemaker


Respiratory:  Yes


COPD


Cardiac:  Yes (PACEMAKER, CARDIAC ABLATION, MAZE PROCEDURE, LEFT APPENDAGE 

CLIPPED)


Atrial Fibrillation, High Cholesterol, Hypertension, Irregular Heartbeat


Neurological:  Yes (PERIPHERAL NEUROPATHY IN HANDS AND FEET;HX OF CVA -NO 

RESIDUAL)


Neuropathy, Stroke


Reproductive Disorders:  No


Genitourinary:  No


Gastrointestinal:  Yes


Gastroesophageal Reflux


Musculoskeletal:  Yes


Chronic Back Pain


Endocrine:  Yes (INSULIN PUMP)


Diabetes, Insulin dep


HEENT:  Yes (DIABETIC RETINOPATHY)


Cancer:  No


Psychosocial:  No


Integumentary:  No


Blood Disorders:  No


 (CAITLIN MCDOWELL STUDENT)





Family Medical History





PAST SURGICAL HISTORY:


-LEFT EYE SURGERY FOR RETINAL BLEED 3 YEARS AGO


- X 2  IN , 


-HYSTERECTOMY 


-CHOLECYSTECTOMY ( OPEN ) 


-LEFT FOOT SURGERY 7 YEARS AGO


-LUMBAR LAMINECTOMY 20 YEARS AGO


-PACEMAKER 4 YEARS AGO


-CARDIAC ABLATION 2019


-MAZE PROCEDURE 2020


-LEFT LATERAL APPENDAGE CLIP 2020


-PT REPORTS CARDIAC CATH DONE IN  IN  SHOWED NO SIGNIFICANT CAD. 





ECHOCARDIOGRAM 21--EF 55-60%


MPI 21--NO INSCHEMIA OR INFARCTIONS


 (CAITLIN MCDOWELL STUDENT)





Physical Exam-Suspected Sepsis


Physical Exam


Vital Signs





Vital Signs - First Documented








 22





 16:20 20:52


 


Temp 36.6 


 


Pulse 64 


 


Resp 18 


 


B/P (MAP) 162/76 (104) 


 


Pulse Ox 100 


 


O2 Delivery Room Air 


 


FiO2  21





 (KRISTI LEWIS MD)


Vital Signs


Capillary Refill : Less Than 3 Seconds 


 (CAITLIN MCDOWELL STUDENT)








Blood Pressure Mean:                    104








Height, Weight, BMI


Height: 5'1.00"


Weight: 151lbs. 0.0oz. 68.558089im; 26.00 BMI


Method:Stated


General Appearance:  No Apparent Distress, WD/WN


Eyes:  Bilateral Eye Normal Inspection


HEENT:  PERRL/EOMI, Pharynx Normal; No Moist Mucous Membranes


Neck:  Full Range of Motion, Non Tender


Respiratory:  Chest Non Tender, Normal Breath Sounds, No Accessory Muscle Use, 

No Respiratory Distress


Cardiovascular:  Regular Rate, Rhythm, No Edema, No Gallop, Normal Peripheral 

Pulses


Gastrointestinal:  Normal Bowel Sounds, Soft


Back:  Normal Inspection, No Vertebral Tenderness


Extremity:  Normal Capillary Refill, Non Tender, No Calf Tenderness


Neurologic/Psychiatric:  Alert, Oriented x3, No Motor/Sensory Deficits, Normal 

Mood/Affect, CNs II-XII Norm as Tested


Skin:  warm/dry; No diaphoresis; pallor


Lymphatic:  No Adenopathy (CAITLIN MCDOWELL MED STUDENT)





Focused Exam


Lactate Level


22 16:47: Lactic Acid Level 1.36


 (KRISTI LEWIS MD)


Lactic Acid Level


 (KRISTI LEWIS MD)





Progress/Results/Core Measures


Suspected Sepsis


SIRS


Temperature: 


Pulse: 64 


Respiratory Rate: 18


 


Laboratory Tests


22 16:25: White Blood Count 4.7


Blood Pressure 162 /76 


Mean: 104


 


22 16:47: 


Laboratory Tests


22 16:25: 


Creatinine 0.85, INR Comment 1.2, Platelet Count 203, Total Bilirubin 0.6


 (CAITLIN MCDOWELL MED STUDENT)





Results/Orders


Lab Results





Laboratory Tests








Test


 22


16:25 22


16:47 22


16:52 22


21:08 Range/Units


 


 


White Blood Count


 4.7 


 


 


 


 4.3-11.0


10^3/uL


 


Red Blood Count


 3.62 L


 


 


 


 3.80-5.11


10^6/uL


 


Hemoglobin 9.4 L    11.5-16.0  g/dL


 


Hematocrit 31 L    35-52  %


 


Mean Corpuscular Volume 85     80-99  fL


 


Mean Corpuscular Hemoglobin 26     25-34  pg


 


Mean Corpuscular Hemoglobin


Concent 31 L


 


 


 


 32-36  g/dL





 


Red Cell Distribution Width 16.2 H    10.0-14.5  %


 


Platelet Count


 203 


 


 


 


 130-400


10^3/uL


 


Mean Platelet Volume 8.2 L    9.0-12.2  fL


 


Immature Granulocyte % (Auto) 0      %


 


Neutrophils (%) (Auto) 67     42-75  %


 


Lymphocytes (%) (Auto) 25     12-44  %


 


Monocytes (%) (Auto) 7     0-12  %


 


Eosinophils (%) (Auto) 1     0-10  %


 


Basophils (%) (Auto) 0     0-10  %


 


Neutrophils # (Auto) 3.1     1.8-7.8  X 10^3


 


Lymphocytes # (Auto) 1.2     1.0-4.0  X 10^3


 


Monocytes # (Auto) 0.3     0.0-1.0  X 10^3


 


Eosinophils # (Auto)


 0.0 


 


 


 


 0.0-0.3


10^3/uL


 


Basophils # (Auto)


 0.0 


 


 


 


 0.0-0.1


10^3/uL


 


Immature Granulocyte # (Auto)


 0.0 


 


 


 


 0.0-0.1


10^3/uL


 


Prothrombin Time 15.6 H    12.2-14.7  SEC


 


INR Comment 1.2     0.8-1.4  


 


Activated Partial


Thromboplast Time 31 


 


 


 


 24-35  SEC





 


Sodium Level 141     135-145  MMOL/L


 


Potassium Level 3.5 L    3.6-5.0  MMOL/L


 


Chloride Level 105       MMOL/L


 


Carbon Dioxide Level 25     21-32  MMOL/L


 


Anion Gap 11     5-14  MMOL/L


 


Blood Urea Nitrogen 13     7-18  MG/DL


 


Creatinine


 0.85 


 


 


 


 0.60-1.30


MG/DL


 


Estimat Glomerular Filtration


Rate 66 


 


 


 


  





 


BUN/Creatinine Ratio 15      


 


Glucose Level 142 H      MG/DL


 


Calcium Level 8.9     8.5-10.1  MG/DL


 


Corrected Calcium 9.1     8.5-10.1  MG/DL


 


Total Bilirubin 0.6     0.1-1.0  MG/DL


 


Aspartate Amino Transf


(AST/SGOT) 14 


 


 


 


 5-34  U/L





 


Alanine Aminotransferase


(ALT/SGPT) < 6 


 


 


 


 0-55  U/L





 


Alkaline Phosphatase 73       U/L


 


C-Reactive Protein High


Sensitivity 3.55 H


 


 


 


 0.00-0.50


MG/DL


 


Total Protein 8.1     6.4-8.2  GM/DL


 


Albumin 3.7     3.2-4.5  GM/DL


 


Procalcitonin 0.21 H    <0.10  NG/ML


 


Lactic Acid Level


 


 1.36 


 


 


 0.50-2.00


MMOL/L


 


Urine Color   YELLOW    


 


Urine Clarity   CLEAR    


 


Urine pH   6.5   5-9  


 


Urine Specific Gravity   1.015 L  1.016-1.022  


 


Urine Protein   NEGATIVE   NEGATIVE  


 


Urine Glucose (UA)   NEGATIVE   NEGATIVE  


 


Urine Ketones   NEGATIVE   NEGATIVE  


 


Urine Nitrite   NEGATIVE   NEGATIVE  


 


Urine Bilirubin   NEGATIVE   NEGATIVE  


 


Urine Urobilinogen   0.2   < = 1.0  MG/DL


 


Urine Leukocyte Esterase   TRACE H  NEGATIVE  


 


Urine RBC (Auto)   TRACE-I H  NEGATIVE  


 


Urine RBC   0-2    /HPF


 


Urine WBC   2-5    /HPF


 


Urine Crystals   PRESENT H   /LPF


 


Urine Amorphous Sediment


 


 


 RARE ELLIE


URATES H 


  /LPF





 


Urine Bacteria   TRACE    /HPF


 


Urine Casts   NONE    /LPF


 


Urine Mucus   NEGATIVE    /LPF


 


Urine Culture Indicated   NO    


 


Glucometer    108    MG/DL


 


Test


 22


06:25 


 


 


 Range/Units


 


 


White Blood Count


 3.9 L


 


 


 


 4.3-11.0


10^3/uL


 


Red Blood Count


 3.24 L


 


 


 


 3.80-5.11


10^6/uL


 


Hemoglobin 8.2 L    11.5-16.0  g/dL


 


Hematocrit 27 L    35-52  %


 


Mean Corpuscular Volume 82     80-99  fL


 


Mean Corpuscular Hemoglobin 25     25-34  pg


 


Mean Corpuscular Hemoglobin


Concent 31 L


 


 


 


 32-36  g/dL





 


Red Cell Distribution Width 15.8 H    10.0-14.5  %


 


Platelet Count


 176 


 


 


 


 130-400


10^3/uL


 


Mean Platelet Volume 8.3 L    9.0-12.2  fL


 


Immature Granulocyte % (Auto) 0      %


 


Neutrophils (%) (Auto) 71     42-75  %


 


Lymphocytes (%) (Auto) 22     12-44  %


 


Monocytes (%) (Auto) 6     0-12  %


 


Eosinophils (%) (Auto) 1     0-10  %


 


Basophils (%) (Auto) 0     0-10  %


 


Neutrophils # (Auto)


 2.8 


 


 


 


 1.8-7.8


10^3/uL


 


Lymphocytes # (Auto)


 0.8 L


 


 


 


 1.0-4.0


10^3/uL


 


Monocytes # (Auto)


 0.2 


 


 


 


 0.0-1.0


10^3/uL


 


Eosinophils # (Auto)


 0.0 


 


 


 


 0.0-0.3


10^3/uL


 


Basophils # (Auto)


 0.0 


 


 


 


 0.0-0.1


10^3/uL


 


Immature Granulocyte # (Auto)


 0.0 


 


 


 


 0.0-0.1


10^3/uL


 


Sodium Level 139     135-145  MMOL/L


 


Potassium Level 3.5 L    3.6-5.0  MMOL/L


 


Chloride Level 108 H      MMOL/L


 


Carbon Dioxide Level 21     21-32  MMOL/L


 


Anion Gap 10     5-14  MMOL/L


 


Blood Urea Nitrogen 9     7-18  MG/DL


 


Creatinine


 0.69 


 


 


 


 0.60-1.30


MG/DL


 


Estimat Glomerular Filtration


Rate 84 


 


 


 


  





 


BUN/Creatinine Ratio 13      


 


Glucose Level 143 H      MG/DL


 


Calcium Level 8.2 L    8.5-10.1  MG/DL


 


Corrected Calcium 8.9     8.5-10.1  MG/DL


 


Total Bilirubin 0.4     0.1-1.0  MG/DL


 


Aspartate Amino Transf


(AST/SGOT) 13 


 


 


 


 5-34  U/L





 


Alanine Aminotransferase


(ALT/SGPT) < 6 


 


 


 


 0-55  U/L





 


Alkaline Phosphatase 68       U/L


 


Total Protein 6.6     6.4-8.2  GM/DL


 


Albumin 3.1 L    3.2-4.5  GM/DL





 (KRISTI LEWIS MD)


Micro Results





Microbiology


22 Urine Culture - Final, Complete


         NO GROWTH


 (KRISTI LEWIS MD)


My Orders





Orders - KRISTI LEWIS MD


Cbc With Automated Diff (22 16:43)


Comprehensive Metabolic Panel (22 16:43)


Blood Culture (22 16:43)


Sputum Culture (22 16:43)


Urinalysis (22 16:43)


Urine Culture (22 16:43)


Protime With Inr (22 16:43)


Partial Thromboplastin Time (22 16:43)


Chest 1 View, Ap/Pa Only (22 16:43)


Ed Iv/Invasive Line Start (22 16:43)


Ed Iv/Invasive Line Start (22 16:43)


Vital Signs Adult Sepsis Patie Q15M (22 16:43)


O2 (22 16:43)


Remove Rings In Anticipation O (22 16:43)


Lactic Acid Analyzer (22 16:43)


Ns Iv 1000 Ml (Sodium Chloride 0.9%) (22 16:45)


Hs C Reactive Protein (22 16:46)


Procalcitonin (Pct) (22 16:46)


 (KRISTI LEWIS MD)


Vital Signs/I&O


 (KRISTI LEWIS MD)


Vital Signs/I&O


Capillary Refill : Less Than 3 Seconds 


 (CAITLIN MCDOWELL MED STUDENT)








Blood Pressure Mean:                    104








Progress Note :  


   Time:  18:18


Progress Note


71-year-old female presents to the emergency department chief complaint 

generalized fatigue and malaise.  Was seen in the emergency room 2 days ago 

diagnosed with urinary tract infection given Rocephin and Omnicef.  Blood 

cultures came back this morning positive for Enterococcus in the blood, E. coli 

in the urine.  Patient denies fevers, chills, nausea vomiting.  She has had 

decreased appetite.  Patient was called by this ER this morning and advised of 

her blood culture results, it was advised if she was worsening or becoming 

symptomatic to come in.  Patient's family encouraged her.





Physical exam basically unremarkable, she is not febrile, not tachycardic not 

hypotensive.  She is on metoprolol 50 mg daily.  She is diabetic.  She is 

anticoagulated on Eliquis.  No chest pain, productive cough.  No shortness of 

breath.  She is COVID-negative, she is tested frequently at work.





She states she has taken her Omnicef 2 doses yesterday 1 today with no adverse 

reactions.





Case discussed with Dr. Waters, offered admission with IV antibiotics, 

vancomycin and Rocephin versus watchful waiting at home continuing Omnicef and 

awaiting second round of blood cultures.  Patient states that she is more 

comfortable staying in the hospital.


 (KRISTI LEWIS MD)





Departure


Communication (Admissions)


Time/Spoke to Admitting Phy:  17:54


Discussed with Dr Waters


 (KRISTI LEWIS MD)





Impression





   Primary Impression:  


   Bacteremia


Disposition:   ADMITTED AS INPATIENT


Condition:  Stable





Admissions


Decision to Admit Reason:  Admit from ER (General)


Decision to Admit/Date:  2022


Time/Decision to Admit Time:  18:15


 (KRISTI LEWIS MD)





Departure-Patient Inst.


Referrals:  


JOSSELYN WILLIS MD (PCP)


Primary Care Physician








NO,LOCAL PHYSICIAN (Family)


Primary Care Physician


Scripts


Amoxicillin/Potassium Clav (Augmentin 875-125 Tablet) 1 Each Tablet


1 EACH PO BID for 14 Days, #28 TAB


   Prov: RICA WATERS MD         22


Verification and Attestation of Medical Student E/M Service





A medical student performed and documented this service in my presence. I 

reviewed and verified all information documented by the medical student and made

modifications to such information, when appropriate. I personally performed the 

physical exam and medical decision making. 





 Kristi Lewis, 2022,18:23


  


 (KRISTI LEWIS MD)











CAITLIN MCDOWELL MED STUDENT   2022 16:58


KRISTI LEWIS MD          2022 18:22

## 2022-01-05 NOTE — DIAGNOSTIC IMAGING REPORT
Indication: Possible cultures



Portable chest 4:48 PM



There is a dual-chamber pacemaker. Heart size and pulmonary

vascularity are normal. Lungs are clear. There are no effusions

or pneumothoraces.



IMPRESSION: No acute abnormalities in the chest



Dictated by: 



  Dictated on workstation # [id number]

## 2022-01-06 VITALS — DIASTOLIC BLOOD PRESSURE: 63 MMHG | SYSTOLIC BLOOD PRESSURE: 132 MMHG

## 2022-01-06 VITALS — DIASTOLIC BLOOD PRESSURE: 63 MMHG | SYSTOLIC BLOOD PRESSURE: 135 MMHG

## 2022-01-06 VITALS — SYSTOLIC BLOOD PRESSURE: 141 MMHG | DIASTOLIC BLOOD PRESSURE: 63 MMHG

## 2022-01-06 VITALS — SYSTOLIC BLOOD PRESSURE: 140 MMHG | DIASTOLIC BLOOD PRESSURE: 63 MMHG

## 2022-01-06 LAB
ALBUMIN SERPL-MCNC: 3.1 GM/DL (ref 3.2–4.5)
ALP SERPL-CCNC: 68 U/L (ref 40–136)
ALT SERPL-CCNC: < 6 U/L (ref 0–55)
BASOPHILS # BLD AUTO: 0 10^3/UL (ref 0–0.1)
BASOPHILS NFR BLD AUTO: 0 % (ref 0–10)
BILIRUB SERPL-MCNC: 0.4 MG/DL (ref 0.1–1)
BUN/CREAT SERPL: 13
CALCIUM SERPL-MCNC: 8.2 MG/DL (ref 8.5–10.1)
CHLORIDE SERPL-SCNC: 108 MMOL/L (ref 98–107)
CO2 SERPL-SCNC: 21 MMOL/L (ref 21–32)
CREAT SERPL-MCNC: 0.69 MG/DL (ref 0.6–1.3)
EOSINOPHIL # BLD AUTO: 0 10^3/UL (ref 0–0.3)
EOSINOPHIL NFR BLD AUTO: 1 % (ref 0–10)
GFR SERPLBLD BASED ON 1.73 SQ M-ARVRAT: 84 ML/MIN
GLUCOSE SERPL-MCNC: 143 MG/DL (ref 70–105)
HCT VFR BLD CALC: 27 % (ref 35–52)
HGB BLD-MCNC: 8.2 G/DL (ref 11.5–16)
LYMPHOCYTES # BLD AUTO: 0.8 10^3/UL (ref 1–4)
LYMPHOCYTES NFR BLD AUTO: 22 % (ref 12–44)
MANUAL DIFFERENTIAL PERFORMED BLD QL: NO
MCH RBC QN AUTO: 25 PG (ref 25–34)
MCHC RBC AUTO-ENTMCNC: 31 G/DL (ref 32–36)
MCV RBC AUTO: 82 FL (ref 80–99)
MONOCYTES # BLD AUTO: 0.2 10^3/UL (ref 0–1)
MONOCYTES NFR BLD AUTO: 6 % (ref 0–12)
NEUTROPHILS # BLD AUTO: 2.8 10^3/UL (ref 1.8–7.8)
NEUTROPHILS NFR BLD AUTO: 71 % (ref 42–75)
PLATELET # BLD: 176 10^3/UL (ref 130–400)
PMV BLD AUTO: 8.3 FL (ref 9–12.2)
POTASSIUM SERPL-SCNC: 3.5 MMOL/L (ref 3.6–5)
PROT SERPL-MCNC: 6.6 GM/DL (ref 6.4–8.2)
SODIUM SERPL-SCNC: 139 MMOL/L (ref 135–145)
WBC # BLD AUTO: 3.9 10^3/UL (ref 4.3–11)

## 2022-01-06 RX ADMIN — Medication SCH ML: at 14:36

## 2022-01-06 RX ADMIN — INSULIN ASPART SCH UNIT: 100 INJECTION, SOLUTION INTRAVENOUS; SUBCUTANEOUS at 16:14

## 2022-01-06 RX ADMIN — INSULIN ASPART SCH UNIT: 100 INJECTION, SOLUTION INTRAVENOUS; SUBCUTANEOUS at 11:36

## 2022-01-06 RX ADMIN — INSULIN ASPART SCH UNIT: 100 INJECTION, SOLUTION INTRAVENOUS; SUBCUTANEOUS at 06:01

## 2022-01-06 RX ADMIN — DOCUSATE SODIUM SCH MG: 100 CAPSULE ORAL at 10:47

## 2022-01-06 RX ADMIN — SENNOSIDES SCH MG: 8.6 TABLET, FILM COATED ORAL at 10:48

## 2022-01-06 RX ADMIN — APIXABAN SCH MG: 5 TABLET, FILM COATED ORAL at 10:48

## 2022-01-06 RX ADMIN — Medication SCH ML: at 06:00

## 2022-01-06 NOTE — DISCHARGE SUMMARY
Discharge Summary


Hospital Course


Problems/Dx:  


(1) UTI (urinary tract infection)


Status:  Acute


(2) Bacteremia


Status:  Acute


Hospital Course


Date of Admission: Jan 5, 2022 at 17:57 


Admission Diagnosis :  UTI, possible bacteremia





Family Physician/Provider: Fabian Hendricks MD  





Date of Discharge: 1/6/22 


Discharge Diagnosis:  UTI, possible bacteremia








Hospital Course:


Ambar Grider is a 71 year old female with PMH HTN, HLD, T2DM, HFrEF, who prese

nted with weakness and was admitted with possible bacteremia. She was in the ER 

two days prior and was started on Omnicef for a UTI. Her blood cultures returned

positive for Enterococcus faecalis. She was not septic. She had no fever and 

normal WBC. Her procalcitonin was normal. There were no signs or symptoms to 

clinically correlate a bacteremia. She was however transitioned to Augmentin to 

cover both her urinary tract infection and a possible bacteremia. She was 

discharged home in stable condition.














Labs and Pending Lab Test:


Laboratory Tests


1/5/22 16:25: 


White Blood Count 4.7, Red Blood Count 3.62L, Hemoglobin 9.4L, Hematocrit 31L, 

Mean Corpuscular Volume 85, Mean Corpuscular Hemoglobin 26, Mean Corpuscular 

Hemoglobin Concent 31L, Red Cell Distribution Width 16.2H, Platelet Count 203, 

Mean Platelet Volume 8.2L, Immature Granulocyte % (Auto) 0, Neutrophils (%) 

(Auto) 67, Lymphocytes (%) (Auto) 25, Monocytes (%) (Auto) 7, Eosinophils (%) 

(Auto) 1, Basophils (%) (Auto) 0, Neutrophils # (Auto) 3.1, Lymphocytes # (Auto)

1.2, Monocytes # (Auto) 0.3, Eosinophils # (Auto) 0.0, Basophils # (Auto) 0.0, 

Immature Granulocyte # (Auto) 0.0, Prothrombin Time 15.6H, INR Comment 1.2, 

Activated Partial Thromboplast Time 31, Sodium Level 141, Potassium Level 3.5L, 

Chloride Level 105, Carbon Dioxide Level 25, Anion Gap 11, Blood Urea Nitrogen 

13, Creatinine 0.85, Estimat Glomerular Filtration Rate 66, BUN/Creatinine Ratio

15, Glucose Level 142H, Calcium Level 8.9, Corrected Calcium 9.1, Total 

Bilirubin 0.6, Aspartate Amino Transf (AST/SGOT) 14, Alanine Aminotransferase 

(ALT/SGPT) < 6, Alkaline Phosphatase 73, C-Reactive Protein High Sensitivity 

3.55H, Total Protein 8.1, Albumin 3.7, Procalcitonin 0.21H


1/5/22 16:47: Lactic Acid Level 1.36


1/5/22 16:52: 


Urine Color YELLOW, Urine Clarity CLEAR, Urine pH 6.5, Urine Specific Gravity 

1.015L, Urine Protein NEGATIVE, Urine Glucose (UA) NEGATIVE, Urine Ketones 

NEGATIVE, Urine Nitrite NEGATIVE, Urine Bilirubin NEGATIVE, Urine Urobilinogen 

0.2, Urine Leukocyte Esterase TRACEH, Urine RBC (Auto) TRACE-IH, Urine RBC 0-2, 

Urine WBC 2-5, Urine Crystals PRESENTH, Urine Amorphous Sediment RARE ELLIE 

URATESH, Urine Bacteria TRACE, Urine Casts NONE, Urine Mucus NEGATIVE, Urine 

Culture Indicated NO


1/5/22 21:08: Glucometer 108


1/6/22 06:25: 


White Blood Count 3.9L, Red Blood Count 3.24L, Hemoglobin 8.2L, Hematocrit 27L, 

Mean Corpuscular Volume 82, Mean Corpuscular Hemoglobin 25, Mean Corpuscular 

Hemoglobin Concent 31L, Red Cell Distribution Width 15.8H, Platelet Count 176, 

Mean Platelet Volume 8.3L, Immature Granulocyte % (Auto) 0, Neutrophils (%) 

(Auto) 71, Lymphocytes (%) (Auto) 22, Monocytes (%) (Auto) 6, Eosinophils (%) 

(Auto) 1, Basophils (%) (Auto) 0, Neutrophils # (Auto) 2.8, Lymphocytes # (Auto)

0.8L, Monocytes # (Auto) 0.2, Eosinophils # (Auto) 0.0, Basophils # (Auto) 0.0, 

Immature Granulocyte # (Auto) 0.0, Sodium Level 139, Potassium Level 3.5L, 

Chloride Level 108H, Carbon Dioxide Level 21, Anion Gap 10, Blood Urea Nitrogen 

9, Creatinine 0.69, Estimat Glomerular Filtration Rate 84, BUN/Creatinine Ratio 

13, Glucose Level 143H, Calcium Level 8.2L, Corrected Calcium 8.9, Total 

Bilirubin 0.4, Aspartate Amino Transf (AST/SGOT) 13, Alanine Aminotransferase 

(ALT/SGPT) < 6, Alkaline Phosphatase 68, Total Protein 6.6, Albumin 3.1L





Home Meds


Active


Augmentin 875-125 Tablet (Amoxicillin/Potassium Clav) 1 Each Tablet 1 Each PO 

BID 14 Days


Reported


Terbinafine HCl 250 Mg Tablet 250 Mg PO DAILY


Celecoxib 100 Mg Capsule 100 Mg PO BID PRN


Colchicine 0.6 Mg Tablet 0.6 Mg PO BID


K-Tab ER (Potassium Chloride) 10 Meq Tablet.er 10 Meq PO DAILY


Entresto 24 mg-26 mg Tablet (Sacubitril/Valsartan) 1 Each Tablet 1 Ea PO BID


Novolog (Insulin Aspart) 100 Unit/1 Ml Susp Units SC PER PUMP


Pravastatin Sodium 20 Mg Tablet 20 Mg PO HS


Metoprolol Succinate 50 Mg Tab.er.24h 50 Mg PO DAILY


Assessment/Pt Instructions


See instructions


Discharge Planning:  <30 minutes discharge planning





Discharge Instructions


Discharge Diet:  ADA Diet


Activity as Tolerated:  Yes





Discharge Physical Examination


Vital Signs





Vital Signs








  Date Time  Temp Pulse Resp B/P (MAP) Pulse Ox O2 Delivery O2 Flow Rate FiO2


 


1/6/22 15:39 37.0 70 18 135/63 (87) 100 Room Air  


 


1/5/22 20:52        21








Allergies:  


Coded Allergies:  


     promethazine (Verified  Allergy, Intermediate, 3/11/08)


     clindamycin (Verified  Allergy, Unknown, 3/7/21)


     hydrocodone (Verified  Allergy, Unknown, 3/7/21)


     morphine (Verified  Allergy, Unknown, 3/7/21)





Discharge Summary


Date of Admission


Jan 5, 2022 at 17:57


Date of Discharge





Discharge Date:  Jan 6, 2022


Discharge Time:  15:55


Admission Diagnosis


UTI, possible bacteremia


Discharge Diagnosis





(1) UTI (urinary tract infection)


Status:  Acute


(2) Bacteremia


Status:  Acute











RICA WATERS MD               Jan 6, 2022 15:55

## 2022-03-25 ENCOUNTER — HOSPITAL ENCOUNTER (EMERGENCY)
Dept: HOSPITAL 75 - ER | Age: 72
Discharge: HOME | End: 2022-03-25
Payer: COMMERCIAL

## 2022-03-25 VITALS — WEIGHT: 119.93 LBS | HEIGHT: 60 IN | BODY MASS INDEX: 23.55 KG/M2

## 2022-03-25 VITALS — DIASTOLIC BLOOD PRESSURE: 90 MMHG | SYSTOLIC BLOOD PRESSURE: 137 MMHG

## 2022-03-25 DIAGNOSIS — E11.65: Primary | ICD-10-CM

## 2022-03-25 DIAGNOSIS — E11.42: ICD-10-CM

## 2022-03-25 DIAGNOSIS — Z20.822: ICD-10-CM

## 2022-03-25 DIAGNOSIS — E11.319: ICD-10-CM

## 2022-03-25 LAB
ALBUMIN SERPL-MCNC: 3.4 GM/DL (ref 3.2–4.5)
ALP SERPL-CCNC: 64 U/L (ref 40–136)
ALT SERPL-CCNC: 7 U/L (ref 0–55)
AMORPH SED URNS QL MICRO: (no result) /LPF
AMYLASE SERPL-CCNC: 57 U/L (ref 25–125)
APTT PPP: YELLOW S
BACTERIA #/AREA URNS HPF: (no result) /HPF
BASOPHILS # BLD AUTO: 0 10^3/UL (ref 0–0.1)
BASOPHILS NFR BLD AUTO: 0 % (ref 0–10)
BILIRUB SERPL-MCNC: 0.7 MG/DL (ref 0.1–1)
BILIRUB UR QL STRIP: NEGATIVE
BUN/CREAT SERPL: 22
CALCIUM SERPL-MCNC: 8.8 MG/DL (ref 8.5–10.1)
CHLORIDE SERPL-SCNC: 97 MMOL/L (ref 98–107)
CO2 SERPL-SCNC: 24 MMOL/L (ref 21–32)
CREAT SERPL-MCNC: 0.89 MG/DL (ref 0.6–1.3)
EOSINOPHIL # BLD AUTO: 0 10^3/UL (ref 0–0.3)
EOSINOPHIL NFR BLD AUTO: 0 % (ref 0–10)
FIBRINOGEN PPP-MCNC: CLEAR MG/DL
GFR SERPLBLD BASED ON 1.73 SQ M-ARVRAT: 70 ML/MIN
GLUCOSE SERPL-MCNC: 352 MG/DL (ref 70–105)
GLUCOSE UR STRIP-MCNC: (no result) MG/DL
HCT VFR BLD CALC: 29 % (ref 35–52)
HGB BLD-MCNC: 8.9 G/DL (ref 11.5–16)
HYALINE CASTS #/AREA URNS LPF: (no result) /LPF
KETONES UR QL STRIP: NEGATIVE
LEUKOCYTE ESTERASE UR QL STRIP: NEGATIVE
LIPASE SERPL-CCNC: 26 U/L (ref 8–78)
LYMPHOCYTES # BLD AUTO: 0.7 10^3/UL (ref 1–4)
LYMPHOCYTES NFR BLD AUTO: 16 % (ref 12–44)
MAGNESIUM SERPL-MCNC: 1.9 MG/DL (ref 1.6–2.4)
MANUAL DIFFERENTIAL PERFORMED BLD QL: NO
MCH RBC QN AUTO: 24 PG (ref 25–34)
MCHC RBC AUTO-ENTMCNC: 31 G/DL (ref 32–36)
MCV RBC AUTO: 78 FL (ref 80–99)
MONOCYTES # BLD AUTO: 0.1 10^3/UL (ref 0–1)
MONOCYTES NFR BLD AUTO: 3 % (ref 0–12)
NEUTROPHILS # BLD AUTO: 3.6 10^3/UL (ref 1.8–7.8)
NEUTROPHILS NFR BLD AUTO: 80 % (ref 42–75)
NITRITE UR QL STRIP: NEGATIVE
PH UR STRIP: 5.5 [PH] (ref 5–9)
PLATELET # BLD: 190 10^3/UL (ref 130–400)
PMV BLD AUTO: 8.2 FL (ref 9–12.2)
POTASSIUM SERPL-SCNC: 3.1 MMOL/L (ref 3.6–5)
PROT SERPL-MCNC: 7.4 GM/DL (ref 6.4–8.2)
PROT UR QL STRIP: NEGATIVE
RBC #/AREA URNS HPF: (no result) /HPF
SODIUM SERPL-SCNC: 132 MMOL/L (ref 135–145)
SP GR UR STRIP: 1.02 (ref 1.02–1.02)
WBC # BLD AUTO: 4.5 10^3/UL (ref 4.3–11)
WBC #/AREA URNS HPF: (no result) /HPF

## 2022-03-25 PROCEDURE — 83036 HEMOGLOBIN GLYCOSYLATED A1C: CPT

## 2022-03-25 PROCEDURE — 83690 ASSAY OF LIPASE: CPT

## 2022-03-25 PROCEDURE — 81000 URINALYSIS NONAUTO W/SCOPE: CPT

## 2022-03-25 PROCEDURE — 87636 SARSCOV2 & INF A&B AMP PRB: CPT

## 2022-03-25 PROCEDURE — 93041 RHYTHM ECG TRACING: CPT

## 2022-03-25 PROCEDURE — 36415 COLL VENOUS BLD VENIPUNCTURE: CPT

## 2022-03-25 PROCEDURE — 83735 ASSAY OF MAGNESIUM: CPT

## 2022-03-25 PROCEDURE — 85025 COMPLETE CBC W/AUTO DIFF WBC: CPT

## 2022-03-25 PROCEDURE — 82010 KETONE BODYS QUAN: CPT

## 2022-03-25 PROCEDURE — 82150 ASSAY OF AMYLASE: CPT

## 2022-03-25 PROCEDURE — 80053 COMPREHEN METABOLIC PANEL: CPT

## 2022-03-25 PROCEDURE — 82947 ASSAY GLUCOSE BLOOD QUANT: CPT

## 2022-03-25 PROCEDURE — 51701 INSERT BLADDER CATHETER: CPT

## 2022-03-25 NOTE — ED GENERAL
General


Chief Complaint:  Glucose Problems


Stated Complaint:  


Source of Information:  Patient (LIMITED HISTORIAN/POOR MEMORY--NORMAL BASELINE 

FOR PT)





History of Present Illness


Date Seen by Provider:  Mar 25, 2022


Time Seen by Provider:  17:55


Initial Comments


PT ARRIVES VIA POV FROM HOME


C/O ELEVATED BLOOD SUGAR


PT HAS BEEN DIABETIC X 20 YEARS, HAS AN INSULIN PUMP FOR 7-8 YEARS, AND THIS 

PARTICULAR PUMP FOR 6 MONTHS.


STATES BLOOD SUGAR AT 1700 , SO RUSHED STRAIGHT HERE


PT DID NOT GIVE HERSELF ANY INSULIN --STATES "BECAUSE THIS NEVER HAPPENED 

BEFORE", STATES SHE "DIDN'T KNOW WHAT TO DO" 


PT DOES NOT FEEL BAD





PT STATES SHE HAD TOAST AND EGG FOR BREAKFAST, HAS NOT HAD ANYTHING ELSE TO EAT 

TODAY





STATES SHE WAS SEEN AT [facility]  A FEW DAYS AGO FOR URINARY SYMPTOMS 

--PAIN/BURNING ON URINATION AND FREQUENCY


WAS DX WITH UTI, STATES THEY CALLED HER TODAY FROM THE CLINIC AND TOLD HER SHE 

HAD A UTI AND CALLED IN A PRESCRIPTION FOR ANTIBIOTICS, BUT PT DID NOT  

THE PRESCRIPTION. 





NO FEVER


NO ABDOMINAL PAIN OR BACK PAIN 


NO NAUSEA/VOMITING


NO COUGH


NO SHORTNESS OF BREATH





HAS HAD COVID-19 VACCINE X 2, NO BOOSTER


NO FLU VACCINE





PCP: DR. WILLIS-[facility]





Allergies and Home Medications


Allergies


Coded Allergies:  


     promethazine (Verified  Allergy, Intermediate, 3/11/08)


     clindamycin (Verified  Allergy, Unknown, 3/7/21)


     hydrocodone (Verified  Allergy, Unknown, 3/7/21)


     morphine (Verified  Allergy, Unknown, 3/7/21)





Patient Home Medication List


Amoxicillin/Potassium Clav (Augmentin 875-125 Tablet) 1 Each Tablet, 1 EACH PO 

BID


   Prescribed by: RICA WATERS on 22 1230


Celecoxib (Celecoxib) 100 Mg Capsule, 100 MG PO BID PRN for ARTHRITIS PAIN, 

(Reported)


   Entered as Reported by: ANIRUDH GODOY on 1/3/22 2154


Colchicine (Colchicine) 0.6 Mg Tablet, 0.6 MG PO BID, (Reported)


   Entered as Reported by: ANIRUDH GODOY on 1/3/22 2154


Insulin Aspart (Novolog) 100 Unit/1 Ml Susp, UNITS SC PER PUMP, (Reported)


   Entered as Reported by: DAKOTA BALL on 3/8/21 1009


Metoprolol Succinate (Metoprolol Succinate) 50 Mg Tab.er.24h, 50 MG PO DAILY, 

(Reported)


   Entered as Reported by: DAKOTA BALL on 3/8/21 100


Potassium Chloride (K-Tab ER) 10 Meq Tablet.er, 10 MEQ PO DAILY, (Reported)


   Entered as Reported by: DAKOTA BALL on 3/8/21 100


Pravastatin Sodium (Pravastatin Sodium) 20 Mg Tablet, 20 MG PO HS, (Reported)


   Entered as Reported by: DAKOTA BALL on 3/8/21 100


Sacubitril/Valsartan (Entresto 24 mg-26 mg Tablet) 1 Each Tablet, 1 EA PO BID, 

(Reported)


   Entered as Reported by: DAKOTA BALL on 3/8/21 100


Terbinafine HCl (Terbinafine HCl) 250 Mg Tablet, 250 MG PO DAILY, (Reported)


   Entered as Reported by: ANIRUDH GODOY on 1/3/22 2154





Review of Systems


Review of Systems


Constitutional:  no symptoms reported


Respiratory:  no symptoms reported


Cardiovascular:  no symptoms reported


Gastrointestinal:  no symptoms reported


Genitourinary:  see HPI


Musculoskeletal:  no symptoms reported


Skin:  no symptoms reported


Psychiatric/Neurological:  No Symptoms Reported


Hematologic/Lymphatic:  No Symptoms Reported


Immunological/Allergic:  no symptoms reported





Past Medical-Social-Family Hx


Patient Social History


Tobacco Use?:  No


Substance use?:  No


Alcohol Use?:  No





Immunizations Up To Date


Tetanus Booster (TDap):  Unknown


First/Initial COVID19 Vaccinat:  


Second COVID19 Vaccination Rodger:  





Past Medical History


Surgery/Hospitalization HX:  


cardiac ablation, ppm, c-sect, hysterectomy, cholecystectomy, lumbar 


laminectomy, heart cath, htn, iddm, gerd, high cholesterol, a-fib, peripheral 


neuropathy, cva


Surgeries:  Yes


Cardiac,  Section, Gallbladder, Hysterectomy, Orthopedic, Pacemaker


Respiratory:  Yes


COPD


Cardiac:  Yes (PACEMAKER, CARDIAC ABLATION, MAZE PROCEDURE, LEFT APPENDAGE 

CLIPPED)


Atrial Fibrillation, High Cholesterol, Hypertension, Irregular Heartbeat


Neurological:  Yes (PERIPHERAL NEUROPATHY IN HANDS AND FEET;HX OF CVA 2019-NO 

RESIDUAL)


Neuropathy, Stroke


Reproductive Disorders:  No


Genitourinary:  Yes


Bladder Infection


Gastrointestinal:  Yes


Gastroesophageal Reflux


Musculoskeletal:  Yes


Chronic Back Pain


Endocrine:  Yes (INSULIN PUMP)


Diabetes, Insulin dep


HEENT:  Yes (DIABETIC RETINOPATHY)


Cancer:  No


Psychosocial:  No


Integumentary:  No


Blood Disorders:  No





Family Medical History





PAST SURGICAL HISTORY:


-LEFT EYE SURGERY FOR RETINAL BLEED 3 YEARS AGO


- X 2  IN , 


-HYSTERECTOMY 


-CHOLECYSTECTOMY ( OPEN ) 


-LEFT FOOT SURGERY 7 YEARS AGO


-LUMBAR LAMINECTOMY 20 YEARS AGO


-PACEMAKER 4 YEARS AGO


-CARDIAC ABLATION 2019


-MAZE PROCEDURE 2020


-LEFT LATERAL APPENDAGE CLIP 2020


-PT REPORTS CARDIAC CATH DONE IN  IN  SHOWED NO SIGNIFICANT CAD. 





ECHOCARDIOGRAM 21--EF 55-60%


MPI 21--NO INSCHEMIA OR INFARCTIONS





Physical Exam


Vital Signs





Vital Signs - First Documented








 3/25/22





 17:50


 


Temp 36.6


 


Pulse 87


 


Resp 20


 


B/P (MAP) 151/74 (99)


 


Pulse Ox 100


 


O2 Delivery Room Air





Capillary Refill :


Height, Weight, BMI


Height: 5'1.00"


Weight: 151lbs. 0.0oz. [id number]; 22.68 BMI


Method:Stated


General Appearance:  No Apparent Distress, WD/WN


HEENT:  Normal ENT Inspection, Pharynx Normal


Neck:  Normal Inspection


Respiratory:  Normal Breath Sounds, No Accessory Muscle Use, No Respiratory 

Distress


Cardiovascular:  Regular Rate, Rhythm, No Murmur


Gastrointestinal:  Non Tender, Soft


Neurologic/Psychiatric:  Alert, Oriented x3 (BUT POOR MEMORY), No Motor/Sensory 

Deficits (HX PERIPHERAL NEUROPATHY, HAS SOME SENSATION TO FEET), Normal 

Mood/Affect, CNs II-XII Norm as Tested


Skin:  Normal Color, Warm/Dry





Progress/Results/Core Measures


Suspected Sepsis


SIRS


Temperature: 


Pulse:  


Respiratory Rate: 


 


Laboratory Tests


3/25/22 18:00: White Blood Count 4.5


Blood Pressure  / 


Mean: 


 


Laboratory Tests


3/25/22 18:00: 


Creatinine 0.89, Platelet Count 190, Total Bilirubin 0.7








Results/Orders


Lab Results





Laboratory Tests








Test


 3/25/22


18:00 3/25/22


18:02 3/25/22


18:20 Range/Units


 


 


White Blood Count


 4.5 


 


 


 4.3-11.0


10^3/uL


 


Red Blood Count


 3.74 L


 


 


 3.80-5.11


10^6/uL


 


Hemoglobin 8.9 L   11.5-16.0  g/dL


 


Hematocrit 29 L   35-52  %


 


Mean Corpuscular Volume 78 L   80-99  fL


 


Mean Corpuscular Hemoglobin 24 L   25-34  pg


 


Mean Corpuscular Hemoglobin


Concent 31 L


 


 


 32-36  g/dL





 


Red Cell Distribution Width 17.2 H   10.0-14.5  %


 


Platelet Count


 190 


 


 


 130-400


10^3/uL


 


Mean Platelet Volume 8.2 L   9.0-12.2  fL


 


Immature Granulocyte % (Auto) 1     %


 


Neutrophils (%) (Auto) 80 H   42-75  %


 


Lymphocytes (%) (Auto) 16    12-44  %


 


Monocytes (%) (Auto) 3    0-12  %


 


Eosinophils (%) (Auto) 0    0-10  %


 


Basophils (%) (Auto) 0    0-10  %


 


Neutrophils # (Auto)


 3.6 


 


 


 1.8-7.8


10^3/uL


 


Lymphocytes # (Auto)


 0.7 L


 


 


 1.0-4.0


10^3/uL


 


Monocytes # (Auto)


 0.1 


 


 


 0.0-1.0


10^3/uL


 


Eosinophils # (Auto)


 0.0 


 


 


 0.0-0.3


10^3/uL


 


Basophils # (Auto)


 0.0 


 


 


 0.0-0.1


10^3/uL


 


Immature Granulocyte # (Auto)


 0.0 


 


 


 0.0-0.1


10^3/uL


 


Sodium Level 132 L   135-145  MMOL/L


 


Potassium Level 3.1 L   3.6-5.0  MMOL/L


 


Chloride Level 97 L     MMOL/L


 


Carbon Dioxide Level 24    21-32  MMOL/L


 


Anion Gap 11    5-14  MMOL/L


 


Blood Urea Nitrogen 20 H   7-18  MG/DL


 


Creatinine


 0.89 


 


 


 0.60-1.30


MG/DL


 


Estimat Glomerular Filtration


Rate 70 


 


 


  





 


BUN/Creatinine Ratio 22     


 


Glucose Level 352 H     MG/DL


 


Calcium Level 8.8    8.5-10.1  MG/DL


 


Corrected Calcium 9.3    8.5-10.1  MG/DL


 


Magnesium Level 1.9    1.6-2.4  MG/DL


 


Total Bilirubin 0.7    0.1-1.0  MG/DL


 


Aspartate Amino Transf


(AST/SGOT) 12 


 


 


 5-34  U/L





 


Alanine Aminotransferase


(ALT/SGPT) 7 


 


 


 0-55  U/L





 


Alkaline Phosphatase 64      U/L


 


Total Protein 7.4    6.4-8.2  GM/DL


 


Albumin 3.4    3.2-4.5  GM/DL


 


Amylase Level 57      U/L


 


Lipase 26    8-78  U/L


 


Glucometer  328 H    MG/DL


 


Urine Color   YELLOW   


 


Urine Clarity   CLEAR   


 


Urine pH   5.5  5-9  


 


Urine Specific Gravity   1.020  1.016-1.022  


 


Urine Protein   NEGATIVE  NEGATIVE  


 


Urine Glucose (UA)   TRACE H NEGATIVE  


 


Urine Ketones   NEGATIVE  NEGATIVE  


 


Urine Nitrite   NEGATIVE  NEGATIVE  


 


Urine Bilirubin   NEGATIVE  NEGATIVE  


 


Urine Urobilinogen   0.2  < = 1.0  MG/DL


 


Urine Leukocyte Esterase   NEGATIVE  NEGATIVE  


 


Urine RBC (Auto)   1+ H NEGATIVE  


 


Urine RBC   0-2   /HPF


 


Urine WBC   0-2   /HPF


 


Urine Crystals   PRESENT H  /LPF


 


Urine Amorphous Sediment


 


 


 FEW ELLIE


URATES H  /LPF





 


Urine Bacteria   TRACE   /HPF


 


Urine Casts   PRESENT   /LPF


 


Urine Hyaline Casts   2-5 H  /LPF


 


Urine Mucus   NEGATIVE   /LPF


 


Urine Culture Indicated   NO   


 


Influenza Type A (RT-PCR)   Not Detected  Not Detecte  


 


Influenza Type B (RT-PCR)   Not Detected  Not Detecte  


 


SARS-CoV-2 RNA (RT-PCR)   Not Detected  Not Detecte  








My Orders





Orders - DARA PAL DO


Accucheck Stat ONCE (3/25/22 17:58)


Ed Iv/Invasive Line Start (3/25/22 17:58)


Monitor-Rhythm Ecg Trace Only (3/25/22 17:58)


Straight Cath For Spec.-Adult (3/25/22 17:58)


Amylase (3/25/22 17:58)


Cbc With Automated Diff (3/25/22 17:58)


Comprehensive Metabolic Panel (3/25/22 17:58)


Lipase (3/25/22 17:58)


Magnesium (3/25/22 17:58)


Ua Culture If Indicated (3/25/22 17:58)


Ed Iv/Invasive Line Start (3/25/22 17:58)


Ns Iv 1000 Ml (Sodium Chloride 0.9%) (3/25/22 18:00)


Covid 19 Inhouse Test (3/25/22 18:04)


Influenza A And B By Pcr (3/25/22 18:04)


Isolation Central Supply Req (3/25/22 18:04)


Insulin (Regular) Human (Novolin R (Per (3/25/22 19:00)


Ed Iv/Invasive Line Start (3/25/22 18:52)


Ns Iv 1000 Ml (Sodium Chloride 0.9%) (3/25/22 19:00)


Potassium Chloride (Tablet) (Klor Con Ta (3/25/22 19:00)


Hemoglobin A1c (3/25/22 19:19)


Beta Hydroxybutyrate (3/25/22 19:19)


Accucheck Stat ONCE (3/25/22 19:29)





Medications Given in ED





Current Medications








 Medications  Dose


 Ordered  Sig/Chris


 Route  Start Time


 Stop Time Status Last Admin


Dose Admin


 


 Insulin Human


 Regular  10 unit  ONCE  ONCE


 IV  3/25/22 19:00


 3/25/22 19:01 DC 3/25/22 19:17


10 UNIT


 


 Potassium Chloride  40 meq  ONCE  ONCE


 PO  3/25/22 19:00


 3/25/22 19:01 DC 3/25/22 19:17


40 MEQ








Vital Signs/I&O











 3/25/22





 17:50


 


Temp 36.6


 


Pulse 87


 


Resp 20


 


B/P (MAP) 151/74 (99)


 


Pulse Ox 100


 


O2 Delivery Room Air





Capillary Refill :





Point of Care Testing


Finger Stick Blood Glucose:  328


Blood Glucose Action Taken:  DR. PAL NOTIFIED


Progress Note :  


Progress Note


UNEVENTFUL ER STAY





GIVEN IV FLUIDS, KCL AND INSULIN


BLOOD GLUCOSE DOWN  PRIOR TO DISMISSAL





Departure


Impression





   Primary Impression:  


   Uncontrolled diabetes mellitus


   Additional Impression:  


   IDDM (insulin dependent diabetes mellitus)


Disposition:   HOME, SELF-CARE


Condition:  Stable





Departure-Patient Inst.


Decision time for Depature:  19:45


Referrals:  


JOSSELYN WILLIS MD (PCP/Family)


Primary Care Physician


Patient Instructions:  High Blood Sugar, Adult (DC), How to Prevent High Blood 

Sugar Emergencies in Diabetes





Add. Discharge Instructions:  


CHECK YOUR BLOOD SUGAR AT LEAST 4 TIMES A DAY





CONTINUE YOUR INSULIN AS PRESCRIBED





TAKE YOUR REGULAR MEDICATIONS AS PRESCRIBED





GET YOUR PRESCRIPTION FOR ANTIBIOTIC FILLED AND TAKE AS PRESCRIBED





FOLLOW UP WITH YOUR DR IN 3-4 DAYS FOR FURTHER CARE





RETURN TO ER IF PROBLEMS





All discharge instructions reviewed with patient and/or family. Voiced 

understanding.











DARA PAL DO                 Mar 25, 2022 18:24

## 2022-04-01 NOTE — DIAGNOSTIC IMAGING REPORT
INDICATION: AMS



COMPARISON: 01/05/2022.



FINDINGS: Single frontal view of the chest demonstrates normal

heart size and pulmonary vascularity. The lungs are well aerated

and clear. No large pleural effusion or pneumothorax is seen. The

visualized osseous structures show no acute abnormalities.

Left-sided dual-lead pacemaker and postsurgical changes of

previous cardiac valve repair are noted.



IMPRESSION: No acute cardiopulmonary process.   



Dictated by: 



  Dictated on workstation # [id number]

## 2022-04-01 NOTE — DIAGNOSTIC IMAGING REPORT
CLINICAL INDICATION: Patient with memory trouble and trouble

walking.



EXAM: Axial CT scan of the brain performed without IV contrast.

High-resolution axial CT brain images with sagittal and coronal

reformations were also created. Auto Exposure Controls were

utilized during the CT exam to meet ALARA standards for radiation

dose reduction.



COMPARISON: Head CT without contrast dated 05/24/2021.



FINDINGS:



There is no evidence of acute intracranial process. There is no

intracranial hemorrhage. There is no dense vessel sign. Stable

small chronic infarct involving the right occipital lobe region.



There are subtle patchy areas of low-attenuation white matter

changes involving both cerebral hemispheres, likely representing

mild chronic small vessel ischemic disease.



There is no hydrocephalus. There is no brain herniation or

midline shift. Basal cisterns are unremarkable.



The extracranial soft tissues, skull, and orbits are

unremarkable.



Paranasal sinuses are clear. There is sclerosis and consolidation

of the left mastoid air cells. There is consolidation of the left

middle ear.



IMPRESSION:

1: There is no evidence of acute intracranial process. There is

no dense vessel sign.



2: Stable small chronic cerebral infarct involving the right

occipital lobe.



3: Age-related brain parenchymal changes.



Results of this report were discussed with MAURICIO Orozco, via

the telephone on 04/01/2022 at 1632 hours.



Dictated by: 



  Dictated on workstation # [id number]

## 2022-04-01 NOTE — ED GENERAL
General


Chief Complaint:  Altered Mental Status


Stated Complaint:  CANT WALK, MEMORY NOT GOOD AT ALL


Source of Information:  Patient


Exam Limitations:  No Limitations


 (NEELAM JENKINS MED STUDENT)





History of Present Illness


Date Seen by Provider:  2022


Time Seen by Provider:  16:09


Initial Comments


Mrs. Powers is a 70yo female with PMH of DM, stroke x2, heart attack x2, ablation

that presents to ED today due to AMS and fever. Her sister is with her who says 

that around 1030 today she noticed her sister beginning to become more tired and

confused. She checked her blood sugar and was 147. At baseline she is confused 

but her sister states that it has been a little worse this week, she heard this 

from patients son. Mrs. Powers is only oriented to person in the room and has 

confused, word salad like speech. She states she is not in any pain. She has a 

fever of 102, is currently being treated for UTI, and vomited once in the 

waiting room. Sister states she had a fall about a week ago, unsure if she hit 

her head, but was never seen for it. Also states that she is not on a blood 

thinner. She does not smoke, drink, or use drugs. 


 (NEELAM JNEKINS MED STUDENT)





Allergies and Home Medications


Allergies


Coded Allergies:  


     promethazine (Verified  Allergy, Intermediate, 3/11/08)


     clindamycin (Verified  Allergy, Unknown, 3/7/21)


     hydrocodone (Verified  Allergy, Unknown, 3/7/21)


     morphine (Verified  Allergy, Unknown, 3/7/21)





Patient Home Medication List


Home Medication List Reviewed:  Yes


 (CINDY NGO MD)


Amoxicillin/Potassium Clav (Augmentin 875-125 Tablet) 1 Each Tablet, 1 EACH PO 

BID


   Prescribed by: RICA WATERS on 22 1230


Celecoxib (Celecoxib) 100 Mg Capsule, 100 MG PO BID PRN for ARTHRITIS PAIN, 

(Reported)


   Entered as Reported by: ANIRUDH GODOY on 1/3/22 2154


Colchicine (Colchicine) 0.6 Mg Tablet, 0.6 MG PO BID, (Reported)


   Entered as Reported by: ANIRUDH GODOY on 1/3/22 2154


Insulin Aspart (Novolog) 100 Unit/1 Ml Susp, UNITS SC PER PUMP, (Reported)


   Entered as Reported by: DAKOTA BALL on 3/8/21 1009


Metoprolol Succinate (Metoprolol Succinate) 50 Mg Tab.er.24h, 50 MG PO DAILY, 

(Reported)


   Entered as Reported by: DAKOTA BALL on 3/8/21 100


Potassium Chloride (K-Tab ER) 10 Meq Tablet.er, 10 MEQ PO DAILY, (Reported)


   Entered as Reported by: DAKOTA BALL on 3/8/21 100


Pravastatin Sodium (Pravastatin Sodium) 20 Mg Tablet, 20 MG PO HS, (Reported)


   Entered as Reported by: DAKOTA BALL on 3/8/21 100


Sacubitril/Valsartan (Entresto 24 mg-26 mg Tablet) 1 Each Tablet, 1 EA PO BID, 

(Reported)


   Entered as Reported by: DAKOTA BALL on 3/8/21 100


Terbinafine HCl (Terbinafine HCl) 250 Mg Tablet, 250 MG PO DAILY, (Reported)


   Entered as Reported by: ANIRUDH GODOY on 1/3/22 2154





Review of Systems


Review of Systems


Constitutional:  No chills; fever


EENTM:  No hearing loss, No vision loss


Respiratory:  No cough, No short of breath


Cardiovascular:  No chest pain, No edema


Gastrointestinal:  No abdominal pain, No constipation, No diarrhea, No nausea, 

No vomiting


Genitourinary:  No dysuria, No hematuria


Musculoskeletal:  No joint pain, No joint swelling


Skin:  No lesions, No rash


Psychiatric/Neurological:  Denies Headache, Denies Numbness; Other (confusion) 

(NEELAM JENKINS MED STUDENT)





Past Medical-Social-Family Hx


Patient Social History


Tobacco Use?:  No


Substance use?:  No


Alcohol Use?:  No


Pt feels they are or have been:  No


 (NEELAM JENKINS [employer] STUDENT)





Immunizations Up To Date


Tetanus Booster (TDap):  Unknown


First/Initial COVID19 Vaccinat:  


Second COVID19 Vaccination Rodger:  


Third COVID19 Vaccination Date:  N/A


 (NEELAM JENKINS MED STUDENT)





Past Medical History


Surgery/Hospitalization HX:  


cardiac ablation, ppm, c-sect, hysterectomy, cholecystectomy, lumbar 


laminectomy, heart cath, htn, iddm, gerd, high cholesterol, a-fib, peripheral 


neuropathy, cva


Surgeries:  Yes


Cardiac,  Section, Gallbladder, Hysterectomy, Orthopedic, Pacemaker


Respiratory:  Yes


COPD


Cardiac:  Yes (PACEMAKER, CARDIAC ABLATION, MAZE PROCEDURE, LEFT APPENDAGE 

CLIPPED)


Atrial Fibrillation, High Cholesterol, Hypertension, Irregular Heartbeat


Neurological:  Yes (PERIPHERAL NEUROPATHY IN HANDS AND FEET;HX OF CVA -NO 

RESIDUAL)


Neuropathy, Stroke


Reproductive Disorders:  No


Genitourinary:  Yes


Bladder Infection


Gastrointestinal:  Yes


Gastroesophageal Reflux


Musculoskeletal:  Yes


Chronic Back Pain


Endocrine:  Yes (INSULIN PUMP)


Diabetes, Insulin dep


HEENT:  Yes (DIABETIC RETINOPATHY)


Cancer:  No


Psychosocial:  No


Integumentary:  No


Blood Disorders:  No


 (NEELAM JENKINS STUDENT)





Family Medical History





PAST SURGICAL HISTORY:


-LEFT EYE SURGERY FOR RETINAL BLEED 3 YEARS AGO


- X 2  IN , 


-HYSTERECTOMY 


-CHOLECYSTECTOMY ( OPEN ) 


-LEFT FOOT SURGERY 7 YEARS AGO


-LUMBAR LAMINECTOMY 20 YEARS AGO


-PACEMAKER 4 YEARS AGO


-CARDIAC ABLATION 2019


-MAZE PROCEDURE 2020


-LEFT LATERAL APPENDAGE CLIP 2020


-PT REPORTS CARDIAC CATH DONE IN  IN  SHOWED NO SIGNIFICANT CAD. 





ECHOCARDIOGRAM 21--EF 55-60%


MPI 21--NO INSCHEMIA OR INFARCTIONS


 (NEELAM JENKINS STUDENT)





Physical Exam


Vital Signs





Vital Signs - First Documented








 22





 16:12


 


Temp 39.2


 


Pulse 107


 


Resp 20


 


B/P (MAP) 164/75 (104)


 


Pulse Ox 98


 


O2 Delivery Room Air





 (CINDY NGO MD)


Vital Signs


Capillary Refill :  


 (NEELAM JENKINS STUDENT)


Height, Weight, BMI


Height: 5'1.00"


Weight: 151lbs. 0.0oz. 68.530097se; 23.00 BMI


Method:Stated


General Appearance:  No Apparent Distress, Other (confused)


Eyes:  Bilateral Eye Normal Inspection, Bilateral Eye PERRL, Bilateral Eye EOMI


HEENT:  PERRL/EOMI, Pharynx Normal, Moist Mucous Membranes


Respiratory:  Chest Non Tender, Lungs Clear, Normal Breath Sounds


Cardiovascular:  Regular Rate, Rhythm, No Edema, No Murmur, Normal Peripheral 

Pulses


Gastrointestinal:  Normal Bowel Sounds, Non Tender, Soft


Extremity:  Non Tender, No Calf Tenderness, No Pedal Edema


Neurologic/Psychiatric:  Alert, No Motor/Sensory Deficits, CNs II-XII Norm as 

Tested, Other (AxO to person only)


Skin:  Normal Color, Warm/Dry (GREGORY,NEELAM MED STUDENT)





Focused Exam


Lactate Level


22 15:59: Lactic Acid Level 1.06


 (CINDY NGO MD)


Lactic Acid Level





Laboratory Tests








Test


 22


15:59


 


Lactic Acid Level


 1.06 MMOL/L


(0.50-2.00)





 (CINDY NGO MD)





Progress/Results/Core Measures


Suspected Sepsis


SIRS


Temperature: 


Pulse:  


Respiratory Rate: 


 


Laboratory Tests


22 15:59: White Blood Count 8.2


Blood Pressure  / 


Mean: 


 


22 15:59: Lactic Acid Level 1.06


Laboratory Tests


22 15:59: 


Creatinine 0.78, Platelet Count 168, Total Bilirubin 1.1H


 (NEELAM JENKINS MED STUDENT)





Results/Orders


Lab Results





Laboratory Tests








Test


 22


15:59 22


16:07 22


16:12 22


16:55 Range/Units


 


 


White Blood Count


 8.2 


 


 


 


 4.3-11.0


10^3/uL


 


Red Blood Count


 3.63 L


 


 


 


 3.80-5.11


10^6/uL


 


Hemoglobin 8.6 L    11.5-16.0  g/dL


 


Hematocrit 28 L    35-52  %


 


Mean Corpuscular Volume 76 L    80-99  fL


 


Mean Corpuscular Hemoglobin 24 L    25-34  pg


 


Mean Corpuscular Hemoglobin


Concent 31 L


 


 


 


 32-36  g/dL





 


Red Cell Distribution Width 17.1 H    10.0-14.5  %


 


Platelet Count


 168 


 


 


 


 130-400


10^3/uL


 


Mean Platelet Volume 8.4 L    9.0-12.2  fL


 


Immature Granulocyte % (Auto) 0      %


 


Neutrophils (%) (Auto) 88 H    42-75  %


 


Lymphocytes (%) (Auto) 9 L    12-44  %


 


Monocytes (%) (Auto) 3     0-12  %


 


Eosinophils (%) (Auto) 0     0-10  %


 


Basophils (%) (Auto) 0     0-10  %


 


Neutrophils # (Auto)


 7.2 


 


 


 


 1.8-7.8


10^3/uL


 


Lymphocytes # (Auto)


 0.7 L


 


 


 


 1.0-4.0


10^3/uL


 


Monocytes # (Auto)


 0.2 


 


 


 


 0.0-1.0


10^3/uL


 


Eosinophils # (Auto)


 0.0 


 


 


 


 0.0-0.3


10^3/uL


 


Basophils # (Auto)


 0.0 


 


 


 


 0.0-0.1


10^3/uL


 


Immature Granulocyte # (Auto)


 0.0 


 


 


 


 0.0-0.1


10^3/uL


 


Neutrophils % (Manual) 95      %


 


Lymphocytes % (Manual) 4      %


 


Monocytes % (Manual) 1      %


 


Hypochromasia SLIGHT      


 


Microcytosis SLIGHT      


 


Prothrombin Time 16.1 H    12.2-14.7  SEC


 


INR Comment 1.2     0.8-1.4  


 


Activated Partial


Thromboplast Time 35 


 


 


 


 24-35  SEC





 


D-Dimer


 5.61 H


 


 


 


 0.00-0.49


UG/ML


 


Sodium Level 129 L    135-145  MMOL/L


 


Potassium Level 3.2 L    3.6-5.0  MMOL/L


 


Chloride Level 94 L      MMOL/L


 


Carbon Dioxide Level 20 L    21-32  MMOL/L


 


Anion Gap 15 H    5-14  MMOL/L


 


Blood Urea Nitrogen 11     7-18  MG/DL


 


Creatinine


 0.78 


 


 


 


 0.60-1.30


MG/DL


 


Estimat Glomerular Filtration


Rate 82 


 


 


 


  





 


BUN/Creatinine Ratio 14      


 


Glucose Level 156 H      MG/DL


 


Lactic Acid Level


 1.06 


 


 


 


 0.50-2.00


MMOL/L


 


Calcium Level 8.4 L    8.5-10.1  MG/DL


 


Corrected Calcium 9.0     8.5-10.1  MG/DL


 


Total Bilirubin 1.1 H    0.1-1.0  MG/DL


 


Aspartate Amino Transf


(AST/SGOT) 14 


 


 


 


 5-34  U/L





 


Alanine Aminotransferase


(ALT/SGPT) < 6 


 


 


 


 0-55  U/L





 


Alkaline Phosphatase 72       U/L


 


Troponin I < 0.028     <0.028  NG/ML


 


C-Reactive Protein High


Sensitivity 5.27 H


 


 


 


 0.00-0.50


MG/DL


 


Total Protein 7.6     6.4-8.2  GM/DL


 


Albumin 3.3     3.2-4.5  GM/DL


 


Glucometer  157 H     MG/DL


 


Influenza Type A (RT-PCR)   Not Detected   Not Detecte  


 


Influenza Type B (RT-PCR)   Not Detected   Not Detecte  


 


SARS-CoV-2 RNA (RT-PCR)   Not Detected   Not Detecte  


 


Urine Color    YELLOW   


 


Urine Clarity    CLEAR   


 


Urine pH    6.0  5-9  


 


Urine Specific Gravity    1.020  1.016-1.022  


 


Urine Protein    TRACE H NEGATIVE  


 


Urine Glucose (UA)    NEGATIVE  NEGATIVE  


 


Urine Ketones    NEGATIVE  NEGATIVE  


 


Urine Nitrite    NEGATIVE  NEGATIVE  


 


Urine Bilirubin    NEGATIVE  NEGATIVE  


 


Urine Urobilinogen    0.2  < = 1.0  MG/DL


 


Urine Leukocyte Esterase    TRACE H NEGATIVE  


 


Urine RBC (Auto)    2+ H NEGATIVE  


 


Urine RBC    2-5 H  /HPF


 


Urine WBC    0-2   /HPF


 


Urine Crystals    PRESENT H  /LPF


 


Urine Amorphous Sediment


 


 


 


 RARE ELLIE


URATES H  /LPF





 


Urine Bacteria    TRACE   /HPF


 


Urine Casts    PRESENT   /LPF


 


Urine Hyaline Casts    0-2 H  /LPF


 


Urine Mucus    SMALL H  /LPF


 


Urine Culture Indicated    NO   





 (CINDY NGO MD)


My Orders





Orders - CINDY NGO MD


Acetaminophen  Tablet (Tylenol  Tablet) (22 16:45)


Hs C Reactive Protein (22 17:24)


Ns Iv 1000 Ml (Sodium Chloride 0.9%) (22 17:30)


Potassium Chloride (Tablet) (Klor Con Ta (22 18:30)


Magnesium (22 18:26)


 (CINDY NGO MD)


Medications Given in ED





Current Medications








 Medications  Dose


 Ordered  Sig/Chris


 Route  Start Time


 Stop Time Status Last Admin


Dose Admin


 


 Acetaminophen  1,000 mg  ONCE  ONCE


 PO  22 16:45


 22 16:46 DC 22 16:41


1,000 MG


 


 Potassium Chloride  20 meq  ONCE  ONCE


 PO  22 18:30


 22 18:31 DC 22 18:29


20 MEQ





 (CINDY NGO MD)


Vital Signs/I&O











 22





 16:12 16:26 16:41


 


Temp 39.2  39.2


 


Pulse 107  


 


Resp 20  


 


B/P (MAP) 164/75 (104)  


 


Pulse Ox 98 98 


 


O2 Delivery Room Air Room Air 





 (CINDY NGO MD)


Vital Signs/I&O


Capillary Refill :  


 (NEELAM JENKINS MED STUDENT)





Point of Care Testing


Finger Stick Blood Glucose:  157


Blood Glucose Action Taken:  RN NOT.


 (NEELAM JENKINS MED STUDENT)


Progress Note :  


   Time:  16:40


Progress Note


Patient was interviewed and examined along with MS 4.  She appears to be 

encephalopathic with confusion at this time, likely secondary to her fever and 

what ever the source of infection is responsible for her fever.  No definite 

acute focal neurologic deficit is appreciated.  Her speech difficulties seem to 

be more associated with delirium than a true aphasia.  She is able to speak 

normally at times but at other times her speech trails off into nonsensical 

words.  She seems to lose her thought toward the end of a sentence or 

expression.  CT of the head demonstrated no acute abnormalities.  Patient is not

a candidate for thrombolytics as there is an unclear last known well time and 

symptoms seem to be more encephalopathic in the context of fever and known 

urinary tract infection..


 (CINDY NGO MD)





ECG


Initial ECG Impression Date:  2022


Initial ECG Impression Time:  16:31


Initial ECG Rate:  103


Initial ECG Rhythm:  S.Tach


Initial ECG Intervals:  Normal


Comment


Normal sinus rhythm with no ST elevation or depression.  No abnormal intervals 

or axis deviation.


 (CINDY NGO MD)





Diagnostic Imaging





   Diagonstic Imaging:  Xray


   Plain Films/CT/US/NM/MRI:  chest


Comments


Chest x-ray viewed by me and report reviewed.  See report below:





NAME:   RICK POWERS


[facility] REC#:   [id number]


ACCOUNT#:   [id number]


PT STATUS:   REG ER


:   1950


PHYSICIAN:   PRISCILA PURDY APRN


ADMIT DATE:   22/ER


***Draft***


Date of Exam:22





CHEST 1 VIEW, AP/PA ONLY





INDICATION: AMS





COMPARISON: 2022.





FINDINGS: Single frontal view of the chest demonstrates normal


heart size and pulmonary vascularity. The lungs are well aerated


and clear. No large pleural effusion or pneumothorax is seen. The


visualized osseous structures show no acute abnormalities.


Left-sided dual-lead pacemaker and postsurgical changes of


previous cardiac valve repair are noted.





IMPRESSION: No acute cardiopulmonary process.   





  Dictated on workstation # [id number]





Dict:   22 1628


Trans:   22 1629


[facility] [id number]





Interpreted by:     NIKUNJ STUART MD








   Diagonstic Imaging:  CT


   Plain Films/CT/US/NM/MRI:  head


Comments


CT head viewed by me and report received from radiologist.  No acute abnorma

lities appreciated.


 (CINDY NGO MD)





Departure


Impression





   Primary Impression:  


   Fever


   Qualified Codes:  R50.9 - Fever, unspecified


   Additional Impressions:  


   Speech disturbance


   Qualified Codes:  R47.9 - Unspecified speech disturbances


   Depression


   Qualified Codes:  F32.A - Depression, unspecified


   Hyponatremia


   Hypokalemia


   Insomnia


   Qualified Codes:  G47.00 - Insomnia, unspecified


Disposition:  01 HOME, SELF-CARE


Condition:  Improved





Departure-Patient Inst.


Decision time for Depature:  18:19


 (CINDY NGO MD)


Referrals:  


JOSSELYN WILLIS MD (PCP/Family)


Primary Care Physician


Patient Instructions:  Depression, Adult ED, Fever, Adult ED





Add. Discharge Instructions:  


OnlyThe exact cause of your fever is uncertain.  It may be related to a viral 

illness.  Complete the antibiotic you are prescribed for urinary tract infection

and return to care if your symptoms are worsening.


Your confusion and speech difficulty definitely improved after treating fever 

with Tylenol.  Monitor your temperature closely over the next few days and treat

fever with Tylenol.


Follow-up with your primary care provider soon as possible.  Please call Monday 

morning to schedule an appointment.  At that appointment I suggest that you 

discuss treatment of depression and seek referral to a behavioral health 

provider.  I also suggest you seek referral back to your neurologist.  You may 

not need a referral as an existing patient and could possibly call the 

neurologist directly to schedule a follow-up.  At your follow-up appointments 

discuss other possible medical conditions that could be contributing to your 

symptoms including thyroid dysfunction, vitamin deficiencies such as vitamin D 

or vitamin B12, multiple sclerosis, etc.


Drink plenty of clear liquids he had eat a well-balanced diet.  You are 

deficient in sodium and potassium today.  Eating a well-balanced diet will help 

keep these levels normal.


You may try Seroquel as prescribed to help settle your thoughts at bedtime and 

help you sleep.  Discuss further use with your primary care provider.


Call with questions or concerns.


Return to the ER if you have worsening symptoms despite following these 

instructions.








All discharge instructions reviewed with patient and/or family. Voiced 

understanding.


Scripts


Quetiapine Fumarate (Seroquel) 25 Mg Tablet


25 MG PO HS, #30 TAB


   Prov: CINDY NGO MD         22





Copy


Copies To 1:   JOSSELYN WILLIS MD, DEREK MED STUDENT       2022 16:20


CINDY NGO MD         2022 16:42

## 2022-04-14 NOTE — ED GENERAL
General


Chief Complaint:  General Problems/Pain


Stated Complaint:  WEAKNESS


Nursing Triage Note:  


brought in by [facility] ems for weakness, intermittant chest pain x1 day. 


denies chest pain at this time.


Source of Information:  Patient


Exam Limitations:  No Limitations





History of Present Illness


Date Seen by Provider:  2022


Time Seen by Provider:  21:22


Initial Comments


Patient to ER by EMS [facility] with chief complaint that she was having 

some chest pain shortness of air for the past several days but it is short lived

less than an hour intermittent.  She did not take anything for it.  She does not

take breathing treatments nor does she have a history of coronary disease.  She 

has never had a heart cath or stent.  She states the pain is on both sides of 

her chest anterior.  She states that since it was going away by the time EMS 

arrived she said she did not want to come here but they brought her in anyways. 

She is not having any symptoms now.  She says she will follow-up with her 

primary care doctor.  She is alert and oriented and adamant that she does not 

want to be here nor does she want to be worked up any further.  She is not on 

aspirin or blood thinners.  She was not given any aspirin at any point.





Allergies and Home Medications


Allergies


Coded Allergies:  


     promethazine (Verified  Allergy, Intermediate, 3/11/08)


     clindamycin (Verified  Allergy, Unknown, 3/7/21)


     hydrocodone (Verified  Allergy, Unknown, 3/7/21)


     morphine (Verified  Allergy, Unknown, 3/7/21)





Patient Home Medication List


Home Medication List Reviewed:  Yes


Amoxicillin/Potassium Clav (Augmentin 875-125 Tablet) 1 Each Tablet, 1 EACH PO 

BID


   Prescribed by: RICA WATERS on 22 1230


Celecoxib (Celecoxib) 100 Mg Capsule, 100 MG PO BID PRN for ARTHRITIS PAIN, 

(Reported)


   Entered as Reported by: ANIRUDH GODOY on 1/3/22 2154


Colchicine (Colchicine) 0.6 Mg Tablet, 0.6 MG PO BID, (Reported)


   Entered as Reported by: ANIRUDH GODOY on 1/3/22 2154


Insulin Aspart (Novolog) 100 Unit/1 Ml Susp, UNITS SC PER PUMP, (Reported)


   Entered as Reported by: DAKOTA BALL on 3/8/21 1009


Metoprolol Succinate (Metoprolol Succinate) 50 Mg Tab.er.24h, 50 MG PO DAILY, 

(Reported)


   Entered as Reported by: DAKOTA BALL on 3/8/21 1009


Potassium Chloride (K-Tab ER) 10 Meq Tablet.er, 10 MEQ PO DAILY, (Reported)


   Entered as Reported by: DAKOTA BALL on 3/8/21 1009


Pravastatin Sodium (Pravastatin Sodium) 20 Mg Tablet, 20 MG PO HS, (Reported)


   Entered as Reported by: DAKOTA BALL on 3/8/21 1009


Quetiapine Fumarate (Seroquel) 25 Mg Tablet, 25 MG PO HS


   Prescribed by: CINDY MICHEL on 22 183


Sacubitril/Valsartan (Entresto 24 mg-26 mg Tablet) 1 Each Tablet, 1 EA PO BID, 

(Reported)


   Entered as Reported by: DAKOTA BALL on 3/8/21 1009


Terbinafine HCl (Terbinafine HCl) 250 Mg Tablet, 250 MG PO DAILY, (Reported)


   Entered as Reported by: ANIRUDH GODOY on 1/3/22 934





Review of Systems


Review of Systems


Constitutional:  No chills, No diaphoresis


EENTM:  No ear discharge, No hearing loss


Respiratory:  No cough, No short of breath


Cardiovascular:  No chest pain, No edema


Gastrointestinal:  No abdominal pain, No nausea, No vomiting


Genitourinary:  No discharge, No dysuria


Musculoskeletal:  No back pain, No joint pain





All Other Systems Reviewed


Negative Unless Noted:  Yes





Past Medical-Social-Family Hx


Patient Social History


Tobacco Use?:  No


Substance use?:  No


Alcohol Use?:  No


Pt feels they are or have been:  No





Immunizations Up To Date


Tetanus Booster (TDap):  Unknown


First/Initial COVID19 Vaccinat:  


Second COVID19 Vaccination Rodger:  


Third COVID19 Vaccination Date:  N/A





Past Medical History


Surgery/Hospitalization HX:  


cardiac ablation, ppm, c-sect, hysterectomy, cholecystectomy, lumbar 


laminectomy, heart cath, htn, iddm, gerd, high cholesterol, a-fib, peripheral 


neuropathy, cva


Surgeries:  Yes


Cardiac,  Section, Gallbladder, Hysterectomy, Orthopedic, Pacemaker


Respiratory:  Yes


COPD


Cardiac:  Yes (PACEMAKER, CARDIAC ABLATION, MAZE PROCEDURE, LEFT APPENDAGE 

CLIPPED)


Atrial Fibrillation, High Cholesterol, Hypertension, Irregular Heartbeat


Neurological:  Yes (PERIPHERAL NEUROPATHY IN HANDS AND FEET;HX OF CVA 2019-NO 

RESIDUAL)


Neuropathy, Stroke


Reproductive Disorders:  No


Genitourinary:  Yes


Bladder Infection


Gastrointestinal:  Yes


Gastroesophageal Reflux


Musculoskeletal:  Yes


Chronic Back Pain


Endocrine:  Yes (INSULIN PUMP)


Diabetes, Insulin dep


HEENT:  Yes (DIABETIC RETINOPATHY)


Cancer:  No


Psychosocial:  No


Integumentary:  No


Blood Disorders:  No





Family Medical History





PAST SURGICAL HISTORY:


-LEFT EYE SURGERY FOR RETINAL BLEED 3 YEARS AGO


- X 2  IN , 


-HYSTERECTOMY 


-CHOLECYSTECTOMY ( OPEN ) 


-LEFT FOOT SURGERY 7 YEARS AGO


-LUMBAR LAMINECTOMY 20 YEARS AGO


-PACEMAKER 4 YEARS AGO


-CARDIAC ABLATION 2019


-MAZE PROCEDURE 2020


-LEFT LATERAL APPENDAGE CLIP 2020


-PT REPORTS CARDIAC CATH DONE IN  IN  SHOWED NO SIGNIFICANT CAD. 





ECHOCARDIOGRAM 21--EF 55-60%


MPI 21--NO INSCHEMIA OR INFARCTIONS





Physical Exam


Vital Signs





Vital Signs - First Documented








 22





 21:20


 


Temp 36.9


 


Pulse 95


 


B/P (MAP) 167/68 (101)


 


Pulse Ox 16


 


O2 Delivery Room Air





Capillary Refill : Less Than 3 Seconds


Height, Weight, BMI


Height: 5'1.00"


Weight: 151lbs. 0.0oz. 68.528239wk; 27.00 BMI


Method:Stated


General Appearance:  No Apparent Distress, WD/WN


Eyes:  Bilateral Eye Normal Inspection, Bilateral Eye PERRL, Bilateral Eye EOMI


HEENT:  PERRL/EOMI, TMs Normal, Normal ENT Inspection, Pharynx Normal, Moist 

Mucous Membranes


Neck:  Full Range of Motion, Normal Inspection


Respiratory:  Chest Non Tender, Lungs Clear, Normal Breath Sounds, No Accessory 

Muscle Use, No Respiratory Distress


Cardiovascular:  Regular Rate, Rhythm, No Edema


Gastrointestinal:  Normal Bowel Sounds, Non Tender, Soft


Neurologic/Psychiatric:  Alert, Oriented x3, No Motor/Sensory Deficits, Normal 

Mood/Affect, CNs II-XII Norm as Tested, Other (Patient has mottled speech and 

difficult, expressive aphasia which is her baseline)


Skin:  Normal Color, Warm/Dry





Progress/Results/Core Measures


Suspected Sepsis


SIRS


Temperature: 


Pulse: 95 


Respiratory Rate: 


 


Blood Pressure 167 /68 


Mean: 101





Results/Orders


Vital Signs/I&O











 22





 21:20


 


Temp 36.9


 


Pulse 95


 


B/P (MAP) 167/68 (101)


 


Pulse Ox 16


 


O2 Delivery Room Air





Capillary Refill : Less Than 3 Seconds








Blood Pressure Mean:                    101








Progress Note :  


   Time:  21:37


Progress Note


Jewel patient has been seen by this provider and had some physical exam and 

history taken she has declined a proper medical screening exam for emergency.  

She has declined EKG, IV blood work, chest x-ray etc.  We did explain to her the

risks that would be entailed if she was having a heart attack, or other 

dangerous reasons to have chest pain and she states that she accepts these risks

and will follow up with Dr. Hendricks.  She does not want any further work-up at 

this time and is oriented x4.  She is at her baseline for mentation.  She has 

her sister and brother present and they will take her home.  She has normal, 

aseptic vital signs.  EMS report did not even endorse any chest pain at any 

time.





Departure


Impression





   Primary Impression:  


   Chest pain


   Qualified Codes:  R07.9 - Chest pain, unspecified


   Additional Impression:  


   Dyspnea


   Qualified Codes:  R06.02 - Shortness of breath


Disposition:  07 AGAINST MEDICAL ADVICE


Condition:  Against Medical Advice





Departure-Patient Inst.


Decision time for Depature:  21:39


Referrals:  


JOSSELYN HENDRICKS MD (PCP/Family)


Primary Care Physician


Patient Instructions:  Chest Pain (DC)





Add. Discharge Instructions:  


Follow-up with Dr. Hendricks at your earliest convenience.


Return to the ER promptly at any time if you have repeat symptoms or need help 

with shortness of air, chest pain etc.


81 mg aspirin daily would be wise given your symptoms until you have further 

work-up done


All discharge instructions reviewed with patient and/or family. Voiced 

understanding.











DIVINA HAND                 2022 21:40

## 2022-06-10 NOTE — ED GENERAL
General


Chief Complaint:  Fever-Adult/Adol


Stated Complaint:  FEVER


Nursing Triage Note:  


TO ED VIA [facility]. PER [facility] HOME PT HAD 


FEVER AND WAS GIVEN TYLENOL WHICH HELPED BUT THEN FEVER RETURNED. PT HAS RIGHT 


UPPER ARM PICC LINE FOR IV ANTIBIOTICS FOR ENDOCARDITIS.


Source of Information:  Patient, EMS, Old Records


Exam Limitations:  No Limitations





History of Present Illness


Date Seen by Provider:  Oc 10, 2022


Time Seen by Provider:  01:12


Initial Comments


This 71-year-old woman presents to the emergency room via EMS [facility] [place] [facility] with concerns about fever, confusion, and bleeding around her 

PICC line.  She reportedly has history of endocarditis which is currently being 

treated with IV ampicillin.  She was admitted to the nursing home 3 or 4 days 

ago.  They report confusion that has been present since this admission to the 

nursing home.  Patient reportedly had a fever for EMS of 101.9, but she is 

afebrile on arrival.  She has a recent diagnosis of anemia and is scheduled for 

transfusion later in the day.





Allergies and Home Medications


Allergies


Coded Allergies:  


     promethazine (Verified  Allergy, Intermediate, 3/11/08)


     clindamycin (Verified  Allergy, Unknown, 3/7/21)


     hydrocodone (Verified  Allergy, Unknown, 3/7/21)


     morphine (Verified  Allergy, Unknown, 3/7/21)





Patient Home Medication List


Home Medication List Reviewed:  Yes


Amoxicillin/Potassium Clav (Augmentin 875-125 Tablet) 1 Each Tablet, 1 EACH PO 

BID


   Prescribed by: RICA WATERS on 22 1230


Celecoxib (Celecoxib) 100 Mg Capsule, 100 MG PO BID PRN for ARTHRITIS PAIN, 

(Reported)


   Entered as Reported by: ANIRUDH GODOY on 1/3/22 2154


Colchicine (Colchicine) 0.6 Mg Tablet, 0.6 MG PO BID, (Reported)


   Entered as Reported by: ANIRUDH GODOY on 1/3/22 2154


Insulin Aspart (Novolog) 100 Unit/1 Ml Susp, UNITS SC PER PUMP, (Reported)


   Entered as Reported by: DAKOTA BALL on 3/8/21 100


Metoprolol Succinate (Metoprolol Succinate) 50 Mg Tab.er.24h, 50 MG PO DAILY, 

(Reported)


   Entered as Reported by: DAKOTA BALL on 3/8/21 100


Potassium Chloride (K-Tab ER) 10 Meq Tablet.er, 10 MEQ PO DAILY, (Reported)


   Entered as Reported by: DAKOTA BALL on 3/8/21 1009


Pravastatin Sodium (Pravastatin Sodium) 20 Mg Tablet, 20 MG PO HS, (Reported)


   Entered as Reported by: DAKOTA BALL on 3/8/21 1009


Quetiapine Fumarate (Seroquel) 25 Mg Tablet, 25 MG PO HS


   Prescribed by: CINDY MICHEL on 22 4148


Sacubitril/Valsartan (Entresto 24 mg-26 mg Tablet) 1 Each Tablet, 1 EA PO BID, 

(Reported)


   Entered as Reported by: DAKOTA BALL on 3/8/21 1009


Terbinafine HCl (Terbinafine HCl) 250 Mg Tablet, 250 MG PO DAILY, (Reported)


   Entered as Reported by: ANIRUDH GODOY on 1/3/22 2154





Review of Systems


Review of Systems


Constitutional:  see HPI


EENTM:  no symptoms reported


Respiratory:  no symptoms reported


Cardiovascular:  see HPI


Gastrointestinal:  no symptoms reported


Genitourinary:  no symptoms reported


Musculoskeletal:  no symptoms reported


Skin:  no symptoms reported


Psychiatric/Neurological:  See HPI


Hematologic/Lymphatic:  No Symptoms Reported


Immunological/Allergic:  no symptoms reported





Past Medical-Social-Family Hx


Patient Social History


Tobacco Use?:  No


Substance use?:  No


Alcohol Use?:  No





Immunizations Up To Date


Tetanus Booster (TDap):  Unknown


First/Initial COVID19 Vaccinat:  


Second COVID19 Vaccination Rodger:  


Third COVID19 Vaccination Date:  N/A





Past Medical History


Surgery/Hospitalization HX:  


cardiac ablation, ppm, c-sect, hysterectomy, cholecystectomy, lumbar 


laminectomy, heart cath, htn, iddm, gerd, high cholesterol, a-fib, peripheral 


neuropathy, cva, ENDOCARDITIS


Surgeries:  Yes


Cardiac,  Section, Gallbladder, Hysterectomy, Orthopedic, Pacemaker


Respiratory:  Yes


COPD


Cardiac:  Yes (PACEMAKER, CARDIAC ABLATION, MAZE PROCEDURE, LEFT APPENDAGE CL

IPPED)


Atrial Fibrillation, High Cholesterol, Hypertension, Irregular Heartbeat


Neurological:  Yes (PERIPHERAL NEUROPATHY IN HANDS AND FEET;HX OF CVA 2019-NO 

RESIDUAL)


Neuropathy, Stroke


Reproductive Disorders:  No


Genitourinary:  Yes


Bladder Infection


Gastrointestinal:  Yes


Gastroesophageal Reflux


Musculoskeletal:  Yes


Chronic Back Pain


Endocrine:  Yes (INSULIN PUMP)


Diabetes, Insulin dep


HEENT:  Yes (DIABETIC RETINOPATHY)


Cancer:  No


Psychosocial:  No


Integumentary:  No


Blood Disorders:  No





Family Medical History





PAST SURGICAL HISTORY:


-LEFT EYE SURGERY FOR RETINAL BLEED 3 YEARS AGO


- X 2  IN , 


-HYSTERECTOMY 


-CHOLECYSTECTOMY ( OPEN ) 


-LEFT FOOT SURGERY 7 YEARS AGO


-LUMBAR LAMINECTOMY 20 YEARS AGO


-PACEMAKER 4 YEARS AGO


-CARDIAC ABLATION 2019


-MAZE PROCEDURE 2020


-LEFT LATERAL APPENDAGE CLIP 2020


-PT REPORTS CARDIAC CATH DONE IN  IN  SHOWED NO SIGNIFICANT CAD. 





ECHOCARDIOGRAM 21--EF 55-60%


MPI 21--NO INSCHEMIA OR INFARCTIONS





Physical Exam


Vital Signs





Vital Signs - First Documented




















Capillary Refill : Less Than 3 Seconds


Height, Weight, BMI


Height: 5'1.00"


Weight: 151lbs. 0.0oz. 68.144507xq; 27.00 BMI


Method:Stated


General Appearance:  No Apparent Distress, WD/WN, Thin


HEENT:  PERRL/EOMI, Normal ENT Inspection, Pharynx Normal


Neck:  Normal Inspection


Respiratory:  Lungs Clear, Normal Breath Sounds, No Accessory Muscle Use


Cardiovascular:  Regular Rate, Rhythm, No Edema, No Murmur


Gastrointestinal:  Normal Bowel Sounds, Non Tender, Soft; No Distended


Extremity:  Normal Inspection, No Pedal Edema


Neurologic/Psychiatric:  Alert, No Motor/Sensory Deficits, CNs II-XII Norm as 

Tested, Other (Confused conversation)


Skin:  Normal Color, Warm/Dry





Focused Exam


Lactate Level


6/10/22 01:29: Lactic Acid Level 1.08





Lactic Acid Level





Laboratory Tests








Test


 6/10/22


01:29


 


Lactic Acid Level


 1.08 MMOL/L


(0.50-2.00)











Progress/Results/Core Measures


Suspected Sepsis


SIRS


Temperature: 


Pulse: 75 


Respiratory Rate: 16


 


Laboratory Tests


6/10/22 01:29: White Blood Count 5.2


Blood Pressure 96 /52 


Mean: 67


 


6/10/22 01:29: Lactic Acid Level 1.08


Laboratory Tests


6/10/22 01:29: 


Creatinine 0.73, INR Comment 1.2, Platelet Count 150, Total Bilirubin 0.5








Results/Orders


Lab Results





Laboratory Tests








Test


 6/10/22


01:29 6/10/22


01:34 Range/Units


 


 


White Blood Count


 5.2 


 


 4.3-11.0


10^3/uL


 


Red Blood Count


 2.42 L


 


 3.80-5.11


10^6/uL


 


Hemoglobin 6.5 *L  11.5-16.0  g/dL


 


Hematocrit 20 *L  35-52  %


 


Mean Corpuscular Volume 84   80-99  fL


 


Mean Corpuscular Hemoglobin 27   25-34  pg


 


Mean Corpuscular Hemoglobin


Concent 32 


 


 32-36  g/dL





 


Red Cell Distribution Width 21.8 H  10.0-14.5  %


 


Platelet Count


 150 


 


 130-400


10^3/uL


 


Mean Platelet Volume 7.9 L  9.0-12.2  fL


 


Immature Granulocyte % (Auto) 1    %


 


Neutrophils (%) (Auto) 77 H  42-75  %


 


Lymphocytes (%) (Auto) 17   12-44  %


 


Monocytes (%) (Auto) 5   0-12  %


 


Eosinophils (%) (Auto) 0   0-10  %


 


Basophils (%) (Auto) 0   0-10  %


 


Neutrophils # (Auto)


 4.0 


 


 1.8-7.8


10^3/uL


 


Lymphocytes # (Auto)


 0.9 L


 


 1.0-4.0


10^3/uL


 


Monocytes # (Auto)


 0.3 


 


 0.0-1.0


10^3/uL


 


Eosinophils # (Auto)


 0.0 


 


 0.0-0.3


10^3/uL


 


Basophils # (Auto)


 0.0 


 


 0.0-0.1


10^3/uL


 


Immature Granulocyte # (Auto)


 0.0 


 


 0.0-0.1


10^3/uL


 


Prothrombin Time 16.0 H  12.2-14.7  SEC


 


INR Comment 1.2   0.8-1.4  


 


Activated Partial


Thromboplast Time 39 H


 


 24-35  SEC





 


Sodium Level 136   135-145  MMOL/L


 


Potassium Level 3.5 L  3.6-5.0  MMOL/L


 


Chloride Level 104     MMOL/L


 


Carbon Dioxide Level 23   21-32  MMOL/L


 


Anion Gap 9   5-14  MMOL/L


 


Blood Urea Nitrogen 13   7-18  MG/DL


 


Creatinine


 0.73 


 


 0.60-1.30


MG/DL


 


Estimat Glomerular Filtration


Rate 88 


 


  





 


BUN/Creatinine Ratio 18    


 


Glucose Level 122 H    MG/DL


 


Lactic Acid Level


 1.08 


 


 0.50-2.00


MMOL/L


 


Calcium Level 8.0 L  8.5-10.1  MG/DL


 


Corrected Calcium 8.9   8.5-10.1  MG/DL


 


Total Bilirubin 0.5   0.1-1.0  MG/DL


 


Aspartate Amino Transf


(AST/SGOT) 17 


 


 5-34  U/L





 


Alanine Aminotransferase


(ALT/SGPT) 6 


 


 0-55  U/L





 


Alkaline Phosphatase 66     U/L


 


C-Reactive Protein High


Sensitivity 3.50 H


 


 0.00-0.50


MG/DL


 


Total Protein 6.3 L  6.4-8.2  GM/DL


 


Albumin 2.9 L  3.2-4.5  GM/DL


 


Procalcitonin 0.08   <0.10  NG/ML


 


Urine Color  YELLOW   


 


Urine Clarity  CLEAR   


 


Urine pH  7.5  5-9  


 


Urine Specific Gravity  1.010 L 1.016-1.022  


 


Urine Protein  NEGATIVE  NEGATIVE  


 


Urine Glucose (UA)  NEGATIVE  NEGATIVE  


 


Urine Ketones  NEGATIVE  NEGATIVE  


 


Urine Nitrite  NEGATIVE  NEGATIVE  


 


Urine Bilirubin  NEGATIVE  NEGATIVE  


 


Urine Urobilinogen  0.2  < = 1.0  MG/DL


 


Urine Leukocyte Esterase  NEGATIVE  NEGATIVE  


 


Urine RBC (Auto)  NEGATIVE  NEGATIVE  


 


Urine RBC  2-5 H  /HPF


 


Urine WBC  NONE   /HPF


 


Urine Squamous Epithelial


Cells 


 0-2 


  /HPF





 


Urine Crystals  NONE   /LPF


 


Urine Bacteria  TRACE   /HPF


 


Urine Casts  NONE   /LPF


 


Urine Mucus  NEGATIVE   /LPF


 


Urine Culture Indicated


 


 CULTURE


PENDING  











My Orders





Orders - CINDY NGO MD


Cbc With Automated Diff (6/10/22 01:)


Comprehensive Metabolic Panel (6/10/22 01:)


Blood Culture (6/10/22 01:)


Sputum Culture (6/10/22 01:)


Urinalysis (6/10/22 01:22)


Urine Culture (6/10/22 01:22)


Protime With Inr (6/10/22 01:)


Partial Thromboplastin Time (6/10/22 01:22)


Chest 1 View, Ap/Pa Only (6/10/22 01:22)


Ed Iv/Invasive Line Start (6/10/22 01:22)


Vital Signs Adult Sepsis Patie Q15M (6/10/22 01:22)


O2 (6/10/22 01:22)


Remove Rings In Anticipation O (6/10/22 01:22)


Lactic Acid Analyzer (6/10/22 01:22)


Hs C Reactive Protein (6/10/22 01:22)


Procalcitonin (Pct) (6/10/22 01:22)


Red Cells Leukocytes Reduced (6/10/22 01:54)


Type And Screen (6/10/22 01:54)


Ns Iv 500 Ml (Sodium Chloride 0.9%) (6/10/22 03:56)





Medications Given in ED





Current Medications








 Medications  Dose


 Ordered  Sig/Chris


 Route  Start Time


 Stop Time Status Last Admin


Dose Admin


 


 Sodium Chloride  500 ml @ ud  STK-MED ONCE


 .ROUTE  6/10/22 03:56


 6/10/22 04:17 DC 6/10/22 04:18


150 MLS/HR








Vital Signs/I&O











 6/10/22 6/10/22 6/10/22 6/10/22





 01:14 01:14 04:00 04:20


 


Temp 37.2  36.4 36.0


 


Pulse 89  70 75


 


Resp 16  16 16


 


B/P (MAP) 101/44 (63)  95/45 96/52


 


Pulse Ox 97  100 100


 


O2 Delivery Room Air Room Air Room Air Simple Mask


 


    





 6/10/22   





 04:41   


 


Temp 35.4   


 


Pulse 74   


 


Resp 16   


 


B/P (MAP) 118/59   


 


Pulse Ox 100   


 


O2 Delivery Room Air   





Capillary Refill : Less Than 3 Seconds








Blood Pressure Mean:                    67








Progress Note :  


   Time:  04:50


Progress Note


Work-up did not reveal evidence of sepsis.  Patient was afebrile while in the 

ER.  Due to significant anemia, transfusion was administered.  Patient was 

stable for discharge back to the nursing home with continued IV antibiotic 

therapy.  PICC line did have blood beneath the dressing but did not demonstrate 

active bleeding while in the ER.  They did function well with blood draw and 

flush.





Diagnostic Imaging





   Diagonstic Imaging:  Xray


   Plain Films/CT/US/NM/MRI:  chest


Comments


Chest x-ray viewed by me.  Report not yet available.  No acute abnormality 

appreciated.





Departure


Impression





   Primary Impression:  


   Severe anemia


   Additional Impression:  


   Antibiotic long-term use


Disposition:   HOME, SELF-CARE


Condition:  Improved





Departure-Patient Inst.


Decision time for Depature:  04:39


Referrals:  


IRIS RUIZ MD (PCP/Family)


Primary Care Physician


Patient Instructions:  Blood Transfusion





Add. Discharge Instructions:  


Contact the primary care provider later this morning for further instructions.


1 unit of packed red blood cells was transfused in the ER due to severe anemia.


No signs of sepsis were observed in the emergency room work-up.  Admission for 

treatment of infection was therefore not necessary.  Continue IV antibiotic 

therapy as directed.


Blood under the PICC line dressing was noted but there was no active bleeding 

observed in the ER.  PICC line appeared to function normally.  Please monitor 

the PICC line for bleeding and functionality.  Discuss PICC line management with

the primary care provider.


Call with questions or concerns, and return to the ER if there are worsening 

symptoms.





All discharge instructions reviewed with patient and/or family. Voiced 

understanding.





Copy


Copies To 1:   IRIS RUIZ MD


Copies To 2:   NEW FLORES JOSHUA T MD        Oc 10, 2022 04:42

## 2022-06-10 NOTE — DIAGNOSTIC IMAGING REPORT
INDICATION: 71-year-old female with fever.



COMPARISONS: 04/01/2022



FINDINGS:



Single view of the chest shows prominent central lung markings.

Some perihilar infiltrates. Background chronic parenchymal

changes are present. Right-sided PICC line is seen with the tip

projected over the SVC. There is a dual-chamber left-sided pacer.

Soft tissues and bony thorax are unremarkable.



IMPRESSION:



Some perihilar infiltrates with some background chronic

parenchymal changes.



Dictated by: 



  Dictated on workstation # [id number]

## 2022-07-12 NOTE — DIAGNOSTIC IMAGING REPORT
PROCEDURE: CT head without contrast.



TECHNIQUE: Multiple contiguous axial images were obtained through

the brain without the use of intravenous contrast. Auto Exposure

Controls were utilized during the CT exam to meet ALARA standards

for radiation dose reduction. 



INDICATION:  Confusion and hallucinations.



Correlation is made with prior head CT 4/1/2022.



The ventricles and sulci are within normal limits. No sulcal

effacement is seen. There is no midline shift. No acute

intra-axial or extra-axial hemorrhage is detected. Cisterns are

patent. Visualized paranasal sinuses are clear.



IMPRESSION: No acute intracranial process is detected.



Dictated by: 



  Dictated on workstation # [id number]

## 2022-08-15 NOTE — DIAGNOSTIC IMAGING REPORT
EXAMINATION: 

Chest, 1 view.



HISTORY: 

PICC placement.



COMPARISON: 

06/10/2022.



FINDINGS: 

A right PICC is in place with the tip overlying the low SVC.

Stable left pectoral pacemaker.



The lung volumes are normal. No focal consolidation is seen. No

large pleural effusion or pneumothorax is seen. The

cardiomediastinal silhouette is stable in size. A left atrial

appendage closure device is again noted. No acute osseous

abnormality is seen.



IMPRESSION: 

1. No focal consolidation or pleural effusion.

2. Right PICC with the tip overlying the low SVC.



Dictated by: 



  Dictated on workstation # [id number]

## 2022-09-24 NOTE — ED GENERAL
General


Chief Complaint:  Dizziness/Syncope


Stated Complaint:  WEAKNESS


Nursing Triage Note:  


pt to room by wheelchair. this rn obtained report from [facility] care and rehab rn 


for pt. pt is A&O to person and place at baseline. rn reports pt had hgb drawn 


twice yesterday. one resulted 7.1 and the other resulted 8.6. pt has recent hx 


of blood transfusions and chronically low hbg per nursing report. nursing home 


report states pt "almost passed out" in her room today and her roommate caught 


her so nurse sent her for recheck of hgb. pt can ambulate but uses wheelchair 


most times. pt also has picc line to right upper arm for recent iv antibiotic 


therapy for endocarditis per nurse report


 (RC JOYCE)





History of Present Illness


Date Seen by Provider:  Sep 24, 2022


Time Seen by Provider:  12:20


Initial Comments


Patient is a 71 yo F who presents to the ED from her nursing home where she had 

a near syncopal episode earlier today. Patient has a h/o anemia of unknown 

etiology and has had several blood transfusions in the past several weeks. Her 

hemoglobin yesterday was reportedly 7.1 at the nursing facility. Pt denies any 

bloody vomitus or stools. She states she is going "to a different hospital on 

Tuesday for more tests" to figure out more of the cause of her anemia. She 

denies any chest pain, shortness of air, diaphoresis, vision change, headache. 

No recent medication changes. Pt is not on anticoagulation. 


 (RC JOYCE)





Allergies and Home Medications


Allergies


Coded Allergies:  


     promethazine (Verified  Allergy, Intermediate, 3/11/08)


     clindamycin (Verified  Allergy, Unknown, 3/7/21)


     hydrocodone (Verified  Allergy, Unknown, 3/7/21)


     morphine (Verified  Allergy, Unknown, 3/7/21)





Patient Home Medication List


Home Medication List Reviewed:  Yes


 (RC JOYCE)


Amoxicillin/Potassium Clav (Augmentin 875-125 Tablet) 1 Each Tablet, 1 EACH PO 

BID


   Prescribed by: RICA WATERS on 22 1230


Celecoxib (Celecoxib) 100 Mg Capsule, 100 MG PO BID PRN for ARTHRITIS PAIN, 

(Reported)


   Entered as Reported by: ANIRUDH GODOY on 1/3/22 2154


Cephalexin (Cephalexin) 500 Mg Tablet, 500 MG PO TID


   Prescribed by: Rc Joyce on 22 1720


Colchicine (Colchicine) 0.6 Mg Tablet, 0.6 MG PO BID, (Reported)


   Entered as Reported by: ANIRUDH GODOY on 1/3/22 2154


Insulin Aspart (Novolog) 100 Unit/1 Ml Susp, UNITS SC PER PUMP, (Reported)


   Entered as Reported by: DAKOTA BALL on 3/8/21 100


Metoprolol Succinate (Metoprolol Succinate) 50 Mg Tab.er.24h, 50 MG PO DAILY, 

(Reported)


   Entered as Reported by: DAKOTA BALL on 3/8/21 100


Potassium Chloride (K-Tab ER) 10 Meq Tablet.er, 10 MEQ PO DAILY, (Reported)


   Entered as Reported by: DAKOTA BALL on 3/8/21 100


Pravastatin Sodium (Pravastatin Sodium) 20 Mg Tablet, 20 MG PO HS, (Reported)


   Entered as Reported by: DAKOTA BALL on 3/8/21 100


Quetiapine Fumarate (Seroquel) 25 Mg Tablet, 25 MG PO HS


   Prescribed by: CINDY MICHEL on 22 183


Sacubitril/Valsartan (Entresto 24 mg-26 mg Tablet) 1 Each Tablet, 1 EA PO BID, 

(Reported)


   Entered as Reported by: DAKOTA BALL on 3/8/21 100


Terbinafine HCl (Terbinafine HCl) 250 Mg Tablet, 250 MG PO DAILY, (Reported)


   Entered as Reported by: ANIRUDH GODOY on 1/3/22 2154





Review of Systems


Review of Systems


Constitutional:  weakness


EENTM:  no symptoms reported


Respiratory:  no symptoms reported


Cardiovascular:  no symptoms reported


Gastrointestinal:  no symptoms reported


Musculoskeletal:  no symptoms reported


Skin:  no symptoms reported


Psychiatric/Neurological:  No Symptoms Reported (RC JOYCE)





Past Medical-Social-Family Hx


Patient Social History


Tobacco Use?:  No


Use of E-Cig and/or Vaping dev:  No


Substance use?:  No


Alcohol Use?:  No


 (RC JOYCE)





Immunizations Up To Date


Tetanus Booster (TDap):  Unknown


Influenza Vaccine Up-to-Date:  Yes; Up-to-Date


First/Initial COVID19 Vaccinat:  


Second COVID19 Vaccination Rodger:  


Third COVID19 Vaccination Date:  N/A


 (RC JOYCE)





Past Medical History


Surgery/Hospitalization HX:  


cardiac ablation, ppm, c-sect, hysterectomy, cholecystectomy, lumbar 


laminectomy, heart cath, htn, iddm, gerd, high cholesterol, a-fib, peripheral 


neuropathy, cva, ENDOCARDITIS


Surgeries:  Yes


Cardiac,  Section, Gallbladder, Hysterectomy, Orthopedic, Pacemaker


Respiratory:  Yes


COPD


Cardiac:  Yes (PACEMAKER, CARDIAC ABLATION, MAZE PROCEDURE, LEFT APPENDAGE 

CLIPPED)


Atrial Fibrillation, High Cholesterol, Hypertension, Irregular Heartbeat


Neurological:  Yes (PERIPHERAL NEUROPATHY IN HANDS AND FEET;HX OF CVA -NO 

RESIDUAL)


Neuropathy, Stroke


Reproductive Disorders:  No


Genitourinary:  Yes


Bladder Infection


Gastrointestinal:  Yes


Gastroesophageal Reflux


Musculoskeletal:  Yes


Chronic Back Pain


Endocrine:  Yes (INSULIN PUMP)


Diabetes, Insulin dep


HEENT:  Yes (DIABETIC RETINOPATHY)


Cancer:  No


Psychosocial:  No


Integumentary:  No


Blood Disorders:  No


 (RC JOYCE)





Family Medical History





PAST SURGICAL HISTORY:


-LEFT EYE SURGERY FOR RETINAL BLEED 3 YEARS AGO


- X 2  IN , 


-HYSTERECTOMY 


-CHOLECYSTECTOMY ( OPEN ) 


-LEFT FOOT SURGERY 7 YEARS AGO


-LUMBAR LAMINECTOMY 20 YEARS AGO


-PACEMAKER 4 YEARS AGO


-CARDIAC ABLATION 2019


-MAZE PROCEDURE 2020


-LEFT LATERAL APPENDAGE CLIP 2020


-PT REPORTS CARDIAC CATH DONE IN  IN  SHOWED NO SIGNIFICANT CAD. 





ECHOCARDIOGRAM 21--EF 55-60%


MPI 21--NO INSCHEMIA OR INFARCTIONS


 (RC JOYCE)





Physical Exam


Vital Signs





Vital Signs - First Documented








 22





 12:00 15:15


 


Temp 36.6 


 


Pulse 80 


 


Resp 16 


 


B/P (MAP) 105/66 (79) 


 


Pulse Ox 100 


 


O2 Delivery  Room Air








 (CINDY NGO MD)


Vital Signs


Capillary Refill :  


 (RC JOYCE)


Height, Weight, BMI


Height: 5'1.00"


Weight: 151lbs. 0.0oz. [id number]; 25.00 BMI


Method:Stated


General Appearance:  No Apparent Distress, WD/WN


HEENT:  PERRL/EOMI, TMs Normal, Normal ENT Inspection, Pharynx Normal


Neck:  Full Range of Motion, Normal Inspection, Non Tender, Supple


Respiratory:  Chest Non Tender, Lungs Clear, Normal Breath Sounds, No Accessory 

Muscle Use, No Respiratory Distress


Cardiovascular:  Regular Rate, Rhythm, No Edema, No Gallop, No JVD, No Murmur, 

Normal Peripheral Pulses


Gastrointestinal:  Normal Bowel Sounds, No Organomegaly, No Pulsatile Mass, Non 

Tender, Soft


Back:  Normal Inspection, No CVA Tenderness, No Vertebral Tenderness


Extremity:  Normal Capillary Refill, Normal Inspection, Normal Range of Motion, 

Non Tender, No Calf Tenderness, No Pedal Edema


Neurologic/Psychiatric:  Alert, Oriented x3, No Motor/Sensory Deficits, Normal 

Mood/Affect


Skin:  Pallor (mild) (RC JOYCE APRN)





Progress/Results/Core Measures


Suspected Sepsis


SIRS


Temperature: 


Pulse: 80 


Respiratory Rate: 16


 


Laboratory Tests


22 12:29: White Blood Count 2.8L


Blood Pressure 105 /66 


Mean: 79


 


Laboratory Tests


22 12:29: 


Creatinine 0.86, Platelet Count 97L, Total Bilirubin 0.3


 (RC JOYCE)





Results/Orders


Lab Results





Laboratory Tests








Test


 22


12:29 22


13:03 22


13:11 22


16:32 Range/Units


 


 


White Blood Count


 2.8 L


 


 


 


 4.3-11.0


10^3/uL


 


Red Blood Count


 2.44 L


 


 


 


 3.80-5.11


10^6/uL


 


Hemoglobin 6.9 *L   8.6 #L 11.5-16.0  g/dL


 


Hematocrit 21 L   26 L 35-52  %


 


Mean Corpuscular Volume 87     80-99  fL


 


Mean Corpuscular Hemoglobin 28     25-34  pg


 


Mean Corpuscular Hemoglobin


Concent 33 


 


 


 


 32-36  g/dL





 


Red Cell Distribution Width 18.3 H    10.0-14.5  %


 


Platelet Count


 97 L


 


 


 


 130-400


10^3/uL


 


Mean Platelet Volume 8.5 L    9.0-12.2  fL


 


Immature Granulocyte % (Auto) 0      %


 


Neutrophils (%) (Auto) 52     42-75  %


 


Lymphocytes (%) (Auto) 42     12-44  %


 


Monocytes (%) (Auto) 4     0-12  %


 


Eosinophils (%) (Auto) 1     0-10  %


 


Basophils (%) (Auto) 0     0-10  %


 


Neutrophils # (Auto)


 1.5 L


 


 


 


 1.8-7.8


10^3/uL


 


Lymphocytes # (Auto)


 1.2 


 


 


 


 1.0-4.0


10^3/uL


 


Monocytes # (Auto)


 0.1 


 


 


 


 0.0-1.0


10^3/uL


 


Eosinophils # (Auto)


 0.0 


 


 


 


 0.0-0.3


10^3/uL


 


Basophils # (Auto)


 0.0 


 


 


 


 0.0-0.1


10^3/uL


 


Immature Granulocyte # (Auto)


 0.0 


 


 


 


 0.0-0.1


10^3/uL


 


Percent Immature Platelet


Fraction 0.5 


 


 


 


 0.0-7.6  %





 


Sodium Level 140     135-145  MMOL/L


 


Potassium Level 4.6     3.6-5.0  MMOL/L


 


Chloride Level 106       MMOL/L


 


Carbon Dioxide Level 18 L    21-32  MMOL/L


 


Anion Gap 16 H    5-14  MMOL/L


 


Blood Urea Nitrogen 36 H    7-18  MG/DL


 


Creatinine


 0.86 


 


 


 


 0.60-1.30


MG/DL


 


Estimat Glomerular Filtration


Rate 72 


 


 


 


  





 


BUN/Creatinine Ratio 42      


 


Glucose Level 113 H      MG/DL


 


Calcium Level 8.9     8.5-10.1  MG/DL


 


Corrected Calcium 9.2     8.5-10.1  MG/DL


 


Magnesium Level 2.0     1.6-2.4  MG/DL


 


Total Bilirubin 0.3     0.1-1.0  MG/DL


 


Aspartate Amino Transf


(AST/SGOT) 25 


 


 


 


 5-34  U/L





 


Alanine Aminotransferase


(ALT/SGPT) 16 


 


 


 


 0-55  U/L





 


Alkaline Phosphatase 57       U/L


 


Total Protein 6.8     6.4-8.2  GM/DL


 


Albumin 3.6     3.2-4.5  GM/DL


 


Urine Color  YELLOW     


 


Urine Clarity  CLEAR     


 


Urine pH  5.0    5-9  


 


Urine Specific Gravity  1.010 L   1.016-1.022  


 


Urine Protein  NEGATIVE    NEGATIVE  


 


Urine Glucose (UA)  NEGATIVE    NEGATIVE  


 


Urine Ketones  NEGATIVE    NEGATIVE  


 


Urine Nitrite  POSITIVE H   NEGATIVE  


 


Urine Bilirubin  NEGATIVE    NEGATIVE  


 


Urine Urobilinogen  0.2    < = 1.0  MG/DL


 


Urine Leukocyte Esterase  3+ H   NEGATIVE  


 


Urine RBC (Auto)  TRACE-I H   NEGATIVE  


 


Urine RBC  0-2     /HPF


 


Urine WBC   H    /HPF


 


Urine Crystals  NONE     /LPF


 


Urine Bacteria  LARGE H    /HPF


 


Urine Casts  NONE     /LPF


 


Urine Mucus  NEGATIVE     /LPF


 


Urine Culture Indicated  YES     


 


Glucometer   100     MG/DL





 (CINDY NGO MD)


My Orders





Orders - CINDY NGO MD


Cbc With Automated Diff (22 11:57)


Comprehensive Metabolic Panel (22 11:57)


Magnesium (22 11:57)


Ua Culture If Indicated (22 11:57)


Ed Iv/Invasive Line Start (22 11:57)


Accucheck Stat ONCE (22 11:57)


Red Cells Leukocytes Reduced (22 12:43)


Type And Screen (22 12:43)


Urine Culture (22 13:03)


 (CINDY NGO MD)


Medications Given in ED





Current Medications








 Medications  Dose


 Ordered  Sig/Chris


 Route  Start Time


 Stop Time Status Last Admin


Dose Admin


 


 Ondansetron HCl  4 mg  ONCE  ONCE


 IVP  22 17:30


 22 17:31 DC 22 17:31


4 MG


 


 Sodium Chloride  250 ml @ ud  STK-MED ONCE


 .ROUTE  22 14:38


 22 14:40 DC 22 15:00


30 MLS/HR





 (CINDY NGO MD)


Vital Signs/I&O











 22





 12:00 15:00 15:15 16:06


 


Temp 36.6 37.0 37.0 36.8


 


Pulse 80 80 81 84


 


Resp 16 18 18 16


 


B/P (MAP) 105/66 (79) 134/56 127/46 154/75


 


Pulse Ox 100 100 100 100


 


O2 Delivery   Room Air 


 


    





 22   





 18:00   


 


Pulse 72   


 


B/P (MAP) 155/65   


 


Pulse Ox 100   








 (CINDY NGO MD)


Vital Signs/I&O


Capillary Refill :  


 (RC JOYCE)








Blood Pressure Mean:                    79











Point of Care Testing


Finger Stick Blood Glucose:  100


Blood Glucose Action Taken:  RN NOTIFIED


 (RC JOYCE)


Progress Note :  


Progress Note


Patient is nontoxic and well hydrated on exam. Vital signs are reassuring. No 

tachycardia, hypotension, or hypoxia noted. No excessive bruising or petechiae 

noted. CBC notable for pancytopenia. Platelets are not low enough to need urgent

repletion. One unit of PRBC were transfused with improvement in Hgb from 6.9 to 

over 8. Will d/c home with recs for supportive care and follow-up with PCP for 

persistent symptoms. Return precautions for urgent symptomology discussed. 

Patient verbalized understanding.


 (RC JOYCE)





Departure


Impression





   Primary Impression:  


   Severe anemia


   Additional Impression:  


   UTI (urinary tract infection)


   Qualified Codes:  N30.00 - Acute cystitis without hematuria


Disposition:   HOME, SELF-CARE


Condition:  Stable





Departure-Patient Inst.


Decision time for Depature:  17:00


 (RC JOYCE)


Referrals:  


IRIS RUIZ MD (PCP/Family)


Primary Care Physician


Patient Instructions:  Blood Transfusion, Urinary Tract Infection, Adult (DC)


Scripts


Cephalexin (Cephalexin) 500 Mg Tablet


500 MG PO TID for 7 Days, #21 TAB


   Prov: RC JOYCE         22








ATTENDING PHYSICIAN NOTE:


I was physically present as attending physician in the emergency department 

during the care of this patient.  I received triage report regarding this 

patient and placed initial orders including order for transfusion of a unit of 

packed red blood cells.  Care was transitioned to JW Alcantar.  After he 

assumed care I was not directly involved in the decision making or delivery of 

care for this patient. 


 (CINDY NGO MD)











RC JOYCE             Sep 24, 2022 13:49


CINDY NGO MD        Sep 24, 2022 20:06

## 2022-09-27 NOTE — ANESTHESIA-GENERAL POST-OP
MAC


Patient Condition


Mental Status/LOC:  Same as Preop


Cardiovascular:  Satisfactory


Nausea/Vomiting:  Absent


Respiratory:  Satisfactory


Pain:  Controlled


Complications:  Absent





Post Op Complications


Complications


None





Follow Up Care/Instructions


Patient Instructions


None needed.





Anesthesiology Discharge Order


Discharge Order


Patient is doing well, no complaints, stable vital signs, no apparent adverse 

anesthesia problems.   


No complications reported per nursing.











SRINIVASA RANDALL CRNA           Sep 27, 2022 14:46

## 2022-09-27 NOTE — DISCHARGE INST-SIMPLE/STANDARD
Discharge Inst-Standard


Patient Instructions/Follow Up


Plan of Care/Instructions/FU:  


2 weeks Slim


Activity as Tolerated:  Yes


Discharge Diet:  Regular Diet











IBRAHIMA MIR DO              Sep 27, 2022 15:14

## 2022-09-27 NOTE — OPERATIVE REPORT
DATE OF SERVICE:  09/27/2022



PREOPERATIVE DIAGNOSES:

Blood in stool, iron deficiency anemia.



POSTOPERATIVE DIAGNOSES:

Slight gastritis body of the stomach, colon polyps, and diverticulosis.



PROCEDURES PERFORMED:

EGD with biopsy, colonoscopy with snare polypectomy x5, and hot biopsy

polypectomy x2.



SURGEON:

Ibrahima Smalls DO.



ANESTHESIA:

Per CRNA.



ESTIMATED BLOOD LOSS:

Scant.



COMPLICATIONS:

None.



INDICATIONS FOR PROCEDURE:

The patient is a 72-year-old female with iron deficiency anemia and reports some

blood in stools.  She understands the risks and benefits of the procedure and

wishes to proceed.  Consent was signed in the chart.



DESCRIPTION OF PROCEDURE:

The patient was taken to the endoscopy suite and placed in a left lateral

recumbent position.  A timeout was performed.  Scope was inserted in the mouth,

down the esophagus, stomach and into the duodenum without difficulty.  There

were no polyps, masses or ulcerations in the duodenum.  Scope was slowly

retracted back into the stomach, where it was further insufflated.  In the body

of stomach, there was some slight gastritis appearance.  Biopsy of this area was

obtained.  Scope was retroflexed noting no other pathology.  Scope was returned

to its normal position, slowly withdrawn to the distal esophagus, had normal

appearance.  No polyps, masses or ulcerations.  Scope was slowly retracted back

until completely removed.  Digital rectal exam was performed.  No palpable

polyps, masses or ulcerations.  Scope was inserted in the rectum and advanced

all the way to cecum with minimal difficulty.  Prep was adequate with irrigation

and suction.  Two polyps in the cecum, which snare polypectomies were performed.

 These were obtained for specimen.  Scope was then continuously retracted back

in the ascending colon, another polyp was present, which snare polypectomy was

performed.  Scope was then continuously and slowly retracted back into the

transverse colon, which another polyp was present, which snare polypectomy was

performed.  Scope was then continuously and slowly retracted back into the

descending colon, which snare polypectomy was performed.  Scope was then

continuously and slowly retracted back through the sigmoid colon.  No polyps,

masses or ulcerations.  She did have some diverticula present.  Once in the

rectum, scope was retroflexed noting a couple of small polyps, which hot biopsy

polypectomy was performed on these two polyps.  Scope was then slowly retracted

back until completely removed. 



RECOMMENDATIONS:

The patient will not need any further colonoscopies unless has change in

condition and is reevaluated.  She will follow up on biopsies.  We will continue

on current medications.  We will consider capsule endoscopy if continues to have

problems.





Job ID: [id number]

DocumentID: [id number]

Dictated Date:  09/27/2022 15:18:25

Transcription Date: 09/27/2022 15:43:19

Dictated By: IBRAHIMA SMALLS DO

## 2022-09-27 NOTE — PROGRESS NOTE-PRE OPERATIVE
Pre-Operative Progress Note


Date of Available H&P:  Sep 23, 2022


Date H&P Reviewed:  Sep 27, 2022


Time H&P Reviewed:  11:55


History & Physical:  H&P Reviewed, Patient Examed, No changes noted


Pre-Operative Diagnosis:  blood in stool, iron def anemia











IBRAHIMA MIR DO              Sep 27, 2022 11:55

## 2022-10-08 NOTE — DIAGNOSTIC IMAGING REPORT
EXAMINATION: CT abdomen and pelvis with contrast from 10/08/2022.



TECHNIQUE: Multiple contiguous axial images were obtained through

the abdomen and pelvis after administration of intravenous

contrast. Auto Exposure Controls were utilized during the CT exam

to meet ALARA standards for radiation dose reduction. All CT

scans use one or more of the following dose optimizing

techniques: automated exposure control, MA and/or KvP adjustment

based on patient size and exam type or iterative reconstruction.



INDICATION: Diffuse abdominal pain and guarding. Left upper and

lower abdominal pain, recent EGD and colonoscopy.



COMPARISON: 03/23/2018.



FINDINGS: Scattered atelectasis and/or scar noted in the

visualized lung bases.



The nonopacified liver is unremarkable other than hepatic

steatosis. The gallbladder is not seen, likely surgically absent.

The spleen is enlarged. This is increased in size since the

previous CT. Evidence of old granulomatous disease noted. The

pancreas appears unremarkable. Adrenal glands are normal. There

is focal fat stranding about the duodenal sweep which may be

secondary to duodenitis. No adjacent free air or abscess is

appreciated. A focal fluid collection adjacent to the duodenal

sweep, likely a duodenal diverticulum.



Kidneys are unremarkable. There is diffuse atherosclerotic

disease. There are findings of diffuse constipation. No

obstructive process is seen.



The osseous structures appear intact with no acute osseous

abnormality appreciated. There is kyphoplasty change within the

L1 vertebral body.



IMPRESSION:

1. Inflammatory change surrounding the duodenal sweep, likely due

to focal duodenitis. No adjacent free air is seen with a small

collection of air along the superior border of the proximal

duodenum, most likely a duodenal diverticulum.

2. Splenomegaly of uncertain etiology. Not mentioned in the body

of the report, scattered minimally prominent lymph nodes noted

within the retroperitoneum of uncertain etiology as well.



Dictated by: 



  Dictated on workstation # [id number]

## 2022-10-08 NOTE — ED ABDOMINAL PAIN
General


Stated Complaint:  ABD PAIN/DISTENDED


Source of Information:  Patient, Caregiver


Exam Limitations:  No Limitations





History of Present Illness


Date Seen by Provider:  Oct 8, 2022


Time Seen by Provider:  12:25


Initial Comments


72-year-old female sent from local nursing facility for abdominal pain.  She 

reportedly had an EGD 2 weeks ago.  She has chronic anemia and per nursing 

facility report they were concerned about a "bleed."  The patient states she had

some blood in her stool couple weeks ago but has not had any since that time and

since her procedure.  She had completely recovered from her procedure and 

started to have diffuse abdominal bloating, cramping over the last 3 to 4 days. 

She denies any fevers or chills, nausea or vomiting.  She had a normal formed 

bowel movement today.  She denies any urinary or vaginal symptoms.  Pain is 

described as diffuse bloating, cramping without radiation.  No aggravating or 

alleviating factors.  Mild to moderate.





Allergies and Home Medications


Allergies


Coded Allergies:  


     promethazine (Verified  Allergy, Intermediate, 3/11/08)


     clindamycin (Verified  Allergy, Unknown, 3/7/21)


     hydrocodone (Verified  Allergy, Unknown, 3/7/21)


     morphine (Verified  Allergy, Unknown, 3/7/21)





Patient Home Medication List


Home Medication List Reviewed:  Yes


Amoxicillin/Potassium Clav (Augmentin 500-125 Tablet) 500 Mg-125 Mg Tablet, 1 

EACH PO BID


   Prescribed by: MIHAELA CAPPS MD on 10/8/22 1412


   Last Action: New Order


Celecoxib (Celecoxib) 100 Mg Capsule, 100 MG PO BID PRN for ARTHRITIS PAIN, 

(Reported)


   Entered as Reported by: ANIRUDH GODOY on 1/3/22 2154


Colchicine (Colchicine) 0.6 Mg Tablet, 0.6 MG PO BID, (Reported)


   Entered as Reported by: ANIRUDH GODOY on 1/3/22 2154


Insulin Aspart (Novolog) 100 Unit/1 Ml Susp, UNITS SC PER PUMP, (Reported)


   Entered as Reported by: DAKOTA BALL on 3/8/21 100


Metoprolol Succinate (Metoprolol Succinate) 50 Mg Tab.er.24h, 50 MG PO DAILY, 

(Reported)


   Entered as Reported by: DAKOTA BALL on 3/8/21 100


Potassium Chloride (K-Tab ER) 10 Meq Tablet.er, 10 MEQ PO DAILY, (Reported)


   Entered as Reported by: DAKOTA BALL on 3/8/21 1009


Pravastatin Sodium (Pravastatin Sodium) 20 Mg Tablet, 20 MG PO HS, (Reported)


   Entered as Reported by: DAKOTA BALL on 3/8/21 1009


Quetiapine Fumarate (Seroquel) 25 Mg Tablet, 25 MG PO HS


   Prescribed by: CINDY MICHEL on 22 1838


Sacubitril/Valsartan (Entresto 24 mg-26 mg Tablet) 1 Each Tablet, 1 EA PO BID, 

(Reported)


   Entered as Reported by: DAKOTA BALL on 3/8/21 1009


Terbinafine HCl (Terbinafine HCl) 250 Mg Tablet, 250 MG PO DAILY, (Reported)


   Entered as Reported by: ANIRUDH GODOY on 1/3/22 405


Discontinued Medications


Amoxicillin/Potassium Clav (Augmentin 500-125 Tablet) 500 Mg-125 Mg Tablet, 1 

EACH PO BID


   Prescribed by: MIHAELA CAPPS MD on 10/8/22 1356


   Last Action: Discontinued





Review of Systems


Review of Systems


Constitutional:  no symptoms reported


EENTM:  No Symptoms Reported


Respiratory:  No Symptoms Reported


Cardiovascular:  No Symptoms Reported


Gastrointestinal:  Abdomen Distended, Abdominal Pain


Genitourinary:  No Symptoms Reported


Musculoskeletal:  no symptoms reported


Skin:  no symptoms reported


Psychiatric/Neurological:  No Symptoms Reported


Endocrine:  No Symptoms Reported


Hematologic/Lymphatic:  No Symptoms Reported





Past Medical-Social-Family Hx


Patient Social History


Tobacco Use?:  No


Use of E-Cig and/or Vaping dev:  No


Substance use?:  No


Alcohol Use?:  No





Immunizations Up To Date


Tetanus Booster (TDap):  Unknown


First/Initial COVID19 Vaccinat:  


Second COVID19 Vaccination Rodger:  


Third COVID19 Vaccination Date:  





Seasonal Allergies


Seasonal Allergies:  No





Past Medical History


Surgery/Hospitalization HX:  


cardiac ablation, ppm, c-sect, hysterectomy, cholecystectomy, lumbar 


laminectomy, heart cath, htn, iddm, gerd, high cholesterol, a-fib, peripheral 


neuropathy, cva, ENDOCARDITIS


Surgeries:  Yes


Cardiac,  Section, Gallbladder, Hysterectomy, Orthopedic, Pacemaker


Respiratory:  Yes


COPD


Cardiac:  Yes (PACEMAKER, CARDIAC ABLATION, MAZE PROCEDURE, LEFT APPENDAGE 

CLIPPED)


Atrial Fibrillation, High Cholesterol, Hypertension, Irregular Heartbeat


Neurological:  Yes (PERIPHERAL NEUROPATHY IN HANDS AND FEET;HX OF CVA 2019-NO 

RESIDUAL)


Neuropathy, Stroke


Reproductive Disorders:  No


Genitourinary:  Yes


Bladder Infection


Gastrointestinal:  Yes


Gastroesophageal Reflux


Musculoskeletal:  Yes


Chronic Back Pain


Endocrine:  Yes (INSULIN PUMP)


Diabetes, Insulin dep


HEENT:  Yes (DIABETIC RETINOPATHY)


Cancer:  No


Psychosocial:  No


Integumentary:  No


Blood Disorders:  No





Family Medical History


Reviewed Nursing Family Hx


No Pertinent Family Hx





PAST SURGICAL HISTORY:


-LEFT EYE SURGERY FOR RETINAL BLEED 3 YEARS AGO


- X 2  IN , 


-HYSTERECTOMY 


-CHOLECYSTECTOMY ( OPEN ) 


-LEFT FOOT SURGERY 7 YEARS AGO


-LUMBAR LAMINECTOMY 20 YEARS AGO


-PACEMAKER 4 YEARS AGO


-CARDIAC ABLATION 2019


-MAZE PROCEDURE 2020


-LEFT LATERAL APPENDAGE CLIP 2020


-PT REPORTS CARDIAC CATH DONE IN  IN  SHOWED NO SIGNIFICANT CAD. 





ECHOCARDIOGRAM 21--EF 55-60%


MPI 21--NO INSCHEMIA OR INFARCTIONS





Physical Exam


Vital Signs





Vital Signs - First Documented








 10/8/22





 12:25


 


Temp 36.4


 


Pulse 72


 


Resp 16


 


B/P (MAP) 153/74 (100)


 


Pulse Ox 99


 


O2 Delivery Room Air





Capillary Refill :


Height/Weight/BMI


Height: 5'1.00"


Weight: 151lbs. 0.0oz. [id number]; 18.46 BMI


Method:Stated


General Appearance:  WD/WN, no apparent distress


HEENT:  PERRL/EOMI, normal ENT inspection, pharynx normal


Neck:  non-tender, full range of motion, supple, normal inspection


Respiratory:  chest non-tender, lungs clear, normal breath sounds, no 

respiratory distress, no accessory muscle use


Cardiovascular:  regular rate, rhythm, no edema, no gallop, no JVD, no murmur


Gastrointestinal:  normal bowel sounds, soft, no organomegaly, tenderness 

(Tenderness to palpation diffusely throughout the abdomen, appears to be worse 

in the lower quadrants bilaterally.  Voluntary guarding without rebound 

tenderness.  No mass organomegaly.  No skin changes)


Extremities:  normal range of motion, non-tender, normal inspection, no pedal 

edema, no calf tenderness, normal capillary refill


Back:  normal inspection, no CVA tenderness


Neurologic/Psychiatric:  alert, oriented x 3


Skin:  normal color, warm/dry


Lymphatic:  no adenopathy





Progress/Results/Core Measures


Results/Orders


Lab Results





Laboratory Tests








Test


 10/8/22


12:35 10/8/22


12:44 Range/Units


 


 


White Blood Count


 5.4 


 


 4.3-11.0


10^3/uL


 


Red Blood Count


 2.68 L


 


 3.80-5.11


10^6/uL


 


Hemoglobin 7.8 L  11.5-16.0  g/dL


 


Hematocrit 25 L  35-52  %


 


Mean Corpuscular Volume 93   80-99  fL


 


Mean Corpuscular Hemoglobin 29   25-34  pg


 


Mean Corpuscular Hemoglobin


Concent 31 L


 


 32-36  g/dL





 


Red Cell Distribution Width 21.1 H  10.0-14.5  %


 


Platelet Count


 177 


 


 130-400


10^3/uL


 


Mean Platelet Volume 8.5 L  9.0-12.2  fL


 


Immature Granulocyte % (Auto) 0    %


 


Neutrophils (%) (Auto) 65   42-75  %


 


Lymphocytes (%) (Auto) 29   12-44  %


 


Monocytes (%) (Auto) 5   0-12  %


 


Eosinophils (%) (Auto) 1   0-10  %


 


Basophils (%) (Auto) 0   0-10  %


 


Neutrophils # (Auto)


 3.5 


 


 1.8-7.8


10^3/uL


 


Lymphocytes # (Auto)


 1.5 


 


 1.0-4.0


10^3/uL


 


Monocytes # (Auto)


 0.2 


 


 0.0-1.0


10^3/uL


 


Eosinophils # (Auto)


 0.1 


 


 0.0-0.3


10^3/uL


 


Basophils # (Auto)


 0.0 


 


 0.0-0.1


10^3/uL


 


Immature Granulocyte # (Auto)


 0.0 


 


 0.0-0.1


10^3/uL


 


Sodium Level 141   135-145  MMOL/L


 


Potassium Level 4.5   3.6-5.0  MMOL/L


 


Chloride Level 111 H    MMOL/L


 


Carbon Dioxide Level 24   21-32  MMOL/L


 


Anion Gap 6   5-14  MMOL/L


 


Blood Urea Nitrogen 13   7-18  MG/DL


 


Creatinine


 0.74 


 


 0.60-1.30


MG/DL


 


Estimat Glomerular Filtration


Rate 86 


 


  





 


BUN/Creatinine Ratio 18    


 


Glucose Level 102     MG/DL


 


Calcium Level 8.7   8.5-10.1  MG/DL


 


Corrected Calcium 9.1   8.5-10.1  MG/DL


 


Total Bilirubin 0.4   0.1-1.0  MG/DL


 


Aspartate Amino Transf


(AST/SGOT) 18 


 


 5-34  U/L





 


Alanine Aminotransferase


(ALT/SGPT) 12 


 


 0-55  U/L





 


Alkaline Phosphatase 79     U/L


 


Total Protein 6.7   6.4-8.2  GM/DL


 


Albumin 3.5   3.2-4.5  GM/DL


 


Lipase 39   8-78  U/L


 


Urine Color  YELLOW   


 


Urine Clarity  CLEAR   


 


Urine pH  7.0  5-9  


 


Urine Specific Gravity  <=1.005  1.016-1.022  


 


Urine Protein  NEGATIVE  NEGATIVE  


 


Urine Glucose (UA)  NEGATIVE  NEGATIVE  


 


Urine Ketones  NEGATIVE  NEGATIVE  


 


Urine Nitrite  NEGATIVE  NEGATIVE  


 


Urine Bilirubin  NEGATIVE  NEGATIVE  


 


Urine Urobilinogen  0.2  < = 1.0  MG/DL


 


Urine Leukocyte Esterase  NEGATIVE  NEGATIVE  


 


Urine RBC (Auto)  NEGATIVE  NEGATIVE  


 


Urine RBC  RARE   /HPF


 


Urine WBC  RARE   /HPF


 


Urine Squamous Epithelial


Cells 


 0-2 


  /HPF





 


Urine Crystals  NONE   /LPF


 


Urine Bacteria  TRACE   /HPF


 


Urine Casts  NONE   /LPF


 


Urine Mucus  NEGATIVE   /LPF


 


Urine Culture Indicated  NO   








My Orders





Orders - MIHAELA CAPPS DO


Cbc With Automated Diff (10/8/22 12:29)


Comprehensive Metabolic Panel (10/8/22 12:29)


Ua Culture If Indicated (10/8/22 12:29)


Lipase (10/8/22 12:29)


Ct Abdomen/Pelvis W (10/8/22 12:29)


Iohexol Injection (Omnipaque 350 Mg/Ml 1 (10/8/22 13:00)


Received Contrast (Hold Metformin- Contr (10/8/22 13:00)


Ns (Ivpb) (Sodium Chloride 0.9% Ivpb Bag (10/8/22 13:00)


Sodium Chloride Flush (Catheter Flush Sy (10/8/22 13:00)





Medications Given in ED





Current Medications








 Medications  Dose


 Ordered  Sig/Chris


 Route  Start Time


 Stop Time Status Last Admin


Dose Admin


 


 Iohexol  100 ml  ONCE  ONCE


 IV  10/8/22 13:00


 10/8/22 13:02 DC 10/8/22 13:18


49 ML


 


 Sodium Chloride  10 ml  AS NEEDED  PRN


 IV  10/8/22 13:00


 10/8/22 14:15 DC 10/8/22 13:18


10 ML


 


 Sodium Chloride  100 ml  ONCE  ONCE


 IV  10/8/22 13:00


 10/8/22 13:02 DC 10/8/22 13:18


80 ML








Vital Signs/I&O











 10/8/22 10/8/22





 12:25 14:10


 


Temp 36.4 36.4


 


Pulse 72 75


 


Resp 16 16


 


B/P (MAP) 153/74 (100) 149/75


 


Pulse Ox 99 99


 


O2 Delivery Room Air Room Air











Departure


Communication (Admissions)


The patient is hemodynamically stable with a nonsurgical abdominal exam.  

Differential diagnosis includes diverticulitis, perforated bowel, bowel 

obstruction, constipation, UTI.  CT abdomen pelvis shows possible duodenitis.  

Started on Augmentin.  Urine is negative and labs are otherwise reassuring.  

Again abdominal exam is nonsurgical.  She is discharged in stable condition with

close follow-up.  Questions were sought and answered.





Impression





   Primary Impression:  


   Duodenitis


Disposition:   HOME, SELF-CARE


Condition:  Stable





Departure-Patient Inst.


Referrals:  


IRIS RUIZ MD (PCP/Family)


Primary Care Physician


Patient Instructions:  Abdominal Pain, Adult ED





Add. Discharge Instructions:  


Take the antibiotics as prescribed until they are gone.  Please use stool 

softeners like MiraLAX for constipation.  Use 1 capful dissolved in liquid twice

a day until you have formed but soft stools.  Please have your hemoglobin 

rechecked on Monday or Tuesday.  Your current hemoglobin level is close to what 

it typically is.  Return to the emergency department for any severe concerns.  

Follow-up with your primary physician for any nonemergent needs.


Scripts


Amoxicillin/Potassium Clav (Augmentin 500-125 Tablet) 500 Mg-125 Mg Tablet


1 EACH PO BID for 7 Days, #14 TAB


   Prov: MIHAELA CAPPS DO         10/8/22











MIHAELA CAPPS DO             Oct 8, 2022 12:34

## 2022-12-21 NOTE — DIAGNOSTIC IMAGING REPORT
CLINICAL INDICATION: Patient fell last night. No complaints.



EXAM: Axial Head CT without IV contrast with sagittal and coronal

reformations. Axial Maxillofacial CT scan without IV contrast

with sagittal and coronal reformations. Axial CT scan of the

cervical spine with sagittal and coronal reformations. Auto

Exposure Controls were utilized during the CT exam to meet ALARA

standards for radiation dose reduction.



COMPARISON: CT scan of the head without contrast dated

07/12/2022. CT scan of the head and cervical spine without

contrast dated 03/23/2018.



FINDINGS:



Head and maxillofacial CT:



There is skull streak artifact which obscures portions of the

brainstem, posterior fossa, and portions of the brain near the

skull.



There is no evidence of acute cerebral infarct, intracranial

hemorrhage, or gross mass effect. Likely small area of

encephalomalacia involving the medial aspect of the right

occipital lobe. There is mild chronic small vessel ischemic

disease and leukoaraiosis.



The brain parenchymal volume appears appropriate for patient's

age. There is normal gray-white matter distinction. There is no

significant midline shift or herniation.



There is no evidence of hydrocephalus. The basal cisterns are

unremarkable. The skull, extracranial soft tissue, and orbits are

unremarkable. There is small amount of fluid and secretions

involving the sphenoid sinus. There is consolidation involving

the left mastoid air cells and left middle ear region.



Cervical spine:

There is no acute cervical spine fracture or dislocation. There

is straightening of the cervical spine posture. There are

cervical spine vertebral body spurs with diffuse disk bulges.

There are small posterior disk herniations at the C3-C4, C4-C5,

C5-C6, and C6-C7 levels which cause at least mild central canal

narrowing. There is no significant neck soft tissue abnormality.

There is interval development of a 6 mm x 11 mm nodular area in

the right lung apex with spiculations extending to the pleural

surface.



IMPRESSION:

1: There is no evidence of acute intracranial process. There is

no skull or maxillofacial fracture.



2: Cervical spine degenerative disease with no acute fracture or

dislocation.



3: There is a nodular area involving the right lung apex

measuring 11 mm x 6 mm. CT scan of the chest in three months is

suggested to evaluate for interval change.



Dictated by: 



  Dictated on workstation # [id number]

## 2022-12-21 NOTE — ED LOWER EXTREMITY
General


Chief Complaint:  Trauma-Non Activation


Stated Complaint:  FALL


Nursing Triage Note:  


ARRIVED VIA AMB WITH WALKER TO ROOM 06.  PT HAD A UNWINTTNESSED FALL AT [facility] 


1830 YESTERDAY. PT STATES SHE TRIPPED OVER HERSELF AND FELL.   DENIES LOC BUT 


DID HIT HER HEAD ON THE RIGHT SIDED BY HER EAR. PT DOES TAKE A BLOOD THINNER.  


DENIES PAIN.





History of Present Illness


Date Seen by Provider:  Dec 21, 2022


Time Seen by Provider:  10:00


Initial Comments


12-year-old female with PMH of dementia/CAD/A. fib on Eliquis/HTN, is here from 

the nursing home after having a fall that was unwitnessed at 1830 yesterday.  

Staff from the nursing home that accompanied the patient stated that she tripped

over a step and fell, hitting her right sided forehead.  Patient complains of 

right-sided forehead and right maxillary pain.  Denies LOC, blurry vision, 

headache, hearing disturbances, bleeding.





Allergies and Home Medications


Allergies


Coded Allergies:  


     promethazine (Verified  Allergy, Intermediate, 3/11/08)


     clindamycin (Verified  Allergy, Unknown, 3/7/21)


     hydrocodone (Verified  Allergy, Unknown, 3/7/21)


     morphine (Verified  Allergy, Unknown, 3/7/21)





Patient Home Medication List


Home Medication List Reviewed:  Yes


Amoxicillin/Potassium Clav (Augmentin 500-125 Tablet) 500 Mg-125 Mg Tablet, 1 

EACH PO BID


   Prescribed by: MIHAELA CAPPS MD on 10/8/22 141


Celecoxib (Celecoxib) 100 Mg Capsule, 100 MG PO BID PRN for ARTHRITIS PAIN, 

(Reported)


   Entered as Reported by: ANIRUDH GODOY on 1/3/22 2154


Colchicine (Colchicine) 0.6 Mg Tablet, 0.6 MG PO BID, (Reported)


   Entered as Reported by: ANIRUDH GODOY on 1/3/22 2154


Insulin Aspart (Novolog) 100 Unit/1 Ml Susp, UNITS SC PER PUMP, (Reported)


   Entered as Reported by: DAKOTA BALL on 3/8/21 100


Metoprolol Succinate (Metoprolol Succinate) 50 Mg Tab.er.24h, 50 MG PO DAILY, 

(Reported)


   Entered as Reported by: DAKOTA BALL on 3/8/21 100


Potassium Chloride (K-Tab ER) 10 Meq Tablet.er, 10 MEQ PO DAILY, (Reported)


   Entered as Reported by: DAKOTA BALL on 3/8/21 1009


Pravastatin Sodium (Pravastatin Sodium) 20 Mg Tablet, 20 MG PO HS, (Reported)


   Entered as Reported by: DAKOTA BALL on 3/8/21 1009


Quetiapine Fumarate (Seroquel) 25 Mg Tablet, 25 MG PO HS


   Prescribed by: CINDY MICHEL on 22 1838


Sacubitril/Valsartan (Entresto 24 mg-26 mg Tablet) 1 Each Tablet, 1 EA PO BID, 

(Reported)


   Entered as Reported by: DAKOTA BALL on 3/8/21 1009


Terbinafine HCl (Terbinafine HCl) 250 Mg Tablet, 250 MG PO DAILY, (Reported)


   Entered as Reported by: ANIRUDH GODOY on 1/3/22 2154





Review of Systems


Constitutional:  no symptoms reported


EENTM:  other (Swelling over the right frontal area)


Respiratory:  no symptoms reported


Cardiovascular:  no symptoms reported


Gastrointestinal:  no symptoms reported


Genitourinary:  no symptoms reported


Musculoskeletal:  no symptoms reported


Skin:  no symptoms reported


Psychiatric/Neurological:  No Symptoms Reported





Past Medical-Social-Family Hx


Patient Social History


Tobacco Use?:  No


Substance use?:  No


Alcohol Use?:  No





Immunizations Up To Date


Tetanus Booster (TDap):  Unknown


First/Initial COVID19 Vaccinat:  


Second COVID19 Vaccination Rodger:  


Third COVID19 Vaccination Date:  





Seasonal Allergies


Seasonal Allergies:  No





Past Medical History


Surgery/Hospitalization HX:  


cardiac ablation, ppm, c-sect, hysterectomy, cholecystectomy, lumbar 


laminectomy, heart cath, htn, iddm, gerd, high cholesterol, a-fib, peripheral 


neuropathy, cva, ENDOCARDITIS


Surgeries:  Yes


Cardiac,  Section, Gallbladder, Hysterectomy, Orthopedic, Pacemaker


Respiratory:  Yes


COPD


Cardiac:  Yes (PACEMAKER, CARDIAC ABLATION, MAZE PROCEDURE, LEFT APPENDAGE 

CLIPPED)


Atrial Fibrillation, High Cholesterol, Hypertension, Irregular Heartbeat


Neurological:  Yes (PERIPHERAL NEUROPATHY IN HANDS AND FEET;HX OF CVA 2019-NO 

RESIDUAL)


Neuropathy, Stroke


Reproductive Disorders:  No


Genitourinary:  Yes


Bladder Infection


Gastrointestinal:  Yes


Gastroesophageal Reflux


Musculoskeletal:  Yes


Chronic Back Pain


Endocrine:  Yes (INSULIN PUMP)


Diabetes, Insulin dep


HEENT:  Yes (DIABETIC RETINOPATHY)


Cancer:  No


Psychosocial:  No


Integumentary:  No


Blood Disorders:  No





Family Medical History


No Pertinent Family Hx





PAST SURGICAL HISTORY:


-LEFT EYE SURGERY FOR RETINAL BLEED 3 YEARS AGO


- X 2  IN 1970, 


-HYSTERECTOMY 


-CHOLECYSTECTOMY ( OPEN ) 


-LEFT FOOT SURGERY 7 YEARS AGO


-LUMBAR LAMINECTOMY 20 YEARS AGO


-PACEMAKER 4 YEARS AGO


-CARDIAC ABLATION 2019


-MAZE PROCEDURE 2020


-LEFT LATERAL APPENDAGE CLIP 2020


-PT REPORTS CARDIAC CATH DONE IN  IN  SHOWED NO SIGNIFICANT CAD. 





ECHOCARDIOGRAM 21--EF 55-60%


MPI 21--NO INSCHEMIA OR INFARCTIONS





Physical Exam


Vital Signs





Vital Signs - First Documented








 22





 09:35


 


Temp 36.3


 


Pulse 70


 


Resp 16


 


B/P (MAP) 148/87 (107)


 


Pulse Ox 100


 


O2 Delivery Room Air





Capillary Refill : Less Than 3 Seconds


Height, Weight, BMI


Height: 5'1.00"


Weight: 151lbs. 0.0oz. 68.943608ev; 19.00 BMI


Method:Stated


General Appearance:  WD/WN, no apparent distress


HEENT:  PERRL/EOMI, normal ENT inspection, other (Maxillary sinus tenderness on 

palpation, and tenderness to right frontal area with mild swelling)


Neck:  non-tender, full range of motion, supple, normal inspection


Cardiovascular:  regular rate, rhythm


Respiratory:  chest non-tender, lungs clear


Gastrointestinal:  non tender, soft


Back:  normal inspection, no vertebral tenderness


Neurologic/Tendon:  normal sensation, normal motor functions, normal tendon 

functions


Neurologic/Psychiatric:  CNs II-XII nml as tested, no motor/sensory deficits, 

alert, normal mood/affect (Speaks like a person with dementia), oriented x 3


Skin:  normal color





Progress/Results/Core Measures


Results/Orders


My Orders





Orders - MIRIAN ROSS MD


Ct Head/Face/Cervical Wo (22 10:10)





Vital Signs/I&O











 22





 09:35


 


Temp 36.3


 


Pulse 70


 


Resp 16


 


B/P (MAP) 148/87 (107)


 


Pulse Ox 100


 


O2 Delivery Room Air














Blood Pressure Mean:                    107











Progress


Progress Note :  


Progress Note


1. FALL:


- CT HEAD/ MAXILLOFACIAL/ CERVICAL: no acute finding


- Pt is on Eliquis


-Follow-up with PCP within the next 7 days


-Concussion precautions given


-The patient was seen in the ED, and treated appropriately to presentation at a 

specific point in time. Patient and her nursing home attendant is informed that 

there is a possibility that disease and illness can evolve and change in acuity 

rapidly or slowly after patient is discharged from the ER. Precautionary advice 

given to the patient for immediate return to ER if symptoms worsen or do not 

resolve, and to seek emergency care sooner rather than later. Pt and nursing 

home staff also advised on the importance of PCP follow up and compliance with 

management and follow up plan with PCP and/or specialist, as this is part of the

management plan.  Staff verbally expressed understanding.





Diagnostic Imaging





   Diagonstic Imaging:  CT


   Plain Films/CT/US/NM/MRI:  facial bones, c-spine, head


Comments


                 [facility] [place], [place]





NAME:   RICK POWERS


[facility] REC#:   [id number]


ACCOUNT#:   [id number]


PT STATUS:   REG ER


:   1950


PHYSICIAN:   MIRIAN ROSS MD


ADMIT DATE:   22/ER


                                   ***Draft***


Date of Exam:22





CT HEAD/FACE/CERVICAL WO








CLINICAL INDICATION: Patient fell last night. No complaints.





EXAM: Axial Head CT without IV contrast with sagittal and coronal


reformations. Axial Maxillofacial CT scan without IV contrast


with sagittal and coronal reformations. Axial CT scan of the


cervical spine with sagittal and coronal reformations. Auto


Exposure Controls were utilized during the CT exam to meet ALARA


standards for radiation dose reduction.





COMPARISON: CT scan of the head without contrast dated


2022. CT scan of the head and cervical spine without


contrast dated 2018.





FINDINGS:





Head and maxillofacial CT:





There is skull streak artifact which obscures portions of the


brainstem, posterior fossa, and portions of the brain near the


skull.





There is no evidence of acute cerebral infarct, intracranial


hemorrhage, or gross mass effect. Likely small area of


encephalomalacia involving the medial aspect of the right


occipital lobe. There is mild chronic small vessel ischemic


disease and leukoaraiosis.





The brain parenchymal volume appears appropriate for patient's


age. There is normal gray-white matter distinction. There is no


significant midline shift or herniation.





There is no evidence of hydrocephalus. The basal cisterns are


unremarkable. The skull, extracranial soft tissue, and orbits are


unremarkable. There is small amount of fluid and secretions


involving the sphenoid sinus. There is consolidation involving


the left mastoid air cells and left middle ear region.





Cervical spine:


There is no acute cervical spine fracture or dislocation. There


is straightening of the cervical spine posture. There are


cervical spine vertebral body spurs with diffuse disk bulges.


There are small posterior disk herniations at the C3-C4, C4-C5,


C5-C6, and C6-C7 levels which cause at least mild central canal


narrowing. There is no significant neck soft tissue abnormality.


There is interval development of a 6 mm x 11 mm nodular area in


the right lung apex with spiculations extending to the pleural


surface.





IMPRESSION:


1: There is no evidence of acute intracranial process. There is


no skull or maxillofacial fracture.





2: Cervical spine degenerative disease with no acute fracture or


dislocation.





3: There is a nodular area involving the right lung apex


measuring 11 mm x 6 mm. CT scan of the chest in three months is


suggested to evaluate for interval change.





  Dictated on workstation # [id number]








Dict:   22 1037


Trans:   22 1104


[id number]





Interpreted by:     LAURIE BARNES MD


Electronically signed by:





Departure


Impression





   Primary Impression:  


   Fall (on) (from) other stairs and steps, initial encounter


   Additional Impression:  


   Hx of long term use of blood thinners


Disposition:  01 HOME, SELF-CARE


Condition:  Stable





Departure-Patient Inst.


Referrals:  


IRIS RUIZ MD (PCP/Family)


Primary Care Physician


Patient Instructions:  Concussion in Adults, Preventing Falls in Older Adults





Add. Discharge Instructions:  


-Follow-up with PCP within the next 7 days


-Concussion precautions given





All discharge instructions reviewed with patient and/or family. Voiced 

understanding.











MIRIAN ROSS MD              Dec 21, 2022 10:28

## 2023-01-02 NOTE — DIAGNOSTIC IMAGING REPORT
EXAMINATION: 

Right hip unilateral, 2 or 3 views (w/pelvis when done).



HISTORY: 

Hip injury.



COMPARISON: 

03/23/2018.



FINDINGS: 

There is mild bilateral hip joint osteoarthritis. No fracture is

seen. No dislocation.



IMPRESSION:

No fracture is seen in the pelvis or right hip.



Dictated by: 



  Dictated on workstation # [id number]

## 2023-01-02 NOTE — ED FALL/INJURY
General


Chief Complaint:  Trauma-Non Activation


Stated Complaint:  FALL


Nursing Triage Note:  


PT PRESENTS TO ED VIA EMS FROM [facility] FOR FALL FROM BED TO 


FLOOR. PT REPROTS R HIP PAIN. NO LOC. PT DID HIT HEAD.





History of Present Illness


Date Seen by Provider:  2023


Time Seen by Provider:  12:50


Initial Comments


72 year old nursing home resident from [facility] slid out of bed to 

floor today. Brought to ED via EMS. Staff did not witness fall but responded 

immediately. Patient reports no LOC. She is on Eliquis and had fall 22 and

evaluated in this ED. Patient denies headache, main complaint is right hip pain.

Patient reports using W/C for mobility or is steady with walker per staff.


Occurred:  just prior to arrival


Severity:  mild


Injuries/Pain Location:  pelvis, lower extremity


Context:  unknown


Loss of Consciousness:  no loss of consciousness


Associated Symptoms (Fall):  Denies Symptoms





Allergies and Home Medications


Allergies


Coded Allergies:  


     promethazine (Verified  Allergy, Intermediate, 3/11/08)


     clindamycin (Verified  Allergy, Unknown, 3/7/21)


     hydrocodone (Verified  Allergy, Unknown, 3/7/21)


     morphine (Verified  Allergy, Unknown, 3/7/21)





Patient Home Medication List


Home Medication List Reviewed:  Yes


Amoxicillin/Potassium Clav (Augmentin 500-125 Tablet) 500 Mg-125 Mg Tablet, 1 

EACH PO BID


   Prescribed by: MIHAELA CAPPS MD on 10/8/22 141


Celecoxib (Celecoxib) 100 Mg Capsule, 100 MG PO BID PRN for ARTHRITIS PAIN, 

(Reported)


   Entered as Reported by: ANIRUDH GODOY on 1/3/22 2154


Colchicine (Colchicine) 0.6 Mg Tablet, 0.6 MG PO BID, (Reported)


   Entered as Reported by: ANIRUDH GODOY on 1/3/22 2154


Insulin Aspart (Novolog) 100 Unit/1 Ml Susp, UNITS SC PER PUMP, (Reported)


   Entered as Reported by: DAKOTA ABLL on 3/8/21 100


Metoprolol Succinate (Metoprolol Succinate) 50 Mg Tab.er.24h, 50 MG PO DAILY, 

(Reported)


   Entered as Reported by: DAKOTA BALL on 3/8/21 1009


Potassium Chloride (K-Tab ER) 10 Meq Tablet.er, 10 MEQ PO DAILY, (Reported)


   Entered as Reported by: DAKOTA BALL on 3/8/21 1009


Pravastatin Sodium (Pravastatin Sodium) 20 Mg Tablet, 20 MG PO HS, (Reported)


   Entered as Reported by: DAKOTA BALL on 3/8/21 1009


Quetiapine Fumarate (Seroquel) 25 Mg Tablet, 25 MG PO HS


   Prescribed by: CINDY MICHEL on 22 1838


Sacubitril/Valsartan (Entresto 24 mg-26 mg Tablet) 1 Each Tablet, 1 EA PO BID, 

(Reported)


   Entered as Reported by: DAOKTA BALL on 3/8/21 1009


Terbinafine HCl (Terbinafine HCl) 250 Mg Tablet, 250 MG PO DAILY, (Reported)


   Entered as Reported by: ANIRUDH GODOY on 1/3/22 2154





Review of Systems


Review of Systems


Constitutional:  no symptoms reported, see HPI


Musculoskeletal:  see HPI, joint pain (right hip)





All Other Systems Reviewed


Negative Unless Noted:  Yes





Past Medical-Social-Family Hx


Patient Social History


Tobacco Use?:  No


Smoking Status:  Unknown if Ever Smoked


Substance use?:  No


Alcohol Use?:  No


Pt feels they are or have been:  No





Immunizations Up To Date


Tetanus Booster (TDap):  Unknown


First/Initial COVID19 Vaccinat:  


Second COVID19 Vaccination Rodger:  


Third COVID19 Vaccination Date:  





Seasonal Allergies


Seasonal Allergies:  No





Past Medical History


Surgery/Hospitalization HX:  


cardiac ablation, ppm, c-sect, hysterectomy, cholecystectomy, lumbar 


laminectomy, heart cath, htn, iddm, gerd, high cholesterol, a-fib, peripheral 


neuropathy, cva, ENDOCARDITIS, DM, ANXIETY


Surgeries:  Yes


Cardiac,  Section, Gallbladder, Hysterectomy, Orthopedic, Pacemaker


Respiratory:  Yes


COPD


Cardiac:  Yes (PACEMAKER, CARDIAC ABLATION, MAZE PROCEDURE, LEFT APPENDAGE 

CLIPPED)


Atrial Fibrillation, High Cholesterol, Hypertension, Irregular Heartbeat


Neurological:  Yes (PERIPHERAL NEUROPATHY IN HANDS AND FEET;HX OF CVA 2019-NO 

RESIDUAL)


Neuropathy, Stroke


Reproductive Disorders:  No


Genitourinary:  Yes


Bladder Infection


Gastrointestinal:  Yes


Gastroesophageal Reflux


Musculoskeletal:  Yes


Chronic Back Pain


Endocrine:  Yes (INSULIN PUMP)


Diabetes, Insulin dep


HEENT:  Yes (DIABETIC RETINOPATHY)


Cancer:  No


Psychosocial:  No


Integumentary:  No


Blood Disorders:  No





Family Medical History


Reviewed Nursing Family Hx


No Pertinent Family Hx





PAST SURGICAL HISTORY:


-LEFT EYE SURGERY FOR RETINAL BLEED 3 YEARS AGO


- X 2  IN , 


-HYSTERECTOMY 


-CHOLECYSTECTOMY ( OPEN ) 


-LEFT FOOT SURGERY 7 YEARS AGO


-LUMBAR LAMINECTOMY 20 YEARS AGO


-PACEMAKER 4 YEARS AGO


-CARDIAC ABLATION 2019


-MAZE PROCEDURE 2020


-LEFT LATERAL APPENDAGE CLIP 2020


-PT REPORTS CARDIAC CATH DONE IN  IN  SHOWED NO SIGNIFICANT CAD. 





ECHOCARDIOGRAM 21--EF 55-60%


MPI 21--NO INSCHEMIA OR INFARCTIONS





Physical Exam


Vital Signs





Vital Signs - First Documented








 23





 12:46 14:17


 


Temp 36.0 


 


Pulse 63 


 


Resp 18 


 


B/P (MAP) 127/73 (91) 


 


Pulse Ox 100 


 


O2 Delivery  Room Air





Capillary Refill : Less Than 3 Seconds


Height, Weight, BMI


Height: 5'1.00"


Weight: 151lbs. 0.0oz. 68.228227xn; 24.00 BMI


Method:Stated


General Appearance:  WD/WN, no apparent distress


HEENT:  PERRL/EOMI, normal ENT inspection, pharynx normal, other (no areas of 

ecchymosis, abrasions or contusions to head. )


Neck:  non-tender, full range of motion, supple, normal inspection; No tender 

lateral, No tender midline


Cardiovascular:  normal peripheral pulses, regular rate, rhythm


Respiratory:  chest non-tender, lungs clear, normal breath sounds


Gastrointestinal:  normal bowel sounds, non tender, soft


Back:  normal inspection, no vertebral tenderness


Extremities:  normal range of motion (UEs and Left LE. ), normal inspection, 

normal capillary refill, pelvis stable, other (pain with palpatin of right hip 

and ROM. Full active ROM to both hips. )


Neurologic/Psychiatric:  no motor/sensory deficits, alert, normal mood/affect





Progress/Results/Core Measures


Results/Orders


My Orders





Orders - GEOVANY APODACA


Ct Head Wo (23 12:51)


Pelvis With Right Hip 2-3views (23 12:51)





Vital Signs/I&O











 23





 12:46 14:17


 


Temp 36.0 36.0


 


Pulse 63 65


 


Resp 18 18


 


B/P (MAP) 127/73 (91) 129/69


 


Pulse Ox 100 100


 


O2 Delivery  Room Air














Blood Pressure Mean:                    91











Progress


Progress Note :  


   Time:  12:50


Progress Note


Patient assessed. Will obtain CT head due to Eliquis and possible head injury; 

x-ray pelvis and right hip. Will wait on diagnostic studies prior to labs. 

patient declines need for pain medicine


1320 no fracture or d/l pelvis and right hip. 


1330 CT head negative. Discharge planning in place to return to [facility]. Patient declines any needs. 


1345 sitting up in Wheelchair, transferred with min asst. No pain to hips with 

ROM. Eating crackers and drinking water. 


1410 [facility] staff here to transfer back.





Diagnostic Imaging





   Diagonstic Imaging:  Xray


   Plain Films/CT/US/NM/MRI:  pelvis, hip


Comments


NAME:   RICK POWERS


MED REC#:   [id number]


ACCOUNT#:   [id number]


PT STATUS:   REG ER


:   1950


PHYSICIAN:   GEOVANY APODACA


ADMIT DATE:   23/ER


                                   ***Draft***


Date of Exam:23





PELVIS WITH RIGHT HIP 2-3VIEWS








EXAMINATION: 


Right hip unilateral, 2 or 3 views (w/pelvis when done).





HISTORY: 


Hip injury.





COMPARISON: 


2018.





FINDINGS: 


There is mild bilateral hip joint osteoarthritis. No fracture is


seen. No dislocation.





IMPRESSION:


No fracture is seen in the pelvis or right hip.





  Dictated on workstation # [id number]








Dict:   23 1313


Trans:   23 1317


 [id number]





Interpreted by:     IRIS GALLEGOS MD


Electronically signed by:


   Reviewed:  Reviewed by Me








   Diagonstic Imaging:  CT


   Plain Films/CT/US/NM/MRI:  head


Comments


NAME:   RICK POWERS


MED REC#:   [id number]


ACCOUNT#:   [id number]


PT STATUS:   REG ER


:   1950


PHYSICIAN:   GEOVANY APODACA


ADMIT DATE:   23/ER


                                   ***Draft***


Date of Exam:23





CT HEAD WO








PROCEDURE: 


CT head without contrast.





TECHNIQUE: 


Multiple contiguous axial images were obtained through the brain


without the use of intravenous contrast. Auto Exposure Controls


were utilized during the CT exam to meet ALARA standards for


radiation dose reduction. 





INDICATION:  


Head trauma, anticoagulation.





FINDINGS:


The ventricles are normal in size, shape, and position. There are


no masses or hemorrhages. There are no extra-axial fluid


collections.





IMPRESSION: 


No acute abnormality is seen in the head.





  Dictated on workstation # [id number]








Dict:   23 1309


Trans:   23 1331


 [id number]





Interpreted by:     ARIANNE YADAV MD


Electronically signed by:


   Reviewed:  Reviewed by Me





Departure


Impression





   Primary Impression:  


   Fall from bed, initial encounter


Disposition:   HOME, SELF-CARE


Condition:  Improved





Departure-Patient Inst.


Decision time for Depature:  13:28


Referrals:  


IRIS RUIZ MD (PCP/Family)


Primary Care Physician


Patient Instructions:  Preventing Falls in Older Adults





Add. Discharge Instructions:  


continue activity as tolerated. 


All measures to prevent falls. 


Follow up with Primary Care, if new problems or patient has new complaints. 


Return to Emergency Dept for urgent healthcare problems. 





All discharge instructions reviewed with patient and/or family. Voiced 

understanding.











GEOVANY APODACA                   2023 13:07

## 2023-01-02 NOTE — DIAGNOSTIC IMAGING REPORT
PROCEDURE: 

CT head without contrast.



TECHNIQUE: 

Multiple contiguous axial images were obtained through the brain

without the use of intravenous contrast. Auto Exposure Controls

were utilized during the CT exam to meet ALARA standards for

radiation dose reduction. 



INDICATION:  

Head trauma, anticoagulation.



FINDINGS:

The ventricles are normal in size, shape, and position. There are

no masses or hemorrhages. There are no extra-axial fluid

collections.



IMPRESSION: 

No acute abnormality is seen in the head.



Dictated by: 



  Dictated on workstation # [id number]

## 2023-01-11 NOTE — DIAGNOSTIC IMAGING REPORT
PROCEDURE: CT head and CT cervical spine without contrast.



TECHNIQUE: Multiple contiguous axial images were obtained through

the brain and cervical spine without the use of intravenous

contrast. Sagittal and coronal reformations through the cervical

spine were then performed. Auto Exposure Controls were utilized

during the CT exam to meet ALARA standards for radiation dose

reduction. 



INDICATION:  Trauma. Fall. 



COMPARISON: CT head without contrast 01/20/2023.



FINDINGS: 



CT HEAD: Moderate generalized parenchymal volume loss. No CT

evidence of an acute territorial infarction. No intracranial

hemorrhage, mass effect, hydrocephalus or extra-axial fluid

collections. Osseous structures are intact. Nonspecific left

mastoid effusion.



CT CERVICAL SPINE: Normal alignment. Vertebral body heights are

preserved. No fractures. Mild to moderate spondylotic changes. No

high-grade spinal canal stenosis is evident by CT. Moderate

atherosclerotic calcifications in the carotid bifurcations. Lung

apices are clear.



IMPRESSION: 

1. No acute intracranial or cervical spine CT findings.

2. Nonspecific left mastoid effusion.



Agree with preliminary interpretation.



 



Dictated by: 



  Dictated on workstation # [id number]

## 2023-02-06 NOTE — DIAGNOSTIC IMAGING REPORT
PROCEDURE: CT head and CT cervical spine without contrast.



TECHNIQUE: Multiple contiguous axial images were obtained through

the brain and cervical spine without the use of intravenous

contrast. Sagittal and coronal reformations through the cervical

spine were then performed. Auto Exposure Controls were utilized

during the CT exam to meet ALARA standards for radiation dose

reduction. 



INDICATION: Head and neck injury after fall. 



COMPARISON: 1/2/2023 and 12/21/2022. 



FINDINGS:



CT HEAD:



The ventricles and cortical sulci are diffusely prominent, likely

from generalized parenchymal volume loss. There is no midline

shift or mass effect. No acute intracranial hemorrhage is seen.

There is no CT evidence of acute territorial ischemia. The

calvarium appears intact. The visualized paranasal sinuses appear

clear. There is fluid in the left mastoid air cells. This is

stable since the prior study.



CT CERVICAL SPINE:



There is straightening of the cervical lordosis. There are mild

degenerative changes throughout the cervical spine. No acute

fracture is seen. Vertebral body heights are preserved. The

field-of-view is somewhat large. No bony fragments or hyperdense

fluid collections are seen in the spinal canal. Soft tissues

about the cervical spine demonstrate no acute abnormality. There

is scarring in the right lung apex. There is a subcentimeter

nodule in the left thyroid.



IMPRESSION:

1. No acute intracranial hemorrhage or calvarium fracture.

2. Nonspecific fluid in the left mastoid air cells appears

stable.

3. Mild degenerative change in the cervical spine with no acute

fracture seen.



 



Dictated by: 



  Dictated on workstation # [id number]
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
## 2023-05-24 NOTE — DIAGNOSTIC IMAGING REPORT
CHEST 1 VIEW, AP/PA ONLY



Indication: Chest pain. 



Comparison: 08/15/2022



Findings:

No focal airspace disease in the visualized lungs. No pleural

effusion or pneumothorax. Heart is normal in size. Stable left

pectoral transvenous pacemaker.



Impression: 

1. No acute cardiopulmonary process by portable radiography.



Dictated by: 



  Dictated on workstation # [id number]

## 2023-05-24 NOTE — ED CHEST PAIN
General


Chief Complaint:  Chest Pain


Stated Complaint:  CHEST PAIN


Nursing Triage Note:  


PT SENT BY FACILITY FOR REPORTS OF LETHARGY, LOW BP, AND CHEST PAIN THAT BEGAN 


THIS MORNING. PER FACILITY RN, PT HAD TO BE STERNAL RUBBED IN ORDER TO BE WOKEN 


UP BUT WAS ABLE TO EAT BREAKFAST AND LUNCH. PT AWAKE AND ALERT AT TIME OF 


TRIAGE. ORIENTED TO PERSON ONLY. HAS HX OF DEMENTIA. PT APPEARS TO BE SLIGHTLY 


SOB AT THIS TIME.





History of Present Illness


Date Seen by Provider:  May 24, 2023


Time Seen by Provider:  15:55


Initial Comments


72 year old female presents via  from [facility] for chest pain 

present for 1 hour. No report received prior to her arrival. Nursing staff 

called by  and report obtained: patient is DNR. [facility] has been "unresponsive and required sternal rub to arouse" 

since this morning. Staff reports she was up to eat breakfast and lunch, then 

returned to her bed. Staff did not provide her medications today, because she wa

s not very alert. Asked staff if there was concern with aspiration, if she was 

not alert but was able to eat and they did not have concern for aspiration. 





Patient has advanced dementia, upon asking her she reports everything hurts from

her head to her toes.  She reports pain with light touch and palpation.  She 

denies chest pain but keeps stating everything hurts.  She has a pacemaker. 

Unable to determine history or today's events, due to dementia. Patient's 

 is resident at same [facility], her son was notified. 





Patient was alert upon arrival to exam room. She ambulated from wheelchair to 

bed with minimal assistance. 





Past medical: ardiac ablation, heart cath, htn, IDDM, high cholesterol, a-fib, 

peripheral neuropathy, CVA, Endocarditis.


Timing/Duration:  1 hour


ASA po PTA:  No (per staff at LT)


NTG SL PTA:  No


Associated Symptoms:  denies symptoms (unable to obtain accurate history)





Allergies and Home Medications


Allergies


Coded Allergies:  


     promethazine (Verified  Allergy, Intermediate, 3/11/08)


     clindamycin (Verified  Allergy, Unknown, 3/7/21)


     hydrocodone (Verified  Allergy, Unknown, 3/7/21)


     morphine (Verified  Allergy, Unknown, 3/7/21)





Patient Home Medication List


Home Medication List Reviewed:  Yes


Amoxicillin/Potassium Clav (Augmentin 500-125 Tablet) 500 Mg-125 Mg Tablet, 1 

EACH PO BID


   Prescribed by: MIHAELA CAPPS MD on 10/8/22 1412


Celecoxib (Celecoxib) 100 Mg Capsule, 100 MG PO BID PRN for ARTHRITIS PAIN, 

(Reported)


   Entered as Reported by: ANIRUDH GODOY on 1/3/22 2154


Colchicine (Colchicine) 0.6 Mg Tablet, 0.6 MG PO BID, (Reported)


   Entered as Reported by: ANIRUDH GODOY on 1/3/22 2154


Insulin Aspart (Novolog) 100 Unit/1 Ml Susp, UNITS SC PER PUMP, (Reported)


   Entered as Reported by: DAKOTA BALL on 3/8/21 100


Metoprolol Succinate (Metoprolol Succinate) 50 Mg Tab.er.24h, 50 MG PO DAILY, 

(Reported)


   Entered as Reported by: DAKOTA BALL on 3/8/21 1009


Potassium Chloride (K-Tab ER) 10 Meq Tablet.er, 10 MEQ PO DAILY, (Reported)


   Entered as Reported by: DAKOTA BALL on 3/8/21 1009


Pravastatin Sodium (Pravastatin Sodium) 20 Mg Tablet, 20 MG PO HS, (Reported)


   Entered as Reported by: DAKOTA BALL on 3/8/21 1009


Quetiapine Fumarate (Seroquel) 25 Mg Tablet, 25 MG PO HS


   Prescribed by: CINDY MICHEL on 22 183


Sacubitril/Valsartan (Entresto 24 mg-26 mg Tablet) 1 Each Tablet, 1 EA PO BID, 

(Reported)


   Entered as Reported by: DAKOTA BALL on 3/8/21 1009


Terbinafine HCl (Terbinafine HCl) 250 Mg Tablet, 250 MG PO DAILY, (Reported)


   Entered as Reported by: ANIRUDH GODOY on 1/3/22 2154





Review of Systems


Review of Systems


Constitutional:  no symptoms reported, see HPI


Respiratory:  See HPI, Shortness of Air (mild with talking but SaO2 on RA 99-

100%)


Cardiovascular:  See HPI, Chest Pain (per staff at [facility], patient denies chest 

pain, just reports pain "all over" )


Gastrointestinal:  No Symptoms Reported, See HPI


Genitourinary:  No Symptoms Reported, See HPI, Other (brief in place)





All Other Systems Reviewed


Negative Unless Noted:  Yes





Past Medical-Social-Family Hx


Patient Social History


Tobacco Use?:  No


Substance use?:  No


Alcohol Use?:  No


Pt feels they are or have been:  No





Immunizations Up To Date


Tetanus Booster (TDap):  Unknown


First/Initial COVID19 Vaccinat:  


Second COVID19 Vaccination Rodger:  


Third COVID19 Vaccination Date:  





Seasonal Allergies


Seasonal Allergies:  No





Past Medical History


Surgery/Hospitalization HX:  


cardiac ablation, ppm, c-sect, hysterectomy, cholecystectomy, lumbar 


laminectomy, heart cath, htn, iddm, gerd, high cholesterol, a-fib, peripheral 


neuropathy, cva, ENDOCARDITIS, DM, ANXIETY


Surgeries:  Yes


Cardiac,  Section, Gallbladder, Hysterectomy, Orthopedic, Pacemaker


Respiratory:  Yes


COPD


Cardiac:  Yes (PACEMAKER, CARDIAC ABLATION, MAZE PROCEDURE, LEFT APPENDAGE 

CLIPPED)


Atrial Fibrillation, High Cholesterol, Hypertension, Irregular Heartbeat


Neurological:  Yes (PERIPHERAL NEUROPATHY IN HANDS AND FEET;HX OF CVA -NO 

RESIDUAL)


Neuropathy, Stroke


Reproductive Disorders:  No


Genitourinary:  Yes


Bladder Infection


Gastrointestinal:  Yes


Gastroesophageal Reflux


Musculoskeletal:  Yes


Chronic Back Pain


Endocrine:  Yes (INSULIN PUMP)


Diabetes, Insulin dep


HEENT:  Yes (DIABETIC RETINOPATHY)


Cancer:  No


Psychosocial:  No


Integumentary:  No


Blood Disorders:  No





Family Medical History


Reviewed Nursing Family Hx


No Pertinent Family Hx





PAST SURGICAL HISTORY:


-LEFT EYE SURGERY FOR RETINAL BLEED 3 YEARS AGO


- X 2  IN , 


-HYSTERECTOMY 


-CHOLECYSTECTOMY ( OPEN ) 


-LEFT FOOT SURGERY 7 YEARS AGO


-LUMBAR LAMINECTOMY 20 YEARS AGO


-PACEMAKER 4 YEARS AGO


-CARDIAC ABLATION 2019


-MAZE PROCEDURE 2020


-LEFT LATERAL APPENDAGE CLIP 2020


-PT REPORTS CARDIAC CATH DONE IN  IN  SHOWED NO SIGNIFICANT CAD. 





ECHOCARDIOGRAM 21--EF 55-60%


MPI 21--NO INSCHEMIA OR INFARCTIONS





Physical Exam


Vital Signs





Vital Signs - First Documented








 23





 15:55 17:06


 


Temp 36.7 


 


Pulse 82 


 


Resp 20 


 


B/P (MAP)  109/55


 


Pulse Ox 100 


 


O2 Delivery Room Air 





Capillary Refill : Less Than 3 Seconds


Height, Weight, BMI


Height: 5'1.00"


Weight: 151lbs. 0.0oz. 68.581580sa; 24.00 BMI


Method:Stated


General Appearance:  No Apparent Distress, WD/WN


Neck:  Full Range of Motion, Normal Inspection, Non Tender, Supple


Respiratory:  Chest Non Tender, Lungs Clear, Decreased Breath Sounds


Cardiovascular:  Regular Rate, Rhythm, No Edema, No Murmur, Normal Peripheral 

Pulses


Gastrointestinal:  Normal Bowel Sounds, Non Tender, Soft


Neurologic/Psychiatric:  Alert


Skin:  Normal Color, Warm/Dry





Progress/Results/Core Measures


Results/Orders


Lab Results





Laboratory Tests








Test


 23


16:00 Range/Units


 


 


White Blood Count


 5.4 


 4.3-11.0


10^3/uL


 


Red Blood Count


 3.47 L


 3.80-5.11


10^6/uL


 


Hemoglobin 10.9 L 11.5-16.0  g/dL


 


Hematocrit 32 L 35-52  %


 


Mean Corpuscular Volume 92  80-99  fL


 


Mean Corpuscular Hemoglobin 31  25-34  pg


 


Mean Corpuscular Hemoglobin


Concent 34 


 32-36  g/dL





 


Red Cell Distribution Width 13.6  10.0-14.5  %


 


Platelet Count


 163 


 130-400


10^3/uL


 


Mean Platelet Volume 9.1  9.0-12.2  fL


 


Immature Granulocyte % (Auto) 0   %


 


Neutrophils (%) (Auto) 51  42-75  %


 


Lymphocytes (%) (Auto) 41  12-44  %


 


Monocytes (%) (Auto) 5  0-12  %


 


Eosinophils (%) (Auto) 3  0-10  %


 


Basophils (%) (Auto) 0  0-10  %


 


Neutrophils # (Auto)


 2.7 


 1.8-7.8


10^3/uL


 


Lymphocytes # (Auto)


 2.2 


 1.0-4.0


10^3/uL


 


Monocytes # (Auto)


 0.3 


 0.0-1.0


10^3/uL


 


Eosinophils # (Auto)


 0.2 


 0.0-0.3


10^3/uL


 


Basophils # (Auto)


 0.0 


 0.0-0.1


10^3/uL


 


Immature Granulocyte # (Auto)


 0.0 


 0.0-0.1


10^3/uL


 


Prothrombin Time 15.0 H 12.2-14.7  SEC


 


INR Comment 1.2  0.8-1.4  


 


Activated Partial


Thromboplast Time 36 H


 24-35  SEC





 


Sodium Level 143  135-145  MMOL/L


 


Potassium Level 4.8  3.6-5.0  MMOL/L


 


Chloride Level 106    MMOL/L


 


Carbon Dioxide Level 26  21-32  MMOL/L


 


Anion Gap 11  5-14  MMOL/L


 


Blood Urea Nitrogen 18  7-18  MG/DL


 


Creatinine


 0.95 


 0.60-1.30


MG/DL


 


Estimat Glomerular Filtration


Rate 64 


  





 


BUN/Creatinine Ratio 19   


 


Glucose Level 123 H   MG/DL


 


Calcium Level 9.6  8.5-10.1  MG/DL


 


Corrected Calcium 9.5  8.5-10.1  MG/DL


 


Magnesium Level 1.9  1.6-2.4  MG/DL


 


Total Bilirubin 0.4  0.1-1.0  MG/DL


 


Aspartate Amino Transf


(AST/SGOT) 23 


 5-34  U/L





 


Alanine Aminotransferase


(ALT/SGPT) 9 


 0-55  U/L





 


Alkaline Phosphatase 71    U/L


 


Myoglobin


 24.3 


 10.0-92.0


NG/ML


 


Troponin I < 0.028  <0.028  NG/ML


 


Total Protein 7.4  6.4-8.2  GM/DL


 


Albumin 4.1  3.2-4.5  GM/DL








My Orders





Orders - GEOVANY APODACA


Cbc With Automated Diff (23 15:55)


Magnesium (23 15:55)


Chest 1 View, Ap/Pa Only (23 15:55)


Comprehensive Metabolic Panel (23 15:55)


Myoglobin Serum (23 15:55)


Protime With Inr (23 15:55)


Partial Thromboplastin Time (23 15:55)


O2 (23 15:55)


Monitor-Rhythm Ecg Trace Only (23 15:55)


Ed Iv/Invasive Line Start (23 15:55)


Troponin I Carbon (23 15:55)


Aspirin Chewable Tablet (Baby Aspirin Ch (23 16:00)





Medications Given in ED





Current Medications








 Medications  Dose


 Ordered  Sig/Chris


 Route  Start Time


 Stop Time Status Last Admin


Dose Admin


 


 Aspirin  324 mg  ONCE  ONCE


 PO  23 16:00


 23 16:01 DC 23 16:05


324 MG








Vital Signs/I&O











 23





 15:55 17:05 17:06


 


Temp 36.7  


 


Pulse 82  83


 


Resp 20  20


 


B/P (MAP)   109/55


 


Pulse Ox 100  97


 


O2 Delivery Room Air Room Air Room Air











Progress


Progress Note :  


   Time:  15:55


Progress Note


Patient assessed, labs will be obtained, aspirin 324 mg, chest x-ray and EKG.  

No ST elevation noted on EKG.


1645 labs normal with no elevation in troponin.  Patient continues to deny chest

pain but states generalized pain and points to different areas of her body with 

pain.  She transferred from bed to wheelchair with minimal assistance. 


1700 [facility] notified, will return to pick patient up. 


1720 patient in wheelchair, having crackers. No complaints, transferred to 

toilet with min assistance. 


1725 [facility] staff here to transport.


Initial ECG Impression Date:  May 24, 2023


Initial ECG Impression Time:  15:59


Initial ECG Rate:  82


Initial ECG Rhythm:  Normal Sinus


Initial ECG Intervals:  Normal


Initial ECG Intervals


, QRS D 93, , QTc 414.  Axis P216, RR 72, T85.


Initial ECG Impression:  Normal


Initial ECG Comparisson:  Unchanged





Diagnostic Imaging





   Diagonstic Imaging:  Xray


   Plain Films/CT/US/NM/MRI:  chest


Comments





NAME:   RICK POWERS SAIMA


MED REC#:   [id number]


ACCOUNT#:   [id number]


PT STATUS:   REG ER


:   1950


PHYSICIAN:   GEOVANY APODACA


ADMIT DATE:   23/ER


                                  ***Signed***


Date of Exam:23





CHEST 1 VIEW, AP/PA ONLY








CHEST 1 VIEW, AP/PA ONLY





Indication: Chest pain. 





Comparison: 08/15/2022





Findings:


No focal airspace disease in the visualized lungs. No pleural


effusion or pneumothorax. Heart is normal in size. Stable left


pectoral transvenous pacemaker.





Impression: 


1. No acute cardiopulmonary process by portable radiography.





Dictated by: 





  Dictated on workstation # [id number]








Dict:   23 1622


Trans:   23 1622


[facility] [id number]





Interpreted by:     GEO BERG MD


Electronically signed by: GEO BERG MD 23


   Reviewed:  Reviewed by Me





Departure


Impression





   Primary Impression:  


   Chest pain


   Qualified Codes:  R07.9 - Chest pain, unspecified


   Additional Impression:  


   Dementia


   Qualified Codes:  F03.B3 - Unspecified dementia, moderate, with mood 

   disturbance


Disposition:  01 HOME, SELF-CARE


Condition:  Stable





Departure-Patient Inst.


Decision time for Depature:  16:40


Referrals:  


IRIS RUIZ MD (PCP/Family)


Primary Care Physician


Patient Instructions:  Chest Pain That Is Not Caused by the Heart (DC)





Add. Discharge Instructions:  


Continue activity as tolerated.


Continue all home medications.  If patient is able to eat she should be able to 

take her medications.


Follow-up with Dr. Ruiz if symptoms or not improving or worsen.


Return to the emergency department for new, urgent healthcare needs.





All discharge instructions reviewed with patient and/or family. Voiced 

understanding.











GEOVANY APODACA                  May 24, 2023 16:11

## 2023-12-04 NOTE — ED GENERAL
General


Chief Complaint:  Glucose Problems


Stated Complaint:  LOW BLOOD SUGAR


Nursing Triage Note:  


PATIENT ARRIVED FROM [facility], PATIENT WAS GIVEN 10 NOVOLOG 


BEFORE DINNER, DID NOT EAT DINNER, FOUND UNRESPONSIVE BS 42, GIVEN IM GLUCAGON 


, 185 BS 62, RESPONSIVE WITH STERNAL RUB. 











EMS  FSBS 127, UNRESPONIVE EXCEPT TO PAINFUL STIMULI





 FSBS 97


Source of Information:  EMS


Exam Limitations:  Physical Impairments





History of Present Illness


Date Seen by Provider:  Dec 4, 2023


Time Seen by Provider:  19:34


Initial Comments


Patient is a 73-year-old female presents from [facility] by 

ambulance with chief complaint hypoglycemia and altered mental status.  

According to the record she has a history of dementia and is on the dementia 

care unit.  She was given 10 units of insulin prior to dinner but decided not to

eat.  Staff later went down to her room, checked on her and found her poorly 

responsive with low blood sugar.  She was given glucagon IM, blood sugar came up

to 62.  She was reportedly responsive to sternal rub, localizing pain.  EMS had 

blood sugars in the 100 range.  On arrival patient's vital signs are stable.  

She appears to be intentionally not responding.  She squeezes her eyes closed 

when I attempt to open her lids to evaluate pupils.  She grabs at this examiner 

with nailbed pressure.  Says "ow".





She will not answer any questions, will not nod her head yes or no.  Completely 

mute.





HPI, review of systems unobtainable from the patient.


Timing/Duration:  1-3 Hours


Severity:  Severe





Allergies and Home Medications


Allergies


Coded Allergies:  


     promethazine (Verified  Allergy, Intermediate, 3/11/08)


     clindamycin (Verified  Allergy, Unknown, 3/7/21)


     hydrocodone (Verified  Allergy, Unknown, 3/7/21)


     morphine (Verified  Allergy, Unknown, 3/7/21)





Patient Home Medication List


Home Medication List Reviewed:  Yes


Amoxicillin/Potassium Clav (Augmentin 500-125 Tablet) 500 Mg-125 Mg Tablet, 1 

EACH PO BID


   Prescribed by: MIHAELA CAPPS MD on 10/8/22 141


Celecoxib (Celecoxib) 100 Mg Capsule, 100 MG PO BID PRN for ARTHRITIS PAIN, 

(Reported)


   Entered as Reported by: ANIRUDH GODOY on 1/3/22 2154


Cephalexin (Cephalexin) 500 Mg Tablet, 500 MG PO TID


   Prescribed by: KRISTI ALCALA on 23


Colchicine (Colchicine) 0.6 Mg Tablet, 0.6 MG PO BID, (Reported)


   Entered as Reported by: ANIRUDH GODOY on 1/3/22 2154


Insulin Aspart (Novolog) 100 Unit/1 Ml Susp, UNITS SC PER PUMP, (Reported)


   Entered as Reported by: DAKOTA BALL on 3/8/21 100


Metoprolol Succinate (Metoprolol Succinate) 50 Mg Tab.er.24h, 50 MG PO DAILY, 

(Reported)


   Entered as Reported by: DAKOTA BALL on 3/8/21 100


Potassium Chloride (K-Tab ER) 10 Meq Tablet.er, 10 MEQ PO DAILY, (Reported)


   Entered as Reported by: DAKOTA BALL on 3/8/21 1009


Pravastatin Sodium (Pravastatin Sodium) 20 Mg Tablet, 20 MG PO HS, (Reported)


   Entered as Reported by: DAKOTA BALL on 3/8/21 100


Quetiapine Fumarate (Seroquel) 25 Mg Tablet, 25 MG PO HS


   Prescribed by: CINDY MICHEL on 22


Sacubitril/Valsartan (Entresto 24 mg-26 mg Tablet) 1 Each Tablet, 1 EA PO BID, 

(Reported)


   Entered as Reported by: DAKOTA BALL on 3/8/21 1009


Terbinafine HCl (Terbinafine HCl) 250 Mg Tablet, 250 MG PO DAILY, (Reported)


   Entered as Reported by: ANIRUDH GODOY on 1/3/22 2154





Review of Systems


Review of Systems


Constitutional:  see HPI


Review of systems unobtainable secondary to behavior disturbance/dementia





Past Medical-Social-Family Hx


Immunizations Up To Date


Tetanus Booster (TDap):  Unknown


First/Initial COVID19 Vaccinat:  


Second COVID19 Vaccination Rodger:  


Third COVID19 Vaccination Date:  





Seasonal Allergies


Seasonal Allergies:  No





Past Medical History


Surgery/Hospitalization HX:  


cardiac ablation, ppm, c-sect, hysterectomy, cholecystectomy, lumbar 


laminectomy, heart cath, htn, iddm, gerd, high cholesterol, a-fib, peripheral 


neuropathy, cva, ENDOCARDITIS, DM, ANXIETY


Surgeries:  Yes


Cardiac,  Section, Gallbladder, Hysterectomy, Orthopedic, Pacemaker


Respiratory:  Yes


COPD


Cardiac:  Yes (PACEMAKER, CARDIAC ABLATION, MAZE PROCEDURE, LEFT APPENDAGE 

CLIPPED)


Atrial Fibrillation, High Cholesterol, Hypertension, Irregular Heartbeat


Neurological:  Yes (PERIPHERAL NEUROPATHY IN HANDS AND FEET;HX OF CVA -NO 

RESIDUAL)


Neuropathy, Stroke


Reproductive Disorders:  No


Genitourinary:  Yes


Bladder Infection


Gastrointestinal:  Yes


Gastroesophageal Reflux


Musculoskeletal:  Yes


Chronic Back Pain


Endocrine:  Yes (INSULIN PUMP)


Diabetes, Insulin dep


HEENT:  Yes (DIABETIC RETINOPATHY)


Cancer:  No


Psychosocial:  No


Integumentary:  No


Blood Disorders:  No





Family Medical History


No Pertinent Family Hx





PAST SURGICAL HISTORY:


-LEFT EYE SURGERY FOR RETINAL BLEED 3 YEARS AGO


- X 2  IN , 


-HYSTERECTOMY 


-CHOLECYSTECTOMY ( OPEN ) 


-LEFT FOOT SURGERY 7 YEARS AGO


-LUMBAR LAMINECTOMY 20 YEARS AGO


-PACEMAKER 4 YEARS AGO


-CARDIAC ABLATION 2019


-MAZE PROCEDURE 2020


-LEFT LATERAL APPENDAGE CLIP 2020


-PT REPORTS CARDIAC CATH DONE IN  IN  SHOWED NO SIGNIFICANT CAD. 





ECHOCARDIOGRAM 21--EF 55-60%


MPI 21--NO INSCHEMIA OR INFARCTIONS





Physical Exam


Vital Signs





Vital Signs - First Documented








 23





 19:34


 


Pulse 62


 


Resp 16


 


B/P (MAP) 147/62 (90)


 


Pulse Ox 100


 


O2 Delivery Room Air





Capillary Refill : Less Than 3 Seconds


Height, Weight, BMI


Height: 5'1.00"


Weight: 151lbs. 0.0oz. 68.950280qg; 19.00 BMI


Method:Stated


General Appearance:  No Apparent Distress, Thin


Eyes:  Bilateral Eye Normal Inspection, Bilateral Eye PERRL, Bilateral Eye EOMI


HEENT:  PERRL/EOMI, Other (Mildly dry mucous membranes)


Respiratory:  Lungs Clear, Normal Breath Sounds, No Accessory Muscle Use, No 

Respiratory Distress


Cardiovascular:  Regular Rate, Rhythm, Normal Peripheral Pulses


Gastrointestinal:  Normal Bowel Sounds, Soft; No Distended


Genital/Rectal:  Normal Genital Exam


Extremity:  Normal Capillary Refill, Normal Inspection, No Pedal Edema


Neurologic/Psychiatric:  Other (Patient will not open her eyes to command.  She 

squeezes her eyes shut to painful stimuli i.e. sternal rub, nailbed pressure.  

She reaches for this examiner's hand when I try to stimulate her.  She said "ow"

on sternal rub.  She withdraws to painful stimuli of all 4 extremities.  Refuses

to open her eyes and answer questions)





Progress/Results/Core Measures


Suspected Sepsis


SIRS


Temperature: 


Pulse: 62 


Respiratory Rate: 16


 


Laboratory Tests


23 19:39: White Blood Count 6.3


Blood Pressure 147 /62 


Mean: 90


 





Laboratory Tests


23 19:39: 


Creatinine 1.10, Platelet Count 144, Total Bilirubin 0.2








Results/Orders


Lab Results





Laboratory Tests








Test


 23


19:34 23


19:39 23


19:42 23


20:25 Range/Units


 


 


Glucometer 113 H   82    MG/DL


 


White Blood Count


 


 6.3 


 


 


 4.3-11.0


10^3/uL


 


Red Blood Count


 


 3.25 L


 


 


 3.80-5.11


10^6/uL


 


Hemoglobin  10.0 L   11.5-16.0  g/dL


 


Hematocrit  31 L   35-52  %


 


Mean Corpuscular Volume  95    80-99  fL


 


Mean Corpuscular Hemoglobin  31    25-34  pg


 


Mean Corpuscular Hemoglobin


Concent 


 33 


 


 


 32-36  g/dL





 


Red Cell Distribution Width  12.9    10.0-14.5  %


 


Platelet Count


 


 144 


 


 


 130-400


10^3/uL


 


Mean Platelet Volume  9.2    9.0-12.2  fL


 


Immature Granulocyte % (Auto)  0     %


 


Neutrophils (%) (Auto)  74    42-75  %


 


Lymphocytes (%) (Auto)  19    12-44  %


 


Monocytes (%) (Auto)  7    0-12  %


 


Eosinophils (%) (Auto)  1    0-10  %


 


Basophils (%) (Auto)  0    0-10  %


 


Neutrophils # (Auto)


 


 4.6 


 


 


 1.8-7.8


10^3/uL


 


Lymphocytes # (Auto)


 


 1.2 


 


 


 1.0-4.0


10^3/uL


 


Monocytes # (Auto)


 


 0.4 


 


 


 0.0-1.0


10^3/uL


 


Eosinophils # (Auto)


 


 0.0 


 


 


 0.0-0.3


10^3/uL


 


Basophils # (Auto)


 


 0.0 


 


 


 0.0-0.1


10^3/uL


 


Immature Granulocyte # (Auto)


 


 0.0 


 


 


 0.0-0.1


10^3/uL


 


Sodium Level  146 H   135-145  MMOL/L


 


Potassium Level  3.1 L   3.6-5.0  MMOL/L


 


Chloride Level  109 H     MMOL/L


 


Carbon Dioxide Level  27    21-32  MMOL/L


 


Anion Gap  10    5-14  MMOL/L


 


Blood Urea Nitrogen  45 H   7-18  MG/DL


 


Creatinine


 


 1.10 


 


 


 0.60-1.30


MG/DL


 


Estimat Glomerular Filtration


Rate 


 53 


 


 


  





 


BUN/Creatinine Ratio  41     


 


Glucose Level  90      MG/DL


 


Calcium Level  9.3    8.5-10.1  MG/DL


 


Corrected Calcium  9.3    8.5-10.1  MG/DL


 


Total Bilirubin  0.2    0.1-1.0  MG/DL


 


Aspartate Amino Transf


(AST/SGOT) 


 27 


 


 


 5-34  U/L





 


Alanine Aminotransferase


(ALT/SGPT) 


 18 


 


 


 0-55  U/L





 


Alkaline Phosphatase  62      U/L


 


Total Protein  7.3    6.4-8.2  GM/DL


 


Albumin  4.0    3.2-4.5  GM/DL


 


Urine Color   YELLOW    


 


Urine Clarity   CLOUDY    


 


Urine pH   6.5   5-9  


 


Urine Specific Gravity   1.020   1.016-1.022  


 


Urine Protein   NEGATIVE   NEGATIVE  


 


Urine Glucose (UA)   NEGATIVE   NEGATIVE  


 


Urine Ketones   NEGATIVE   NEGATIVE  


 


Urine Nitrite   NEGATIVE   NEGATIVE  


 


Urine Bilirubin   NEGATIVE   NEGATIVE  


 


Urine Urobilinogen   0.2   < = 1.0  MG/DL


 


Urine Leukocyte Esterase   1+ H  NEGATIVE  


 


Urine RBC (Auto)   NEGATIVE   NEGATIVE  


 


Urine RBC   NONE    /HPF


 


Urine WBC    H   /HPF


 


Urine Squamous Epithelial


Cells 


 


 0-2 


 


  /HPF





 


Urine Crystals   NONE    /LPF


 


Urine Bacteria   LARGE H   /HPF


 


Urine Casts   NONE    /LPF


 


Urine Mucus   NEGATIVE    /LPF


 


Urine Culture Indicated   YES    


 


Test


 23


22:04 


 


 


 Range/Units


 


 


Glucometer 91       MG/DL








My Orders





Orders - KRISTI ALCALA MD


Ed Iv/Invasive Line Start (23 19:44)


Cbc And Automated Diff (23 19:44)


Comprehensive Metabolic Panel (23 19:44)


Ua Culture If Indicated (23 19:44)


Urine Culture (23 19:42)


Ceftriaxone  Iv/Im (Ceftriaxone  Iv/Im) (23 21:15)





Medications Given in ED





Current Medications








 Medications  Dose


 Ordered  Sig/Chris


 Route  Start Time


 Stop Time Status Last Admin


Dose Admin


 


 Ceftriaxone


 Sodium 1000 mg/


 Sodium Chloride  50 ml @ 


 100 mls/hr  ONCE  ONCE


 IV  23 21:15


 23 21:44 DC 23 21:19


100 MLS/HR








Vital Signs/I&O











 23





 19:34


 


Pulse 62


 


Resp 16


 


B/P (MAP) 147/62 (90)


 


Pulse Ox 100


 


O2 Delivery Room Air





Capillary Refill : Less Than 3 Seconds








Blood Pressure Mean:                    90











Point of Care Testing


Finger Stick Blood Glucose:  113


Progress Note :  


   Time:  21:49


Progress Note


Patient seen and evaluated by me.  Evaluation today includes history and 

physical exam.  History obtained from EMS as the patient is not answering ques

tions.  CBC, comprehensive metabolic panel and urinalysis are ordered.  

Patient's blood sugar was checked again on arrival, 113.  Vital signs stable.  

Mucous membranes a little bit dry.  Heart is regular, not tachycardic.  Blood 

pressure is good, 140s over 60s.  Room air sats 100%.  Lungs are clear.  Heart 

rhythm is regular.  Abdomen is soft and apparently nondistended.  She does w

ithdraw to nailbed pressure to all 4 extremities.  Again the patient grimaces 

and squeezes her eyes shut as I am trying to evaluate pupils.





Differential diagnosis includes behavior disturbance, infection such as urinary 

tract, hypoglycemia; acute stroke





Labs independently reviewed and interpreted by me.  Her CBC is normal except for

slightly low hemoglobin at 10, hematocrit at 31.  Her chemistry shows slightly 

low potassium at 3.1.  BUN is 45, creatinine 1.1.  Serum blood sugar was 90.  

Her urine shows evidence of infection with 1+ leukocyte Estrace,  white 

blood cells and large bacteria, this is a catheterized specimen.  Patient was 

monitored in the emergency department, we were hoping that she would slowly 

start to become more responsive.  After labs were obtained, serial blood sugars 

were monitored.  She stayed within normal range.  She was given a gram of 

Rocephin IV for her urinary tract infection.  Her nurse, Shwetha RN brought in 8 

ounces of orange juice and when the straw was put to her lips she drank it all 

down.  Suspect that this "altered mental status"/poor responsiveness is all 

behavioral due to her dementia.  No clinical or objective findings to warrant 

further studies from the emergency department at this time.  Consideration for 

head CT however history and physical examination did not support the need at 

this time.  Report is called to the nursing home and she will be transported 

back to [facility] and rehab.





Departure


Impression





   Primary Impression:  


   UTI (urinary tract infection)


   Qualified Codes:  N30.00 - Acute cystitis without hematuria


Disposition:   HOME, SELF-CARE


Condition:  Stable





Departure-Patient Inst.


Decision time for Depature:  21:49


Referrals:  


IRIS RUIZ MD (PCP/Family)


Primary Care Physician


Patient Instructions:  Urinary Tract Infection, Adult ED





Add. Discharge Instructions:  


Ambar has a urinary tract infection this evening.  She was given 1 g of 

Rocephin IV.  She has been sent a prescription for cephalexin 500 mg tablets, 

one 3 times a day for 5 days starting tomorrow afternoon.





Basic laboratory studies ordered, CBC, comprehensive metabolic panel and UA.  

Her blood work is all within normal range.





Neurologic exam does not reveal any concerning findings for acute ischemic or 

hemorrhagic stroke.  I believe her altered mental status is more behavioral at 

this point.





Frequent blood sugar checks.  Encourage oral intake.





Return to the emergency department for any new, concerning or emergent 

complaints.


Scripts


Cephalexin (Cephalexin) 500 Mg Tablet


500 MG PO TID for 5 Days, #15 TAB 0 Refills


   Prov: KRISTI ALCALA MD         23





Copy


Copies To 1:   IRIS RUIZ MD, KATHRYN M MD          Dec 4, 2023 19:44 11:00